# Patient Record
Sex: MALE | Race: WHITE | NOT HISPANIC OR LATINO | Employment: UNEMPLOYED | URBAN - METROPOLITAN AREA
[De-identification: names, ages, dates, MRNs, and addresses within clinical notes are randomized per-mention and may not be internally consistent; named-entity substitution may affect disease eponyms.]

---

## 2016-05-12 LAB
ALT SERPL-CCNC: 30 U/L (ref 9–46)
AST SERPL-CCNC: 36 U/L (ref 10–35)
CHOLEST SERPL-MCNC: 180 MG/DL (ref 125–200)
CREAT SERPL-MCNC: 1.03 MG/DL (ref 0.7–1.33)
GFR SERPL CREATININE-BSD FRML MDRD: 80 ML/MIN/1.73M2
GLUCOSE SERPL-MCNC: 90 MG/DL (ref 65–99)
HBA1C MFR BLD: 5.2 % (ref 0–5.7)
HDLC SERPL-MCNC: 67 MG/DL
LDLC SERPL CALC-MCNC: 95 MG/DL
NONHDLC SERPL-MCNC: 113 MG/DL
POTASSIUM SERPL-SCNC: 4.8 MMOL/L (ref 3.5–5.3)
TRIGL SERPL-MCNC: 89 MG/DL
TSH SERPL-ACNC: 9.22 MCU/ML (ref 0.4–4.5)

## 2017-07-18 LAB
ALT SERPL-CCNC: 20 U/L (ref 9–46)
AST SERPL-CCNC: 27 U/L (ref 10–35)
CREAT SERPL-MCNC: 1.1 MG/DL (ref 0.7–1.33)
GFR SERPL CREATININE-BSD FRML MDRD: 74 ML/MIN/1.73M2
GLUCOSE SERPL-MCNC: 68 MG/DL (ref 65–99)
POTASSIUM SERPL-SCNC: 4.7 MMOL/L (ref 3.5–5.3)
TSH SERPL-ACNC: 11.36 MIU/L (ref 0.4–4.5)

## 2017-08-16 ENCOUNTER — TRANSFERRED RECORDS (OUTPATIENT)
Dept: HEALTH INFORMATION MANAGEMENT | Facility: CLINIC | Age: 57
End: 2017-08-16

## 2017-08-16 LAB — TSH SERPL-ACNC: 12.34 UIU/ML (ref 0.3–5)

## 2017-10-25 LAB — TSH SERPL-ACNC: 3.81 UIU/ML (ref 0.3–5)

## 2018-03-14 ENCOUNTER — TRANSFERRED RECORDS (OUTPATIENT)
Dept: HEALTH INFORMATION MANAGEMENT | Facility: CLINIC | Age: 58
End: 2018-03-14

## 2018-03-14 LAB — TSH SERPL-ACNC: 11.08 UIU/ML (ref 0.3–5)

## 2018-03-27 ENCOUNTER — TRANSFERRED RECORDS (OUTPATIENT)
Dept: HEALTH INFORMATION MANAGEMENT | Facility: CLINIC | Age: 58
End: 2018-03-27

## 2018-03-27 LAB
ALT SERPL-CCNC: 19 U/L (ref 9–46)
AST SERPL-CCNC: 22 U/L (ref 10–35)
CHOLEST SERPL-MCNC: 232 MG/DL
CREAT SERPL-MCNC: 1.05 MG/DL (ref 0.7–1.33)
GFR SERPL CREATININE-BSD FRML MDRD: 78 ML/MIN/1.73M2
GLUCOSE SERPL-MCNC: 73 MG/DL (ref 65–99)
HDLC SERPL-MCNC: 62 MG/DL
LDLC SERPL CALC-MCNC: 126 MG/DL
NONHDLC SERPL-MCNC: 170 MG/DL
POTASSIUM SERPL-SCNC: 4.3 MMOL/L (ref 3.5–5.3)
TRIGL SERPL-MCNC: 284 MG/DL

## 2018-04-03 ENCOUNTER — TRANSFERRED RECORDS (OUTPATIENT)
Dept: HEALTH INFORMATION MANAGEMENT | Facility: CLINIC | Age: 58
End: 2018-04-03

## 2018-04-17 ENCOUNTER — OFFICE VISIT (OUTPATIENT)
Dept: URGENT CARE | Facility: URGENT CARE | Age: 58
End: 2018-04-17
Payer: COMMERCIAL

## 2018-04-17 ENCOUNTER — NURSE TRIAGE (OUTPATIENT)
Dept: NURSING | Facility: CLINIC | Age: 58
End: 2018-04-17

## 2018-04-17 VITALS
OXYGEN SATURATION: 90 % | BODY MASS INDEX: 32.08 KG/M2 | HEART RATE: 85 BPM | HEIGHT: 74 IN | TEMPERATURE: 101.3 F | RESPIRATION RATE: 18 BRPM | SYSTOLIC BLOOD PRESSURE: 161 MMHG | DIASTOLIC BLOOD PRESSURE: 93 MMHG | WEIGHT: 250 LBS

## 2018-04-17 DIAGNOSIS — R05.9 COUGH: ICD-10-CM

## 2018-04-17 DIAGNOSIS — R50.9 FEVER CHILLS: Primary | ICD-10-CM

## 2018-04-17 LAB
BASOPHILS # BLD AUTO: 0 10E9/L (ref 0–0.2)
BASOPHILS NFR BLD AUTO: 0.3 %
DIFFERENTIAL METHOD BLD: ABNORMAL
EOSINOPHIL # BLD AUTO: 0 10E9/L (ref 0–0.7)
EOSINOPHIL NFR BLD AUTO: 0.5 %
ERYTHROCYTE [DISTWIDTH] IN BLOOD BY AUTOMATED COUNT: 12.9 % (ref 10–15)
FLUAV+FLUBV AG SPEC QL: NEGATIVE
FLUAV+FLUBV AG SPEC QL: NEGATIVE
HCT VFR BLD AUTO: 41.1 % (ref 40–53)
HGB BLD-MCNC: 13.9 G/DL (ref 13.3–17.7)
LYMPHOCYTES # BLD AUTO: 0.9 10E9/L (ref 0.8–5.3)
LYMPHOCYTES NFR BLD AUTO: 14.3 %
MCH RBC QN AUTO: 32.1 PG (ref 26.5–33)
MCHC RBC AUTO-ENTMCNC: 33.8 G/DL (ref 31.5–36.5)
MCV RBC AUTO: 95 FL (ref 78–100)
MONOCYTES # BLD AUTO: 0.6 10E9/L (ref 0–1.3)
MONOCYTES NFR BLD AUTO: 9.5 %
NEUTROPHILS # BLD AUTO: 4.8 10E9/L (ref 1.6–8.3)
NEUTROPHILS NFR BLD AUTO: 75.4 %
PLATELET # BLD AUTO: 153 10E9/L (ref 150–450)
RBC # BLD AUTO: 4.33 10E12/L (ref 4.4–5.9)
SPECIMEN SOURCE: NORMAL
WBC # BLD AUTO: 6.3 10E9/L (ref 4–11)

## 2018-04-17 PROCEDURE — 99214 OFFICE O/P EST MOD 30 MIN: CPT | Performed by: PHYSICIAN ASSISTANT

## 2018-04-17 PROCEDURE — 36415 COLL VENOUS BLD VENIPUNCTURE: CPT | Performed by: PHYSICIAN ASSISTANT

## 2018-04-17 PROCEDURE — 85025 COMPLETE CBC W/AUTO DIFF WBC: CPT | Performed by: PHYSICIAN ASSISTANT

## 2018-04-17 PROCEDURE — 87804 INFLUENZA ASSAY W/OPTIC: CPT | Performed by: PHYSICIAN ASSISTANT

## 2018-04-17 RX ORDER — CODEINE PHOSPHATE AND GUAIFENESIN 10; 100 MG/5ML; MG/5ML
1-2 SOLUTION ORAL EVERY 6 HOURS PRN
Qty: 236 ML | Refills: 0 | Status: SHIPPED | OUTPATIENT
Start: 2018-04-17 | End: 2018-08-29

## 2018-04-17 RX ORDER — ALBUTEROL SULFATE 90 UG/1
2 AEROSOL, METERED RESPIRATORY (INHALATION) EVERY 6 HOURS
Qty: 1 INHALER | Refills: 0 | Status: SHIPPED | OUTPATIENT
Start: 2018-04-17 | End: 2018-08-29

## 2018-04-17 NOTE — LETTER
[facility]  [address] [address]  [phone]      April 17, 2018    RE:  Nicolás Fermin                                                                                                                                                       [address] [facility] [address]            To whom it may concern:    Nicolás Fermin was seen in the urgent care today for an influenza like illness. He will be able to return to work when his fever has resolved.         Sincerely,        Salvador Sanchez [facility]

## 2018-04-17 NOTE — PROGRESS NOTES
"SUBJECTIVE:   Nicolás Fermin is a 58 year old male presenting with a chief complaint of fever, body aches, runny nose and cough.  Onset of symptoms was 3-4 days.  Course of illness is same.    Severity moderate  Current and Associated symptoms: dry coughing  Treatment measures tried include OTC medications.  Predisposing factors include none.    Past Medical History:   Diagnosis Date     Asymptomatic human immunodeficiency virus (HIV) infection status (H)      ALLERGIES   No Known Allergies      Social History   Substance Use Topics     Smoking status: Current Every Day Smoker     Smokeless tobacco: Never Used      Comment: 15 cigarettes a day     Alcohol use Not on file       ROS:  CONSTITUTIONAL:POSITIVE  for fever  INTEGUMENTARY/SKIN: NEGATIVE for worrisome rashes, moles or lesions  ENT/MOUTH: POSITIVE for runny nose  RESP:POSITIVE for cough-non productive  CV: NEGATIVE for chest pain, palpitations or peripheral edema  GI: NEGATIVE for nausea, abdominal pain, heartburn, or change in bowel habits  MUSCULOSKELETAL: POSITIVE  for body aches  NEURO: NEGATIVE for weakness, dizziness or paresthesias    OBJECTIVE  :BP (!) 161/93  Pulse 85  Temp 101.3  F (38.5  C) (Oral)  Resp 18  Ht 6' 2\" (1.88 m)  Wt 250 lb (113.4 kg)  SpO2 90%  BMI 32.1 kg/m2  GENERAL APPEARANCE: healthy, alert and no distress  EYES: EOMI,  PERRL, conjunctiva clear  HENT: ear canals and TM's normal.  Nose and mouth without ulcers, erythema or lesions  NECK: supple, nontender, no lymphadenopathy  RESP: lungs clear to auscultation - no rales, rhonchi or wheezes  CV: regular rates and rhythm, normal S1 S2, no murmur noted  ABDOMEN:  soft, nontender, no HSM or masses and bowel sounds normal  MS: extremities normal- no gross deformities noted, no erythema, FROM noted in all extremities  NEURO: Normal strength and tone, sensory exam grossly normal,  normal speech and mentation  SKIN: no suspicious lesions or rashes    Results for orders placed or " performed in visit on 04/17/18   CBC with platelets differential   Result Value Ref Range    WBC 6.3 4.0 - 11.0 10e9/L    RBC Count 4.33 (L) 4.4 - 5.9 10e12/L    Hemoglobin 13.9 13.3 - 17.7 g/dL    Hematocrit 41.1 40.0 - 53.0 %    MCV 95 78 - 100 fl    MCH 32.1 26.5 - 33.0 pg    MCHC 33.8 31.5 - 36.5 g/dL    RDW 12.9 10.0 - 15.0 %    Platelet Count 153 150 - 450 10e9/L    Diff Method Automated Method     % Neutrophils 75.4 %    % Lymphocytes 14.3 %    % Monocytes 9.5 %    % Eosinophils 0.5 %    % Basophils 0.3 %    Absolute Neutrophil 4.8 1.6 - 8.3 10e9/L    Absolute Lymphocytes 0.9 0.8 - 5.3 10e9/L    Absolute Monocytes 0.6 0.0 - 1.3 10e9/L    Absolute Eosinophils 0.0 0.0 - 0.7 10e9/L    Absolute Basophils 0.0 0.0 - 0.2 10e9/L   Influenza A/B antigen   Result Value Ref Range    Influenza A/B Agn Specimen Nasal     Influenza A Negative NEG^Negative    Influenza B Negative NEG^Negative       ASSESSMENT/PLAN:      ICD-10-CM    1. Fever chills R50.9 Emtricitabine-Tenofovir AF (DESCOVY PO)     Dolutegravir Sodium (TIVICAY PO)     Influenza A/B antigen     CBC with platelets differential   2. Cough R05           Influenza A/B antigen     CBC with platelets differential     guaiFENesin-codeine (ROBITUSSIN AC) 100-10 MG/5ML SOLN solution     albuterol (PROVENTIL HFA) 108 (90 Base) MCG/ACT Inhaler     Robitussin ac for coughing  proventil for chest tightness  Fluids, rest  Follow up as needed  See orders in Epic

## 2018-04-17 NOTE — MR AVS SNAPSHOT
"              After Visit Summary   2018    Nicolás Fermin    MRN: [id number]           Patient Information     Date Of Birth          1960        Visit Information        Provider Department      2018 12:50 PM Salvador Sanchez PA-C [facility]        Today's Diagnoses     Fever chills    -  1    Cough           Follow-ups after your visit        Who to contact     If you have questions or need follow up information about today's clinic visit or your schedule please contact [facility] directly at [phone].  Normal or non-critical lab and imaging results will be communicated to you by Echovox[employer], letter or phone within 4 business days after the clinic has received the results. If you do not hear from us within 7 days, please contact the clinic through Echovoxhart or phone. If you have a critical or abnormal lab result, we will notify you by phone as soon as possible.  Submit refill requests through [employer] or call your pharmacy and they will forward the refill request to us. Please allow 3 business days for your refill to be completed.          Additional Information About Your Visit        MyChart Information     [employer] lets you send messages to your doctor, view your test results, renew your prescriptions, schedule appointments and more. To sign up, go to [email or web address] . Click on \"Log in\" on the left side of the screen, which will take you to the Welcome page. Then click on \"Sign up Now\" on the right side of the page.     You will be asked to enter the access code listed below, as well as some personal information. Please follow the directions to create your username and password.     Your access code is: [email or web address]  Expires: 2018  3:52 PM     Your access code will  in 90 days. If you need help or a new code, please call your [place] clinic or [phone].        Care EveryWhere ID     This is your Care EveryWhere ID. " "This could be used by other organizations to access your [place] medical records  [id number]        Your Vitals Were     Pulse Temperature Respirations Height Pulse Oximetry BMI (Body Mass Index)    85 101.3  F (38.5  C) (Oral) 18 6' 2\" (1.88 m) 90% 32.1 kg/m2       Blood Pressure from Last 3 Encounters:   04/17/18 (!) 161/93   11/24/15 150/80    Weight from Last 3 Encounters:   04/17/18 250 lb (113.4 kg)   11/24/15 207 lb (93.9 kg)              We Performed the Following     CBC with platelets differential     Influenza A/B antigen          Today's Medication Changes          These changes are accurate as of 4/17/18  3:52 PM.  If you have any questions, ask your nurse or doctor.               Start taking these medicines.        Dose/Directions    albuterol 108 (90 Base) MCG/ACT Inhaler   Commonly known as:  PROVENTIL HFA   Used for:  Cough   Started by:  Salvador Sanchez PA-C        Dose:  2 puff   Inhale 2 puffs into the lungs every 6 hours   Quantity:  1 Inhaler   Refills:  0       guaiFENesin-codeine 100-10 MG/5ML Soln solution   Commonly known as:  ROBITUSSIN AC   Used for:  Cough   Started by:  Salvador Sanchez PA-C        Dose:  1-2 tsp.   Take 5-10 mLs by mouth every 6 hours as needed for cough   Quantity:  236 mL   Refills:  0            Where to get your medicines      These medications were sent to [id number] [place], [place] - [address] [address]     Phone:  [phone]     albuterol 108 (90 Base) MCG/ACT Inhaler         Some of these will need a paper prescription and others can be bought over the counter.  Ask your nurse if you have questions.     Bring a paper prescription for each of these medications     guaiFENesin-codeine 100-10 MG/5ML Soln solution                Primary Care Provider Fax #    Physician No Ref-Primary [phone]       No address on file        Equal Access to Services     ROSSY MOSLEY AH: hayden [place] " tuckerjackie, yoselin kalouann hess, genie edouard. So [facility] [phone].    ATENCIÓN: Si erasmo jernigan, tiene a aceves disposición servicios gratuitos de asistencia lingüística. Cecilio al [phone].    We comply with applicable federal civil rights laws and [place] laws. We do not discriminate on the basis of race, color, national origin, age, disability, sex, sexual orientation, or gender identity.            Thank you!     Thank you for choosing [facility]  for your care. Our goal is always to provide you with excellent care. Hearing back from our patients is one way we can continue to improve our services. Please take a few minutes to complete the written survey that you may receive in the mail after your visit with us. Thank you!             Your Updated Medication List - Protect others around you: Learn how to safely use, store and throw away your medicines at www.disposemymeds.org.          This list is accurate as of 4/17/18  3:52 PM.  Always use your most recent med list.                   Brand Name Dispense Instructions for use Diagnosis    albuterol 108 (90 Base) MCG/ACT Inhaler    PROVENTIL HFA    1 Inhaler    Inhale 2 puffs into the lungs every 6 hours    Cough       amitriptyline 25 MG tablet    ELAVIL     Take 75 mg by mouth At Bedtime        atenolol 100 MG tablet    TENORMIN     Take 100 mg by mouth daily        ATRIPLA 600-200-300 MG per tablet   Generic drug:  efavirenz-emtrictabine-tenofovir      Take 1 tablet by mouth At Bedtime        DESCOVY PO      Take by mouth daily    Fever chills, Cough       gabapentin 300 MG capsule    NEURONTIN     Take 300 mg by mouth 2 times daily        guaiFENesin-codeine 100-10 MG/5ML Soln solution    ROBITUSSIN AC    236 mL    Take 5-10 mLs by mouth every 6 hours as needed for cough    Cough       TIVICAY PO       Fever chills, Cough       valACYclovir 1000 mg tablet    VALTREX    21 tablet    For  Shingles: Take 1 tablet by mouth three times daily for 7 days    Herpes zoster without complication       VIAGRA 50 MG tablet   Generic drug:  sildenafil      Take by mouth daily as needed for erectile dysfunction

## 2018-04-17 NOTE — TELEPHONE ENCOUNTER
Paulino, pharmacist at [facility] in [place] called asking to substitute Ventolin HFA for the ordered Proventil HFA. [facility] doesn't have the Proventil. Also, Ventolin is $30 cheaper for the patient per his insurance.  Paulino stated it is the same dosing only a different name.  I was able to okay this.  Karin JIMENEZ RN [facility]

## 2018-08-29 ENCOUNTER — OFFICE VISIT (OUTPATIENT)
Dept: INTERNAL MEDICINE | Facility: CLINIC | Age: 58
End: 2018-08-29
Payer: COMMERCIAL

## 2018-08-29 VITALS
HEART RATE: 77 BPM | HEIGHT: 74 IN | WEIGHT: 255.8 LBS | BODY MASS INDEX: 32.83 KG/M2 | OXYGEN SATURATION: 97 % | RESPIRATION RATE: 15 BRPM

## 2018-08-29 DIAGNOSIS — I10 ESSENTIAL HYPERTENSION, BENIGN: Primary | ICD-10-CM

## 2018-08-29 DIAGNOSIS — Z13.6 CARDIOVASCULAR SCREENING; LDL GOAL LESS THAN 130: ICD-10-CM

## 2018-08-29 DIAGNOSIS — E05.00 GRAVES DISEASE: ICD-10-CM

## 2018-08-29 DIAGNOSIS — B20 HUMAN IMMUNODEFICIENCY VIRUS (HIV) DISEASE (H): ICD-10-CM

## 2018-08-29 DIAGNOSIS — G62.9 NEUROPATHY: ICD-10-CM

## 2018-08-29 DIAGNOSIS — Z11.59 NEED FOR HEPATITIS C SCREENING TEST: ICD-10-CM

## 2018-08-29 DIAGNOSIS — Z12.11 SCREEN FOR COLON CANCER: ICD-10-CM

## 2018-08-29 DIAGNOSIS — Z23 NEED FOR PROPHYLACTIC VACCINATION WITH TETANUS-DIPHTHERIA (TD): ICD-10-CM

## 2018-08-29 LAB
ANION GAP SERPL CALCULATED.3IONS-SCNC: 8 MMOL/L (ref 3–14)
BUN SERPL-MCNC: 12 MG/DL (ref 7–30)
CALCIUM SERPL-MCNC: 8.7 MG/DL (ref 8.5–10.1)
CHLORIDE SERPL-SCNC: 101 MMOL/L (ref 94–109)
CO2 SERPL-SCNC: 28 MMOL/L (ref 20–32)
CREAT SERPL-MCNC: 1.1 MG/DL (ref 0.66–1.25)
GFR SERPL CREATININE-BSD FRML MDRD: 68 ML/MIN/1.7M2
GLUCOSE SERPL-MCNC: 99 MG/DL (ref 70–99)
POTASSIUM SERPL-SCNC: 4 MMOL/L (ref 3.4–5.3)
SODIUM SERPL-SCNC: 137 MMOL/L (ref 133–144)
T4 FREE SERPL-MCNC: 0.86 NG/DL (ref 0.76–1.46)
TSH SERPL DL<=0.005 MIU/L-ACNC: 8.1 MU/L (ref 0.4–4)

## 2018-08-29 PROCEDURE — 36415 COLL VENOUS BLD VENIPUNCTURE: CPT | Performed by: INTERNAL MEDICINE

## 2018-08-29 PROCEDURE — 84439 ASSAY OF FREE THYROXINE: CPT | Performed by: INTERNAL MEDICINE

## 2018-08-29 PROCEDURE — 84443 ASSAY THYROID STIM HORMONE: CPT | Performed by: INTERNAL MEDICINE

## 2018-08-29 PROCEDURE — 90715 TDAP VACCINE 7 YRS/> IM: CPT | Performed by: INTERNAL MEDICINE

## 2018-08-29 PROCEDURE — 99214 OFFICE O/P EST MOD 30 MIN: CPT | Mod: 25 | Performed by: INTERNAL MEDICINE

## 2018-08-29 PROCEDURE — 80048 BASIC METABOLIC PNL TOTAL CA: CPT | Performed by: INTERNAL MEDICINE

## 2018-08-29 PROCEDURE — 90471 IMMUNIZATION ADMIN: CPT | Performed by: INTERNAL MEDICINE

## 2018-08-29 RX ORDER — LISINOPRIL 20 MG/1
20 TABLET ORAL DAILY
Qty: 90 TABLET | Refills: 3 | Status: SHIPPED | OUTPATIENT
Start: 2018-08-29 | End: 2019-02-26

## 2018-08-29 RX ORDER — DULOXETIN HYDROCHLORIDE 60 MG/1
60 CAPSULE, DELAYED RELEASE ORAL DAILY
COMMUNITY
Start: 2018-04-05 | End: 2022-07-28

## 2018-08-29 RX ORDER — LEVOTHYROXINE SODIUM 88 UG/1
TABLET ORAL
COMMUNITY
Start: 2018-03-17 | End: 2022-06-06 | Stop reason: DRUGHIGH

## 2018-08-29 RX ORDER — AMITRIPTYLINE HYDROCHLORIDE 75 MG/1
TABLET ORAL
COMMUNITY
Start: 2018-06-26 | End: 2018-08-29

## 2018-08-29 NOTE — LETTER
[address] [address]  [phone]      8/29/2018       Nicolás Fermin  [address] [address] [facility] [address] [facility] [place] [address]        Dear Nicolás,    I am pleased to inform you that your routine blood work including your sodium, potassium, calcium and kidney function tests are all normal.    Your thyroid function tests may indicate you need some medication adjustment so I would call the clinic and make a follow-up appointment to discuss these changes.    Sincerely,      Brooks Walsh MD  Internal Medicine

## 2018-08-29 NOTE — MR AVS SNAPSHOT
After Visit Summary   8/29/2018    Nicolás Fermin    MRN: [id number]           Patient Information     Date Of Birth          1960        Visit Information        Provider Department      8/29/2018 2:20 PM Brooks Walsh MD [facility]        Today's Diagnoses     Essential hypertension, benign    -  1    Human immunodeficiency virus (HIV) disease (H)        CARDIOVASCULAR SCREENING; LDL GOAL LESS THAN 130        Neuropathy        Graves disease        Screen for colon cancer        Need for hepatitis C screening test        Need for prophylactic vaccination with tetanus-diphtheria (TD)           Follow-ups after your visit        Additional Services     GASTROENTEROLOGY ADULT REF PROCEDURE ONLY [facility] Black [phone]       Last Lab Result: Creatinine (mg/dL)       Date                     Value                 09/13/2002               0.8              ----------  There is no height or weight on file to calculate BMI.     Needed:  No  Language:  English    Patient will be contacted to schedule procedure.     Please be aware that coverage of these services is subject to the terms and limitations of your health insurance plan.  Call member services at your health plan with any benefit or coverage questions.  Any procedures must be performed at a [facility] facility OR coordinated by your clinic's referral office.    Please bring the following with you to your appointment:    (1) Any X-Rays, CTs or MRIs which have been performed.  Contact the facility where they were done to arrange for  prior to your scheduled appointment.    (2) List of current medications   (3) This referral request   (4) Any documents/labs given to you for this referral                  Future tests that were ordered for you today     Open Future Orders        Priority Expected Expires Ordered    Hepatitis C Screen Reflex to HCV RNA Quant and Genotype Routine 8/29/2018  "2018    Lipid panel reflex to direct LDL Fasting Routine 2018            Who to contact     If you have questions or need follow up information about today's clinic visit or your schedule please contact [facility] directly at [phone].  Normal or non-critical lab and imaging results will be communicated to you by MyChart, letter or phone within 4 business days after the clinic has received the results. If you do not hear from us within 7 days, please contact the clinic through [employer] or phone. If you have a critical or abnormal lab result, we will notify you by phone as soon as possible.  Submit refill requests through [employer] or call your pharmacy and they will forward the refill request to us. Please allow 3 business days for your refill to be completed.          Additional Information About Your Visit        [employer] Information     [employer] lets you send messages to your doctor, view your test results, renew your prescriptions, schedule appointments and more. To sign up, go to [email or web address] . Click on \"Log in\" on the left side of the screen, which will take you to the Welcome page. Then click on \"Sign up Now\" on the right side of the page.     You will be asked to enter the access code listed below, as well as some personal information. Please follow the directions to create your username and password.     Your access code is: [email or web address]  Expires: 2018  2:15 PM     Your access code will  in 90 days. If you need help or a new code, please call your [place] clinic or [phone].        Care EveryWhere ID     This is your Care EveryWhere ID. This could be used by other organizations to access your [place] medical records  [id number]        Your Vitals Were     Pulse Respirations Height Pulse Oximetry BMI (Body Mass Index)       77 15 6' 2\" (1.88 m) 97% 32.84 kg/m2        Blood Pressure from Last 3 Encounters: "   08/29/18 (!) (P) 160/100   04/17/18 (!) 161/93   11/24/15 150/80    Weight from Last 3 Encounters:   08/29/18 255 lb 12.8 oz (116 kg)   04/17/18 250 lb (113.4 kg)   11/24/15 207 lb (93.9 kg)              We Performed the Following     Basic metabolic panel     GASTROENTEROLOGY ADULT REF PROCEDURE ONLY [facility]at[facility] Black [phone]     TDAP VACCINE (ADACEL)     TSH with free T4 reflex          Today's Medication Changes          These changes are accurate as of 8/29/18  2:39 PM.  If you have any questions, ask your nurse or doctor.               Start taking these medicines.        Dose/Directions    lisinopril 20 MG tablet   Commonly known as:  PRINIVIL/ZESTRIL   Used for:  Essential hypertension, benign   Started by:  Brooks Walsh MD        Dose:  20 mg   Take 1 tablet (20 mg) by mouth daily   Quantity:  90 tablet   Refills:  3            Where to get your medicines      These medications were sent to [facility] - [place], [place] - [address] AT [facility] [place] [facility] [address]  [address], [place] [facility] [place] [address]    Hours:  24-hours Phone:  [phone]     lisinopril 20 MG tablet                Primary Care Provider Fax #    Physician No Ref-Primary [phone]       No address on file        Equal Access to Services     ROSSY MOSLEY AH: Hadii aad ku hadasho Soomaali, waaxda luqadaha, qaybta kaalmada adeegyada, genie quan haymichael goldberg . So [facility] [phone].    ATENCIÓN: Si habla español, tiene a aceves disposición servicios gratuitos de asistencia lingüística. Llame al [phone].    We comply with applicable federal civil rights laws and [place] laws. We do not discriminate on the basis of race, color, national origin, age, disability, sex, sexual orientation, or gender identity.            Thank you!     Thank you for choosing [facility]  for your care. Our goal is always to provide you with excellent care. Hearing back from our  patients is one way we can continue to improve our services. Please take a few minutes to complete the written survey that you may receive in the mail after your visit with us. Thank you!             Your Updated Medication List - Protect others around you: Learn how to safely use, store and throw away your medicines at www.disposemymeds.org.          This list is accurate as of 8/29/18  2:39 PM.  Always use your most recent med list.                   Brand Name Dispense Instructions for use Diagnosis    amitriptyline 25 MG tablet    ELAVIL     Take 75 mg by mouth At Bedtime        atenolol 100 MG tablet    TENORMIN     Take 100 mg by mouth daily        DESCOVY PO      Take by mouth daily    Fever chills, Cough       DULoxetine 60 MG EC capsule    CYMBALTA     Take 60 mg by mouth        gabapentin 300 MG capsule    NEURONTIN     Take 300 mg by mouth        levothyroxine 88 MCG tablet    SYNTHROID/LEVOTHROID          lisinopril 20 MG tablet    PRINIVIL/ZESTRIL    90 tablet    Take 1 tablet (20 mg) by mouth daily    Essential hypertension, benign       TIVICAY PO      Take 50 mg by mouth daily    Fever chills, Cough       VIAGRA 50 MG tablet   Generic drug:  sildenafil      Take by mouth daily as needed for erectile dysfunction

## 2018-08-29 NOTE — PROGRESS NOTES
SUBJECTIVE:   Nicolás Fermin is a 58 year old male who presents to clinic today for the following health issues:    New patient never seen in IM clinic. Patient states no previous routine provider. Seen by Dr. Wagner (HIV) and Neurology ( Dr. Hall- neuropathy), Dr. Freeman ([facility])     Hypertension Follow-up      Outpatient blood pressures are not being checked routinely     Low Salt Diet: low salt        Amount of exercise or physical activity: None    Problems taking medications regularly: No    Medication side effects: none    Diet: regular (no restrictions)    Other concerns:  1. Requesting TSH lab check     Problem list and histories reviewed & adjusted, as indicated.  Additional history: as documented    Patient Active Problem List   Diagnosis     Essential hypertension, benign     Human immunodeficiency virus (HIV) disease (H)     CARDIOVASCULAR SCREENING; LDL GOAL LESS THAN 130     Graves disease     Neuropathy     No past surgical history on file.    Social History   Substance Use Topics     Smoking status: Current Every Day Smoker     Smokeless tobacco: Never Used      Comment: 15 cigarettes a day     Alcohol use Not on file     No family history on file.      Current Outpatient Prescriptions   Medication Sig Dispense Refill     amitriptyline (ELAVIL) 25 MG tablet Take 75 mg by mouth At Bedtime       atenolol (TENORMIN) 100 MG tablet Take 100 mg by mouth daily       Dolutegravir Sodium (TIVICAY PO) Take 50 mg by mouth daily        DULoxetine (CYMBALTA) 60 MG EC capsule Take 60 mg by mouth       Emtricitabine-Tenofovir AF (DESCOVY PO) Take by mouth daily       gabapentin (NEURONTIN) 300 MG capsule Take 300 mg by mouth        levothyroxine (SYNTHROID/LEVOTHROID) 88 MCG tablet        lisinopril (PRINIVIL/ZESTRIL) 20 MG tablet Take 1 tablet (20 mg) by mouth daily 90 tablet 3     sildenafil (VIAGRA) 50 MG tablet Take by mouth daily as needed for erectile dysfunction        "[DISCONTINUED] amitriptyline (ELAVIL) 75 MG tablet TAKE ONE TABLET BY MOUTH NIGHTLY AT BEDTIME       No Known Allergies  BP Readings from Last 3 Encounters:   04/17/18 (!) 161/93   11/24/15 150/80    Wt Readings from Last 3 Encounters:   04/17/18 250 lb (113.4 kg)   11/24/15 207 lb (93.9 kg)            Reviewed and updated as needed this visit by clinical staff       Reviewed and updated as needed this visit by Provider       ROS:  CONSTITUTIONAL: NEGATIVE for fever, chills, change in weight  ENT/MOUTH: NEGATIVE for ear, mouth and throat problems  RESP: NEGATIVE for significant cough or SOB  CV: NEGATIVE for chest pain, palpitations or peripheral edema  GI: NEGATIVE for nausea, abdominal pain, heartburn, or change in bowel habits  : NEGATIVE for frequency, dysuria, or hematuria  MUSCULOSKELETAL: NEGATIVE for significant arthralgias or myalgia  HEME: NEGATIVE for bleeding problems  PSYCHIATRIC: NEGATIVE for changes in mood or affect    OBJECTIVE:                                                    BP (!) (P) 160/100  Pulse 77  Temp (P) 98  F (36.7  C) (Oral)  Resp 15  Ht 6' 2\" (1.88 m)  Wt 255 lb 12.8 oz (116 kg)  SpO2 97%  BMI 32.84 kg/m2  Body mass index is 32.84 kg/(m^2).  GENERAL: alert and no distress  RESP: lungs clear to auscultation - no rales, no rhonchi, no wheezes  CV: regular rates and rhythm, normal S1 S2, no S3 or S4 and no murmur, no click or rub -  MS: extremities- no gross deformities noted  NEURO:  No acute changes  PSYCH: Alert and oriented times 3; speech- coherent , normal rate and volume; able to articulate logical thoughts, able to abstract reason, no tangential thoughts, no hallucinations or delusions, affect- normal     ASSESSMENT/PLAN:                                                      (I10) Essential hypertension, benign  (primary encounter diagnosis)  Comment: Needs better blood pressure control thus will start lisinopril 20 mg daily pending basic metabolic  Plan: Basic metabolic " panel, lisinopril         (PRINIVIL/ZESTRIL) 20 MG tablet        Recheck blood pressure in clinic in 4    (B20) Human immunodeficiency virus (HIV) disease (H)  Comment: Stable and followed in the infectious disease clinic and continuing with antiviral third  Plan: Annual T4 and HIV viral RNA load recommend    (Z13.6) CARDIOVASCULAR SCREENING; LDL GOAL LESS THAN 130  Comment: Labs ordered as fasting per root  Plan: Lipid panel reflex to direct LDL Fasting            (G62.9) Neuropathy  Comment: Stable on gabapentin continue with low-dose as direct  Plan:     (E05.00) Graves disease  Comment: Stable on Synthroid recheck TSH follow-up endocrinology as direct  Plan: TSH with free T4 reflex            (Z12.11) Screen for colon cancer  Comment: ADVISED COLONOSCOPY FOR ROUTINE PREVENTATIVE CARE.    Plan: GASTROENTEROLOGY ADULT REF PROCEDURE ONLY         Aissatou Sofia [phone]            (Z11.59) Need for hepatitis C screening test  Comment: Routine screening done per  Plan: Hepatitis C Screen Reflex to HCV RNA Quant and         Genotype            (Z23) Need for prophylactic vaccination with tetanus-diphtheria (TD)  Comment: Recommended update as per routine tetanus booster recommended  Plan: TDAP VACCINE (ADACEL)          See Patient Instructions    Brooks Walsh MD  [facility]    THE MEDICATION LIST HAS BEEN FULLY RECONCILED BY THE M.D. AND THE NURSING STAFF.

## 2018-09-07 DIAGNOSIS — Z13.6 CARDIOVASCULAR SCREENING; LDL GOAL LESS THAN 130: ICD-10-CM

## 2018-09-07 DIAGNOSIS — Z11.59 NEED FOR HEPATITIS C SCREENING TEST: ICD-10-CM

## 2018-09-07 LAB
CHOLEST SERPL-MCNC: 188 MG/DL
HDLC SERPL-MCNC: 70 MG/DL
LDLC SERPL CALC-MCNC: 100 MG/DL
NONHDLC SERPL-MCNC: 118 MG/DL
TRIGL SERPL-MCNC: 91 MG/DL

## 2018-09-07 PROCEDURE — 36415 COLL VENOUS BLD VENIPUNCTURE: CPT | Performed by: INTERNAL MEDICINE

## 2018-09-07 PROCEDURE — 86803 HEPATITIS C AB TEST: CPT | Performed by: INTERNAL MEDICINE

## 2018-09-07 PROCEDURE — 80061 LIPID PANEL: CPT | Performed by: INTERNAL MEDICINE

## 2018-09-10 LAB — HCV AB SERPL QL IA: NONREACTIVE

## 2018-09-18 ENCOUNTER — TRANSFERRED RECORDS (OUTPATIENT)
Dept: INTERNAL MEDICINE | Facility: CLINIC | Age: 58
End: 2018-09-18

## 2018-09-18 NOTE — PROGRESS NOTES
Received transfer of records from [facility]. Sent to Dr Walsh.  Electronically filed by Kim Mcardle     9/18/2018  2:01 PM

## 2018-10-17 ENCOUNTER — HOSPITAL ENCOUNTER (OUTPATIENT)
Facility: CLINIC | Age: 58
Discharge: HOME | End: 2018-10-17
Attending: COLON & RECTAL SURGERY | Admitting: COLON & RECTAL SURGERY
Payer: COMMERCIAL

## 2018-10-17 ENCOUNTER — SURGERY (OUTPATIENT)
Age: 58
End: 2018-10-17

## 2018-10-17 VITALS
DIASTOLIC BLOOD PRESSURE: 103 MMHG | HEIGHT: 74 IN | RESPIRATION RATE: 11 BRPM | WEIGHT: 245 LBS | OXYGEN SATURATION: 93 % | BODY MASS INDEX: 31.44 KG/M2 | SYSTOLIC BLOOD PRESSURE: 170 MMHG

## 2018-10-17 LAB — COLONOSCOPY: NORMAL

## 2018-10-17 PROCEDURE — 88305 TISSUE EXAM BY PATHOLOGIST: CPT | Mod: 26 | Performed by: COLON & RECTAL SURGERY

## 2018-10-17 PROCEDURE — 25000128 H RX IP 250 OP 636: Performed by: COLON & RECTAL SURGERY

## 2018-10-17 PROCEDURE — G0500 MOD SEDAT ENDO SERVICE >5YRS: HCPCS | Performed by: COLON & RECTAL SURGERY

## 2018-10-17 PROCEDURE — 88305 TISSUE EXAM BY PATHOLOGIST: CPT | Performed by: COLON & RECTAL SURGERY

## 2018-10-17 PROCEDURE — 45385 COLONOSCOPY W/LESION REMOVAL: CPT | Performed by: COLON & RECTAL SURGERY

## 2018-10-17 RX ORDER — FENTANYL CITRATE 50 UG/ML
INJECTION, SOLUTION INTRAMUSCULAR; INTRAVENOUS PRN
Status: DISCONTINUED | OUTPATIENT
Start: 2018-10-17 | End: 2018-10-17 | Stop reason: HOSPADM

## 2018-10-17 RX ORDER — ONDANSETRON 2 MG/ML
4 INJECTION INTRAMUSCULAR; INTRAVENOUS
Status: DISCONTINUED | OUTPATIENT
Start: 2018-10-17 | End: 2018-10-17 | Stop reason: HOSPADM

## 2018-10-17 RX ORDER — ASPIRIN 325 MG/1
325 TABLET, FILM COATED ORAL DAILY
COMMUNITY

## 2018-10-17 RX ORDER — LIDOCAINE 40 MG/G
CREAM TOPICAL
Status: DISCONTINUED | OUTPATIENT
Start: 2018-10-17 | End: 2018-10-17 | Stop reason: HOSPADM

## 2018-10-17 RX ORDER — OMEGA-3/DHA/EPA/FISH OIL 60 MG-90MG
CAPSULE ORAL
COMMUNITY

## 2018-10-17 RX ADMIN — FENTANYL CITRATE 100 MCG: 50 INJECTION, SOLUTION INTRAMUSCULAR; INTRAVENOUS at 09:30

## 2018-10-17 RX ADMIN — MIDAZOLAM 2 MG: 1 INJECTION INTRAMUSCULAR; INTRAVENOUS at 09:29

## 2018-10-17 NOTE — H&P
Colon & Rectal Surgery History and Physical  Pre-Endoscopy Procedure Note    History of Present Illness   I have been asked by Dr. Walsh to evaluate this 58 year old male for colorectal cancer screening. He denies any abdominal pain, weight loss, bleeding per rectum, or recent change in bowel habits.    Past Medical History  Diagnosis Date     Asymptomatic human immunodeficiency virus (HIV) infection status (H)      Graves disease 2006     Hypertension      Peripheral neuropathy        Past Surgical History   No past surgical history.     Medications  Medication Sig     amitriptyline (ELAVIL) 25 MG tablet Take 75 mg by mouth At Bedtime     aspirin - buffered (ASCRIPTIN) 325 MG TABS tablet Take 325 mg by mouth daily     atenolol (TENORMIN) 100 MG tablet Take 100 mg by mouth daily     DiphenhydrAMINE HCl (BENADRYL PO)      Dolutegravir Sodium (TIVICAY PO) Take 50 mg by mouth daily      DULoxetine (CYMBALTA) 60 MG EC capsule Take 60 mg by mouth     Emtricitabine-Tenofovir AF (DESCOVY PO) Take by mouth daily     gabapentin (NEURONTIN) 300 MG capsule Take 300 mg by mouth      levothyroxine (SYNTHROID/LEVOTHROID) 88 MCG tablet      lisinopril (PRINIVIL/ZESTRIL) 20 MG tablet Take 1 tablet (20 mg) by mouth daily     NAPROXEN PO      Omega-3 Fatty Acids (FISH OIL) 500 MG CAPS      psyllium (METAMUCIL) 58.6 % POWD Take by mouth daily     sildenafil (VIAGRA) 50 MG tablet Take by mouth daily as needed for erectile dysfunction       Allergies   No Known Allergies     Family History   Family history includes Breast Cancer in his mother; Hypertension in his father; Thyroid Disease in his mother.     Social History   He reports that he has been smoking Cigarettes.  He has been smoking about 0.50 packs per day. He has never used smokeless tobacco. He reports that he drinks alcohol. He reports that he does not use illicit drugs.    Review of Systems   Constitutional:  No fever, weight change or fatigue.    Eyes:     No dry eyes or  "vision changes.   Ears/Nose/Throat/Neck:  No oral ulcers, sore throat or voice change.    Cardiovascular:   No palpitations, syncope, angina or edema.   Respiratory:    No chest pain, excessive sleepiness, shortness of breath or hemoptysis.    Gastrointestinal:   No abdominal pain, nausea, vomiting, diarrhea or heartburn.    Genitourinary:   No dysuria, hematuria, urinary retention or urinary frequency.   Musculoskeletal:  No joint swelling or arthralgias.    Dermatologic:  No skin rash or other skin changes.   Neurologic:    No focal weakness or numbness. No neuropathy.   Psychiatric:    No depression, anxiety, suicidal ideation, or paranoid ideation.   Endocrine:   No cold or heat intolerance, polydipsia, hirsutism, change in libido, or flushing.   Hematology/Lymphatic:  No bleeding or lymphadenopathy.    Allergy/Immunology:  No rhinitis or hives.     Physical Exam   Vitals:  BP (!) 195/112, RR 18, HR 64, height 1.88 m (6' 2\"), weight 111.1 kg (245 lb), SpO2 97 %.    General:  Alert and oriented to person, place and time   Airway: Normal oropharyngeal airway and neck mobility   Lungs:  Clear bilaterally   Heart:  Regular rate and rhythm   Abdomen: Soft, NT, ND, no masses   Rectal:  Perianal skin without excoriation, hemorrhoidal disease or anal fissure        Digital rectal examination reveals normal sphincter tone without masses    ASA Grade: II (mild systemic disease)    Impression: Cleared for use of conscious sedation for colorectal cancer screening    Plan: Proceed with colonoscopy     Sofia Penn MD  [facility]  [phone]  "

## 2018-10-19 LAB — COPATH REPORT: NORMAL

## 2019-02-11 ENCOUNTER — TELEPHONE (OUTPATIENT)
Dept: INTERNAL MEDICINE | Facility: CLINIC | Age: 59
End: 2019-02-11

## 2019-02-11 NOTE — TELEPHONE ENCOUNTER
Panel Management Review    Patient Active Problem List   Diagnosis     Essential hypertension, benign     Human immunodeficiency virus (HIV) disease (H)     CARDIOVASCULAR SCREENING; LDL GOAL LESS THAN 130     Graves disease     Neuropathy       Patient has the following on his problem list:     Hypertension   Last three blood pressure readings:  BP Readings from Last 3 Encounters:   10/17/18 (!) 170/103   08/29/18 (!) (P) 160/100   04/17/18 (!) 161/93     Blood pressure: FAILED    HTN Guidelines:  Age 18-59 BP range:  Less than 140/90  Age 60-85 with Diabetes:  Less than 140/90  Age 60-85 without Diabetes:  less than 150/90      Composite cancer screening  Chart review shows that this patient is due/due soon for the following None  Summary:    Patient is due/failing the following:   FOLLOW UP    Action needed:   Patient needs office visit for HTN follow up.    Type of outreach:    Sent letter.    Questions for provider review:    None                                                                                                                                    Annamaria Lazaro CMA       Chart routed to Care Team .

## 2019-02-11 NOTE — LETTER
[facility]  [address] [address]  [phone]  February 11, 2019    Nicolás Fermin  [address]  [place] [address]    Dear Nicolás,    We care about your health and based on a review of your medical records, recommend the the following, to better manage your health:      You are in particular need of attention regarding:  -High Blood Pressure    I am recommending that you:     -schedule a FOLLOWUP OFFICE APPOINTMENT with me.        Here is a list of Health Maintenance topics that are due now or due soon:  Health Maintenance Due   Topic Date Due     Meningitis A Vaccine (1 - Risk 2-dose series) 04/13/1961     HIV SCREEN (SYSTEM ASSIGNED)  04/13/1977     Zoster (Shingles) Vaccine (1 of 2) 04/13/2009     Flu Vaccine (1) 09/01/2018       Please call us at [phone] or [phone] (or use Verifico) to address the above recommendations.     Thank you for trusting [facility].  We appreciate the opportunity to serve you and look forward to supporting your healthcare needs in the future.    If you have (or plan to have) any of these tests done at a facility other than a [facility] or a [facility], please have the results from these tests sent to your primary physician at [facility].    Healthy Regards,    Brooks Walsh MD/Annamaria Lazaro CMA

## 2019-02-25 NOTE — PROGRESS NOTES
SUBJECTIVE:   CC: Nicolás Fermin is an 58 year old male who presents for preventive health visit.     Answers for HPI/ROS submitted by the patient on 2/26/2019   Annual Exam:  Frequency of exercise:: 2-3 days/week  Getting at least 3 servings of Calcium per day:: Yes  Diet:: Low salt  Medication side effects:: Not applicable  Duration of exercise:: N/A        Today's PHQ-2 Score:   PHQ-2 ( 1999 [employer]) 8/29/2018   Q1: Little interest or pleasure in doing things 0   Q2: Feeling down, depressed or hopeless 0   PHQ-2 Score 0       Abuse: Current or Past(Physical, Sexual or Emotional)- No  Do you feel safe in your environment? Yes    Social History     Tobacco Use     Smoking status: Current Every Day Smoker     Packs/day: 0.50     Types: Cigarettes     Smokeless tobacco: Never Used     Tobacco comment: 15 cigarettes a day   Substance Use Topics     Alcohol use: Yes     Alcohol/week: 0.0 oz     Comment: 2-3/day     If you drink alcohol do you typically have >3 drinks per day or >7 drinks per week? No                      Last PSA: No results found for: PSA    Reviewed orders with patient. Reviewed health maintenance and updated orders accordingly - Yes    Reviewed and updated as needed this visit by clinical staff       Reviewed and updated as needed this visit by Provider        ROS:  CONSTITUTIONAL: NEGATIVE for fever, chills, change in weight  INTEGUMENTARY/SKIN: NEGATIVE for worrisome rashes, moles or lesions  EYES: NEGATIVE for vision changes or irritation  ENT: NEGATIVE for ear, mouth and throat problems  RESP: NEGATIVE for significant cough or SOB  CV: NEGATIVE for chest pain, palpitations or peripheral edema  GI: NEGATIVE for nausea, abdominal pain, heartburn, or change in bowel habits   male: negative for dysuria, hematuria, decreased urinary stream, erectile dysfunction, urethral discharge  MUSCULOSKELETAL: NEGATIVE for significant arthralgias or myalgia  NEURO: NEGATIVE for weakness, dizziness with  "mild paresthesias to ffet.  PSYCHIATRIC: NEGATIVE for changes in mood or affect    OBJECTIVE:   BP (!) 160/98   Pulse 75   Temp 98.3  F (36.8  C) (Oral)   Resp 15   Ht 1.88 m (6' 2\")   Wt 114.3 kg (251 lb 14.4 oz)   SpO2 98%   BMI 32.34 kg/m    EXAM:  GENERAL: healthy, alert and no distress  EYES: Eyes grossly normal to inspection, PERRL and conjunctivae and sclerae normal  HENT: ear canals and TM's normal, nose and mouth without ulcers or lesions  NECK: no adenopathy, no asymmetry, masses, or scars and thyroid normal to palpation  RESP: lungs clear to auscultation - no rales, rhonchi or wheezes  CV: regular rate and rhythm, normal S1 S2, no S3 or S4, no murmur, click or rub, no peripheral edema and peripheral pulses strong  ABDOMEN: soft, nontender, no hepatosplenomegaly, no masses and bowel sounds normal  RECTAL: normal sphincter tone, no rectal masses, prostate normal size, smooth, nontender without nodules or masses  MS: no gross musculoskeletal defects noted, no edema  NEURO: Normal strength and tone, mentation intact and speech normal less subjective paresthesia to feet.  PSYCH: mentation appears normal, affect normal/bright    LDL Cholesterol Calculated   Date Value Ref Range Status   09/07/2018 100 (H) <100 mg/dL Final     Comment:     Desirable:       <100 mg/dl       ASSESSMENT/PLAN:   1. Encounter for routine adult health examination without abnormal findings  Advised Shingrix    2. Human immunodeficiency virus (HIV) disease (H)  Stable and followed through infectious disease as ordered    3. Essential hypertension, benign  Poorly controlled and will increase lisinopril to twice daily dosing with recheck blood pressure 6-8 weeks recommend  - lisinopril (PRINIVIL/ZESTRIL) 20 MG tablet; Take 1 tablet (20 mg) by mouth 2 times daily  Dispense: 180 tablet; Refill: 3  - OFFICE/OUTPT VISIT,PEGGY WANG III    4. Neuropathy  Stable and continuing with medical management and followed up per    Daniella Danielson " "disease  Patient scheduled to see endocrinology within the next couple months.  Will recheck TSH and free T4 today as labs are currently at subclinical level    6. CARDIOVASCULAR SCREENING; LDL GOAL LESS THAN 130  Current LDL at goal continuing with medical therapy    7. Screening PSA (prostate specific antigen)  Labs ordered as scheduled  - PSA, screen    8. Colon cancer screening  Recent colonoscopy updated      COUNSELING:  Reviewed preventive health counseling, as reflected in patient instructions       Regular exercise       Healthy diet/nutrition    BP Readings from Last 1 Encounters:   10/17/18 (!) 170/103     Estimated body mass index is 31.46 kg/m  as calculated from the following:    Height as of 10/17/18: 1.88 m (6' 2\").    Weight as of 10/17/18: 111.1 kg (245 lb).       reports that he has been smoking cigarettes.  He has been smoking about 0.50 packs per day. he has never used smokeless tobacco.      Counseling Resources:  ATP IV Guidelines  Pooled Cohorts Equation Calculator  FRAX Risk Assessment  ICSI Preventive Guidelines  Dietary Guidelines for Americans, 2010  USDA's MyPlate  ASA Prophylaxis  Lung CA Screening    Brooks Walsh MD  [facility]  "

## 2019-02-26 ENCOUNTER — OFFICE VISIT (OUTPATIENT)
Dept: INTERNAL MEDICINE | Facility: CLINIC | Age: 59
End: 2019-02-26
Payer: COMMERCIAL

## 2019-02-26 VITALS
BODY MASS INDEX: 32.33 KG/M2 | WEIGHT: 251.9 LBS | RESPIRATION RATE: 15 BRPM | OXYGEN SATURATION: 98 % | TEMPERATURE: 98.3 F | HEIGHT: 74 IN | DIASTOLIC BLOOD PRESSURE: 98 MMHG | SYSTOLIC BLOOD PRESSURE: 160 MMHG | HEART RATE: 75 BPM

## 2019-02-26 DIAGNOSIS — Z12.5 SCREENING PSA (PROSTATE SPECIFIC ANTIGEN): ICD-10-CM

## 2019-02-26 DIAGNOSIS — Z13.6 CARDIOVASCULAR SCREENING; LDL GOAL LESS THAN 130: ICD-10-CM

## 2019-02-26 DIAGNOSIS — B20 HUMAN IMMUNODEFICIENCY VIRUS (HIV) DISEASE (H): ICD-10-CM

## 2019-02-26 DIAGNOSIS — E05.00 GRAVES DISEASE: ICD-10-CM

## 2019-02-26 DIAGNOSIS — Z12.11 COLON CANCER SCREENING: ICD-10-CM

## 2019-02-26 DIAGNOSIS — G62.9 NEUROPATHY: ICD-10-CM

## 2019-02-26 DIAGNOSIS — I10 ESSENTIAL HYPERTENSION, BENIGN: ICD-10-CM

## 2019-02-26 DIAGNOSIS — Z00.00 ENCOUNTER FOR ROUTINE ADULT HEALTH EXAMINATION WITHOUT ABNORMAL FINDINGS: Primary | ICD-10-CM

## 2019-02-26 PROCEDURE — 36415 COLL VENOUS BLD VENIPUNCTURE: CPT | Performed by: INTERNAL MEDICINE

## 2019-02-26 PROCEDURE — G0103 PSA SCREENING: HCPCS | Performed by: INTERNAL MEDICINE

## 2019-02-26 PROCEDURE — 84439 ASSAY OF FREE THYROXINE: CPT | Performed by: INTERNAL MEDICINE

## 2019-02-26 PROCEDURE — 99396 PREV VISIT EST AGE 40-64: CPT | Performed by: INTERNAL MEDICINE

## 2019-02-26 PROCEDURE — 99214 OFFICE O/P EST MOD 30 MIN: CPT | Mod: 25 | Performed by: INTERNAL MEDICINE

## 2019-02-26 PROCEDURE — 84443 ASSAY THYROID STIM HORMONE: CPT | Performed by: INTERNAL MEDICINE

## 2019-02-26 RX ORDER — LISINOPRIL 20 MG/1
20 TABLET ORAL 2 TIMES DAILY
Qty: 180 TABLET | Refills: 3 | Status: SHIPPED | OUTPATIENT
Start: 2019-02-26 | End: 2019-05-28 | Stop reason: ALTCHOICE

## 2019-02-26 ASSESSMENT — MIFFLIN-ST. JEOR: SCORE: 2027.36

## 2019-02-26 NOTE — LETTER
[address] [address]  [phone]      2/27/2019       Nicolás Fermin  [address] [place] [address]        Dear Nicolás,    Your thyroid function tests are slightly abnormal but stable and should be rechecked here in the clinic in 3 months with a follow-up visit with me.  I will look forward to seeing you at that time and please call to make an appointment.    In addition, your PSA or prostate screening antigen is normal and should be repeated annually.    Sincerely,      Brooks Walsh MD  Internal Medicine

## 2019-02-27 LAB
PSA SERPL-ACNC: 0.45 UG/L (ref 0–4)
T4 FREE SERPL-MCNC: 0.99 NG/DL (ref 0.76–1.46)
TSH SERPL DL<=0.005 MIU/L-ACNC: 6.1 MU/L (ref 0.4–4)

## 2019-05-21 ENCOUNTER — TRANSFERRED RECORDS (OUTPATIENT)
Dept: HEALTH INFORMATION MANAGEMENT | Facility: CLINIC | Age: 59
End: 2019-05-21

## 2019-05-28 ENCOUNTER — OFFICE VISIT (OUTPATIENT)
Dept: INTERNAL MEDICINE | Facility: CLINIC | Age: 59
End: 2019-05-28
Payer: COMMERCIAL

## 2019-05-28 VITALS
HEART RATE: 87 BPM | DIASTOLIC BLOOD PRESSURE: 80 MMHG | RESPIRATION RATE: 16 BRPM | TEMPERATURE: 98.5 F | OXYGEN SATURATION: 96 % | SYSTOLIC BLOOD PRESSURE: 150 MMHG | WEIGHT: 252.8 LBS | BODY MASS INDEX: 32.46 KG/M2

## 2019-05-28 DIAGNOSIS — Z72.0 TOBACCO ABUSE DISORDER: ICD-10-CM

## 2019-05-28 DIAGNOSIS — Z13.6 CARDIOVASCULAR SCREENING; LDL GOAL LESS THAN 130: ICD-10-CM

## 2019-05-28 DIAGNOSIS — E05.00 GRAVES DISEASE: ICD-10-CM

## 2019-05-28 DIAGNOSIS — I10 ESSENTIAL HYPERTENSION, BENIGN: Primary | ICD-10-CM

## 2019-05-28 PROCEDURE — 99214 OFFICE O/P EST MOD 30 MIN: CPT | Performed by: INTERNAL MEDICINE

## 2019-05-28 RX ORDER — LISINOPRIL AND HYDROCHLOROTHIAZIDE 12.5; 2 MG/1; MG/1
1 TABLET ORAL
Qty: 180 TABLET | Refills: 1 | Status: SHIPPED | OUTPATIENT
Start: 2019-05-28 | End: 2019-11-06

## 2019-05-28 NOTE — PROGRESS NOTES
Subjective     Nicolás Fermin is a 59 year old male who presents to clinic today for the following health issues:    HPI   Hypertension Follow-up      Do you check your blood pressure regularly outside of the clinic? Yes     Are you following a low salt diet? Yes    Are your blood pressures ever more than 140 on the top number (systolic) OR more   than 90 on the bottom number (diastolic), for example 140/90? Yes    Amount of exercise or physical activity: None    Problems taking medications regularly: No    Medication side effects: none    Diet: regular (no restrictions)    Dr. Romero was concerned about his lipids talk of a statin    Patient Active Problem List   Diagnosis     Essential hypertension, benign     Human immunodeficiency virus (HIV) disease (H)     CARDIOVASCULAR SCREENING; LDL GOAL LESS THAN 130     Graves disease     Neuropathy     No past surgical history on file.    Social History     Tobacco Use     Smoking status: Current Every Day Smoker     Packs/day: 0.50     Types: Cigarettes     Smokeless tobacco: Never Used     Tobacco comment: 10 cigarettes a day   Substance Use Topics     Alcohol use: Yes     Alcohol/week: 0.0 oz     Comment: 2-3/day     Family History   Problem Relation Age of Onset     Thyroid Disease Mother      Breast Cancer Mother      Hypertension Father          Current Outpatient Medications   Medication Sig Dispense Refill     amitriptyline (ELAVIL) 25 MG tablet Take 75 mg by mouth At Bedtime       aspirin - buffered (ASCRIPTIN) 325 MG TABS tablet Take 325 mg by mouth daily       atenolol (TENORMIN) 100 MG tablet Take 100 mg by mouth daily       DiphenhydrAMINE HCl (BENADRYL PO)        Dolutegravir Sodium (TIVICAY PO) Take 50 mg by mouth daily        DULoxetine (CYMBALTA) 60 MG EC capsule Take 60 mg by mouth       Emtricitabine-Tenofovir AF (DESCOVY PO) Take by mouth daily       gabapentin (NEURONTIN) 300 MG capsule Take 300 mg by mouth        levothyroxine  (SYNTHROID/LEVOTHROID) 88 MCG tablet        lisinopril (PRINIVIL/ZESTRIL) 20 MG tablet Take 1 tablet (20 mg) by mouth 2 times daily 180 tablet 3     NAPROXEN PO        Omega-3 Fatty Acids (FISH OIL) 500 MG CAPS        psyllium (METAMUCIL) 58.6 % POWD Take by mouth daily       sildenafil (VIAGRA) 50 MG tablet Take by mouth daily as needed for erectile dysfunction       No Known Allergies  BP Readings from Last 3 Encounters:   02/26/19 (!) 160/98   10/17/18 (!) 170/103   08/29/18 (!) (P) 160/100    Wt Readings from Last 3 Encounters:   02/26/19 114.3 kg (251 lb 14.4 oz)   10/17/18 111.1 kg (245 lb)   08/29/18 116 kg (255 lb 12.8 oz)           Reviewed and updated as needed this visit by Provider         Review of Systems   ROS COMP: CONSTITUTIONAL: NEGATIVE for fever, chills, change in weight  ENT/MOUTH: NEGATIVE for ear, mouth and throat problems  RESP: NEGATIVE for significant cough or SOB  CV: NEGATIVE for chest pain, palpitations or peripheral edema  GI: NEGATIVE for nausea, abdominal pain, heartburn, or change in bowel habits  : NEGATIVE for frequency, dysuria, or hematuria  MUSCULOSKELETAL: NEGATIVE for significant arthralgias or myalgia  NEURO: NEGATIVE for weakness, dizziness or paresthesias  ENDOCRINE: NEGATIVE for temperature intolerance, skin/hair changes  HEME: NEGATIVE for bleeding problems  PSYCHIATRIC: NEGATIVE for changes in mood or affect      Objective    /80   Pulse 87   Temp 98.5  F (36.9  C) (Oral)   Resp 16   Wt 114.7 kg (252 lb 12.8 oz)   SpO2 96%   BMI 32.46 kg/m    Body mass index is 32.46 kg/m .  Physical Exam   GENERAL: healthy, alert and no distress  EYES: Eyes grossly normal to inspection, PERRL and conjunctivae and sclerae normal  HENT: ear canals and TM's normal, nose and mouth without ulcers or lesions  NECK: no adenopathy, no asymmetry, masses, or scars and thyroid normal to palpation  RESP: lungs clear to auscultation - no rales, rhonchi or wheezes  CV: regular rate and  "rhythm, normal S1 S2, no S3 or S4, no murmur, click or rub, no peripheral edema and peripheral pulses strong  ABDOMEN: soft, nontender, no hepatosplenomegaly, no masses and bowel sounds normal  MS: no gross musculoskeletal defects noted, no edema  PSYCH: mentation appears normal, affect normal/bright    TSH   Date Value Ref Range Status   02/26/2019 6.10 (H) 0.40 - 4.00 mU/L Final        Assessment & Plan     1. Essential hypertension, benign  Suggest increasing blood pressure control and adding low-dose hydrochlorothiazide 12.5 mg twice daily with recheck of blood pressure in 4 to 6 weeks and a basic metabolic panel in 2 weeks  - lisinopril-hydrochlorothiazide (PRINZIDE/ZESTORETIC) 20-12.5 MG tablet; Take 1 tablet by mouth 2 times daily  Dispense: 180 tablet; Refill: 1  - Basic metabolic panel; Future    2. Tobacco abuse disorder  Smoking cessation was advised and the risks of continued smoking in regards to this patients health history was reiterated. Options of smoking cessation were also discussed. This time extended beyond the routine exam.    3. CARDIOVASCULAR SCREENING; LDL GOAL LESS THAN 130  Labs ordered as fasting per routine due to current medication therapy  - Lipid Profile; Future    4. Graves disease  Recent dose adjustment per ID for thyroid function tests for which she will be following through that clinic.       Tobacco Cessation:   reports that he has been smoking cigarettes.  He has been smoking about 0.50 packs per day. He has never used smokeless tobacco.  Tobacco Cessation Action Plan: Information offered: Patient not interested at this time      BMI:   Estimated body mass index is 32.34 kg/m  as calculated from the following:    Height as of 2/26/19: 1.88 m (6' 2\").    Weight as of 2/26/19: 114.3 kg (251 lb 14.4 oz).           Work on weight loss  Regular exercise    Return in about 2 weeks (around 6/11/2019) for Lab Work appointment.    Brooks Walsh MD  [facility] " [facility]

## 2019-06-10 DIAGNOSIS — I10 ESSENTIAL HYPERTENSION, BENIGN: ICD-10-CM

## 2019-06-10 DIAGNOSIS — Z13.6 CARDIOVASCULAR SCREENING; LDL GOAL LESS THAN 130: ICD-10-CM

## 2019-06-10 LAB
ANION GAP SERPL CALCULATED.3IONS-SCNC: 8 MMOL/L (ref 3–14)
BUN SERPL-MCNC: 15 MG/DL (ref 7–30)
CALCIUM SERPL-MCNC: 9 MG/DL (ref 8.5–10.1)
CHLORIDE SERPL-SCNC: 91 MMOL/L (ref 94–109)
CHOLEST SERPL-MCNC: 176 MG/DL
CO2 SERPL-SCNC: 28 MMOL/L (ref 20–32)
CREAT SERPL-MCNC: 1.23 MG/DL (ref 0.66–1.25)
GFR SERPL CREATININE-BSD FRML MDRD: 63 ML/MIN/{1.73_M2}
GLUCOSE SERPL-MCNC: 99 MG/DL (ref 70–99)
HDLC SERPL-MCNC: 44 MG/DL
LDLC SERPL CALC-MCNC: 112 MG/DL
NONHDLC SERPL-MCNC: 132 MG/DL
POTASSIUM SERPL-SCNC: 4.5 MMOL/L (ref 3.4–5.3)
SODIUM SERPL-SCNC: 127 MMOL/L (ref 133–144)
TRIGL SERPL-MCNC: 100 MG/DL

## 2019-06-10 PROCEDURE — 36415 COLL VENOUS BLD VENIPUNCTURE: CPT | Performed by: INTERNAL MEDICINE

## 2019-06-10 PROCEDURE — 80061 LIPID PANEL: CPT | Performed by: INTERNAL MEDICINE

## 2019-06-10 PROCEDURE — 80048 BASIC METABOLIC PNL TOTAL CA: CPT | Performed by: INTERNAL MEDICINE

## 2019-06-25 ENCOUNTER — MYC MEDICAL ADVICE (OUTPATIENT)
Dept: INTERNAL MEDICINE | Facility: CLINIC | Age: 59
End: 2019-06-25

## 2019-06-25 DIAGNOSIS — I10 ESSENTIAL HYPERTENSION, BENIGN: Primary | ICD-10-CM

## 2019-06-28 DIAGNOSIS — I10 ESSENTIAL HYPERTENSION, BENIGN: ICD-10-CM

## 2019-06-28 LAB
ANION GAP SERPL CALCULATED.3IONS-SCNC: 8 MMOL/L (ref 3–14)
BUN SERPL-MCNC: 8 MG/DL (ref 7–30)
CALCIUM SERPL-MCNC: 9.5 MG/DL (ref 8.5–10.1)
CHLORIDE SERPL-SCNC: 95 MMOL/L (ref 94–109)
CO2 SERPL-SCNC: 26 MMOL/L (ref 20–32)
CREAT SERPL-MCNC: 1.01 MG/DL (ref 0.66–1.25)
GFR SERPL CREATININE-BSD FRML MDRD: 80 ML/MIN/{1.73_M2}
GLUCOSE SERPL-MCNC: 85 MG/DL (ref 70–99)
POTASSIUM SERPL-SCNC: 4 MMOL/L (ref 3.4–5.3)
SODIUM SERPL-SCNC: 129 MMOL/L (ref 133–144)

## 2019-06-28 PROCEDURE — 36415 COLL VENOUS BLD VENIPUNCTURE: CPT | Performed by: INTERNAL MEDICINE

## 2019-06-28 PROCEDURE — 80048 BASIC METABOLIC PNL TOTAL CA: CPT | Performed by: INTERNAL MEDICINE

## 2019-09-26 ENCOUNTER — MYC MEDICAL ADVICE (OUTPATIENT)
Dept: INTERNAL MEDICINE | Facility: CLINIC | Age: 59
End: 2019-09-26

## 2019-10-17 ENCOUNTER — OFFICE VISIT (OUTPATIENT)
Dept: FAMILY MEDICINE | Facility: CLINIC | Age: 59
End: 2019-10-17
Payer: COMMERCIAL

## 2019-10-17 VITALS — DIASTOLIC BLOOD PRESSURE: 82 MMHG | SYSTOLIC BLOOD PRESSURE: 136 MMHG

## 2019-10-17 DIAGNOSIS — D22.9 MULTIPLE NEVI: ICD-10-CM

## 2019-10-17 DIAGNOSIS — D18.01 CHERRY ANGIOMA: ICD-10-CM

## 2019-10-17 DIAGNOSIS — Z80.8 FAMILY HISTORY OF BASAL CELL CARCINOMA (BCC): ICD-10-CM

## 2019-10-17 DIAGNOSIS — B35.1 ONYCHOMYCOSIS: ICD-10-CM

## 2019-10-17 DIAGNOSIS — L40.9 PSORIASIS: ICD-10-CM

## 2019-10-17 DIAGNOSIS — L82.1 SEBORRHEIC KERATOSES: ICD-10-CM

## 2019-10-17 DIAGNOSIS — L81.4 LENTIGINES: ICD-10-CM

## 2019-10-17 DIAGNOSIS — B35.3 TINEA PEDIS OF BOTH FEET: ICD-10-CM

## 2019-10-17 DIAGNOSIS — D48.5 NEOPLASM OF UNCERTAIN BEHAVIOR OF SKIN: ICD-10-CM

## 2019-10-17 DIAGNOSIS — L57.8 SOLAR ELASTOSIS: ICD-10-CM

## 2019-10-17 DIAGNOSIS — Z51.81 MEDICATION MONITORING ENCOUNTER: Primary | ICD-10-CM

## 2019-10-17 DIAGNOSIS — L57.0 ACTINIC KERATOSIS: ICD-10-CM

## 2019-10-17 PROCEDURE — 11102 TANGNTL BX SKIN SINGLE LES: CPT | Performed by: PHYSICIAN ASSISTANT

## 2019-10-17 PROCEDURE — 11103 TANGNTL BX SKIN EA SEP/ADDL: CPT | Performed by: PHYSICIAN ASSISTANT

## 2019-10-17 PROCEDURE — 17003 DESTRUCT PREMALG LES 2-14: CPT | Performed by: PHYSICIAN ASSISTANT

## 2019-10-17 PROCEDURE — 88305 TISSUE EXAM BY PATHOLOGIST: CPT | Mod: TC | Performed by: PHYSICIAN ASSISTANT

## 2019-10-17 PROCEDURE — 17000 DESTRUCT PREMALG LESION: CPT | Mod: 59 | Performed by: PHYSICIAN ASSISTANT

## 2019-10-17 PROCEDURE — 99243 OFF/OP CNSLTJ NEW/EST LOW 30: CPT | Mod: 25 | Performed by: PHYSICIAN ASSISTANT

## 2019-10-17 RX ORDER — FLUOCINONIDE 0.5 MG/G
CREAM TOPICAL
Qty: 15 G | Refills: 0 | Status: SHIPPED | OUTPATIENT
Start: 2019-10-17 | End: 2022-06-13

## 2019-10-17 NOTE — LETTER
10/17/2019         RE: Nicolás Fermin  [address]  [place] [address]        Dear Colleague,    Thank you for referring your patient, Nicolás Fermin, to the [facility]. Please see a copy of my visit note below.    HPI:  I was asked to see pt by Dr. Walsh. Nicolás Fermin is a 59 year old male patient here today for growth on right leg .  Patient states this has been present for a while.  Patient reports the following symptoms: painful .  Patient reports the following previous treatments: none.  Patient reports the following modifying factors: none.  Associated symptoms: none.  Patient has no other skin complaints today.  Remainder of the HPI, Meds, PMH, Allergies, FH, and SH was reviewed in chart.    Pertinent Hx:   Father and mother had BCC. No personal history of skin cancer.   Past Medical History:   Diagnosis Date     Asymptomatic human immunodeficiency virus (HIV) infection status (H)      Graves disease 2006     Hypertension      Peripheral neuropathy        No past surgical history on file.     Family History   Problem Relation Age of Onset     Thyroid Disease Mother      Breast Cancer Mother      Skin Cancer Mother      Hypertension Father      Skin Cancer Father        Social History     Socioeconomic History     Marital status: Single     Spouse name: Not on file     Number of children: Not on file     Years of education: Not on file     Highest education level: Not on file   Occupational History     Not on file   Social Needs     Financial resource strain: Not on file     Food insecurity:     Worry: Not on file     Inability: Not on file     Transportation needs:     Medical: Not on file     Non-medical: Not on file   Tobacco Use     Smoking status: Current Every Day Smoker     Packs/day: 0.50     Types: Cigarettes     Smokeless tobacco: Never Used     Tobacco comment: 10 cigarettes a day   Substance and Sexual Activity     Alcohol use: Yes     Alcohol/week:  0.0 standard drinks     Comment: 2-3/day     Drug use: No     Sexual activity: Not Currently   Lifestyle     Physical activity:     Days per week: Not on file     Minutes per session: Not on file     Stress: Not on file   Relationships     Social connections:     Talks on phone: Not on file     Gets together: Not on file     Attends Bahai service: Not on file     Active member of club or organization: Not on file     Attends meetings of clubs or organizations: Not on file     Relationship status: Not on file     Intimate partner violence:     Fear of current or ex partner: Not on file     Emotionally abused: Not on file     Physically abused: Not on file     Forced sexual activity: Not on file   Other Topics Concern     Parent/sibling w/ CABG, MI or angioplasty before 65F 55M? No   Social History Narrative     Not on file       Outpatient Encounter Medications as of 10/17/2019   Medication Sig Dispense Refill     amitriptyline (ELAVIL) 25 MG tablet Take 75 mg by mouth At Bedtime       aspirin - buffered (ASCRIPTIN) 325 MG TABS tablet Take 325 mg by mouth daily       atenolol (TENORMIN) 100 MG tablet Take 100 mg by mouth daily       DiphenhydrAMINE HCl (BENADRYL PO)        Dolutegravir Sodium (TIVICAY PO) Take 50 mg by mouth daily        Emtricitabine-Tenofovir AF (DESCOVY PO) Take by mouth daily       gabapentin (NEURONTIN) 300 MG capsule Take 300 mg by mouth        levothyroxine (SYNTHROID/LEVOTHROID) 88 MCG tablet        lisinopril-hydrochlorothiazide (PRINZIDE/ZESTORETIC) 20-12.5 MG tablet Take 1 tablet by mouth 2 times daily 180 tablet 1     NAPROXEN PO        Omega-3 Fatty Acids (FISH OIL) 500 MG CAPS        psyllium (METAMUCIL) 58.6 % POWD Take by mouth daily       sildenafil (VIAGRA) 50 MG tablet Take by mouth daily as needed for erectile dysfunction       DULoxetine (CYMBALTA) 60 MG EC capsule Take 60 mg by mouth       No facility-administered encounter medications on file as of 10/17/2019.        Review  Of Systems:  Skin: As above  Eyes: negative  Ears/Nose/Throat: negative  Respiratory: No shortness of breath, dyspnea on exertion, cough, or hemoptysis  Cardiovascular: negative  Gastrointestinal: negative  Genitourinary: negative  Musculoskeletal: negative  Neurologic: negative  Psychiatric: negative  Hematologic/Lymphatic/Immunologic: negative  Endocrine: negative      Objective:     /82   Eyes: Conjunctivae/lids: Normal   ENT: Lips:  Normal  MSK: Normal  Cardiovascular: Peripheral edema none  Pulm: Breathing Normal  Neuro/Psych: Orientation: Normal; Mood/Affect: Normal, NAD, WDWN  Pt accompanied by: self  Following areas examined: Scalp, face, eyelids, lips, neck, chest, abdomen, back, buttocks, and R&L upper and lower extremities. Pt defers exam of groin and genitals.   [place] skin type:i   Findings:  Red smooth well-defined macules on trunk and extremities.  Brown, stuck-on scaly appearing papules on trunk and extremities.  Well circumscribed macules with symmetric color distribution on trunk and extremities.  Tan WD smooth macules on face, neck, trunk, and extremities.  Pink hemecrusted plaque on right posterior thigh 1.3cm  Pink smooth pearly papule with atrophic center and raised borders on left nasal side wall 0.5cm  Brown irregular pigmented macule on left nasal bridge 0.3cm  Pink gritty macules on left temple, right ear and left cheek  Pink plaque with silvery scale x 2 plaque on medial aspects of thighs  Scaling of feet  Yellow thickened nails of great toes and 2-4th right toenails  Rhytides, hypo/hyperpigmentation, and atrophy of face, neck, trunk and UE    Assessment and Plan:     1) Cherry angiomas, Seborrheic keratoses, Benign nevi, Lentigines     I discussed the specifics of tumor, prognosis, and genetics of benign lesions.  I explained that treatment of these lesions would be purely cosmetic and not medically neccessary.  I discussed with patient different removal options including  excision, cryotherapy, cautery and /or laser.  Lesion may recur and/or may not completely resolve. May need additional treatment.     2) Neoplasm of uncertain behavior right posterior thigh 1.3cm  BCC vs SCC  TANGENTIAL BIOPSY:  After consent, anesthesia with LEC and prep, tangential biopsy performed.  No complications and routine wound care.  May grow back and will get a scar. Based on lesion type may need to completely remove lesion. Patient will be notified in 7-10 days of results. Wound care directions given.    3) Neoplasm of uncertain behavior left nasal bridge 0.3cm  Lentigo vs lentigo maligna  TANGENTIAL BIOPSY:  After consent, anesthesia with LEC and prep, tangential biopsy performed.  No complications and routine wound care.  May grow back and will get a scar. Based on lesion type may need to completely remove lesion. Patient will be notified in 7-10 days of results. Wound care directions given.    4) Neoplasm of uncertain behavior left nasal side wall 0.5cm  Rule out BCC  TANGENTIAL BIOPSY:  After consent, anesthesia with LEC and prep, tangential biopsy performed.  No complications and routine wound care.  May grow back and will get a scar. Based on lesion type may need to completely remove lesion. Patient will be notified in 7-10 days of results. Wound care directions given.    5) onychomycosis,  tinea pedis and medication monitoring  Begin terbinafine 250mg tablet by mouth once a day for 3 months. Follow up for labs in 6 weeks.   Discussed treatment options with OTC products including daily application of tea tree oil, Vicks vapor rub, and/or apple cider vinegar soaks. Disc oral agents and liver function tests required at baseline, 6 weeks after treatment begins, and then once treatment ends. Prescription strength topicals are available, can use up to 48 weeks. Fingernails take 3-6 months to regrow completely, and toenails up to 12-18 months.   6) psoriasis vs dermatitis doubt superficial BCC   Apply a  thin layer of lidex to affected area 2x a day for 2-3 weeks. Tapering with improvement. Do not use on face or body folds.  Discussed side effects of topical steroids including but not limited to atrophy (skin thinning), striae (stretch marks) telangiectasias, steroid acne, and others. Do not apply to normal skin. Do not apply to discolored skin that does not have rash present. Educated patient on post inflammatory hyperpigmentation.   7) Actinic keratoses x 3 and solar elastosis    LN2 for 5 seconds x 2. Discussed AE include hypopigmentation (white spot) and recurrence. Follow up in 2-3 months to recheck lesions. There is a risk of AKs developing into a SCC.   Treatment options include LN2 vs PDT vs Efudex. Pt elected LN2    8) family history of BCC  Signs and Symptoms of non-melanoma skin cancer and ABCDEs of melanoma reviewed with patient. Patient encouraged to perform monthly self skin exams and educated on how to perform them. UV precautions reviewed with patient. Patient was asked about new or changing moles/lesions on body.   Wear a sunscreen with at least SPF 30 on your face, ears, neck and V of the chest daily. Wear sunscreen on other areas of the body if those areas are exposed to the sun throughout the day. Sunscreens can contain physical and/or chemical blockers. Physical blockers are less likely to clog pores, these include zinc oxide and titanium dioxide. Reapply every two hour and after swimming. Sunscreen examples include Neutrogena, CeraVe, Blue Lizard, Elta MD and many others.    Proper skin care from [facility]:    -Eliminate harsh soaps as they strip the natural oils from the skin, often resulting in dry itchy skin ( i.e. Dial, Zest, [place])  -Use mild soaps such as Cetaphil or Dove Sensitive Skin in the shower. You do not need to use soap on arms, legs, and trunk every time you shower unless visibly soiled.   -Avoid hot or cold showers.  -After showering, lightly dry off and apply  moisturizing within 2-3 minutes. This will help trap moisture in the skin.   -Aggressive use of a moisturizer at least 1-2 times a day to the entire body (including -Vanicream, Cetaphil, Aquaphor or Cerave) and moisturize hands after every washing.  -We recommend using moisturizers that come in a tub that needs to be scooped out, not a pump. This has more of an oil base. It will hold moisture in your skin much better than a water base moisturizer. The above recommended are non-pore clogging.               Follow up in 3 weeks to recheck rash. 2-3 months to check aks. yearly FBE.       Again, thank you for allowing me to participate in the care of your patient.        Sincerely,        Aiyana Sanabria PA-C

## 2019-10-17 NOTE — PROGRESS NOTES
HPI:  I was asked to see pt by Dr. Walsh. Nicolás Fermin is a 59 year old male patient here today for growth on right leg .  Patient states this has been present for a while.  Patient reports the following symptoms: painful .  Patient reports the following previous treatments: none.  Patient reports the following modifying factors: none.  Associated symptoms: none.  Patient has no other skin complaints today.  Remainder of the HPI, Meds, PMH, Allergies, FH, and SH was reviewed in chart.    Pertinent Hx:   Father and mother had BCC. No personal history of skin cancer.   Past Medical History:   Diagnosis Date     Asymptomatic human immunodeficiency virus (HIV) infection status (H)      Graves disease 2006     Hypertension      Peripheral neuropathy        No past surgical history on file.     Family History   Problem Relation Age of Onset     Thyroid Disease Mother      Breast Cancer Mother      Skin Cancer Mother      Hypertension Father      Skin Cancer Father        Social History     Socioeconomic History     Marital status: Single     Spouse name: Not on file     Number of children: Not on file     Years of education: Not on file     Highest education level: Not on file   Occupational History     Not on file   Social Needs     Financial resource strain: Not on file     Food insecurity:     Worry: Not on file     Inability: Not on file     Transportation needs:     Medical: Not on file     Non-medical: Not on file   Tobacco Use     Smoking status: Current Every Day Smoker     Packs/day: 0.50     Types: Cigarettes     Smokeless tobacco: Never Used     Tobacco comment: 10 cigarettes a day   Substance and Sexual Activity     Alcohol use: Yes     Alcohol/week: 0.0 standard drinks     Comment: 2-3/day     Drug use: No     Sexual activity: Not Currently   Lifestyle     Physical activity:     Days per week: Not on file     Minutes per session: Not on file     Stress: Not on file   Relationships     Social  connections:     Talks on phone: Not on file     Gets together: Not on file     Attends Restorationism service: Not on file     Active member of club or organization: Not on file     Attends meetings of clubs or organizations: Not on file     Relationship status: Not on file     Intimate partner violence:     Fear of current or ex partner: Not on file     Emotionally abused: Not on file     Physically abused: Not on file     Forced sexual activity: Not on file   Other Topics Concern     Parent/sibling w/ CABG, MI or angioplasty before 65F 55M? No   Social History Narrative     Not on file       Outpatient Encounter Medications as of 10/17/2019   Medication Sig Dispense Refill     amitriptyline (ELAVIL) 25 MG tablet Take 75 mg by mouth At Bedtime       aspirin - buffered (ASCRIPTIN) 325 MG TABS tablet Take 325 mg by mouth daily       atenolol (TENORMIN) 100 MG tablet Take 100 mg by mouth daily       DiphenhydrAMINE HCl (BENADRYL PO)        Dolutegravir Sodium (TIVICAY PO) Take 50 mg by mouth daily        Emtricitabine-Tenofovir AF (DESCOVY PO) Take by mouth daily       gabapentin (NEURONTIN) 300 MG capsule Take 300 mg by mouth        levothyroxine (SYNTHROID/LEVOTHROID) 88 MCG tablet        lisinopril-hydrochlorothiazide (PRINZIDE/ZESTORETIC) 20-12.5 MG tablet Take 1 tablet by mouth 2 times daily 180 tablet 1     NAPROXEN PO        Omega-3 Fatty Acids (FISH OIL) 500 MG CAPS        psyllium (METAMUCIL) 58.6 % POWD Take by mouth daily       sildenafil (VIAGRA) 50 MG tablet Take by mouth daily as needed for erectile dysfunction       DULoxetine (CYMBALTA) 60 MG EC capsule Take 60 mg by mouth       No facility-administered encounter medications on file as of 10/17/2019.        Review Of Systems:  Skin: As above  Eyes: negative  Ears/Nose/Throat: negative  Respiratory: No shortness of breath, dyspnea on exertion, cough, or hemoptysis  Cardiovascular: negative  Gastrointestinal: negative  Genitourinary:  negative  Musculoskeletal: negative  Neurologic: negative  Psychiatric: negative  Hematologic/Lymphatic/Immunologic: negative  Endocrine: negative      Objective:     /82   Eyes: Conjunctivae/lids: Normal   ENT: Lips:  Normal  MSK: Normal  Cardiovascular: Peripheral edema none  Pulm: Breathing Normal  Neuro/Psych: Orientation: Normal; Mood/Affect: Normal, NAD, WDWN  Pt accompanied by: self  Following areas examined: Scalp, face, eyelids, lips, neck, chest, abdomen, back, buttocks, and R&L upper and lower extremities. Pt defers exam of groin and genitals.   [place] skin type:i   Findings:  Red smooth well-defined macules on trunk and extremities.  Brown, stuck-on scaly appearing papules on trunk and extremities.  Well circumscribed macules with symmetric color distribution on trunk and extremities.  Tan WD smooth macules on face, neck, trunk, and extremities.  Pink hemecrusted plaque on right posterior thigh 1.3cm  Pink smooth pearly papule with atrophic center and raised borders on left nasal side wall 0.5cm  Brown irregular pigmented macule on left nasal bridge 0.3cm  Pink gritty macules on left temple, right ear and left cheek  Pink plaque with silvery scale x 2 plaque on medial aspects of thighs  Scaling of feet  Yellow thickened nails of great toes and 2-4th right toenails  Rhytides, hypo/hyperpigmentation, and atrophy of face, neck, trunk and UE    Assessment and Plan:     1) Cherry angiomas, Seborrheic keratoses, Benign nevi, Lentigines     I discussed the specifics of tumor, prognosis, and genetics of benign lesions.  I explained that treatment of these lesions would be purely cosmetic and not medically neccessary.  I discussed with patient different removal options including excision, cryotherapy, cautery and /or laser.  Lesion may recur and/or may not completely resolve. May need additional treatment.     2) Neoplasm of uncertain behavior right posterior thigh 1.3cm  BCC vs SCC  TANGENTIAL BIOPSY:   After consent, anesthesia with LEC and prep, tangential biopsy performed.  No complications and routine wound care.  May grow back and will get a scar. Based on lesion type may need to completely remove lesion. Patient will be notified in 7-10 days of results. Wound care directions given.    3) Neoplasm of uncertain behavior left nasal bridge 0.3cm  Lentigo vs lentigo maligna  TANGENTIAL BIOPSY:  After consent, anesthesia with LEC and prep, tangential biopsy performed.  No complications and routine wound care.  May grow back and will get a scar. Based on lesion type may need to completely remove lesion. Patient will be notified in 7-10 days of results. Wound care directions given.    4) Neoplasm of uncertain behavior left nasal side wall 0.5cm  Rule out BCC  TANGENTIAL BIOPSY:  After consent, anesthesia with LEC and prep, tangential biopsy performed.  No complications and routine wound care.  May grow back and will get a scar. Based on lesion type may need to completely remove lesion. Patient will be notified in 7-10 days of results. Wound care directions given.    5) onychomycosis,  tinea pedis and medication monitoring  Begin terbinafine 250mg tablet by mouth once a day for 3 months. Follow up for labs in 6 weeks.   Discussed treatment options with OTC products including daily application of tea tree oil, Vicks vapor rub, and/or apple cider vinegar soaks. Disc oral agents and liver function tests required at baseline, 6 weeks after treatment begins, and then once treatment ends. Prescription strength topicals are available, can use up to 48 weeks. Fingernails take 3-6 months to regrow completely, and toenails up to 12-18 months.   6) psoriasis vs dermatitis doubt superficial BCC   Apply a thin layer of lidex to affected area 2x a day for 2-3 weeks. Tapering with improvement. Do not use on face or body folds.  Discussed side effects of topical steroids including but not limited to atrophy (skin thinning), striae  (stretch marks) telangiectasias, steroid acne, and others. Do not apply to normal skin. Do not apply to discolored skin that does not have rash present. Educated patient on post inflammatory hyperpigmentation.   7) Actinic keratoses x 3 and solar elastosis    LN2 for 5 seconds x 2. Discussed AE include hypopigmentation (white spot) and recurrence. Follow up in 2-3 months to recheck lesions. There is a risk of AKs developing into a SCC.   Treatment options include LN2 vs PDT vs Efudex. Pt elected LN2    8) family history of BCC  Signs and Symptoms of non-melanoma skin cancer and ABCDEs of melanoma reviewed with patient. Patient encouraged to perform monthly self skin exams and educated on how to perform them. UV precautions reviewed with patient. Patient was asked about new or changing moles/lesions on body.   Wear a sunscreen with at least SPF 30 on your face, ears, neck and V of the chest daily. Wear sunscreen on other areas of the body if those areas are exposed to the sun throughout the day. Sunscreens can contain physical and/or chemical blockers. Physical blockers are less likely to clog pores, these include zinc oxide and titanium dioxide. Reapply every two hour and after swimming. Sunscreen examples include Neutrogena, CeraVe, Blue Lizard, Elta MD and many others.    Proper skin care from [facility]:    -Eliminate harsh soaps as they strip the natural oils from the skin, often resulting in dry itchy skin ( i.e. Dial, Zest, [place])  -Use mild soaps such as Cetaphil or Dove Sensitive Skin in the shower. You do not need to use soap on arms, legs, and trunk every time you shower unless visibly soiled.   -Avoid hot or cold showers.  -After showering, lightly dry off and apply moisturizing within 2-3 minutes. This will help trap moisture in the skin.   -Aggressive use of a moisturizer at least 1-2 times a day to the entire body (including -Vanicream, Cetaphil, Aquaphor or Cerave) and moisturize  hands after every washing.  -We recommend using moisturizers that come in a tub that needs to be scooped out, not a pump. This has more of an oil base. It will hold moisture in your skin much better than a water base moisturizer. The above recommended are non-pore clogging.               Follow up in 3 weeks to recheck rash. 2-3 months to check aks. yearly FBE.

## 2019-10-17 NOTE — PATIENT INSTRUCTIONS
Wound Care Instructions     FOR SUPERFICIAL WOUNDS     [facility] [phone]    [facility] [phone]          AFTER 24 HOURS YOU SHOULD REMOVE THE BANDAGE AND BEGIN DAILY DRESSING CHANGES AS FOLLOWS:     1) Remove Dressing.     2) Clean and dry the area with tap water using a Q-tip or sterile gauze pad.     3) Apply Vaseline, Aquaphor, Polysporin ointment or Bacitracin ointment over entire wound.  Do NOT use Neosporin ointment.     4) Cover the wound with a band-aid, or a sterile non-stick gauze pad and micropore paper tape      REPEAT THESE INSTRUCTIONS AT LEAST ONCE A DAY UNTIL THE WOUND HAS COMPLETELY HEALED.    It is an old wives tale that a wound heals better when it is exposed to air and allowed to dry out. The wound will heal faster with a better cosmetic result if it is kept moist with ointment and covered with a bandage.    **Do not let the wound dry out.**      Supplies Needed:      *Cotton tipped applicators (Q-tips)    *Polysporin Ointment or Bacitracin Ointment (NOT NEOSPORIN)    *Band-aids or non-stick gauze pads and micropore paper tape.      PATIENT INFORMATION:    During the healing process you will notice a number of changes. All wounds develop a small halo of redness surrounding the wound.  This means healing is occurring. Severe itching with extensive redness usually indicates sensitivity to the ointment or bandage tape used to dress the wound.  You should call our office if this develops.      Swelling  and/or discoloration around your surgical site is common, particularly when performed around the eye.    All wounds normally drain.  The larger the wound the more drainage there will be.  After 7-10 days, you will notice the wound beginning to shrink and new skin will begin to grow.  The wound is healed when you can see skin has formed over the entire area.  A healed wound has a healthy, shiny look to the surface and is red to dark pink in color to  normalize.  Wounds may take approximately 4-6 weeks to heal.  Larger wounds may take 6-8 weeks.  After the wound is healed you may discontinue dressing changes.    You may experience a sensation of tightness as your wound heals. This is normal and will gradually subside.    Your healed wound may be sensitive to temperature changes. This sensitivity improves with time, but if you re having a lot of discomfort, try to avoid temperature extremes.    Patients frequently experience itching after their wound appears to have healed because of the continue healing under the skin.  Plain Vaseline will help relieve the itching.        POSSIBLE COMPLICATIONS    BLEEDIN. Leave the bandage in place.  2. Use tightly rolled up gauze or a cloth to apply direct pressure over the bandage for 30  minutes.  3. Reapply pressure for an additional 30 minutes if necessary  4. Use additional gauze and tape to maintain pressure once the bleeding has stopped.        WOUND CARE INSTRUCTIONS  FOR CRYOSURGERY        This area treated with liquid nitrogen will form a blister. You do not need to bandage the area until after the blister forms and breaks (which may be a few days).  When the blister breaks, begin daily dressing changes as follows:    1) Clean and dry the area with tap water using clean Q-tip or sterile gauze pad.    2) Apply Polysporin ointment or Bacitracin ointment over entire wound.  Do NOT use Neosporin ointment.    3) Cover the wound with a band-aid or sterile non-stick gauze pad and micropore paper tape.      REPEAT THESE INSTRUCTIONS AT LEAST ONCE A DAY UNTIL THE WOUND HAS COMPLETELY HEALED.        It is an old wives tale that a wound heals better when it is exposed to air and allowed to dry out. The wound will heal faster with a better cosmetic result if it is kept moist with ointment and covered with a bandage.  Do not let the wound dry out.      Supplies Needed:     *Cotton tipped applicators (Q-tips)   *Polysporin  ointment or Bacitracin ointment (NOT NEOSPORIN)   *Band-aids, or non stick gauze pads and micropore paper tape    PATIENT INFORMATION    During the healing process you will notice a number of changes. All wounds develop a small halo of redness surrounding the wound.  This means healing is occurring. Severe itching with extensive redness usually indicates sensitivity to the ointment or bandage tape used to dress the wound.  You should call our office if this develops.      Swelling and/or discoloration around your surgical site is common, particularly when performed around the eye.    All wounds normally drain.  The larger the wound the more drainage there will be.  After 7-10 days, you will notice the wound beginning to shrink and new skin will begin to grow.  The wound is healed when you can see skin has formed over the entire area.  A healed wound has a healthy, shiny look to the surface and is red to dark pink in color to normalize.  Wounds may take approximately 4-6 weeks to heal.  Larger wounds may take 6-8 weeks.  After the wound is healed you may discontinue dressing changes.    You may experience a sensation of tightness as your wound heals. This is normal and will gradually subside.    Your healed wound may be sensitive to temperature changes. This sensitivity improves with time, but if you re having a lot of discomfort, try to avoid temperature extremes.    Patients frequently experience itching after their wound appears to have healed because of the continue healing under the skin.  Plain Vaseline will help relieve the itching.             Proper skin care from [facility]:    -Eliminate harsh soaps as they strip the natural oils from the skin, often resulting in dry itchy skin ( i.e. Dial, Zest, Una Spring)  -Use mild soaps such as Cetaphil or Dove Sensitive Skin in the shower. You do not need to use soap on arms, legs, and trunk every time you shower unless visibly soiled.   -Avoid hot or  cold showers.  -After showering, lightly dry off and apply moisturizing within 2-3 minutes. This will help trap moisture in the skin.   -Aggressive use of a moisturizer at least 1-2 times a day to the entire body (including -Vanicream, Cetaphil, Aquaphor or Cerave) and moisturize hands after every washing.  -We recommend using moisturizers that come in a tub that needs to be scooped out, not a pump. This has more of an oil base. It will hold moisture in your skin much better than a water base moisturizer. The above recommended are non-pore clogging.      Wear a sunscreen with at least SPF 30 on your face, ears, neck and V of the chest daily. Wear sunscreen on other areas of the body if those areas are exposed to the sun throughout the day. Sunscreens can contain physical and/or chemical blockers. Physical blockers are less likely to clog pores, these include zinc oxide and titanium dioxide. Reapply every two hour and after swimming. Sunscreen examples include Neutrogena, CeraVe, Blue Lizard, Elta MD and many others.    UV radiation  UVA radiation remains constant throughout the day and throughout the year. It is a longer wavelength than UVB and therefore penetrates deeper into the skin leading to immediate and delayed tanning, photoaging, and skin cancer. 70-80% of UVA and UVB radiation occurs between the hours of 10am-2pm.  UVB radiation  UVB radiation causes the most harmful effects and is more significant during the summer months. However, snow and ice can reflect UVB radiation leading to skin damage during the winter months as well. UVB radiation is responsible for tanning, burning, inflammation, delayed erythema (pinkness), pigmentation (brown spots), and skin cancer.       onychomycosis  Begin terbinafine 250mg tablet by mouth once a day for 3 months. Follow up for labs in 6 weeks.   Discussed treatment options with OTC products including daily application of tea tree oil, Vicks vapor rub, and/or apple cider  vinegar soaks. Disc oral agents and liver function tests required at baseline, 6 weeks after treatment begins, and then once treatment ends. Prescription strength topicals are available, can use up to 48 weeks. Fingernails take 3-6 months to regrow completely, and toenails up to 12-18 months.     Apply a thin layer of lidex to affected area 2x a day for 2-3 weeks. Tapering with improvement. Do not use on face or body folds.  Discussed side effects of topical steroids including but not limited to atrophy (skin thinning), striae (stretch marks) telangiectasias, steroid acne, and others. Do not apply to normal skin. Do not apply to discolored skin that does not have rash present. Educated patient on post inflammatory hyperpigmentation.       Follow up 3 weeks to recheck rash on thighs

## 2019-10-22 DIAGNOSIS — Z51.81 MEDICATION MONITORING ENCOUNTER: ICD-10-CM

## 2019-10-22 LAB
ALBUMIN SERPL-MCNC: 4.1 G/DL (ref 3.4–5)
ALP SERPL-CCNC: 53 U/L (ref 40–150)
ALT SERPL W P-5'-P-CCNC: 48 U/L (ref 0–70)
AST SERPL W P-5'-P-CCNC: 36 U/L (ref 0–45)
BILIRUB DIRECT SERPL-MCNC: 0.2 MG/DL (ref 0–0.2)
BILIRUB SERPL-MCNC: 0.7 MG/DL (ref 0.2–1.3)
PROT SERPL-MCNC: 7.4 G/DL (ref 6.8–8.8)

## 2019-10-22 PROCEDURE — 36415 COLL VENOUS BLD VENIPUNCTURE: CPT | Performed by: PHYSICIAN ASSISTANT

## 2019-10-22 PROCEDURE — 80076 HEPATIC FUNCTION PANEL: CPT | Performed by: PHYSICIAN ASSISTANT

## 2019-10-23 ENCOUNTER — TELEPHONE (OUTPATIENT)
Dept: FAMILY MEDICINE | Facility: CLINIC | Age: 59
End: 2019-10-23

## 2019-10-23 ENCOUNTER — TELEPHONE (OUTPATIENT)
Dept: DERMATOLOGY | Facility: CLINIC | Age: 59
End: 2019-10-23

## 2019-10-23 DIAGNOSIS — B35.1 ONYCHOMYCOSIS: Primary | ICD-10-CM

## 2019-10-23 LAB — COPATH REPORT: NORMAL

## 2019-10-23 RX ORDER — TERBINAFINE HYDROCHLORIDE 250 MG/1
250 TABLET ORAL DAILY
Qty: 45 TABLET | Refills: 0 | Status: SHIPPED | OUTPATIENT
Start: 2019-10-23 | End: 2019-11-25

## 2019-10-23 NOTE — TELEPHONE ENCOUNTER
----- Message from Abundio Matt MD sent at 10/23/2019  7:30 AM CDT -----  ;lfts normal    No further treatment

## 2019-10-23 NOTE — TELEPHONE ENCOUNTER
----- Message from Aiyana Sanabria PA-C sent at 10/23/2019 11:19 AM CDT -----  A. Shave, left nasal side wall:   - Basal cell carcinoma, infiltrating type, extending to the lateral and   deep margins - (see description)   --mohs  B. Shave, left nasal bridge:   - Actinic keratosis, pigmented - (see description)     -cryo    C. Shave, right posterior thigh:   - Squamous cell carcinoma, at least in situ - (see comment)     --mohs

## 2019-10-23 NOTE — TELEPHONE ENCOUNTER
Called and spoke to patient. Educated patient on lab results- labs normal, terbinafine was sent by provider.     Reminded patient to f/u in 6 weeks for labs ONLY (can go to any [facility] lab for this)- after lab results are are back provider will send the remainder of Rx.     Patient voiced understanding.    NYA Anderson-BSN-PHN  [facility]  [phone]

## 2019-10-23 NOTE — TELEPHONE ENCOUNTER
Patient called back. Informed of lab results and refill that was sent for Lamisil. Advised bx results are still pending.     Pepper ANAYA RN, BSN, PHN

## 2019-10-23 NOTE — LETTER
[facility]  [address]  [address]  [phone]    10/23/2019       Nicolás Fermin  [address]  [place] [address]      Dear Nicolás:    You are scheduled for Mohs Surgery on: 12/5/19 @7:45am.    Please check in at [facility], [address].     You don't need to arrive more than 5-10 minutes prior to your appointment time.     Be sure to eat a good breakfast and bathe and wash your hair prior to surgery.     If you are taking any anti-coagulants that are prescribed by your Doctor (such as Coumadin/Warfarin, Plavix, Aspirin, Ibuprofen), please continue taking them.     However, if you are taking anti-coagulants over the counter without a Doctor's order for a medical condition, please discontinue them 10 days prior to surgery.     Please wear loose comfortable clothing as it could possibly be 4-6 hours until your surgery is completed depending upon how many layers of tissue need to be removed.      Thank you,    GORGE Matt MD

## 2019-10-23 NOTE — TELEPHONE ENCOUNTER
Called and spoke to patient. Educated patient on biopsy results- SCIS + BCC + AK. Educated patient on SCIS, BCC, AK, mohs, cryo, scheduled mohs/cryo appointment, and letter/packet sent. Patient voiced understanding.    **Patient is aware Dr. Matt may NOT be able to do both spots at scheduled appointment.    Monica RN-BSN-N  [facility]  [phone]

## 2019-10-23 NOTE — TELEPHONE ENCOUNTER
Called and LM for patient to call back in regards to lab results and providers notes.    Monica RN-BSN-PHN  [facility]  [phone]

## 2019-10-23 NOTE — TELEPHONE ENCOUNTER
Called and LM for patient to call back in regards to lab results and providers notes.    Monica RN-BSN-PHN  [facility]  [phone]

## 2019-11-04 ENCOUNTER — HEALTH MAINTENANCE LETTER (OUTPATIENT)
Age: 59
End: 2019-11-04

## 2019-11-06 DIAGNOSIS — I10 ESSENTIAL HYPERTENSION, BENIGN: ICD-10-CM

## 2019-11-06 RX ORDER — LISINOPRIL AND HYDROCHLOROTHIAZIDE 12.5; 2 MG/1; MG/1
TABLET ORAL
Qty: 180 TABLET | Refills: 0 | Status: SHIPPED | OUTPATIENT
Start: 2019-11-06 | End: 2020-02-05

## 2019-11-06 NOTE — TELEPHONE ENCOUNTER
"Requested Prescriptions   Pending Prescriptions Disp Refills     lisinopril-hydrochlorothiazide (PRINZIDE/ZESTORETIC) 20-12.5 MG tablet [Pharmacy Med Name: LISINOPRIL-HCTZ 20/12.5MG TABLETS] 180 tablet 0     Sig: TAKE 1 TABLET BY MOUTH TWICE DAILY       Diuretics (Including Combos) Protocol Failed - 11/6/2019  5:09 AM        Failed - Normal serum sodium on file in past 12 months     Recent Labs   Lab Test 06/28/19  1031   *              Passed - Blood pressure under 140/90 in past 12 months     BP Readings from Last 3 Encounters:   10/17/19 136/82   05/28/19 150/80   02/26/19 (!) 160/98                 Passed - Recent (12 mo) or future (30 days) visit within the authorizing provider's specialty     Patient has had an office visit with the authorizing provider or a provider within the authorizing providers department within the previous 12 mos or has a future within next 30 days. See \"Patient Info\" tab in inbasket, or \"Choose Columns\" in Meds & Orders section of the refill encounter.              Passed - Medication is active on med list        Passed - Patient is age 18 or older        Passed - Normal serum creatinine on file in past 12 months     Recent Labs   Lab Test 06/28/19  1031   CR 1.01              Passed - Normal serum potassium on file in past 12 months     Recent Labs   Lab Test 06/28/19  1031   POTASSIUM 4.0                    Routing refill request to provider for review/approval because:  Labs out of range:  Na        "

## 2019-11-14 ENCOUNTER — OFFICE VISIT (OUTPATIENT)
Dept: FAMILY MEDICINE | Facility: CLINIC | Age: 59
End: 2019-11-14
Payer: COMMERCIAL

## 2019-11-14 VITALS — DIASTOLIC BLOOD PRESSURE: 82 MMHG | SYSTOLIC BLOOD PRESSURE: 140 MMHG

## 2019-11-14 DIAGNOSIS — C44.311 BASAL CELL CARCINOMA (BCC) OF SKIN OF NOSE: ICD-10-CM

## 2019-11-14 DIAGNOSIS — B35.3 TINEA PEDIS, UNSPECIFIED LATERALITY: ICD-10-CM

## 2019-11-14 DIAGNOSIS — B35.1 ONYCHOMYCOSIS: ICD-10-CM

## 2019-11-14 DIAGNOSIS — L40.9 PSORIASIS: ICD-10-CM

## 2019-11-14 DIAGNOSIS — D09.9 SQUAMOUS CELL CARCINOMA IN SITU (SCCIS): ICD-10-CM

## 2019-11-14 DIAGNOSIS — L57.0 ACTINIC KERATOSES: Primary | ICD-10-CM

## 2019-11-14 PROCEDURE — 17000 DESTRUCT PREMALG LESION: CPT | Performed by: PHYSICIAN ASSISTANT

## 2019-11-14 PROCEDURE — 99214 OFFICE O/P EST MOD 30 MIN: CPT | Mod: 25 | Performed by: PHYSICIAN ASSISTANT

## 2019-11-14 NOTE — PROGRESS NOTES
HPI:  Nicolás Fermin is a 59 year old male patient here today for recheck onychomycosis .  Patient states this has been present for a while.  Patient reports the following symptoms: thickened nails and bothersome .  Patient reports the following previous treatments: has completed one month of daily terbinafine. Tolerating medication well. Hepatic panel nml. LOV biopsy proven SCC, BCC, and AK. Pt has mohs appt for BCC and SCC. Has been treating dermatitis on right leg with lidex bid with improvement. .  Patient reports the following modifying factors: none.  Associated symptoms: none.  Patient has no other skin complaints today.  Remainder of the HPI, Meds, PMH, Allergies, FH, and SH was reviewed in chart.    Pertinent Hx:   BCC, SCC, AK. Mother and father had skin cancer.   Past Medical History:   Diagnosis Date     Asymptomatic human immunodeficiency virus (HIV) infection status (H)      Graves disease 2006     Hypertension      Peripheral neuropathy        History reviewed. No pertinent surgical history.     Family History   Problem Relation Age of Onset     Thyroid Disease Mother      Breast Cancer Mother      Skin Cancer Mother      Hypertension Father      Skin Cancer Father        Social History     Socioeconomic History     Marital status: Single     Spouse name: Not on file     Number of children: Not on file     Years of education: Not on file     Highest education level: Not on file   Occupational History     Not on file   Social Needs     Financial resource strain: Not on file     Food insecurity:     Worry: Not on file     Inability: Not on file     Transportation needs:     Medical: Not on file     Non-medical: Not on file   Tobacco Use     Smoking status: Current Every Day Smoker     Packs/day: 0.50     Types: Cigarettes     Smokeless tobacco: Never Used     Tobacco comment: 10 cigarettes a day   Substance and Sexual Activity     Alcohol use: Yes     Alcohol/week: 0.0 standard drinks     Comment:  2-3/day     Drug use: No     Sexual activity: Not Currently   Lifestyle     Physical activity:     Days per week: Not on file     Minutes per session: Not on file     Stress: Not on file   Relationships     Social connections:     Talks on phone: Not on file     Gets together: Not on file     Attends Congregation service: Not on file     Active member of club or organization: Not on file     Attends meetings of clubs or organizations: Not on file     Relationship status: Not on file     Intimate partner violence:     Fear of current or ex partner: Not on file     Emotionally abused: Not on file     Physically abused: Not on file     Forced sexual activity: Not on file   Other Topics Concern     Parent/sibling w/ CABG, MI or angioplasty before 65F 55M? No   Social History Narrative     Not on file       Outpatient Encounter Medications as of 11/14/2019   Medication Sig Dispense Refill     amitriptyline (ELAVIL) 25 MG tablet Take 75 mg by mouth At Bedtime       aspirin - buffered (ASCRIPTIN) 325 MG TABS tablet Take 325 mg by mouth daily       atenolol (TENORMIN) 100 MG tablet Take 100 mg by mouth daily       DiphenhydrAMINE HCl (BENADRYL PO)        Dolutegravir Sodium (TIVICAY PO) Take 50 mg by mouth daily        Emtricitabine-Tenofovir AF (DESCOVY PO) Take by mouth daily       fluocinonide (LIDEX) 0.05 % external cream Apply 1/4g to aa on thighs bid x 3 weeks. Do not use on face or body folds. Should last 30 days. 15 g 0     gabapentin (NEURONTIN) 300 MG capsule Take 300 mg by mouth        levothyroxine (SYNTHROID/LEVOTHROID) 88 MCG tablet        lisinopril-hydrochlorothiazide (PRINZIDE/ZESTORETIC) 20-12.5 MG tablet TAKE 1 TABLET BY MOUTH TWICE DAILY 180 tablet 0     NAPROXEN PO        Omega-3 Fatty Acids (FISH OIL) 500 MG CAPS        psyllium (METAMUCIL) 58.6 % POWD Take by mouth daily       sildenafil (VIAGRA) 50 MG tablet Take by mouth daily as needed for erectile dysfunction       terbinafine (LAMISIL) 250 MG tablet  Take 1 tablet (250 mg) by mouth daily 45 tablet 0     DULoxetine (CYMBALTA) 60 MG EC capsule Take 60 mg by mouth       No facility-administered encounter medications on file as of 11/14/2019.        Review Of Systems:  Skin: As above  Eyes: negative  Ears/Nose/Throat: negative  Respiratory: No shortness of breath, dyspnea on exertion, cough, or hemoptysis  Cardiovascular: negative  Gastrointestinal: negative  Genitourinary: negative  Musculoskeletal: negative  Neurologic: negative  Psychiatric: negative  Hematologic/Lymphatic/Immunologic: negative  Endocrine: negative      Objective:     BP (!) 140/82   Eyes: Conjunctivae/lids: Normal   ENT: Lips:  Normal  MSK: Normal  Cardiovascular: Peripheral edema none  Pulm: Breathing Normal  Neuro/Psych: Orientation: Normal; Mood/Affect: Normal, NAD, WDWN  Pt accompanied by: self  Following areas examined: face, right foot, neck, ventral and medial LE,   [place] skin type:i   Findings:  Pink scaly macule on left nasal bridge  Well granulating biopsy site on right posterior thigh  Atrophic macule on left nasal side wall  Pink scaly patches on right and left medial thighs, pink scaly macule on right and left medial leg  Yellow thickened nails on right foot   Minimal scaling of skin of right foot  Assessment and Plan:  1)  Bx proven Basal cell carcinoma, infiltrating type, extending to the lateral and   deep margins - left nasal side wall 0.5cm  -mohs scheduled 12/5/19 with Dr. Matt    2) Bx proven Actinic keratosis, pigmented left nasal bridge 0.3cm  Disc etiology  LN2: Treated with LN2 for 5s for 1-2 cycles. Warned risks of blistering, pain, pigment change, scarring, and incomplete resolution.  Advised patient to return if lesions do not completely resolve within 2-3 months.  Wound care sheet given.      3)  Bx proven Squamous cell carcinoma, at least in situ- right posterior thigh 1.3cm    -mohs scheduled 12/5/19 with Dr. Matt    4) Onychomycosis,  tinea pedis    Begin terbinafine 250mg tablet by mouth once a day for 3 months. Follow up for labs in 2 weeks.   Discussed treatment options with OTC products including daily application of tea tree oil, Vicks vapor rub, and/or apple cider vinegar soaks. Disc oral agents and liver function tests required at baseline, 6 weeks after treatment begins, and then once treatment ends. Prescription strength topicals are available, can use up to 48 weeks. Fingernails take 3-6 months to regrow completely, and toenails up to 12-18 months    4) SCCIS vs Esthela vs psoriasis  Right and left medial leg and thighs. Total of 4 lesions.   Disc with pt recommend biopsy of lesions today. Pt has been treating lesion on right thigh with lidex bid x a few weeks with improvement. Pt would like to try treating a little longer.   Follow up for biopsy in 4 weeks.     Rainer to follow up with Primary Care provider regarding elevated blood pressure.

## 2019-11-14 NOTE — LETTER
11/14/2019         RE: Nicolás Fermin  [address]  [place] [address]        Dear Colleague,    Thank you for referring your patient, Nicolás Fermin, to the [facility]. Please see a copy of my visit note below.    HPI:  Nicolás Fermin is a 59 year old male patient here today for recheck onychomycosis .  Patient states this has been present for a while.  Patient reports the following symptoms: thickened nails and bothersome .  Patient reports the following previous treatments: has completed one month of daily terbinafine. Tolerating medication well. Hepatic panel nml. LOV biopsy proven SCC, BCC, and AK. Pt has mohs appt for BCC and SCC. Has been treating dermatitis on right leg with lidex bid with improvement. .  Patient reports the following modifying factors: none.  Associated symptoms: none.  Patient has no other skin complaints today.  Remainder of the HPI, Meds, PMH, Allergies, FH, and SH was reviewed in chart.    Pertinent Hx:   BCC, SCC, AK. Mother and father had skin cancer.   Past Medical History:   Diagnosis Date     Asymptomatic human immunodeficiency virus (HIV) infection status (H)      Graves disease 2006     Hypertension      Peripheral neuropathy        History reviewed. No pertinent surgical history.     Family History   Problem Relation Age of Onset     Thyroid Disease Mother      Breast Cancer Mother      Skin Cancer Mother      Hypertension Father      Skin Cancer Father        Social History     Socioeconomic History     Marital status: Single     Spouse name: Not on file     Number of children: Not on file     Years of education: Not on file     Highest education level: Not on file   Occupational History     Not on file   Social Needs     Financial resource strain: Not on file     Food insecurity:     Worry: Not on file     Inability: Not on file     Transportation needs:     Medical: Not on file     Non-medical: Not on file   Tobacco Use     Smoking  status: Current Every Day Smoker     Packs/day: 0.50     Types: Cigarettes     Smokeless tobacco: Never Used     Tobacco comment: 10 cigarettes a day   Substance and Sexual Activity     Alcohol use: Yes     Alcohol/week: 0.0 standard drinks     Comment: 2-3/day     Drug use: No     Sexual activity: Not Currently   Lifestyle     Physical activity:     Days per week: Not on file     Minutes per session: Not on file     Stress: Not on file   Relationships     Social connections:     Talks on phone: Not on file     Gets together: Not on file     Attends Evangelical service: Not on file     Active member of club or organization: Not on file     Attends meetings of clubs or organizations: Not on file     Relationship status: Not on file     Intimate partner violence:     Fear of current or ex partner: Not on file     Emotionally abused: Not on file     Physically abused: Not on file     Forced sexual activity: Not on file   Other Topics Concern     Parent/sibling w/ CABG, MI or angioplasty before 65F 55M? No   Social History Narrative     Not on file       Outpatient Encounter Medications as of 11/14/2019   Medication Sig Dispense Refill     amitriptyline (ELAVIL) 25 MG tablet Take 75 mg by mouth At Bedtime       aspirin - buffered (ASCRIPTIN) 325 MG TABS tablet Take 325 mg by mouth daily       atenolol (TENORMIN) 100 MG tablet Take 100 mg by mouth daily       DiphenhydrAMINE HCl (BENADRYL PO)        Dolutegravir Sodium (TIVICAY PO) Take 50 mg by mouth daily        Emtricitabine-Tenofovir AF (DESCOVY PO) Take by mouth daily       fluocinonide (LIDEX) 0.05 % external cream Apply 1/4g to aa on thighs bid x 3 weeks. Do not use on face or body folds. Should last 30 days. 15 g 0     gabapentin (NEURONTIN) 300 MG capsule Take 300 mg by mouth        levothyroxine (SYNTHROID/LEVOTHROID) 88 MCG tablet        lisinopril-hydrochlorothiazide (PRINZIDE/ZESTORETIC) 20-12.5 MG tablet TAKE 1 TABLET BY MOUTH TWICE DAILY 180 tablet 0      NAPROXEN PO        Omega-3 Fatty Acids (FISH OIL) 500 MG CAPS        psyllium (METAMUCIL) 58.6 % POWD Take by mouth daily       sildenafil (VIAGRA) 50 MG tablet Take by mouth daily as needed for erectile dysfunction       terbinafine (LAMISIL) 250 MG tablet Take 1 tablet (250 mg) by mouth daily 45 tablet 0     DULoxetine (CYMBALTA) 60 MG EC capsule Take 60 mg by mouth       No facility-administered encounter medications on file as of 11/14/2019.        Review Of Systems:  Skin: As above  Eyes: negative  Ears/Nose/Throat: negative  Respiratory: No shortness of breath, dyspnea on exertion, cough, or hemoptysis  Cardiovascular: negative  Gastrointestinal: negative  Genitourinary: negative  Musculoskeletal: negative  Neurologic: negative  Psychiatric: negative  Hematologic/Lymphatic/Immunologic: negative  Endocrine: negative      Objective:     BP (!) 140/82   Eyes: Conjunctivae/lids: Normal   ENT: Lips:  Normal  MSK: Normal  Cardiovascular: Peripheral edema none  Pulm: Breathing Normal  Neuro/Psych: Orientation: Normal; Mood/Affect: Normal, NAD, WDWN  Pt accompanied by: self  Following areas examined: face, right foot, neck, ventral and medial LE,   [place] skin type:i   Findings:  Pink scaly macule on left nasal bridge  Well granulating biopsy site on right posterior thigh  Atrophic macule on left nasal side wall  Pink scaly patches on right and left medial thighs, pink scaly macule on right and left medial leg  Yellow thickened nails on right foot   Minimal scaling of skin of right foot  Assessment and Plan:  1)  Bx proven Basal cell carcinoma, infiltrating type, extending to the lateral and   deep margins - left nasal side wall 0.5cm  -mohs scheduled 12/5/19 with Dr. Matt    2) Bx proven Actinic keratosis, pigmented left nasal bridge 0.3cm  Disc etiology  LN2: Treated with LN2 for 5s for 1-2 cycles. Warned risks of blistering, pain, pigment change, scarring, and incomplete resolution.  Advised patient to  return if lesions do not completely resolve within 2-3 months.  Wound care sheet given.      3)  Bx proven Squamous cell carcinoma, at least in situ- right posterior thigh 1.3cm    -mohs scheduled 12/5/19 with Dr. Matt    4) Onychomycosis,  tinea pedis   Begin terbinafine 250mg tablet by mouth once a day for 3 months. Follow up for labs in 2 weeks.   Discussed treatment options with OTC products including daily application of tea tree oil, Vicks vapor rub, and/or apple cider vinegar soaks. Disc oral agents and liver function tests required at baseline, 6 weeks after treatment begins, and then once treatment ends. Prescription strength topicals are available, can use up to 48 weeks. Fingernails take 3-6 months to regrow completely, and toenails up to 12-18 months    4) SCCIS vs Esthela vs psoriasis  Right and left medial leg and thighs. Total of 4 lesions.   Disc with pt recommend biopsy of lesions today. Pt has been treating lesion on right thigh with lidex bid x a few weeks with improvement. Pt would like to try treating a little longer.   Follow up for biopsy in 4 weeks.         Again, thank you for allowing me to participate in the care of your patient.        Sincerely,        Aiyana Sanabria PA-C

## 2019-11-14 NOTE — PATIENT INSTRUCTIONS
Proper skin care from [facility]:    -Eliminate harsh soaps as they strip the natural oils from the skin, often resulting in dry itchy skin ( i.e. Dial, Zest, Una Spring)  -Use mild soaps such as Cetaphil or Dove Sensitive Skin in the shower. You do not need to use soap on arms, legs, and trunk every time you shower unless visibly soiled.   -Avoid hot or cold showers.  -After showering, lightly dry off and apply moisturizing within 2-3 minutes. This will help trap moisture in the skin.   -Aggressive use of a moisturizer at least 1-2 times a day to the entire body (including -Vanicream, Cetaphil, Aquaphor or Cerave) and moisturize hands after every washing.  -We recommend using moisturizers that come in a tub that needs to be scooped out, not a pump. This has more of an oil base. It will hold moisture in your skin much better than a water base moisturizer. The above recommended are non-pore clogging.      Wear a sunscreen with at least SPF 30 on your face, ears, neck and V of the chest daily. Wear sunscreen on other areas of the body if those areas are exposed to the sun throughout the day. Sunscreens can contain physical and/or chemical blockers. Physical blockers are less likely to clog pores, these include zinc oxide and titanium dioxide. Reapply every two hour and after swimming. Sunscreen examples include Neutrogena, CeraVe, Blue Lizard, Elta MD and many others.    UV radiation  UVA radiation remains constant throughout the day and throughout the year. It is a longer wavelength than UVB and therefore penetrates deeper into the skin leading to immediate and delayed tanning, photoaging, and skin cancer. 70-80% of UVA and UVB radiation occurs between the hours of 10am-2pm.  UVB radiation  UVB radiation causes the most harmful effects and is more significant during the summer months. However, snow and ice can reflect UVB radiation leading to skin damage during the winter months as well. UVB radiation is  responsible for tanning, burning, inflammation, delayed erythema (pinkness), pigmentation (brown spots), and skin cancer.

## 2019-11-25 DIAGNOSIS — Z51.81 MEDICATION MONITORING ENCOUNTER: ICD-10-CM

## 2019-11-25 DIAGNOSIS — B35.1 ONYCHOMYCOSIS: ICD-10-CM

## 2019-11-25 LAB
ALBUMIN SERPL-MCNC: 4.1 G/DL (ref 3.4–5)
ALP SERPL-CCNC: 60 U/L (ref 40–150)
ALT SERPL W P-5'-P-CCNC: 35 U/L (ref 0–70)
AST SERPL W P-5'-P-CCNC: 29 U/L (ref 0–45)
BILIRUB DIRECT SERPL-MCNC: 0.2 MG/DL (ref 0–0.2)
BILIRUB SERPL-MCNC: 0.9 MG/DL (ref 0.2–1.3)
PROT SERPL-MCNC: 7.2 G/DL (ref 6.8–8.8)

## 2019-11-25 PROCEDURE — 36415 COLL VENOUS BLD VENIPUNCTURE: CPT | Performed by: PHYSICIAN ASSISTANT

## 2019-11-25 PROCEDURE — 80076 HEPATIC FUNCTION PANEL: CPT | Performed by: PHYSICIAN ASSISTANT

## 2019-11-25 RX ORDER — TERBINAFINE HYDROCHLORIDE 250 MG/1
250 TABLET ORAL DAILY
Qty: 45 TABLET | Refills: 0 | Status: SHIPPED | OUTPATIENT
Start: 2019-11-25 | End: 2020-07-16

## 2019-12-04 NOTE — PROGRESS NOTES
Surgical Office Location:  [facility]  [address] [place] [id number]

## 2019-12-05 ENCOUNTER — OFFICE VISIT (OUTPATIENT)
Dept: DERMATOLOGY | Facility: CLINIC | Age: 59
End: 2019-12-05
Payer: COMMERCIAL

## 2019-12-05 VITALS — DIASTOLIC BLOOD PRESSURE: 87 MMHG | HEART RATE: 66 BPM | SYSTOLIC BLOOD PRESSURE: 135 MMHG | OXYGEN SATURATION: 94 %

## 2019-12-05 DIAGNOSIS — D04.71 SQUAMOUS CELL CARCINOMA IN SITU OF SKIN OF RIGHT LOWER LEG: Primary | ICD-10-CM

## 2019-12-05 DIAGNOSIS — C44.311 BASAL CELL CARCINOMA OF NOSE: ICD-10-CM

## 2019-12-05 PROCEDURE — 17311 MOHS 1 STAGE H/N/HF/G: CPT | Mod: 51 | Performed by: DERMATOLOGY

## 2019-12-05 PROCEDURE — 17313 MOHS 1 STAGE T/A/L: CPT | Mod: 51 | Performed by: DERMATOLOGY

## 2019-12-05 PROCEDURE — 14061 TIS TRNFR E/N/E/L10.1-30SQCM: CPT | Performed by: DERMATOLOGY

## 2019-12-05 NOTE — PATIENT INSTRUCTIONS
Sutured Wound Care     [facility]: [phone]    [facility]: [phone]          ? No strenuous activity for 48 hours. Resume moderate activity in 48 hours. No heavy exercising until you are seen for follow up in one week.     ? Take Tylenol as needed for discomfort.                         ? Do not drink alcoholic beverages for 48 hours.     ? Keep the pressure bandage in place for 24 hours. If the bandage becomes blood tinged or loose, reinforce it with gauze and tape.        (Refer to the reverse side of this page for management of bleeding).    ? Remove pressure bandage in 24 hours     ? Leave the flat bandage in place until your follow up appointment.    ? Keep the bandage dry. Wash around it carefully.    ? If the tape becomes soiled or starts to come off, reinforce it with additional paper tape.    ? Do not smoke for 3 weeks; smoking is detrimental to wound healing.    ? It is normal to have swelling and bruising around the surgical site. The bruising will fade in approximately 10-14 days. Elevate the area to reduce swelling.    ? Numbness, itchiness and sensitivity to temperature changes can occur after surgery and may take up to 18 months to normalize.      POSSIBLE COMPLICATIONS    BLEEDIN. Leave the bandage in place.  2. Use tightly rolled up gauze or a cloth to apply direct pressure over the bandage for 20   minutes.  3. Reapply pressure for an additional 20 minutes if necessary  4. Call the office or go to the nearest emergency room if pressure fails to stop the bleeding.  5. Use additional gauze and tape to maintain pressure once the bleeding has stopped.        PAIN:    1. Post operative pain should slowly get better, never worse.  2. A severe increase in pain may indicate a problem. Call the office if this occurs.    In case of emergency phone:Dr Matt [phone]

## 2019-12-05 NOTE — PROGRESS NOTES
Nicolás Fermin is a 59 year old year old male patient here today for evaluation and managment of squamous cell carcinoma in situ and basal cell carcinoma. Patient has no other skin complaints today.  Remainder of the HPI, Meds, PMH, Allergies, FH, and SH was reviewed in chart.      Past Medical History:   Diagnosis Date     Asymptomatic human immunodeficiency virus (HIV) infection status (H)      Graves disease 2006     Hypertension      Peripheral neuropathy        History reviewed. No pertinent surgical history.     Family History   Problem Relation Age of Onset     Thyroid Disease Mother      Breast Cancer Mother      Skin Cancer Mother         face     Hypertension Father      Skin Cancer Father         head       Social History     Socioeconomic History     Marital status: Single     Spouse name: Not on file     Number of children: Not on file     Years of education: Not on file     Highest education level: Not on file   Occupational History     Not on file   Social Needs     Financial resource strain: Not on file     Food insecurity:     Worry: Not on file     Inability: Not on file     Transportation needs:     Medical: Not on file     Non-medical: Not on file   Tobacco Use     Smoking status: Current Every Day Smoker     Packs/day: 0.50     Types: Cigarettes     Smokeless tobacco: Never Used     Tobacco comment: 10 cigarettes a day   Substance and Sexual Activity     Alcohol use: Yes     Alcohol/week: 0.0 standard drinks     Comment: 2-3/day     Drug use: No     Sexual activity: Not Currently   Lifestyle     Physical activity:     Days per week: Not on file     Minutes per session: Not on file     Stress: Not on file   Relationships     Social connections:     Talks on phone: Not on file     Gets together: Not on file     Attends Christianity service: Not on file     Active member of club or organization: Not on file     Attends meetings of clubs or organizations: Not on file     Relationship status: Not on  file     Intimate partner violence:     Fear of current or ex partner: Not on file     Emotionally abused: Not on file     Physically abused: Not on file     Forced sexual activity: Not on file   Other Topics Concern     Parent/sibling w/ CABG, MI or angioplasty before 65F 55M? No   Social History Narrative     Not on file       Outpatient Encounter Medications as of 12/5/2019   Medication Sig Dispense Refill     amitriptyline (ELAVIL) 25 MG tablet Take 75 mg by mouth At Bedtime       aspirin - buffered (ASCRIPTIN) 325 MG TABS tablet Take 325 mg by mouth daily       atenolol (TENORMIN) 100 MG tablet Take 100 mg by mouth daily       DiphenhydrAMINE HCl (BENADRYL PO)        Dolutegravir Sodium (TIVICAY PO) Take 50 mg by mouth daily        Emtricitabine-Tenofovir AF (DESCOVY PO) Take by mouth daily       fluocinonide (LIDEX) 0.05 % external cream Apply 1/4g to aa on thighs bid x 3 weeks. Do not use on face or body folds. Should last 30 days. 15 g 0     gabapentin (NEURONTIN) 300 MG capsule Take 300 mg by mouth        levothyroxine (SYNTHROID/LEVOTHROID) 88 MCG tablet        lisinopril-hydrochlorothiazide (PRINZIDE/ZESTORETIC) 20-12.5 MG tablet TAKE 1 TABLET BY MOUTH TWICE DAILY 180 tablet 0     NAPROXEN PO        Omega-3 Fatty Acids (FISH OIL) 500 MG CAPS        psyllium (METAMUCIL) 58.6 % POWD Take by mouth daily       sildenafil (VIAGRA) 50 MG tablet Take by mouth daily as needed for erectile dysfunction       terbinafine (LAMISIL) 250 MG tablet Take 1 tablet (250 mg) by mouth daily 45 tablet 0     DULoxetine (CYMBALTA) 60 MG EC capsule Take 60 mg by mouth       No facility-administered encounter medications on file as of 12/5/2019.              Review Of Systems  Skin: As above  Eyes: negative  Ears/Nose/Throat: negative  Respiratory: No shortness of breath, dyspnea on exertion, cough, or hemoptysis  Cardiovascular: negative  Gastrointestinal: negative  Genitourinary: negative  Musculoskeletal: negative  Neurologic:  negative  Psychiatric: negative  Hematologic/Lymphatic/Immunologic: negative  Endocrine: negative      O:   NAD, WDWN, Alert & Oriented, Mood & Affect wnl, Vitals stable   Here today alone   /87   Pulse 66   SpO2 94%    General appearance normal   Vitals stable   Alert, oriented and in no acute distress      Following lymph nodes palpated: Occipital, Cervical, Supraclavicular , popliteal no lad   R post thigh 1.6cm red ill-defined scaly papule    L NSW 5mm scaly papule       Eyes: Conjunctivae/lids:Normal     ENT: Lips, buccal mucosa, tongue: normal    MSK:Normal    Cardiovascular: peripheral edema none    Pulm: Breathing Normal    Lymph Nodes: No Head and Neck Lymphadenopathy     Neuro/Psych: Orientation:Alert and Orientedx3 ; Mood/Affect:normal       A/P:  1. L NSW basal cell carcinoma   MOHS:   Location    After PGACAC discussed with patient, decision for Mohs surgery was made. Indication for Mohs was Location. Patient confirmed the site with Dr. Matt.  After anesthesia with LEC, the tumor was excised using standard Mohs technique in 1 stages(s).  CLEAR MARGINS OBTAINED and Final defect size was 0.8 x 1 cm.       REPAIR RHOMBIC: Because of the size and full-thickness nature of the defect, and in order to maintain form and function while avoiding distortion of the nearby nasal tip and ala, a rhombic transposition flap was planned. After anesthesia and prep, the rhombic flap was carefully incised superior to the defect; Burow's triangle was excised laterally, just superior to the alar groove. The flap was raised and the wound edges were all undermined in the subcutaneous plane (above the nasalis muscle). After hemostasis, the flap was transposed into the defect and sutured in a layered fashion using Vicryl and Fast Absorbing Plain Gut sutures. Postoperative size was 4.1 x 2.9 cm.  EBL minimal; complications none; wound care routine.  The patient was discharged in good condition and will return in one  week for wound evaluation.  2. R post thigh squamous cell carcinoma in situ  MOHS:   Size and Ill-defined margins    After PGACAC discussed with patient, decision for Mohs surgery was made. Indication for Mohs was Size and Ill-defined margins. Patient confirmed the site with Dr. Matt.  After anesthesia with LEC, the tumor was excised using standard Mohs technique in 1 stages(s).  CLEAR MARGINS OBTAINED and Final defect size was 2.2 x 2.2 cm.       REPAIR SECOND INTENT: We discussed the options for wound management in full with the patient including risks/benefits/possible outcomes. Decision made to allow the wound to heal by second intention. EBL minimal; complications none; wound care routine.  The patient was discharged in good condition and will return in one month or prn for wound evaluation.  BENIGN LESIONS DISCUSSED WITH PATIENT:  I discussed the specifics of tumor, prognosis, and genetics of benign lesions.  I explained that treatment of these lesions would be purely cosmetic and not medically neccessary.  I discussed with patient different removal options including excision, cautery and /or laser.      Nature and genetics of benign skin lesions dicussed with patient.  Signs and Symptoms of skin cancer discussed with patient.  ABCDEs of melanoma reviewed with patient.  Patient encouraged to perform monthly skin exams.  UV precautions reviewed with patient.  Patient to follow up with Primary Care provider regarding elevated blood pressure.  Skin care regimen reviewed with patient: Eliminate harsh soaps, i.e. Dial, zest, irsih spring; Mild soaps such as Cetaphil or Dove sensitive skin, avoid hot or cold showers, aggressive use of emollients including vanicream, cetaphil or cerave discussed with patient.    Risks of non-melanoma skin cancer discussed with patient   Return to clinic 6 months

## 2019-12-05 NOTE — LETTER
12/5/2019         RE: Nicol[facility] Fermin  [address] [facility] [address]        Dear Colleague,    Thank you for referring your patient, Nicolás Fermin, to the [facility]. Please see a copy of my visit note below.    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 Patient has no other skin complaints today.  Remainder of the HPI, Meds, PMH, Allergies, FH, and SH was reviewed in chart.      Past Medical History:   Diagnosis Date     Asymptomatic human immunodeficiency virus (HIV) infection status (H)      Graves disease 2006     Hypertension      Peripheral neuropathy        History reviewed. No pertinent surgical history.     Family History   Problem Relation Age of Onset     Thyroid Disease Mother      Breast Cancer Mother      Skin Cancer Mother         face     Hypertension Father      Skin Cancer Father         head       Social History     Socioeconomic History     Marital status: Single     Spouse name: Not on file     Number of children: Not on file     Years of education: Not on file     Highest education level: Not on file   Occupational History     Not on file   Social Needs     Financial resource strain: Not on file     Food insecurity:     Worry: Not on file     Inability: Not on file     Transportation needs:     Medical: Not on file     Non-medical: Not on file   Tobacco Use     Smoking status: Current Every Day Smoker     Packs/day: 0.50     Types: Cigarettes     Smokeless tobacco: Never Used     Tobacco comment: 10 cigarettes a day   Substance and Sexual Activity     Alcohol use: Yes     Alcohol/week: 0.0 standard drinks     Comment: 2-3/day     Drug use: No     Sexual activity: Not Currently   Lifestyle     Physical activity:     Days per week: Not  on file     Minutes per session: Not on file     Stress: Not on file   Relationships     Social connections:     Talks on phone: Not on file     Gets together: Not on file     Attends Alevism service: Not on file     Active member of club or organization: Not on file     Attends meetings of clubs or organizations: Not on file     Relationship status: Not on file     Intimate partner violence:     Fear of current or ex partner: Not on file     Emotionally abused: Not on file     Physically abused: Not on file     Forced sexual activity: Not on file   Other Topics Concern     Parent/sibling w/ CABG, MI or angioplasty before 65F 55M? No   Social History Narrative     Not on file       Outpatient Encounter Medications as of 12/5/2019   Medication Sig Dispense Refill     amitriptyline (ELAVIL) 25 MG tablet Take 75 mg by mouth At Bedtime       aspirin - buffered (ASCRIPTIN) 325 MG TABS tablet Take 325 mg by mouth daily       atenolol (TENORMIN) 100 MG tablet Take 100 mg by mouth daily       DiphenhydrAMINE HCl (BENADRYL PO)        Dolutegravir Sodium (TIVICAY PO) Take 50 mg by mouth daily        Emtricitabine-Tenofovir AF (DESCOVY PO) Take by mouth daily       fluocinonide (LIDEX) 0.05 % external cream Apply 1/4g to aa on thighs bid x 3 weeks. Do not use on face or body folds. Should last 30 days. 15 g 0     gabapentin (NEURONTIN) 300 MG capsule Take 300 mg by mouth        levothyroxine (SYNTHROID/LEVOTHROID) 88 MCG tablet        lisinopril-hydrochlorothiazide (PRINZIDE/ZESTORETIC) 20-12.5 MG tablet TAKE 1 TABLET BY MOUTH TWICE DAILY 180 tablet 0     NAPROXEN PO        Omega-3 Fatty Acids (FISH OIL) 500 MG CAPS        psyllium (METAMUCIL) 58.6 % POWD Take by mouth daily       sildenafil (VIAGRA) 50 MG tablet Take by mouth daily as needed for erectile dysfunction       terbinafine (LAMISIL) 250 MG tablet Take 1 tablet (250 mg) by mouth daily 45 tablet 0     DULoxetine (CYMBALTA) 60 MG EC capsule Take 60 mg by mouth        No facility-administered encounter medications on file as of 12/5/2019.              Review Of Systems  Skin: As above  Eyes: negative  Ears/Nose/Throat: negative  Respiratory: No shortness of breath, dyspnea on exertion, cough, or hemoptysis  Cardiovascular: negative  Gastrointestinal: negative  Genitourinary: negative  Musculoskeletal: negative  Neurologic: negative  Psychiatric: negative  Hematologic/Lymphatic/Immunologic: negative  Endocrine: negative      O:   NAD, WDWN, Alert & Oriented, Mood & Affect wnl, Vitals stable   Here today alone   /87   Pulse 66   SpO2 94%    General appearance normal   Vitals stable   Alert, oriented and in no acute distress      Following lymph nodes palpated: Occipital, Cervical, Supraclavicular , popliteal no lad   R post thigh 1.6cm red ill-defined scaly papule    L NSW 5mm scaly papule       Eyes: Conjunctivae/lids:Normal     ENT: Lips, buccal mucosa, tongue: normal    MSK:Normal    Cardiovascular: peripheral edema none    Pulm: Breathing Normal    Lymph Nodes: No Head and Neck Lymphadenopathy     Neuro/Psych: Orientation:Alert and Orientedx3 ; Mood/Affect:normal       A/P:  1. L NSW basal cell carcinoma   MOHS:   Location    After PGACAC discussed with patient, decision for Mohs surgery was made. Indication for Mohs was Location. Patient confirmed the site with Dr. Matt.  After anesthesia with LEC, the tumor was excised using standard Mohs technique in 1 stages(s).  CLEAR MARGINS OBTAINED and Final defect size was 0.8 x 1 cm.       REPAIR RHOMBIC: Because of the size and full-thickness nature of the defect, and in order to maintain form and function while avoiding distortion of the nearby nasal tip and ala, a rhombic transposition flap was planned. After anesthesia and prep, the rhombic flap was carefully incised superior to the defect; Burow's triangle was excised laterally, just superior to the alar groove. The flap was raised and the wound edges were all  undermined in the subcutaneous plane (above the nasalis muscle). After hemostasis, the flap was transposed into the defect and sutured in a layered fashion using Vicryl and Fast Absorbing Plain Gut sutures. Postoperative size was 4.1 x 2.9 cm.  EBL minimal; complications none; wound care routine.  The patient was discharged in good condition and will return in one week for wound evaluation.  2. R post thigh squamous cell carcinoma in situ  MOHS:   Size and Ill-defined margins    After PGACAC discussed with patient, decision for Mohs surgery was made. Indication for Mohs was Size and Ill-defined margins. Patient confirmed the site with Dr. Matt.  After anesthesia with LEC, the tumor was excised using standard Mohs technique in 1 stages(s).  CLEAR MARGINS OBTAINED and Final defect size was 2.2 x 2.2 cm.       REPAIR SECOND INTENT: We discussed the options for wound management in full with the patient including risks/benefits/possible outcomes. Decision made to allow the wound to heal by second intention. EBL minimal; complications none; wound care routine.  The patient was discharged in good condition and will return in one month or prn for wound evaluation.  BENIGN LESIONS DISCUSSED WITH PATIENT:  I discussed the specifics of tumor, prognosis, and genetics of benign lesions.  I explained that treatment of these lesions would be purely cosmetic and not medically neccessary.  I discussed with patient different removal options including excision, cautery and /or laser.      Nature and genetics of benign skin lesions dicussed with patient.  Signs and Symptoms of skin cancer discussed with patient.  ABCDEs of melanoma reviewed with patient.  Patient encouraged to perform monthly skin exams.  UV precautions reviewed with patient.  Patient to follow up with Primary Care provider regarding elevated blood pressure.  Skin care regimen reviewed with patient: Eliminate harsh soaps, i.e. Dial, zest, irsih spring; Mild soaps  such as Cetaphil or Dove sensitive skin, avoid hot or cold showers, aggressive use of emollients including vanicream, cetaphil or cerave discussed with patient.    Risks of non-melanoma skin cancer discussed with patient   Return to clinic 6 months      Again, thank you for allowing me to participate in the care of your patient.        Sincerely,        Abundio Matt MD

## 2019-12-06 ENCOUNTER — MYC MEDICAL ADVICE (OUTPATIENT)
Dept: DERMATOLOGY | Facility: CLINIC | Age: 59
End: 2019-12-06

## 2019-12-13 ENCOUNTER — ALLIED HEALTH/NURSE VISIT (OUTPATIENT)
Dept: DERMATOLOGY | Facility: CLINIC | Age: 59
End: 2019-12-13
Payer: COMMERCIAL

## 2019-12-13 DIAGNOSIS — Z48.01 ENCOUNTER FOR CHANGE OR REMOVAL OF SURGICAL WOUND DRESSING: Primary | ICD-10-CM

## 2019-12-13 PROCEDURE — 99207 ZZC NO CHARGE NURSE ONLY: CPT

## 2019-12-13 NOTE — PATIENT INSTRUCTIONS

## 2019-12-13 NOTE — NURSING NOTE
Pt returned to clinic for post surgery 1 week follow up bandage change. Pt has no complaints, denies pain. Bandage removed from left NSW, area cleansed with normal saline. Site is healing and wound edges approximating well. Reapplied new steri strips and paper tape.    Advised to watch for signs/sx of infection; spreading redness, drainage, odor, fever. Call or report promptly to clinic. Pt given written instructions and informed to rtc as needed. Patient verbalized understanding.     NYA Anderson-BSN-PHN  [facility]  [phone]

## 2020-02-04 DIAGNOSIS — I10 ESSENTIAL HYPERTENSION, BENIGN: ICD-10-CM

## 2020-02-05 RX ORDER — LISINOPRIL AND HYDROCHLOROTHIAZIDE 12.5; 2 MG/1; MG/1
TABLET ORAL
Qty: 180 TABLET | Refills: 0 | Status: SHIPPED | OUTPATIENT
Start: 2020-02-05 | End: 2020-05-04

## 2020-02-05 NOTE — TELEPHONE ENCOUNTER
"    Requested Prescriptions   Pending Prescriptions Disp Refills     lisinopril-hydrochlorothiazide (PRINZIDE/ZESTORETIC) 20-12.5 MG tablet [Pharmacy Med Name: LISINOPRIL-HCTZ 20/12.5MG TABLETS] 180 tablet 0     Sig: TAKE 1 TABLET BY MOUTH TWICE DAILY       Diuretics (Including Combos) Protocol Failed - 2/4/2020  5:40 AM        Failed - Normal serum sodium on file in past 12 months     Recent Labs   Lab Test 06/28/19  1031   *              Passed - Blood pressure under 140/90 in past 12 months     BP Readings from Last 3 Encounters:   12/05/19 135/87   11/14/19 (!) 140/82   10/17/19 136/82                 Passed - Recent (12 mo) or future (30 days) visit within the authorizing provider's specialty     Patient has had an office visit with the authorizing provider or a provider within the authorizing providers department within the previous 12 mos or has a future within next 30 days. See \"Patient Info\" tab in inbasket, or \"Choose Columns\" in Meds & Orders section of the refill encounter.              Passed - Medication is active on med list        Passed - Patient is age 18 or older        Passed - Normal serum creatinine on file in past 12 months     Recent Labs   Lab Test 06/28/19  1031   CR 1.01              Passed - Normal serum potassium on file in past 12 months     Recent Labs   Lab Test 06/28/19  1031   POTASSIUM 4.0                      "

## 2020-05-04 DIAGNOSIS — I10 ESSENTIAL HYPERTENSION, BENIGN: ICD-10-CM

## 2020-05-04 RX ORDER — LISINOPRIL AND HYDROCHLOROTHIAZIDE 12.5; 2 MG/1; MG/1
TABLET ORAL
Qty: 180 TABLET | Refills: 0 | Status: SHIPPED | OUTPATIENT
Start: 2020-05-04 | End: 2020-07-16

## 2020-05-04 NOTE — TELEPHONE ENCOUNTER
Prescription refilled but patient needs to be reminded that will need a virtual phone visit with me prior to next refill

## 2020-06-11 ENCOUNTER — OFFICE VISIT (OUTPATIENT)
Dept: FAMILY MEDICINE | Facility: CLINIC | Age: 60
End: 2020-06-11
Payer: COMMERCIAL

## 2020-06-11 VITALS — DIASTOLIC BLOOD PRESSURE: 80 MMHG | SYSTOLIC BLOOD PRESSURE: 122 MMHG

## 2020-06-11 DIAGNOSIS — L57.0 ACTINIC KERATOSES: ICD-10-CM

## 2020-06-11 DIAGNOSIS — L91.8 INFLAMED SKIN TAG: ICD-10-CM

## 2020-06-11 DIAGNOSIS — Z85.828 HISTORY OF NONMELANOMA SKIN CANCER: ICD-10-CM

## 2020-06-11 DIAGNOSIS — D18.01 CHERRY ANGIOMA: ICD-10-CM

## 2020-06-11 DIAGNOSIS — L82.1 SEBORRHEIC KERATOSES: ICD-10-CM

## 2020-06-11 DIAGNOSIS — D48.5 NEOPLASM OF UNCERTAIN BEHAVIOR OF SKIN: Primary | ICD-10-CM

## 2020-06-11 DIAGNOSIS — L57.8 SOLAR ELASTOSIS: ICD-10-CM

## 2020-06-11 DIAGNOSIS — L81.4 LENTIGINES: ICD-10-CM

## 2020-06-11 DIAGNOSIS — L82.0 INFLAMED SEBORRHEIC KERATOSIS: ICD-10-CM

## 2020-06-11 DIAGNOSIS — D22.9 MULTIPLE BENIGN NEVI: ICD-10-CM

## 2020-06-11 PROCEDURE — 88341 IMHCHEM/IMCYTCHM EA ADD ANTB: CPT | Mod: TC | Performed by: PHYSICIAN ASSISTANT

## 2020-06-11 PROCEDURE — 17110 DESTRUCTION B9 LES UP TO 14: CPT | Performed by: PHYSICIAN ASSISTANT

## 2020-06-11 PROCEDURE — 99214 OFFICE O/P EST MOD 30 MIN: CPT | Mod: 25 | Performed by: PHYSICIAN ASSISTANT

## 2020-06-11 PROCEDURE — 17000 DESTRUCT PREMALG LESION: CPT | Mod: 59 | Performed by: PHYSICIAN ASSISTANT

## 2020-06-11 PROCEDURE — 88342 IMHCHEM/IMCYTCHM 1ST ANTB: CPT | Mod: TC | Performed by: PHYSICIAN ASSISTANT

## 2020-06-11 PROCEDURE — 17003 DESTRUCT PREMALG LES 2-14: CPT | Mod: 59 | Performed by: PHYSICIAN ASSISTANT

## 2020-06-11 PROCEDURE — 11200 RMVL SKIN TAGS UP TO&INC 15: CPT | Mod: 59 | Performed by: PHYSICIAN ASSISTANT

## 2020-06-11 PROCEDURE — 11102 TANGNTL BX SKIN SINGLE LES: CPT | Mod: 59 | Performed by: PHYSICIAN ASSISTANT

## 2020-06-11 PROCEDURE — 88305 TISSUE EXAM BY PATHOLOGIST: CPT | Mod: TC | Performed by: PHYSICIAN ASSISTANT

## 2020-06-11 NOTE — PROGRESS NOTES
HPI:  Nicolás Fermin is a 60 year old male patient here today for FBE . Has some new very bothersome growths near anus Patient states this has been present for months.  Patient reports the following symptoms: irritated, interferes with cleaning .  Patient reports the following previous treatments: none.  Patient reports the following modifying factors: none.  Associated symptoms: none.  Patient has no other skin complaints today.  Remainder of the HPI, Meds, PMH, Allergies, FH, and SH was reviewed in chart.    Pertinent Hx:   NMSC. fhx of NMSC.  Past Medical History:   Diagnosis Date     Asymptomatic human immunodeficiency virus (HIV) infection status (H)      Graves disease 2006     Hypertension      Peripheral neuropathy        No past surgical history on file.     Family History   Problem Relation Age of Onset     Thyroid Disease Mother      Breast Cancer Mother      Skin Cancer Mother         face     Hypertension Father      Skin Cancer Father         head       Social History     Socioeconomic History     Marital status: Single     Spouse name: Not on file     Number of children: Not on file     Years of education: Not on file     Highest education level: Not on file   Occupational History     Not on file   Social Needs     Financial resource strain: Not on file     Food insecurity     Worry: Not on file     Inability: Not on file     Transportation needs     Medical: Not on file     Non-medical: Not on file   Tobacco Use     Smoking status: Current Every Day Smoker     Packs/day: 0.50     Types: Cigarettes     Smokeless tobacco: Never Used     Tobacco comment: 10 cigarettes a day   Substance and Sexual Activity     Alcohol use: Yes     Alcohol/week: 0.0 standard drinks     Comment: 2-3/day     Drug use: No     Sexual activity: Not Currently   Lifestyle     Physical activity     Days per week: Not on file     Minutes per session: Not on file     Stress: Not on file   Relationships     Social connections      Talks on phone: Not on file     Gets together: Not on file     Attends Buddhist service: Not on file     Active member of club or organization: Not on file     Attends meetings of clubs or organizations: Not on file     Relationship status: Not on file     Intimate partner violence     Fear of current or ex partner: Not on file     Emotionally abused: Not on file     Physically abused: Not on file     Forced sexual activity: Not on file   Other Topics Concern     Parent/sibling w/ CABG, MI or angioplasty before 65F 55M? No   Social History Narrative     Not on file       Outpatient Encounter Medications as of 6/11/2020   Medication Sig Dispense Refill     amitriptyline (ELAVIL) 25 MG tablet Take 75 mg by mouth At Bedtime       aspirin - buffered (ASCRIPTIN) 325 MG TABS tablet Take 325 mg by mouth daily       atenolol (TENORMIN) 100 MG tablet Take 100 mg by mouth daily       DiphenhydrAMINE HCl (BENADRYL PO)        Dolutegravir Sodium (TIVICAY PO) Take 50 mg by mouth daily        Emtricitabine-Tenofovir AF (DESCOVY PO) Take by mouth daily       fluocinonide (LIDEX) 0.05 % external cream Apply 1/4g to aa on thighs bid x 3 weeks. Do not use on face or body folds. Should last 30 days. 15 g 0     gabapentin (NEURONTIN) 300 MG capsule Take 300 mg by mouth        levothyroxine (SYNTHROID/LEVOTHROID) 88 MCG tablet        lisinopril-hydrochlorothiazide (ZESTORETIC) 20-12.5 MG tablet TAKE 1 TABLET BY MOUTH TWICE DAILY 180 tablet 0     NAPROXEN PO        Omega-3 Fatty Acids (FISH OIL) 500 MG CAPS        psyllium (METAMUCIL) 58.6 % POWD Take by mouth daily       sildenafil (VIAGRA) 50 MG tablet Take by mouth daily as needed for erectile dysfunction       terbinafine (LAMISIL) 250 MG tablet Take 1 tablet (250 mg) by mouth daily 45 tablet 0     DULoxetine (CYMBALTA) 60 MG EC capsule Take 60 mg by mouth       No facility-administered encounter medications on file as of 6/11/2020.        Review Of Systems:  Skin: growths near  anus  Eyes: negative  Ears/Nose/Throat: negative  Respiratory: No shortness of breath, dyspnea on exertion, cough, or hemoptysis  Cardiovascular: negative  Gastrointestinal: negative  Genitourinary: negative  Musculoskeletal: negative  Neurologic: negative  Psychiatric: negative  Hematologic/Lymphatic/Immunologic: negative  Endocrine: negative      Objective:     /80   Eyes: Conjunctivae/lids: Normal   ENT: Lips:  Normal  MSK: Normal  Cardiovascular: Peripheral edema none  Pulm: Breathing Normal  Neuro/Psych: Orientation: A/O x 3. Normal; Mood/Affect: Normal, NAD, WDWN  Pt accompanied by: self  Following areas examined: Scalp, face, eyelids, lips, neck, chest, abdomen, back, buttocks, anus and R&L upper and lower extremities. Pt defers exam of groin and genitals.   [place] skin type:i   Findings:  Red smooth well-defined macules on trunk and extremities.  Brown, stuck-on scaly appearing papules on trunk and extremities.  Well circumscribed macules with symmetric color distribution on trunk and extremities.  Tan WD smooth macules on face, neck, trunk, and extremities.  Inflamed flesh-colored smooth pedunculated papules on left medial gluteal cleft x 2  Light brown and dark brown irregular macule on right lateral arm 0.6cm  Inflamed brown, stuck-on scaly appearing papules on forearms, ventral legs   Pink gritty macule/s x 4 on right dorsal hands  Linear flesh colored plaque and patch on left NLF  Pink annular scaly patch on post right thigh  Rhytides, hypo/hyperpigmentation, and atrophy        Assessment and Plan:     1) Cherry angiomas, Seborrheic keratoses, Benign nevi, Lentigines     I discussed the specifics of tumor, prognosis, and genetics of benign lesions.  I explained that treatment of these lesions would be purely cosmetic and not medically neccessary.  I discussed with patient different removal options including excision, cryotherapy, cautery and /or laser.  Lesion may recur and/or may not  completely resolve. May need additional treatment.     2) Neoplasm of uncertain behavior right lateral arm 0.6cm  ISK vs atypical nevus vs MIS  TANGENTIAL BIOPSY:  After consent, anesthesia with LEC and prep, tangential biopsy performed.  No complications and routine wound care.  May grow back and will get a scar. Based on lesion type may need to completely remove lesion. Patient will be notified in 7-10 days of results. Wound care directions given.    3) irritated skin tags x 2  SNIP REMOVAL: After consent, anesthesia with LEC and prep, snip excision performed.  No complications and routine wound care.  May grow back and will get a scar. Wound care directions given.  4) ISK x 6  Benign etiology and course of lesion.  LN2: Treated with LN2 for 5s for 1-2 cycles. Warned risks of blistering, pain, pigment change, scarring, and incomplete resolution.  Advised patient to return if lesions do not completely resolve within 2-3 months.  Wound care sheet given.  5) Actinic keratoses x 4 and solar elastosis    LN2 for 5 seconds x 2. Discussed AE include hypopigmentation (white spot) and recurrence. Follow up in 2-3 months to recheck lesions. There is a risk of AKs developing into a SCC.   Treatment options include LN2 vs PDT vs Efudex. 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 Patient encouraged to perform monthly self skin exams and educated on how to perform them. UV precautions reviewed with patient. Patient was asked about new or changing moles/lesions on body.   Wear a sunscreen with at least SPF 30 on your face, ears, neck and V of the chest daily. Wear sunscreen on other areas of the body if those areas are exposed to the sun throughout the day. Sunscreens can contain physical and/or chemical blockers. Physical blockers are less likely to clog pores, these include  zinc oxide and titanium dioxide. Reapply every two hour and after swimming. Sunscreen examples include Neutrogena, CeraVe, Blue Lizard, Elta MD and many others.    Proper skin care from [facility]:    -Eliminate harsh soaps as they strip the natural oils from the skin, often resulting in dry itchy skin ( i.e. Dial, Zest, Tamazight Spring)  -Use mild soaps such as Cetaphil or Dove Sensitive Skin in the shower. You do not need to use soap on arms, legs, and trunk every time you shower unless visibly soiled.   -Avoid hot or cold showers.  -After showering, lightly dry off and apply moisturizing within 2-3 minutes. This will help trap moisture in the skin.   -Aggressive use of a moisturizer at least 1-2 times a day to the entire body (including -Vanicream, Cetaphil, Aquaphor or Cerave) and moisturize hands after every washing.  -We recommend using moisturizers that come in a tub that needs to be scooped out, not a pump. This has more of an oil base. It will hold moisture in your skin much better than a water base moisturizer. The above recommended are non-pore clogging.               Follow up in 6 months

## 2020-06-11 NOTE — LETTER
6/11/2020         RE: Nicolás Fermin  [address]  [place] [address]        Dear Colleague,    Thank you for referring your patient, Nicolás Fermin, to the [facility] [place]. Please see a copy of my visit note below.    HPI:  Nicolás Fermin is a 60 year old male patient here today for FBE . Has some new very bothersome growths near anus Patient states this has been present for months.  Patient reports the following symptoms: irritated, interferes with cleaning .  Patient reports the following previous treatments: none.  Patient reports the following modifying factors: none.  Associated symptoms: none.  Patient has no other skin complaints today.  Remainder of the HPI, Meds, PMH, Allergies, FH, and SH was reviewed in chart.    Pertinent Hx:   NMSC. fhx of NMSC.  Past Medical History:   Diagnosis Date     Asymptomatic human immunodeficiency virus (HIV) infection status (H)      Graves disease 2006     Hypertension      Peripheral neuropathy        No past surgical history on file.     Family History   Problem Relation Age of Onset     Thyroid Disease Mother      Breast Cancer Mother      Skin Cancer Mother         face     Hypertension Father      Skin Cancer Father         head       Social History     Socioeconomic History     Marital status: Single     Spouse name: Not on file     Number of children: Not on file     Years of education: Not on file     Highest education level: Not on file   Occupational History     Not on file   Social Needs     Financial resource strain: Not on file     Food insecurity     Worry: Not on file     Inability: Not on file     Transportation needs     Medical: Not on file     Non-medical: Not on file   Tobacco Use     Smoking status: Current Every Day Smoker     Packs/day: 0.50     Types: Cigarettes     Smokeless tobacco: Never Used     Tobacco comment: 10 cigarettes a day   Substance and Sexual Activity     Alcohol use: Yes     Alcohol/week: 0.0  standard drinks     Comment: 2-3/day     Drug use: No     Sexual activity: Not Currently   Lifestyle     Physical activity     Days per week: Not on file     Minutes per session: Not on file     Stress: Not on file   Relationships     Social connections     Talks on phone: Not on file     Gets together: Not on file     Attends Synagogue service: Not on file     Active member of club or organization: Not on file     Attends meetings of clubs or organizations: Not on file     Relationship status: Not on file     Intimate partner violence     Fear of current or ex partner: Not on file     Emotionally abused: Not on file     Physically abused: Not on file     Forced sexual activity: Not on file   Other Topics Concern     Parent/sibling w/ CABG, MI or angioplasty before 65F 55M? No   Social History Narrative     Not on file       Outpatient Encounter Medications as of 6/11/2020   Medication Sig Dispense Refill     amitriptyline (ELAVIL) 25 MG tablet Take 75 mg by mouth At Bedtime       aspirin - buffered (ASCRIPTIN) 325 MG TABS tablet Take 325 mg by mouth daily       atenolol (TENORMIN) 100 MG tablet Take 100 mg by mouth daily       DiphenhydrAMINE HCl (BENADRYL PO)        Dolutegravir Sodium (TIVICAY PO) Take 50 mg by mouth daily        Emtricitabine-Tenofovir AF (DESCOVY PO) Take by mouth daily       fluocinonide (LIDEX) 0.05 % external cream Apply 1/4g to aa on thighs bid x 3 weeks. Do not use on face or body folds. Should last 30 days. 15 g 0     gabapentin (NEURONTIN) 300 MG capsule Take 300 mg by mouth        levothyroxine (SYNTHROID/LEVOTHROID) 88 MCG tablet        lisinopril-hydrochlorothiazide (ZESTORETIC) 20-12.5 MG tablet TAKE 1 TABLET BY MOUTH TWICE DAILY 180 tablet 0     NAPROXEN PO        Omega-3 Fatty Acids (FISH OIL) 500 MG CAPS        psyllium (METAMUCIL) 58.6 % POWD Take by mouth daily       sildenafil (VIAGRA) 50 MG tablet Take by mouth daily as needed for erectile dysfunction       terbinafine  (LAMISIL) 250 MG tablet Take 1 tablet (250 mg) by mouth daily 45 tablet 0     DULoxetine (CYMBALTA) 60 MG EC capsule Take 60 mg by mouth       No facility-administered encounter medications on file as of 6/11/2020.        Review Of Systems:  Skin: growths near anus  Eyes: negative  Ears/Nose/Throat: negative  Respiratory: No shortness of breath, dyspnea on exertion, cough, or hemoptysis  Cardiovascular: negative  Gastrointestinal: negative  Genitourinary: negative  Musculoskeletal: negative  Neurologic: negative  Psychiatric: negative  Hematologic/Lymphatic/Immunologic: negative  Endocrine: negative      Objective:     /80   Eyes: Conjunctivae/lids: Normal   ENT: Lips:  Normal  MSK: Normal  Cardiovascular: Peripheral edema none  Pulm: Breathing Normal  Neuro/Psych: Orientation: A/O x 3. Normal; Mood/Affect: Normal, NAD, WDWN  Pt accompanied by: self  Following areas examined: Scalp, face, eyelids, lips, neck, chest, abdomen, back, buttocks, anus and R&L upper and lower extremities. Pt defers exam of groin and genitals.   [place] skin type:i   Findings:  Red smooth well-defined macules on trunk and extremities.  Brown, stuck-on scaly appearing papules on trunk and extremities.  Well circumscribed macules with symmetric color distribution on trunk and extremities.  Tan WD smooth macules on face, neck, trunk, and extremities.  Inflamed flesh-colored smooth pedunculated papules on left medial gluteal cleft x 2  Light brown and dark brown irregular macule on right lateral arm 0.6cm  Inflamed brown, stuck-on scaly appearing papules on forearms, ventral legs   Pink gritty macule/s x 4 on right dorsal hands  Linear flesh colored plaque and patch on left NLF  Pink annular scaly patch on post right thigh  Rhytides, hypo/hyperpigmentation, and atrophy        Assessment and Plan:     1) Cherry angiomas, Seborrheic keratoses, Benign nevi, Lentigines     I discussed the specifics of tumor, prognosis, and genetics of  benign lesions.  I explained that treatment of these lesions would be purely cosmetic and not medically neccessary.  I discussed with patient different removal options including excision, cryotherapy, cautery and /or laser.  Lesion may recur and/or may not completely resolve. May need additional treatment.     2) Neoplasm of uncertain behavior right lateral arm 0.6cm  ISK vs atypical nevus vs MIS  TANGENTIAL BIOPSY:  After consent, anesthesia with LEC and prep, tangential biopsy performed.  No complications and routine wound care.  May grow back and will get a scar. Based on lesion type may need to completely remove lesion. Patient will be notified in 7-10 days of results. Wound care directions given.    3) irritated skin tags x 2  SNIP REMOVAL: After consent, anesthesia with LEC and prep, snip excision performed.  No complications and routine wound care.  May grow back and will get a scar. Wound care directions given.  4) ISK x 6  Benign etiology and course of lesion.  LN2: Treated with LN2 for 5s for 1-2 cycles. Warned risks of blistering, pain, pigment change, scarring, and incomplete resolution.  Advised patient to return if lesions do not completely resolve within 2-3 months.  Wound care sheet given.  5) Actinic keratoses x 4 and solar elastosis    LN2 for 5 seconds x 2. Discussed AE include hypopigmentation (white spot) and recurrence. Follow up in 2-3 months to recheck lesions. There is a risk of AKs developing into a SCC.   Treatment options include LN2 vs PDT vs Efudex. 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 Patient encouraged to perform monthly self skin exams and educated on how to perform them. UV precautions reviewed with patient. Patient was asked about new or changing moles/lesions on body.   Wear a sunscreen with at least SPF 30 on your face,  ears, neck and V of the chest daily. Wear sunscreen on other areas of the body if those areas are exposed to the sun throughout the day. Sunscreens can contain physical and/or chemical blockers. Physical blockers are less likely to clog pores, these include zinc oxide and titanium dioxide. Reapply every two hour and after swimming. Sunscreen examples include Neutrogena, CeraVe, Blue Lizard, Elta MD and many others.    Proper skin care from [facility]:    -Eliminate harsh soaps as they strip the natural oils from the skin, often resulting in dry itchy skin ( i.e. Dial, Zest, [place] Spring)  -Use mild soaps such as Cetaphil or Dove Sensitive Skin in the shower. You do not need to use soap on arms, legs, and trunk every time you shower unless visibly soiled.   -Avoid hot or cold showers.  -After showering, lightly dry off and apply moisturizing within 2-3 minutes. This will help trap moisture in the skin.   -Aggressive use of a moisturizer at least 1-2 times a day to the entire body (including -Vanicream, Cetaphil, Aquaphor or Cerave) and moisturize hands after every washing.  -We recommend using moisturizers that come in a tub that needs to be scooped out, not a pump. This has more of an oil base. It will hold moisture in your skin much better than a water base moisturizer. The above recommended are non-pore clogging.               Follow up in 6 months      Again, thank you for allowing me to participate in the care of your patient.        Sincerely,        Aiyana Sanabria PA-C

## 2020-06-11 NOTE — PATIENT INSTRUCTIONS
Wound Care Instructions     FOR SUPERFICIAL WOUNDS     [facility] [phone]    [facility] [phone]          AFTER 24 HOURS YOU SHOULD REMOVE THE BANDAGE AND BEGIN DAILY DRESSING CHANGES AS FOLLOWS:     1) Remove Dressing.     2) Clean and dry the area with tap water using a Q-tip or sterile gauze pad.     3) Apply Vaseline, Aquaphor, Polysporin ointment or Bacitracin ointment over entire wound.  Do NOT use Neosporin ointment.     4) Cover the wound with a band-aid, or a sterile non-stick gauze pad and micropore paper tape      REPEAT THESE INSTRUCTIONS AT LEAST ONCE A DAY UNTIL THE WOUND HAS COMPLETELY HEALED.    It is an old wives tale that a wound heals better when it is exposed to air and allowed to dry out. The wound will heal faster with a better cosmetic result if it is kept moist with ointment and covered with a bandage.    **Do not let the wound dry out.**      Supplies Needed:      *Cotton tipped applicators (Q-tips)    *Polysporin Ointment or Bacitracin Ointment (NOT NEOSPORIN)    *Band-aids or non-stick gauze pads and micropore paper tape.      PATIENT INFORMATION:    During the healing process you will notice a number of changes. All wounds develop a small halo of redness surrounding the wound.  This means healing is occurring. Severe itching with extensive redness usually indicates sensitivity to the ointment or bandage tape used to dress the wound.  You should call our office if this develops.      Swelling  and/or discoloration around your surgical site is common, particularly when performed around the eye.    All wounds normally drain.  The larger the wound the more drainage there will be.  After 7-10 days, you will notice the wound beginning to shrink and new skin will begin to grow.  The wound is healed when you can see skin has formed over the entire area.  A healed wound has a healthy, shiny look to the surface and is red to dark pink in color to  normalize.  Wounds may take approximately 4-6 weeks to heal.  Larger wounds may take 6-8 weeks.  After the wound is healed you may discontinue dressing changes.    You may experience a sensation of tightness as your wound heals. This is normal and will gradually subside.    Your healed wound may be sensitive to temperature changes. This sensitivity improves with time, but if you re having a lot of discomfort, try to avoid temperature extremes.    Patients frequently experience itching after their wound appears to have healed because of the continue healing under the skin.  Plain Vaseline will help relieve the itching.        POSSIBLE COMPLICATIONS    BLEEDIN. Leave the bandage in place.  2. Use tightly rolled up gauze or a cloth to apply direct pressure over the bandage for 30  minutes.  3. Reapply pressure for an additional 30 minutes if necessary  4. Use additional gauze and tape to maintain pressure once the bleeding has stopped.    WOUND CARE INSTRUCTIONS  FOR CRYOSURGERY        This area treated with liquid nitrogen will form a blister. You do not need to bandage the area until after the blister forms and breaks (which may be a few days).  When the blister breaks, begin daily dressing changes as follows:    1) Clean and dry the area with tap water using clean Q-tip or sterile gauze pad.    2) Apply Polysporin ointment or Bacitracin ointment over entire wound.  Do NOT use Neosporin ointment.    3) Cover the wound with a band-aid or sterile non-stick gauze pad and micropore paper tape.      REPEAT THESE INSTRUCTIONS AT LEAST ONCE A DAY UNTIL THE WOUND HAS COMPLETELY HEALED.        It is an old wives tale that a wound heals better when it is exposed to air and allowed to dry out. The wound will heal faster with a better cosmetic result if it is kept moist with ointment and covered with a bandage.  Do not let the wound dry out.      Supplies Needed:     *Cotton tipped applicators (Q-tips)   *Polysporin  ointment or Bacitracin ointment (NOT NEOSPORIN)   *Band-aids, or non stick gauze pads and micropore paper tape    PATIENT INFORMATION    During the healing process you will notice a number of changes. All wounds develop a small halo of redness surrounding the wound.  This means healing is occurring. Severe itching with extensive redness usually indicates sensitivity to the ointment or bandage tape used to dress the wound.  You should call our office if this develops.      Swelling and/or discoloration around your surgical site is common, particularly when performed around the eye.    All wounds normally drain.  The larger the wound the more drainage there will be.  After 7-10 days, you will notice the wound beginning to shrink and new skin will begin to grow.  The wound is healed when you can see skin has formed over the entire area.  A healed wound has a healthy, shiny look to the surface and is red to dark pink in color to normalize.  Wounds may take approximately 4-6 weeks to heal.  Larger wounds may take 6-8 weeks.  After the wound is healed you may discontinue dressing changes.    You may experience a sensation of tightness as your wound heals. This is normal and will gradually subside.    Your healed wound may be sensitive to temperature changes. This sensitivity improves with time, but if you re having a lot of discomfort, try to avoid temperature extremes.    Patients frequently experience itching after their wound appears to have healed because of the continue healing under the skin.  Plain Vaseline will help relieve the itching.         Proper skin care from [facility]:    -Eliminate harsh soaps as they strip the natural oils from the skin, often resulting in dry itchy skin ( i.e. Dial, Zest, Citizen of the [place] Spring)  -Use mild soaps such as Cetaphil or Dove Sensitive Skin in the shower. You do not need to use soap on arms, legs, and trunk every time you shower unless visibly soiled.   -Avoid hot or cold  showers.  -After showering, lightly dry off and apply moisturizing within 2-3 minutes. This will help trap moisture in the skin.   -Aggressive use of a moisturizer at least 1-2 times a day to the entire body (including -Vanicream, Cetaphil, Aquaphor or Cerave) and moisturize hands after every washing.  -We recommend using moisturizers that come in a tub that needs to be scooped out, not a pump. This has more of an oil base. It will hold moisture in your skin much better than a water base moisturizer. The above recommended are non-pore clogging.      Wear a sunscreen with at least SPF 30 on your face, ears, neck and V of the chest daily. Wear sunscreen on other areas of the body if those areas are exposed to the sun throughout the day. Sunscreens can contain physical and/or chemical blockers. Physical blockers are less likely to clog pores, these include zinc oxide and titanium dioxide. Reapply every two hour and after swimming. Sunscreen examples include Neutrogena, CeraVe, Blue Lizard, Elta MD and many others.    UV radiation  UVA radiation remains constant throughout the day and throughout the year. It is a longer wavelength than UVB and therefore penetrates deeper into the skin leading to immediate and delayed tanning, photoaging, and skin cancer. 70-80% of UVA and UVB radiation occurs between the hours of 10am-2pm.  UVB radiation  UVB radiation causes the most harmful effects and is more significant during the summer months. However, snow and ice can reflect UVB radiation leading to skin damage during the winter months as well. UVB radiation is responsible for tanning, burning, inflammation, delayed erythema (pinkness), pigmentation (brown spots), and skin cancer.

## 2020-06-18 ENCOUNTER — MYC MEDICAL ADVICE (OUTPATIENT)
Dept: FAMILY MEDICINE | Facility: CLINIC | Age: 60
End: 2020-06-18

## 2020-06-18 ENCOUNTER — MYC MEDICAL ADVICE (OUTPATIENT)
Dept: DERMATOLOGY | Facility: CLINIC | Age: 60
End: 2020-06-18

## 2020-06-18 NOTE — TELEPHONE ENCOUNTER
[email or web address] message sent:    Hi Rainer,    It was good to see you too!    I am not understanding your questions. Can you rephrase for me?    Thank you,    Azucena

## 2020-06-18 NOTE — TELEPHONE ENCOUNTER
[employer] message sent:    Hi Rainer-    Your biopsy results are not back yet.    We will give you a call as soon as they are back.    Let me know if you have any other questions,    NYA Anderson-BSN-N  [facility]  [phone]

## 2020-06-18 NOTE — TELEPHONE ENCOUNTER
Hi Rainer,    It was good to see you too!    I am not understanding your questions. Can you rephrase for me?    Thank you,    Azucena

## 2020-06-22 LAB — COPATH REPORT: NORMAL

## 2020-07-15 NOTE — PROGRESS NOTES
3  SUBJECTIVE:   CC: Nicolás Fermin is an 60 year old male who presents for preventive health visit.     Healthy Habits:    Do you get at least three servings of calcium containing foods daily (dairy, green leafy vegetables, etc.)? yes    Amount of exercise or daily activities, outside of work: 2-3 day(s) per week    Problems taking medications regularly No    Medication side effects: No    Have you had an eye exam in the past two years? yes    Do you see a dentist twice per year? no    Do you have sleep apnea, excessive snoring or daytime drowsiness?no      PROBLEMS TO ADD ON...  1. Discuss sildenafil use with  Lisinopril - discussed  2. Chiropractic suggestions     Today's PHQ-2 Score:   PHQ-2 ( 1999 Pfizer) 2/26/2019 8/29/2018   Q1: Little interest or pleasure in doing things 1 0   Q2: Feeling down, depressed or hopeless 1 0   PHQ-2 Score 2 0   Q1: Little interest or pleasure in doing things Not at all -   Q2: Feeling down, depressed or hopeless Not at all -   PHQ-2 Score 0 -       Abuse: Current or Past(Physical, Sexual or Emotional)- No  Do you feel safe in your environment? Yes    Social History     Tobacco Use     Smoking status: Current Every Day Smoker     Packs/day: 0.50     Types: Cigarettes     Smokeless tobacco: Never Used     Tobacco comment: 10 cigarettes a day   Substance Use Topics     Alcohol use: Yes     Alcohol/week: 0.0 standard drinks     Comment: 2-3/day     If you drink alcohol do you typically have >3 drinks per day or >7 drinks per week? No                      Last PSA:   PSA   Date Value Ref Range Status   02/26/2019 0.45 0 - 4 ug/L Final     Comment:     Assay Method:  Chemiluminescence using Siemens Vista analyzer       Reviewed orders with patient. Reviewed health maintenance and updated orders accordingly - Yes    Reviewed and updated as needed this visit by clinical staff       Reviewed and updated as needed this visit by Provider         ROS:  CONSTITUTIONAL: NEGATIVE for  "fever, chills, change in weight  INTEGUMENTARY/SKIN: NEGATIVE for worrisome rashes, moles or lesions  EYES: NEGATIVE for vision changes or irritation  ENT: NEGATIVE for ear, mouth and throat problems  RESP: NEGATIVE for significant cough or SOB  CV: NEGATIVE for chest pain, palpitations or peripheral edema  GI: NEGATIVE for nausea, abdominal pain, heartburn, or change in bowel habits   male: negative for dysuria, hematuria, decreased urinary stream, erectile dysfunction, urethral discharge  MUSCULOSKELETAL: NEGATIVE for significant arthralgias or myalgia  NEURO: NEGATIVE for weakness, dizziness or paresthesias  PSYCHIATRIC: NEGATIVE for changes in mood or affect    OBJECTIVE:   /80   Pulse 65   Temp 97.6  F (36.4  C) (Temporal)   Resp 15   Ht 1.861 m (6' 1.25\")   Wt 105.7 kg (233 lb 1.6 oz)   SpO2 99%   BMI 30.54 kg/m    EXAM:  GENERAL: healthy, alert and no distress  EYES: Eyes grossly normal to inspection, PERRL and conjunctivae and sclerae normal  HENT: ear canals and TM's normal, nose and mouth without ulcers or lesions  NECK: no adenopathy, no asymmetry, masses, or scars and thyroid normal to palpation  RESP: lungs clear to auscultation - no rales, rhonchi or wheezes  CV: regular rate and rhythm, normal S1 S2, no S3 or S4, no murmur, click or rub, no peripheral edema and peripheral pulses strong  ABDOMEN: soft, nontender, no hepatosplenomegaly, no masses and bowel sounds normal  RECTAL: normal sphincter tone, no rectal masses, prostate normal size, smooth, nontender without nodules or masses  MS: no gross musculoskeletal defects noted, no edema  NEURO: Normal strength and tone, mentation intact and speech normal  PSYCH: mentation appears normal, affect normal/bright    ASSESSMENT/PLAN:   1. Routine general medical examination at a health care facility  Advised updated vaccinations in setting of HIV status  - Hemoglobin  - Comprehensive metabolic panel  - Lipid Profile    2. Human immunodeficiency " "virus (HIV) disease (H)  Following with ID and Dr. Romero    3. Essential hypertension, benign  Stable on therapy  - lisinopril-hydrochlorothiazide (ZESTORETIC) 20-12.5 MG tablet; Take 1 tablet by mouth 2 times daily  Dispense: 60 tablet; Refill: 11    4. CARDIOVASCULAR SCREENING; LDL GOAL LESS THAN 130  Labs as fasting   - Lipid Profile    5. Screening PSA (prostate specific antigen)  As per screening  - PSA, screen    6. Colon cancer screening  Prior Colonoscopy reviewed  - Fecal colorectal cancer screen (FIT); Future    COUNSELING:  Reviewed preventive health counseling, as reflected in patient instructions       Regular exercise       Healthy diet/nutrition    Estimated body mass index is 32.46 kg/m  as calculated from the following:    Height as of 2/26/19: 1.88 m (6' 2\").    Weight as of 5/28/19: 114.7 kg (252 lb 12.8 oz).     reports that he has been smoking cigarettes. He has been smoking about 0.50 packs per day. He has never used smokeless tobacco.  Tobacco Cessation Action Plan: Information offered: Patient not interested at this time    Counseling Resources:  ATP IV Guidelines  Pooled Cohorts Equation Calculator  FRAX Risk Assessment  ICSI Preventive Guidelines  Dietary Guidelines for Americans, 2010  [employer]'s MyPlate  ASA Prophylaxis  Lung CA Screening    Brooks Walsh MD  [facility]  "

## 2020-07-16 ENCOUNTER — OFFICE VISIT (OUTPATIENT)
Dept: INTERNAL MEDICINE | Facility: CLINIC | Age: 60
End: 2020-07-16
Payer: COMMERCIAL

## 2020-07-16 VITALS
BODY MASS INDEX: 30.89 KG/M2 | WEIGHT: 233.1 LBS | TEMPERATURE: 97.6 F | SYSTOLIC BLOOD PRESSURE: 130 MMHG | DIASTOLIC BLOOD PRESSURE: 80 MMHG | HEIGHT: 73 IN | RESPIRATION RATE: 15 BRPM | OXYGEN SATURATION: 99 % | HEART RATE: 65 BPM

## 2020-07-16 DIAGNOSIS — Z12.5 SCREENING PSA (PROSTATE SPECIFIC ANTIGEN): ICD-10-CM

## 2020-07-16 DIAGNOSIS — I10 ESSENTIAL HYPERTENSION, BENIGN: ICD-10-CM

## 2020-07-16 DIAGNOSIS — Z12.11 COLON CANCER SCREENING: ICD-10-CM

## 2020-07-16 DIAGNOSIS — Z00.00 ROUTINE GENERAL MEDICAL EXAMINATION AT A HEALTH CARE FACILITY: Primary | ICD-10-CM

## 2020-07-16 DIAGNOSIS — Z13.6 CARDIOVASCULAR SCREENING; LDL GOAL LESS THAN 130: ICD-10-CM

## 2020-07-16 DIAGNOSIS — B20 HUMAN IMMUNODEFICIENCY VIRUS (HIV) DISEASE (H): ICD-10-CM

## 2020-07-16 LAB
HGB BLD-MCNC: 14.1 G/DL (ref 13.3–17.7)
PSA SERPL-ACNC: 0.39 UG/L (ref 0–4)

## 2020-07-16 PROCEDURE — 99396 PREV VISIT EST AGE 40-64: CPT | Performed by: INTERNAL MEDICINE

## 2020-07-16 PROCEDURE — 80053 COMPREHEN METABOLIC PANEL: CPT | Performed by: INTERNAL MEDICINE

## 2020-07-16 PROCEDURE — 80061 LIPID PANEL: CPT | Performed by: INTERNAL MEDICINE

## 2020-07-16 PROCEDURE — 85018 HEMOGLOBIN: CPT | Performed by: INTERNAL MEDICINE

## 2020-07-16 PROCEDURE — 36415 COLL VENOUS BLD VENIPUNCTURE: CPT | Performed by: INTERNAL MEDICINE

## 2020-07-16 PROCEDURE — G0103 PSA SCREENING: HCPCS | Performed by: INTERNAL MEDICINE

## 2020-07-16 RX ORDER — LISINOPRIL AND HYDROCHLOROTHIAZIDE 12.5; 2 MG/1; MG/1
1 TABLET ORAL 2 TIMES DAILY
Qty: 60 TABLET | Refills: 11 | Status: SHIPPED | OUTPATIENT
Start: 2020-07-16 | End: 2020-08-19 | Stop reason: ALTCHOICE

## 2020-07-16 ASSESSMENT — MIFFLIN-ST. JEOR: SCORE: 1920.17

## 2020-07-17 LAB
ALBUMIN SERPL-MCNC: 3.9 G/DL (ref 3.4–5)
ALP SERPL-CCNC: 55 U/L (ref 40–150)
ALT SERPL W P-5'-P-CCNC: 30 U/L (ref 0–70)
ANION GAP SERPL CALCULATED.3IONS-SCNC: 7 MMOL/L (ref 3–14)
AST SERPL W P-5'-P-CCNC: 30 U/L (ref 0–45)
BILIRUB SERPL-MCNC: 0.7 MG/DL (ref 0.2–1.3)
BUN SERPL-MCNC: 12 MG/DL (ref 7–30)
CALCIUM SERPL-MCNC: 8.7 MG/DL (ref 8.5–10.1)
CHLORIDE SERPL-SCNC: 95 MMOL/L (ref 94–109)
CHOLEST SERPL-MCNC: 182 MG/DL
CO2 SERPL-SCNC: 26 MMOL/L (ref 20–32)
CREAT SERPL-MCNC: 0.92 MG/DL (ref 0.66–1.25)
GFR SERPL CREATININE-BSD FRML MDRD: 89 ML/MIN/{1.73_M2}
GLUCOSE SERPL-MCNC: 88 MG/DL (ref 70–99)
HDLC SERPL-MCNC: 67 MG/DL
LDLC SERPL CALC-MCNC: 100 MG/DL
NONHDLC SERPL-MCNC: 115 MG/DL
POTASSIUM SERPL-SCNC: 4.3 MMOL/L (ref 3.4–5.3)
PROT SERPL-MCNC: 7.4 G/DL (ref 6.8–8.8)
SODIUM SERPL-SCNC: 128 MMOL/L (ref 133–144)
TRIGL SERPL-MCNC: 76 MG/DL

## 2020-07-20 DIAGNOSIS — Z12.11 COLON CANCER SCREENING: ICD-10-CM

## 2020-07-20 PROCEDURE — 82274 ASSAY TEST FOR BLOOD FECAL: CPT | Performed by: INTERNAL MEDICINE

## 2020-07-24 LAB — HEMOCCULT STL QL IA: NEGATIVE

## 2020-08-13 ENCOUNTER — TELEPHONE (OUTPATIENT)
Dept: INTERNAL MEDICINE | Facility: CLINIC | Age: 60
End: 2020-08-13

## 2020-08-13 DIAGNOSIS — I10 ESSENTIAL HYPERTENSION, BENIGN: Primary | ICD-10-CM

## 2020-08-13 NOTE — TELEPHONE ENCOUNTER
Patient informed. Has appointment for lab already made.  Aiyana Garrett CMA on 8/13/2020 at 4:28 PM

## 2020-08-13 NOTE — TELEPHONE ENCOUNTER
Reason for Call: Request for an order or referral:    Order or referral being requested: sodium lab    Date needed: as soon as possible    Has the patient been seen by the PCP for this problem? YES    Additional comments: Pt saw Dr Walsh 7/16/20.  Pt was told to check his sodium in 1 month, but there isn't an order.  Pt has a follow up appt 8/19/20 scheduled with Dr Walsh.    Phone number Patient can be reached at:  Home number on file [phone] (home)    Best Time:  anytime    Can we leave a detailed message on this number?  YES    Call taken on 8/13/2020 at 9:12 AM by FOREIGN VASQUEZ

## 2020-08-14 DIAGNOSIS — I10 ESSENTIAL HYPERTENSION, BENIGN: ICD-10-CM

## 2020-08-14 LAB
ANION GAP SERPL CALCULATED.3IONS-SCNC: 6 MMOL/L (ref 3–14)
BUN SERPL-MCNC: 10 MG/DL (ref 7–30)
CALCIUM SERPL-MCNC: 9.2 MG/DL (ref 8.5–10.1)
CHLORIDE SERPL-SCNC: 93 MMOL/L (ref 94–109)
CO2 SERPL-SCNC: 27 MMOL/L (ref 20–32)
CREAT SERPL-MCNC: 1 MG/DL (ref 0.66–1.25)
GFR SERPL CREATININE-BSD FRML MDRD: 81 ML/MIN/{1.73_M2}
GLUCOSE SERPL-MCNC: 88 MG/DL (ref 70–99)
POTASSIUM SERPL-SCNC: 4.1 MMOL/L (ref 3.4–5.3)
SODIUM SERPL-SCNC: 126 MMOL/L (ref 133–144)

## 2020-08-14 PROCEDURE — 36415 COLL VENOUS BLD VENIPUNCTURE: CPT | Performed by: INTERNAL MEDICINE

## 2020-08-14 PROCEDURE — 80048 BASIC METABOLIC PNL TOTAL CA: CPT | Performed by: INTERNAL MEDICINE

## 2020-08-17 ENCOUNTER — MYC MEDICAL ADVICE (OUTPATIENT)
Dept: INTERNAL MEDICINE | Facility: CLINIC | Age: 60
End: 2020-08-17

## 2020-08-19 ENCOUNTER — VIRTUAL VISIT (OUTPATIENT)
Dept: INTERNAL MEDICINE | Facility: CLINIC | Age: 60
End: 2020-08-19
Payer: COMMERCIAL

## 2020-08-19 DIAGNOSIS — E05.00 GRAVES DISEASE: ICD-10-CM

## 2020-08-19 DIAGNOSIS — I10 ESSENTIAL HYPERTENSION, BENIGN: Primary | ICD-10-CM

## 2020-08-19 DIAGNOSIS — E87.1 HYPONATREMIA: ICD-10-CM

## 2020-08-19 PROCEDURE — 99214 OFFICE O/P EST MOD 30 MIN: CPT | Mod: 95 | Performed by: INTERNAL MEDICINE

## 2020-08-19 RX ORDER — LISINOPRIL 20 MG/1
20 TABLET ORAL 2 TIMES DAILY
Qty: 180 TABLET | Refills: 1 | Status: SHIPPED | OUTPATIENT
Start: 2020-08-19 | End: 2021-02-15

## 2020-08-19 NOTE — PROGRESS NOTES
"Nicolás Fermin is a 60 year old male who is being evaluated via a billable telephone visit.      The patient has been notified of following:     \"This telephone visit will be conducted via a call between you and your physician/provider. We have found that certain health care needs can be provided without the need for a physical exam.  This service lets us provide the care you need with a short phone conversation.  If a prescription is necessary we can send it directly to your pharmacy.  If lab work is needed we can place an order for that and you can then stop by our lab to have the test done at a later time.    Telephone visits are billed at different rates depending on your insurance coverage. During this emergency period, for some insurers they may be billed the same as an in-person visit.  Please reach out to your insurance provider with any questions.    If during the course of the call the physician/provider feels a telephone visit is not appropriate, you will not be charged for this service.\"    Patient has given verbal consent for Telephone visit?  Yes    What phone number would you like to be contacted at? [phone]    How would you like to obtain your AVS? [place]    Subjective     Nicolás Fermin is a 60 year old male who presents via phone visit today for the following health issues:    HPI    Follow up on sodium from 8/14/20    Review of Systems   CONSTITUTIONAL: NEGATIVE for fever, chills, change in weight  EYES: NEGATIVE for vision changes or irritation  ENT/MOUTH: NEGATIVE for ear, mouth and throat problems  RESP: NEGATIVE for significant cough or SOB  CV: NEGATIVE for chest pain, palpitations or peripheral edema  GI: NEGATIVE for nausea, abdominal pain, heartburn, or change in bowel habits  : NEGATIVE for frequency, dysuria, or hematuria  MUSCULOSKELETAL: NEGATIVE for significant arthralgias or myalgia  NEURO: NEGATIVE for weakness, dizziness or paresthesias  HEME: NEGATIVE for bleeding " problems  PSYCHIATRIC: NEGATIVE for changes in mood or affect       Objective      Vitals:  No vitals were obtained today due to virtual visit.  BP at home 130/80, pulse 62  healthy, alert and no distress  PSYCH: Alert and oriented times 3; coherent speech, normal   rate and volume, able to articulate logical thoughts, able   to abstract reason, no tangential thoughts, no hallucinations   or delusions  His affect is normal  RESP: No cough, no audible wheezing, able to talk in full sentences  Remainder of exam unable to be completed due to telephone visits      Component      Latest Ref Rng & Units 8/29/2018 6/10/2019 6/28/2019 7/16/2020   Sodium      133 - 144 mmol/L 137 127 (L) 129 (L) 128 (L)   Potassium      3.4 - 5.3 mmol/L 4.0 4.5 4.0 4.3   Chloride      94 - 109 mmol/L 101 91 (L) 95 95   Carbon Dioxide      20 - 32 mmol/L 28 28 26 26   Anion Gap      3 - 14 mmol/L 8 8 8 7   Glucose      70 - 99 mg/dL 99 99 85 88   Urea Nitrogen      7 - 30 mg/dL 12 15 8 12   Creatinine      0.66 - 1.25 mg/dL 1.10 1.23 1.01 0.92   GFR Estimate      >60 mL/min/1.73:m2 68 63 80 89   GFR Estimate If Black      >60 mL/min/1.73:m2 83 73 >90 >90   Calcium      8.5 - 10.1 mg/dL 8.7 9.0 9.5 8.7     Component      Latest Ref Rng & Units 8/14/2020   Sodium      133 - 144 mmol/L 126 (L)   Potassium      3.4 - 5.3 mmol/L 4.1   Chloride      94 - 109 mmol/L 93 (L)   Carbon Dioxide      20 - 32 mmol/L 27   Anion Gap      3 - 14 mmol/L 6   Glucose      70 - 99 mg/dL 88   Urea Nitrogen      7 - 30 mg/dL 10   Creatinine      0.66 - 1.25 mg/dL 1.00   GFR Estimate      >60 mL/min/1.73:m2 81   GFR Estimate If Black      >60 mL/min/1.73:m2 >90   Calcium      8.5 - 10.1 mg/dL 9.2       Assessment & Plan     Essential hypertension, benign  We will discontinue hydrochlorothiazide component in hopes of improving sodium level and recommend recheck blood pressure in 1 month with recheck basic metabolic panel.  - lisinopril (ZESTRIL) 20 MG tablet; Take 1  tablet (20 mg) by mouth 2 times daily  - Basic metabolic panel  (Ca, Cl, CO2, Creat, Gluc, K, Na, BUN); Future    Hyponatremia  Advised limiting excess fluid intake and discontinuing hydrochlorothiazide.  - Basic metabolic panel  (Ca, Cl, CO2, Creat, Gluc, K, Na, BUN); Future    Graves disease  Recheck thyroid function panel as ordered  - TSH with free T4 reflex; Future       See Patient Instructions    Return in about 1 month (around 9/19/2020) for Lab Work appointment, BP Recheck.    Brooks Walsh MD  [facility]    Phone call duration:  14 minutes

## 2020-09-18 DIAGNOSIS — I10 ESSENTIAL HYPERTENSION, BENIGN: ICD-10-CM

## 2020-09-18 DIAGNOSIS — E05.00 GRAVES DISEASE: ICD-10-CM

## 2020-09-18 DIAGNOSIS — E87.1 HYPONATREMIA: ICD-10-CM

## 2020-09-18 PROCEDURE — 80048 BASIC METABOLIC PNL TOTAL CA: CPT | Performed by: INTERNAL MEDICINE

## 2020-09-18 PROCEDURE — 36415 COLL VENOUS BLD VENIPUNCTURE: CPT | Performed by: INTERNAL MEDICINE

## 2020-09-18 PROCEDURE — 84443 ASSAY THYROID STIM HORMONE: CPT | Performed by: INTERNAL MEDICINE

## 2020-09-18 PROCEDURE — 84439 ASSAY OF FREE THYROXINE: CPT | Performed by: INTERNAL MEDICINE

## 2020-09-19 LAB
ANION GAP SERPL CALCULATED.3IONS-SCNC: 5 MMOL/L (ref 3–14)
BUN SERPL-MCNC: 13 MG/DL (ref 7–30)
CALCIUM SERPL-MCNC: 9.1 MG/DL (ref 8.5–10.1)
CHLORIDE SERPL-SCNC: 103 MMOL/L (ref 94–109)
CO2 SERPL-SCNC: 24 MMOL/L (ref 20–32)
CREAT SERPL-MCNC: 1.04 MG/DL (ref 0.66–1.25)
GFR SERPL CREATININE-BSD FRML MDRD: 77 ML/MIN/{1.73_M2}
GLUCOSE SERPL-MCNC: 87 MG/DL (ref 70–99)
POTASSIUM SERPL-SCNC: 4.5 MMOL/L (ref 3.4–5.3)
SODIUM SERPL-SCNC: 132 MMOL/L (ref 133–144)
T4 FREE SERPL-MCNC: 1.29 NG/DL (ref 0.76–1.46)
TSH SERPL DL<=0.005 MIU/L-ACNC: 0.33 MU/L (ref 0.4–4)

## 2020-11-13 ENCOUNTER — APPOINTMENT (OUTPATIENT)
Dept: LAB | Facility: CLINIC | Age: 60
End: 2020-11-13
Payer: COMMERCIAL

## 2020-11-13 ENCOUNTER — TELEPHONE (OUTPATIENT)
Dept: LAB | Facility: CLINIC | Age: 60
End: 2020-11-13

## 2020-11-13 ENCOUNTER — MYC MEDICAL ADVICE (OUTPATIENT)
Dept: INTERNAL MEDICINE | Facility: CLINIC | Age: 60
End: 2020-11-13

## 2020-11-13 DIAGNOSIS — I10 ESSENTIAL HYPERTENSION, BENIGN: Primary | ICD-10-CM

## 2020-11-13 DIAGNOSIS — E05.00 GRAVES DISEASE: ICD-10-CM

## 2020-11-13 NOTE — TELEPHONE ENCOUNTER
Awaiting pt response. No future lab appts or appt with Dr. Walsh seen. Will need to help him reschedule.

## 2020-11-16 DIAGNOSIS — I10 ESSENTIAL HYPERTENSION, BENIGN: ICD-10-CM

## 2020-11-16 DIAGNOSIS — E05.00 GRAVES DISEASE: ICD-10-CM

## 2020-11-16 PROCEDURE — 84443 ASSAY THYROID STIM HORMONE: CPT | Performed by: INTERNAL MEDICINE

## 2020-11-16 PROCEDURE — 80048 BASIC METABOLIC PNL TOTAL CA: CPT | Performed by: INTERNAL MEDICINE

## 2020-11-17 LAB
ANION GAP SERPL CALCULATED.3IONS-SCNC: 5 MMOL/L (ref 3–14)
BUN SERPL-MCNC: 12 MG/DL (ref 7–30)
CALCIUM SERPL-MCNC: 9.2 MG/DL (ref 8.5–10.1)
CHLORIDE SERPL-SCNC: 100 MMOL/L (ref 94–109)
CO2 SERPL-SCNC: 27 MMOL/L (ref 20–32)
CREAT SERPL-MCNC: 1.01 MG/DL (ref 0.66–1.25)
GFR SERPL CREATININE-BSD FRML MDRD: 80 ML/MIN/{1.73_M2}
GLUCOSE SERPL-MCNC: 94 MG/DL (ref 70–99)
POTASSIUM SERPL-SCNC: 4.2 MMOL/L (ref 3.4–5.3)
SODIUM SERPL-SCNC: 132 MMOL/L (ref 133–144)
TSH SERPL DL<=0.005 MIU/L-ACNC: 0.56 MU/L (ref 0.4–4)

## 2020-11-19 ENCOUNTER — VIRTUAL VISIT (OUTPATIENT)
Dept: INTERNAL MEDICINE | Facility: CLINIC | Age: 60
End: 2020-11-19
Payer: COMMERCIAL

## 2020-11-19 DIAGNOSIS — E87.1 HYPONATREMIA: ICD-10-CM

## 2020-11-19 DIAGNOSIS — M25.552 HIP PAIN, LEFT: Primary | ICD-10-CM

## 2020-11-19 PROCEDURE — 99213 OFFICE O/P EST LOW 20 MIN: CPT | Mod: 95 | Performed by: INTERNAL MEDICINE

## 2020-11-19 NOTE — PROGRESS NOTES
"Nicolás Fermin is a 60 year old male who is being evaluated via a billable telephone visit.      The patient has been notified of following:     \"This telephone visit will be conducted via a call between you and your physician/provider. We have found that certain health care needs can be provided without the need for a physical exam.  This service lets us provide the care you need with a short phone conversation.  If a prescription is necessary we can send it directly to your pharmacy.  If lab work is needed we can place an order for that and you can then stop by our lab to have the test done at a later time.    Telephone visits are billed at different rates depending on your insurance coverage. During this emergency period, for some insurers they may be billed the same as an in-person visit.  Please reach out to your insurance provider with any questions.    If during the course of the call the physician/provider feels a telephone visit is not appropriate, you will not be charged for this service.\"    Patient has given verbal consent for Telephone visit?  Yes    What phone number would you like to be contacted at? [phone]    How would you like to obtain your AVS? MyChart    Subjective     Nicolás Fermin is a 60 year old male who presents via phone visit today for the following health issues:    HPI        Follow up BMP & TSH from 11/16/20.  Would like to just review.    Musculoskeletal problem/pain  States [place] the airport [place] and walked about a mile and at the end of his walk noted increasing pain or discomfort left hip area.    Duration: 2-3 weeks symptom duration    Description  Location: left hip     Intensity:  moderate    Accompanying signs and symptoms: radiation of pain to knee     History  Previous similar problem: no   Previous evaluation:  none    Precipitating or alleviating factors:  Trauma or overuse: no   Aggravating factors include: prolonged standing     Therapies " tried and outcome: Ibuprofen- no improvement        Review of Systems   CONSTITUTIONAL: NEGATIVE for fever, chills, change in weight  EYES: NEGATIVE for vision changes or irritation  ENT/MOUTH: NEGATIVE for ear, mouth and throat problems  RESP: NEGATIVE for significant cough or SOB  CV: NEGATIVE for chest pain, palpitations or peripheral edema  GI: NEGATIVE for nausea, abdominal pain, heartburn, or change in bowel habits  : NEGATIVE for frequency, dysuria, or hematuria  NEURO: NEGATIVE for weakness, dizziness or paresthesias  HEME: NEGATIVE for bleeding problems  PSYCHIATRIC: NEGATIVE for changes in mood or affect       Objective      Vitals:  No vitals were obtained today due to virtual visit.    Healthy, alert and no distress  PSYCH: Alert and oriented times 3; coherent speech, normal   rate and volume, able to articulate logical thoughts, able   to abstract reason, no tangential thoughts, no hallucinations   or delusions  His affect is normal  RESP: No cough, no audible wheezing, able to talk in full sentences  Remainder of exam unable to be completed due to telephone visits      Component      Latest Ref Rng & Units 9/18/2020 11/16/2020   Sodium      133 - 144 mmol/L 132 (L) 132 (L)   Potassium      3.4 - 5.3 mmol/L 4.5 4.2   Chloride      94 - 109 mmol/L 103 100   Carbon Dioxide      20 - 32 mmol/L 24 27   Anion Gap      3 - 14 mmol/L 5 5   Glucose      70 - 99 mg/dL 87 94   Urea Nitrogen      7 - 30 mg/dL 13 12   Creatinine      0.66 - 1.25 mg/dL 1.04 1.01   GFR Estimate      >60 mL/min/1.73:m2 77 80   GFR Estimate If Black      >60 mL/min/1.73:m2 89 >90   Calcium      8.5 - 10.1 mg/dL 9.1 9.2   T4 Free      0.76 - 1.46 ng/dL 1.29    TSH      0.40 - 4.00 mU/L  0.56     Assessment/Plan:    Hip pain, left  Discussed with patient options at this point which include observation with as needed anti-inflammatories versus a trial of physical therapy versus a referral to see one of our orthopedic   to determine need for further assessment.  Patient is opted to trial anti-inflammatory over the next couple weeks and will follow up with me pending his response.    Hyponatremia  Improving now almost back to baseline.  Suggest continuing as ordered with a recheck of basic metabolic panel in 3 months.  Orders placed  - Basic metabolic panel  (Ca, Cl, CO2, Creat, Gluc, K, Na, BUN); Future      See Patient Instructions    Return in about 2 weeks (around 12/3/2020) for if symptoms recur or worsen.    Brooks Walsh MD  [facility]    Phone call duration:  12 minutes

## 2021-02-13 DIAGNOSIS — I10 ESSENTIAL HYPERTENSION, BENIGN: ICD-10-CM

## 2021-02-15 RX ORDER — LISINOPRIL 20 MG/1
20 TABLET ORAL 2 TIMES DAILY
Qty: 180 TABLET | Refills: 2 | Status: SHIPPED | OUTPATIENT
Start: 2021-02-15 | End: 2021-11-10

## 2021-03-01 ENCOUNTER — MYC MEDICAL ADVICE (OUTPATIENT)
Dept: INTERNAL MEDICINE | Facility: CLINIC | Age: 61
End: 2021-03-01

## 2021-03-04 DIAGNOSIS — E87.1 HYPONATREMIA: ICD-10-CM

## 2021-03-04 PROCEDURE — 80048 BASIC METABOLIC PNL TOTAL CA: CPT | Performed by: INTERNAL MEDICINE

## 2021-03-04 PROCEDURE — 36415 COLL VENOUS BLD VENIPUNCTURE: CPT | Performed by: INTERNAL MEDICINE

## 2021-03-05 LAB
ANION GAP SERPL CALCULATED.3IONS-SCNC: 7 MMOL/L (ref 3–14)
BUN SERPL-MCNC: 12 MG/DL (ref 7–30)
CALCIUM SERPL-MCNC: 9.4 MG/DL (ref 8.5–10.1)
CHLORIDE SERPL-SCNC: 91 MMOL/L (ref 94–109)
CO2 SERPL-SCNC: 27 MMOL/L (ref 20–32)
CREAT SERPL-MCNC: 1.04 MG/DL (ref 0.66–1.25)
GFR SERPL CREATININE-BSD FRML MDRD: 77 ML/MIN/{1.73_M2}
GLUCOSE SERPL-MCNC: 83 MG/DL (ref 70–99)
POTASSIUM SERPL-SCNC: 3.8 MMOL/L (ref 3.4–5.3)
SODIUM SERPL-SCNC: 125 MMOL/L (ref 133–144)

## 2021-03-11 ENCOUNTER — VIRTUAL VISIT (OUTPATIENT)
Dept: INTERNAL MEDICINE | Facility: CLINIC | Age: 61
End: 2021-03-11
Payer: COMMERCIAL

## 2021-03-11 DIAGNOSIS — B20 HUMAN IMMUNODEFICIENCY VIRUS (HIV) DISEASE (H): ICD-10-CM

## 2021-03-11 DIAGNOSIS — M25.552 HIP PAIN, LEFT: ICD-10-CM

## 2021-03-11 DIAGNOSIS — E87.1 HYPONATREMIA: Primary | ICD-10-CM

## 2021-03-11 DIAGNOSIS — I10 ESSENTIAL HYPERTENSION, BENIGN: ICD-10-CM

## 2021-03-11 PROCEDURE — 99214 OFFICE O/P EST MOD 30 MIN: CPT | Mod: 95 | Performed by: INTERNAL MEDICINE

## 2021-03-11 NOTE — PROGRESS NOTES
"Rainer is a 60 year old who is being evaluated via a billable telephone visit.      What phone number would you like to be contacted at? [phone]  How would you like to obtain your AVS? MyChart    Assessment & Plan     Hyponatremia  Presumed secondary to hydrochlorothiazide.  We will stop as such and recheck basic metabolic panel in 2 weeks.  - Basic metabolic panel  (Ca, Cl, CO2, Creat, Gluc, K, Na, BUN); Future    Human immunodeficiency virus (HIV) disease (H)  Stable and continue with current oral therapy as maintenance.    Essential hypertension, benign  Change to lisinopril 20 mg twice daily, recheck blood pressure.    Hip pain, left  Start physical therapy, image left hip, pending response consider orthopedic assessment  - [facility]; Future  - XR Pelvis and Hip Left 1 View; Future       BMI:   Estimated body mass index is 30.54 kg/m  as calculated from the following:    Height as of 7/16/20: 1.861 m (6' 1.25\").    Weight as of 7/16/20: 105.7 kg (233 lb 1.6 oz).     See Patient Instructions    Return in about 2 weeks (around 3/25/2021) for BP Recheck, Lab Work appointment, follow-up physical therapy.    Brooks Walsh MD  [facility]    Subjective   Rainer is a 60 year old who presents for the following health issues     HPI     Patient states his prescription for lisinopril contains hydrochlorothiazide (12.5 mg), not currently listed on his chart.     Follow up 3/4/21 sodium lab results. Review of chart demonstrates that his sodium level started to drift downward in 6/2019 without any obvious precipitant cause. He does not have hyperglycemia. He did not have any obvious postoperative procedure or he did not receive any form of IVIG. His serum is not obviously lipemic nor is he jaundice and he has no notable history of a plasma cell dyscrasia. In addition his GFR and creatinine are stable. He also has no signs of hypovolemia or postural hypotension. Review of " his HIV medicine this does not demonstrate any obvious source for persistent hyponatremia.    I reviewed things in depth and it appears that from what I can gather the patient has been getting hydrochlorothiazide with his lisinopril dose although the chart did not distinctly reflect that as I reviewed it.  Nonetheless it appears that this was added in a twice daily dosing regimen as of May 2019 with his first sodium level in June 2019 being low.    Hypertension Follow-up      Do you check your blood pressure regularly outside of the clinic? Yes     Are you following a low salt diet? Yes    Are your blood pressures ever more than 140 on the top number (systolic) OR more   than 90 on the bottom number (diastolic), for example 140/90? No     Other concerns:  1. Follow up on ongoing left hip/ groin pain. Patient had virtual visit 11/19/21. Using Ibuprofen with minimal relief, would like to discuss starting PT     Review of Systems   CONSTITUTIONAL: NEGATIVE for fever, chills, change in weight  EYES: NEGATIVE for vision changes or irritation  ENT/MOUTH: NEGATIVE for ear, mouth and throat problems  RESP: NEGATIVE for significant cough or SOB  CV: NEGATIVE for chest pain, palpitations or peripheral edema  GI: NEGATIVE for nausea, abdominal pain, heartburn, or change in bowel habits  : NEGATIVE for frequency, dysuria, or hematuria  MUSCULOSKELETAL: NEGATIVE for significant arthralgias or myalgia  NEURO: NEGATIVE for weakness, dizziness or paresthesias  HEME: NEGATIVE for bleeding problems  PSYCHIATRIC: NEGATIVE for changes in mood or affect      Objective       Vitals:  No vitals were obtained today due to virtual visit.    Physical Exam   healthy, alert and no distress  PSYCH: Alert and oriented times 3; coherent speech, normal   rate and volume, able to articulate logical thoughts, able   to abstract reason, no tangential thoughts, no hallucinations   or delusions  His affect is normal  RESP: No cough, no audible  wheezing, able to talk in full sentences  Remainder of exam unable to be completed due to telephone visits    Creatinine   Date Value Ref Range Status   03/04/2021 1.04 0.66 - 1.25 mg/dL Final             Phone call duration: 14 minutes

## 2021-03-16 ENCOUNTER — THERAPY VISIT (OUTPATIENT)
Dept: PHYSICAL THERAPY | Facility: CLINIC | Age: 61
End: 2021-03-16
Attending: INTERNAL MEDICINE
Payer: COMMERCIAL

## 2021-03-16 DIAGNOSIS — M25.552 HIP PAIN, LEFT: ICD-10-CM

## 2021-03-16 PROCEDURE — 97161 PT EVAL LOW COMPLEX 20 MIN: CPT | Mod: GP | Performed by: PHYSICAL THERAPIST

## 2021-03-16 PROCEDURE — 97110 THERAPEUTIC EXERCISES: CPT | Mod: GP | Performed by: PHYSICAL THERAPIST

## 2021-03-16 ASSESSMENT — ACTIVITIES OF DAILY LIVING (ADL)
HOW_WOULD_YOU_RATE_YOUR_CURRENT_LEVEL_OF_FUNCTION_DURING_YOUR_USUAL_ACTIVITIES_OF_DAILY_LIVING_FROM_0_TO_100_WITH_100_BEING_YOUR_LEVEL_OF_FUNCTION_PRIOR_TO_YOUR_HIP_PROBLEM_AND_0_BEING_THE_INABILITY_TO_PERFORM_ANY_OF_YOUR_USUAL_DAILY_ACTIVITIES?: 70
WALKING_APPROXIMATELY_10_MINUTES: MODERATE DIFFICULTY
HOS_ADL_SCORE(%): 54.41
WALKING_15_MINUTES_OR_GREATER: EXTREME DIFFICULTY
WALKING_DOWN_STEEP_HILLS: MODERATE DIFFICULTY
ROLLING_OVER_IN_BED: SLIGHT DIFFICULTY
TWISTING/PIVOTING_ON_INVOLVED_LEG: MODERATE DIFFICULTY
SITTING_FOR_15_MINUTES: NO DIFFICULTY AT ALL
STEPPING_UP_AND_DOWN_CURBS: SLIGHT DIFFICULTY
STANDING_FOR_15_MINUTES: MODERATE DIFFICULTY
RECREATIONAL_ACTIVITIES: MODERATE DIFFICULTY
GETTING_INTO_AND_OUT_OF_A_BATHTUB: SLIGHT DIFFICULTY
HOS_ADL_ITEM_SCORE_TOTAL: 37
DEEP_SQUATTING: EXTREME DIFFICULTY
LIGHT_TO_MODERATE_WORK: SLIGHT DIFFICULTY
WALKING_INITIALLY: SLIGHT DIFFICULTY
GOING_DOWN_1_FLIGHT_OF_STAIRS: MODERATE DIFFICULTY
HOS_ADL_COUNT: 17
GETTING_INTO_AND_OUT_OF_AN_AVERAGE_CAR: SLIGHT DIFFICULTY
HEAVY_WORK: MODERATE DIFFICULTY
WALKING_UP_STEEP_HILLS: EXTREME DIFFICULTY
PUTTING_ON_SOCKS_AND_SHOES: SLIGHT DIFFICULTY
GOING_UP_1_FLIGHT_OF_STAIRS: MODERATE DIFFICULTY
HOS_ADL_HIGHEST_POTENTIAL_SCORE: 68

## 2021-03-16 NOTE — PROGRESS NOTES
Physical Therapy Initial Evaluation  Subjective:  The history is provided by the patient. No  was used.   Patient Health History  Nicolás Fermin being seen for hip pain, sciatica(?).     Problem began: 10/16/2020.   Problem occurred: later 2020, progressively getting more painful   Pain is reported as 8/10 on pain scale.  General health as reported by patient is fair.  Pertinent medical history includes: hepatitis, high blood pressure, numbness/tingling, overweight, smoking and thyroid problems (B distal Neuropathy, B distal edema w compression socks).   Red flags:  None as reported by patient.  Medical allergies: none.   Surgeries include:  None.    Current medications:  Anti-depressants, anti-inflammatory, high blood pressure medication, pain medication and thyroid medication.       Primary job tasks include:  Computer work, driving, prolonged sitting and repetitive tasks.                  Therapist Generated HPI Evaluation         Type of problem:  Left hip.    This is a new condition.  Condition occurred with:  Insidious onset.  Where condition occurred: for unknown reasons.  Patient reports pain:  Joint and posterior.  Pain is described as aching and is intermittent.  Pain radiates to:  Hip, thigh and knee. Pain is worse in the P.M. and worse during the night.  Since onset symptoms are unchanged.  Symptoms are exacerbated by standing, certain positions, activity, bending/squatting, ascending stairs, descending stairs and walking  and relieved by rest.      Work activity restrictions: currently not working.  Barriers include:  None as reported by patient.                        Objective:    Gait:    Gait Type:  Antalgic   Weight Bearing Status:  WBAT   Assistive Devices:  None  Deviations:  Lumbar:  Trunk lean LGeneral Deviations:  Charo decr               Lumbar/SI Evaluation  ROM:  Arom wnl lumbar: prone lying good tolerance to position and ALFRED x 2 sets feels back stretch, no LE pain.   Repeated L SG ok during, after worse.  AROM Lumbar:   Flexion:          Min loss, feels good  Ext:                    Min-mod loss no changes   Side Bend:        Left:  Min loss w L leg sx's    Right:  WNL  Rotation:           Left:     Right:   Side Glide:        Left:     Right:                                                               Hip Evaluation  Hip PROM:  Hip PROM:  Left Hip:    Normal  Right Hip:  Normal                          Hip Strength:        Abduction:  Left: 3+/5     Pain:Right: 4-/5    Pain:                  Hip Special Testing:   Special tests hip not assessed: B nerve tension with SLR around 50 deg.  Left hip positive for the following special tests:  SLR  Left hip negative for the following special tests:  Piriformis or Fadir/Labrum   Right hip positive for the following special tests:  SLRRight hip negative for the following special tests:  Piriformis or Fadir/Labrum                 General     ROS    Assessment/Plan:    Patient is a 60 year old male with lumbar and left side hip complaints.    Patient has the following significant findings with corresponding treatment plan.                Diagnosis 1:  L hip pain  Pain -  hot/cold therapy, self management and education  Decreased ROM/flexibility - manual therapy and therapeutic exercise  Decreased strength - therapeutic exercise and therapeutic activities  Impaired balance - neuro re-education and therapeutic activities  Decreased proprioception - neuro re-education and therapeutic activities  Impaired gait - gait training  Decreased function - therapeutic activities    Previous and current functional limitations:  (See Goal Flow Sheet for this information)    Short term and Long term goals: (See Goal Flow Sheet for this information)     Communication ability:  Patient appears to be able to clearly communicate and understand verbal and written communication and follow directions correctly.  Treatment Explanation - The following has been  discussed with the patient:   RX ordered/plan of care  Anticipated outcomes  Possible risks and side effects  This patient would benefit from PT intervention to resume normal activities.   Rehab potential is good.    Frequency:  1 X week, once daily  Duration:  for 6 weeks  Discharge Plan:  Achieve all LTG.  Independent in home treatment program.  Reach maximal therapeutic benefit.    Please refer to the daily flowsheet for treatment today, total treatment time and time spent performing 1:1 timed codes.

## 2021-03-23 ENCOUNTER — THERAPY VISIT (OUTPATIENT)
Dept: PHYSICAL THERAPY | Facility: CLINIC | Age: 61
End: 2021-03-23
Payer: COMMERCIAL

## 2021-03-23 DIAGNOSIS — M25.552 HIP PAIN, LEFT: ICD-10-CM

## 2021-03-23 PROCEDURE — 97112 NEUROMUSCULAR REEDUCATION: CPT | Mod: GP | Performed by: PHYSICAL THERAPIST

## 2021-03-23 PROCEDURE — 97110 THERAPEUTIC EXERCISES: CPT | Mod: GP | Performed by: PHYSICAL THERAPIST

## 2021-03-30 ENCOUNTER — THERAPY VISIT (OUTPATIENT)
Dept: PHYSICAL THERAPY | Facility: CLINIC | Age: 61
End: 2021-03-30
Payer: COMMERCIAL

## 2021-03-30 DIAGNOSIS — M25.552 HIP PAIN, LEFT: ICD-10-CM

## 2021-03-30 PROCEDURE — 97112 NEUROMUSCULAR REEDUCATION: CPT | Mod: GP | Performed by: PHYSICAL THERAPIST

## 2021-03-30 PROCEDURE — 97110 THERAPEUTIC EXERCISES: CPT | Mod: GP | Performed by: PHYSICAL THERAPIST

## 2021-04-09 ENCOUNTER — ANCILLARY PROCEDURE (OUTPATIENT)
Dept: GENERAL RADIOLOGY | Facility: CLINIC | Age: 61
End: 2021-04-09
Attending: INTERNAL MEDICINE
Payer: COMMERCIAL

## 2021-04-09 DIAGNOSIS — E87.1 HYPONATREMIA: ICD-10-CM

## 2021-04-09 DIAGNOSIS — M25.552 HIP PAIN, LEFT: ICD-10-CM

## 2021-04-09 LAB
ANION GAP SERPL CALCULATED.3IONS-SCNC: 2 MMOL/L (ref 3–14)
BUN SERPL-MCNC: 10 MG/DL (ref 7–30)
CALCIUM SERPL-MCNC: 8.8 MG/DL (ref 8.5–10.1)
CHLORIDE SERPL-SCNC: 102 MMOL/L (ref 94–109)
CO2 SERPL-SCNC: 29 MMOL/L (ref 20–32)
CREAT SERPL-MCNC: 1.05 MG/DL (ref 0.66–1.25)
GFR SERPL CREATININE-BSD FRML MDRD: 76 ML/MIN/{1.73_M2}
GLUCOSE SERPL-MCNC: 78 MG/DL (ref 70–99)
POTASSIUM SERPL-SCNC: 4.6 MMOL/L (ref 3.4–5.3)
SODIUM SERPL-SCNC: 133 MMOL/L (ref 133–144)

## 2021-04-09 PROCEDURE — 73502 X-RAY EXAM HIP UNI 2-3 VIEWS: CPT | Mod: LT | Performed by: RADIOLOGY

## 2021-04-09 PROCEDURE — 80048 BASIC METABOLIC PNL TOTAL CA: CPT | Performed by: INTERNAL MEDICINE

## 2021-04-09 PROCEDURE — 36415 COLL VENOUS BLD VENIPUNCTURE: CPT | Performed by: INTERNAL MEDICINE

## 2021-05-17 ENCOUNTER — MYC MEDICAL ADVICE (OUTPATIENT)
Dept: INTERNAL MEDICINE | Facility: CLINIC | Age: 61
End: 2021-05-17

## 2021-05-17 DIAGNOSIS — I10 ESSENTIAL HYPERTENSION, BENIGN: Primary | ICD-10-CM

## 2021-05-17 DIAGNOSIS — E05.00 GRAVES DISEASE: ICD-10-CM

## 2021-05-18 RX ORDER — LEVOTHYROXINE SODIUM 88 UG/1
88 TABLET ORAL DAILY
Qty: 90 TABLET | Refills: 3 | Status: CANCELLED | OUTPATIENT
Start: 2021-05-18

## 2021-05-18 RX ORDER — ATENOLOL 100 MG/1
100 TABLET ORAL DAILY
Qty: 90 TABLET | Refills: 3 | Status: SHIPPED | OUTPATIENT
Start: 2021-05-18 | End: 2022-05-17

## 2021-05-18 RX ORDER — LEVOTHYROXINE SODIUM 112 UG/1
112 TABLET ORAL DAILY
Qty: 90 TABLET | Refills: 0 | Status: SHIPPED | OUTPATIENT
Start: 2021-05-18 | End: 2021-08-26

## 2021-05-18 NOTE — TELEPHONE ENCOUNTER
As you may recall, I mentioned that Dr. Garcia at [facility] has retired and this is an opportunity to do some more consolidation to [place].  (112mcg daily).Yisel Wei RN

## 2021-08-03 ENCOUNTER — MYC MEDICAL ADVICE (OUTPATIENT)
Dept: FAMILY MEDICINE | Facility: CLINIC | Age: 61
End: 2021-08-03

## 2021-08-24 ASSESSMENT — ENCOUNTER SYMPTOMS
PALPITATIONS: 0
WEAKNESS: 0
FREQUENCY: 0
NERVOUS/ANXIOUS: 0
SORE THROAT: 0
SHORTNESS OF BREATH: 0
DIARRHEA: 0
HEADACHES: 0
HEMATOCHEZIA: 0
DYSURIA: 0
HEARTBURN: 0
MYALGIAS: 0
ARTHRALGIAS: 1
FEVER: 0
ABDOMINAL PAIN: 0
NAUSEA: 0
PARESTHESIAS: 0
HEMATURIA: 0
JOINT SWELLING: 1
COUGH: 1
CHILLS: 0
CONSTIPATION: 0
DIZZINESS: 1
EYE PAIN: 0

## 2021-08-25 DIAGNOSIS — E05.00 GRAVES DISEASE: ICD-10-CM

## 2021-08-26 RX ORDER — LEVOTHYROXINE SODIUM 112 UG/1
TABLET ORAL
Qty: 90 TABLET | Refills: 0 | Status: SHIPPED | OUTPATIENT
Start: 2021-08-26 | End: 2021-11-26

## 2021-08-27 ENCOUNTER — OFFICE VISIT (OUTPATIENT)
Dept: FAMILY MEDICINE | Facility: CLINIC | Age: 61
End: 2021-08-27
Payer: COMMERCIAL

## 2021-08-27 VITALS — DIASTOLIC BLOOD PRESSURE: 88 MMHG | SYSTOLIC BLOOD PRESSURE: 138 MMHG

## 2021-08-27 DIAGNOSIS — L57.8 SOLAR ELASTOSIS: ICD-10-CM

## 2021-08-27 DIAGNOSIS — L82.1 SEBORRHEIC KERATOSES: ICD-10-CM

## 2021-08-27 DIAGNOSIS — D48.5 NEOPLASM OF UNCERTAIN BEHAVIOR OF SKIN: Primary | ICD-10-CM

## 2021-08-27 DIAGNOSIS — D22.9 MULTIPLE BENIGN NEVI: ICD-10-CM

## 2021-08-27 DIAGNOSIS — Z85.828 HISTORY OF NONMELANOMA SKIN CANCER: ICD-10-CM

## 2021-08-27 DIAGNOSIS — L81.4 LENTIGINES: ICD-10-CM

## 2021-08-27 DIAGNOSIS — L57.0 ACTINIC KERATOSES: ICD-10-CM

## 2021-08-27 DIAGNOSIS — Z87.898 HISTORY OF ATYPICAL NEVUS: ICD-10-CM

## 2021-08-27 DIAGNOSIS — D18.01 CHERRY ANGIOMA: ICD-10-CM

## 2021-08-27 DIAGNOSIS — L82.0 INFLAMED SEBORRHEIC KERATOSIS: ICD-10-CM

## 2021-08-27 PROCEDURE — 99214 OFFICE O/P EST MOD 30 MIN: CPT | Mod: 25 | Performed by: PHYSICIAN ASSISTANT

## 2021-08-27 PROCEDURE — 11102 TANGNTL BX SKIN SINGLE LES: CPT | Mod: 59 | Performed by: PHYSICIAN ASSISTANT

## 2021-08-27 PROCEDURE — 17000 DESTRUCT PREMALG LESION: CPT | Mod: 59 | Performed by: PHYSICIAN ASSISTANT

## 2021-08-27 PROCEDURE — 17003 DESTRUCT PREMALG LES 2-14: CPT | Mod: 59 | Performed by: PHYSICIAN ASSISTANT

## 2021-08-27 PROCEDURE — 17110 DESTRUCTION B9 LES UP TO 14: CPT | Performed by: PHYSICIAN ASSISTANT

## 2021-08-27 PROCEDURE — 88305 TISSUE EXAM BY PATHOLOGIST: CPT | Performed by: PATHOLOGY

## 2021-08-27 PROCEDURE — 11103 TANGNTL BX SKIN EA SEP/ADDL: CPT | Mod: 59 | Performed by: PHYSICIAN ASSISTANT

## 2021-08-27 NOTE — PATIENT INSTRUCTIONS
Proper skin care from [facility]:    -Eliminate harsh soaps as they strip the natural oils from the skin, often resulting in dry itchy skin ( i.e. Dial, Zest, [place] Spring)  -Use mild soaps such as Cetaphil or Dove Sensitive Skin in the shower. You do not need to use soap on arms, legs, and trunk every time you shower unless visibly soiled.   -Avoid hot or cold showers.  -After showering, lightly dry off and apply moisturizing within 2-3 minutes. This will help trap moisture in the skin.   -Aggressive use of a moisturizer at least 1-2 times a day to the entire body (including -Vanicream, Cetaphil, Aquaphor or Cerave) and moisturize hands after every washing.  -We recommend using moisturizers that come in a tub that needs to be scooped out, not a pump. This has more of an oil base. It will hold moisture in your skin much better than a water base moisturizer. The above recommended are non-pore clogging.      Wear a sunscreen with at least SPF 30 on your face, ears, neck and V of the chest daily. Wear sunscreen on other areas of the body if those areas are exposed to the sun throughout the day. Sunscreens can contain physical and/or chemical blockers. Physical blockers are less likely to clog pores, these include zinc oxide and titanium dioxide. Reapply every two hour and after swimming.     Sunscreen examples: https://www.ewg.org/sunscreen/    UV radiation  UVA radiation remains constant throughout the day and throughout the year. It is a longer wavelength than UVB and therefore penetrates deeper into the skin leading to immediate and delayed tanning, photoaging, and skin cancer. 70-80% of UVA and UVB radiation occurs between the hours of 10am-2pm.  UVB radiation  UVB radiation causes the most harmful effects and is more significant during the summer months. However, snow and ice can reflect UVB radiation leading to skin damage during the winter months as well. UVB radiation is responsible for tanning,  burning, inflammation, delayed erythema (pinkness), pigmentation (brown spots), and skin cancer.     I recommend self monthly full body exams and yearly full body exams with a dermatology provider. If you develop a new or changing lesion please follow up for examination. Most skin cancers are pink and scaly or pink and pearly. However, we do see blue/brown/black skin cancers.  Consider the ABCDEs of melanoma when giving yourself your monthly full body exam ( don't forget the groin, buttocks, feet, toes, etc). A-asymmetry, B-borders, C-color, D-diameter, E-elevation or evolving. If you see any of these changes please follow up in clinic. If you cannot see your back I recommend purchasing a hand held mirror to use with a larger wall mirror.                       Wound Care Instructions     FOR SUPERFICIAL WOUNDS     [facility] or     [facility] [phone]          AFTER 24 HOURS YOU SHOULD REMOVE THE BANDAGE AND BEGIN DAILY DRESSING CHANGES AS FOLLOWS:     1) Remove Dressing.     2) Clean and dry the area with tap water using a Q-tip or sterile gauze pad.     3) Apply Vaseline, Aquaphor, Polysporin ointment or Bacitracin ointment over entire wound.  Do NOT use Neosporin ointment.     4) Cover the wound with a band-aid, or a sterile non-stick gauze pad and micropore paper tape      REPEAT THESE INSTRUCTIONS AT LEAST ONCE A DAY UNTIL THE WOUND HAS COMPLETELY HEALED.    It is an old wives tale that a wound heals better when it is exposed to air and allowed to dry out. The wound will heal faster with a better cosmetic result if it is kept moist with ointment and covered with a bandage.    **Do not let the wound dry out.**      Supplies Needed:      *Cotton tipped applicators (Q-tips)    *Polysporin Ointment or Bacitracin Ointment (NOT NEOSPORIN)    *Band-aids or non-stick gauze pads and micropore paper tape.      PATIENT INFORMATION:    During the healing process you will notice a number of  changes. All wounds develop a small halo of redness surrounding the wound.  This means healing is occurring. Severe itching with extensive redness usually indicates sensitivity to the ointment or bandage tape used to dress the wound.  You should call our office if this develops.      Swelling  and/or discoloration around your surgical site is common, particularly when performed around the eye.    All wounds normally drain.  The larger the wound the more drainage there will be.  After 7-10 days, you will notice the wound beginning to shrink and new skin will begin to grow.  The wound is healed when you can see skin has formed over the entire area.  A healed wound has a healthy, shiny look to the surface and is red to dark pink in color to normalize.  Wounds may take approximately 4-6 weeks to heal.  Larger wounds may take 6-8 weeks.  After the wound is healed you may discontinue dressing changes.    You may experience a sensation of tightness as your wound heals. This is normal and will gradually subside.    Your healed wound may be sensitive to temperature changes. This sensitivity improves with time, but if you re having a lot of discomfort, try to avoid temperature extremes.    Patients frequently experience itching after their wound appears to have healed because of the continue healing under the skin.  Plain Vaseline will help relieve the itching.        POSSIBLE COMPLICATIONS    BLEEDIN. Leave the bandage in place.  2. Use tightly rolled up gauze or a cloth to apply direct pressure over the bandage for 30  minutes.  3. Reapply pressure for an additional 30 minutes if necessary  4. Use additional gauze and tape to maintain pressure once the bleeding has stopped.    WOUND CARE INSTRUCTIONS  FOR CRYOSURGERY  For questions please call [phone]        This area treated with liquid nitrogen will form a blister. You do not need to bandage the area until after the blister forms and breaks (which may be a few  days).  When the blister breaks, begin daily dressing changes as follows:    1) Clean and dry the area with tap water using clean Q-tip or sterile gauze pad.    2) Apply Vaseline or Aquaphor over entire wound. Other options include Polysporin ointment or Bacitracin ointment over entire wound.  Do NOT use Neosporin ointment.    3) Cover the wound with a band-aid or sterile non-stick gauze pad and micropore paper tape.      REPEAT THESE INSTRUCTIONS AT LEAST ONCE A DAY UNTIL THE WOUND HAS COMPLETELY HEALED.        It is an old wives tale that a wound heals better when it is exposed to air and allowed to dry out. The wound will heal faster with a better cosmetic result if it is kept moist with ointment and covered with a bandage.  Do not let the wound dry out.      Supplies Needed:     *Cotton tipped applicators (Q-tips)   *Polysporin ointment or Bacitracin ointment (NOT NEOSPORIN)   *Band-aids, or non stick gauze pads and micropore paper tape    PATIENT INFORMATION    During the healing process you will notice a number of changes. All wounds develop a small halo of redness surrounding the wound.  This means healing is occurring. Severe itching with extensive redness usually indicates sensitivity to the ointment or bandage tape used to dress the wound.  You should call our office if this develops.      Swelling and/or discoloration around your surgical site is common, particularly when performed around the eye.    All wounds normally drain.  The larger the wound the more drainage there will be.  After 7-10 days, you will notice the wound beginning to shrink and new skin will begin to grow.  The wound is healed when you can see skin has formed over the entire area.  A healed wound has a healthy, shiny look to the surface and is red to dark pink in color to normalize.  Wounds may take approximately 4-6 weeks to heal.  Larger wounds may take 6-8 weeks.  After the wound is healed you may discontinue dressing  changes.    You may experience a sensation of tightness as your wound heals. This is normal and will gradually subside.    Your healed wound may be sensitive to temperature changes. This sensitivity improves with time, but if you re having a lot of discomfort, try to avoid temperature extremes.    Patients frequently experience itching after their wound appears to have healed because of the continue healing under the skin.  Plain Vaseline will help relieve the itching.

## 2021-08-27 NOTE — LETTER
8/27/2021         RE: Nicolás Fermin  [address]  [place] [address]        Dear Colleague,    Thank you for referring your patient, Nicolás Fermin, to the [facility]. Please see a copy of my visit note below.    HPI:  Nicolás Fermin is a 61 year old male patient here today for FBE. Has some scaly spots on nose .  Patient states this has been present for a while.  Patient reports the following symptoms: scaly .  Patient reports the following previous treatments: none.  Patient reports the following modifying factors: none.  Associated symptoms: none.  Patient has no other skin complaints today.  Remainder of the HPI, Meds, PMH, Allergies, FH, and SH was reviewed in chart.    Pertinent Hx:   NMSC. fhx of NMSC.  Past Medical History:   Diagnosis Date     Asymptomatic human immunodeficiency virus (HIV) infection status (H)      Graves disease 2006     Hypertension      Peripheral neuropathy        History reviewed. No pertinent surgical history.     Family History   Problem Relation Age of Onset     Thyroid Disease Mother      Breast Cancer Mother      Skin Cancer Mother         face     Hypertension Father      Skin Cancer Father         head       Social History     Socioeconomic History     Marital status: Single     Spouse name: Not on file     Number of children: Not on file     Years of education: Not on file     Highest education level: Not on file   Occupational History     Not on file   Tobacco Use     Smoking status: Current Every Day Smoker     Packs/day: 0.50     Types: Cigarettes     Smokeless tobacco: Never Used     Tobacco comment: 10 cigarettes a day   Substance and Sexual Activity     Alcohol use: Yes     Alcohol/week: 0.0 standard drinks     Comment: 2-3/day     Drug use: No     Sexual activity: Not Currently   Other Topics Concern     Parent/sibling w/ CABG, MI or angioplasty before 65F 55M? No   Social History Narrative     Not on file     Social  Determinants of Health     Financial Resource Strain:      Difficulty of Paying Living Expenses:    Food Insecurity:      Worried About Running Out of Food in the Last Year:      Ran Out of Food in the Last Year:    Transportation Needs:      Lack of Transportation (Medical):      Lack of Transportation (Non-Medical):    Physical Activity:      Days of Exercise per Week:      Minutes of Exercise per Session:    Stress:      Feeling of Stress :    Social Connections:      Frequency of Communication with Friends and Family:      Frequency of Social Gatherings with Friends and Family:      Attends Anabaptist Services:      Active Member of Clubs or Organizations:      Attends Club or Organization Meetings:      Marital Status:    Intimate Partner Violence:      Fear of Current or Ex-Partner:      Emotionally Abused:      Physically Abused:      Sexually Abused:        Outpatient Encounter Medications as of 8/27/2021   Medication Sig Dispense Refill     amitriptyline (ELAVIL) 25 MG tablet Take 75 mg by mouth At Bedtime       aspirin - buffered (ASCRIPTIN) 325 MG TABS tablet Take 325 mg by mouth daily       atenolol (TENORMIN) 100 MG tablet Take 1 tablet (100 mg) by mouth daily 90 tablet 3     DiphenhydrAMINE HCl (BENADRYL PO)        Dolutegravir Sodium (TIVICAY PO) Take 50 mg by mouth daily        Emtricitabine-Tenofovir AF (DESCOVY PO) Take by mouth daily       fluocinonide (LIDEX) 0.05 % external cream Apply 1/4g to aa on thighs bid x 3 weeks. Do not use on face or body folds. Should last 30 days. 15 g 0     gabapentin (NEURONTIN) 300 MG capsule Take 300 mg by mouth        levothyroxine (SYNTHROID/LEVOTHROID) 112 MCG tablet TAKE 1 TABLET(112 MCG) BY MOUTH DAILY 90 tablet 0     levothyroxine (SYNTHROID/LEVOTHROID) 88 MCG tablet        lisinopril (ZESTRIL) 20 MG tablet Take 1 tablet (20 mg) by mouth 2 times daily 180 tablet 2     NAPROXEN PO        Omega-3 Fatty Acids (FISH OIL) 500 MG CAPS        psyllium (METAMUCIL)  58.6 % POWD Take by mouth daily       sildenafil (VIAGRA) 50 MG tablet Take by mouth daily as needed for erectile dysfunction       DULoxetine (CYMBALTA) 60 MG EC capsule Take 60 mg by mouth daily        No facility-administered encounter medications on file as of 8/27/2021.       Review Of Systems:  Skin: scaly spots  Eyes: negative  Ears/Nose/Throat: negative  Respiratory: No shortness of breath, dyspnea on exertion, cough, or hemoptysis  Cardiovascular: negative  Gastrointestinal: negative  Genitourinary: negative  Musculoskeletal: negative  Neurologic: negative  Psychiatric: negative  Hematologic/Lymphatic/Immunologic: negative  Endocrine: negative      Objective:     /88   Eyes: Conjunctivae/lids: Normal   ENT: Lips:  Normal  MSK: Normal  Cardiovascular: Peripheral edema none  Pulm: Breathing Normal  Neuro/Psych: Orientation: A/O x 3. Normal; Mood/Affect: Normal, NAD, WDWN  Pt accompanied by: self  Following areas examined: Scalp, face, eyelids, lips, neck, chest, abdomen, back, R&L upper and lower extremities. Pt defers exam of buttocks, hips, proximal thighs, groin and genitals.   [place] skin type:i   Findings:  Red smooth well-defined macules on trunk and extremities.  Brown, stuck-on scaly appearing papules on trunk and extremities.  Well circumscribed macules with symmetric color distribution on trunk and extremities.  Tan WD smooth macules on face, neck, trunk, and extremities.  Pink gritty macule/s on nose x 3, left dorsal hand/wrist x 1  Rhytides, hypo/hyperpigmentation, and atrophy  Brown and dark brown macule with irregular pigment network on let lateral mid back 0.4cm  Pink smooth slightly raised plaque on left calf 0.6cm  Wd red/pink patch on left ventral proximal leg 1.2cm  Pink scaly patch on mid upper abdomen 1.2cm  Inflamed brown, stuck-on scaly appearing papules on right temporal scalp      Assessment and Plan:     1) Cherry angiomas, Seborrheic keratoses, Benign nevi, Lentigines      I discussed the specifics of tumor, prognosis, and genetics of benign lesions.  I explained that treatment of these lesions would be purely cosmetic and not medically neccessary.  I discussed with patient different removal options including excision, cryotherapy, cautery and /or laser.  Lesion may recur and/or may not completely resolve. May need additional treatment.     2) hx of atypical nevus  right lateral arm, shave:   - Lentiginous junctional melanocytic nevus with mild atypia   reveiwed pathology  This is a benign lesion that does not need any additional treatment.   Watch and monitor  3) Neoplasm of uncertain behavior left lateral mid back 0.4cm  Rule out atypical nevus vs ISK  TANGENTIAL BIOPSY:  After consent, anesthesia with LEC and prep, tangential biopsy performed.  No complications and routine wound care.  May grow back and will get a scar. Based on lesion type may need to completely remove lesion. Patient will be notified in 7-10 days of results. Wound care directions given.    4) Neoplasm of uncertain behavior left calf 0.6cm  Rule out BCC  TANGENTIAL BIOPSY:  After consent, anesthesia with LEC and prep, tangential biopsy performed.  No complications and routine wound care.  May grow back and will get a scar. Based on lesion type may need to completely remove lesion. Patient will be notified in 7-10 days of results. Wound care directions given.    5) Neoplasm of uncertain behavior left ventral proximal leg 1.2cm  SCC vs SK  TANGENTIAL BIOPSY:  After consent, anesthesia with LEC and prep, tangential biopsy performed.  No complications and routine wound care.  May grow back and will get a scar. Based on lesion type may need to completely remove lesion. Patient will be notified in 7-10 days of results. Wound care directions given.    6) Neoplasm of uncertain behavior mid upper abdomen 1.2cm  BCC vs SCC  TANGENTIAL BIOPSY:  After consent, anesthesia with LEC and prep, tangential biopsy performed.  No  complications and routine wound care.  May grow back and will get a scar. Based on lesion type may need to completely remove lesion. Patient will be notified in 7-10 days of results. Wound care directions given.    7) ISK x 1  Benign etiology and course of lesion.  LN2: Treated with LN2 for 5s for 1-2 cycles. Warned risks of blistering, pain, pigment change, scarring, and incomplete resolution.  Advised patient to return if lesions do not completely resolve within 2-3 months.  Wound care sheet given.  8) Actinic keratoses x 4 and solar elastosis    LN2 for 5 seconds x 2. Discussed AE include hypopigmentation (white spot) and recurrence. Follow up in 2-3 months to recheck lesions. There is a risk of AKs developing into a SCC.   Treatment options include LN2 vs PDT vs Efudex. 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 Patient encouraged to perform monthly self skin exams and educated on how to perform them. UV precautions reviewed with patient. Patient was asked about new or changing moles/lesions on body.   Wear a sunscreen with at least SPF 30 on your face, ears, neck and V of the chest daily. Wear sunscreen on other areas of the body if those areas are exposed to the sun throughout the day. Sunscreens can contain physical and/or chemical blockers. Physical blockers are less likely to clog pores, these include zinc oxide and titanium dioxide. Reapply every two hour and after swimming.Sunscreen examples: https://www.ewg.org/sunscreen/    Proper skin care from [facility]:    -Eliminate harsh soaps as they strip the natural oils from the skin, often resulting in dry itchy skin ( i.e. Dial, Zest, [place] Spring)  -Use mild soaps such as Cetaphil or Dove Sensitive Skin in the shower. You do not need to use soap on arms, legs, and trunk every time you shower unless  visibly soiled.   -Avoid hot or cold showers.  -After showering, lightly dry off and apply moisturizing within 2-3 minutes. This will help trap moisture in the skin.   -Aggressive use of a moisturizer at least 1-2 times a day to the entire body (including -Vanicream, Cetaphil, Aquaphor or Cerave) and moisturize hands after every washing.  -We recommend using moisturizers that come in a tub that needs to be scooped out, not a pump. This has more of an oil base. It will hold moisture in your skin much better than a water base moisturizer. The above recommended are non-pore clogging.         It was a pleasure speaking with Nicolás Fermin today.       Follow up in 6 month FBE.         Again, thank you for allowing me to participate in the care of your patient.        Sincerely,        Aiyana Sanabria PA-C

## 2021-08-27 NOTE — PROGRESS NOTES
HPI:  Nicolás Fermin is a 61 year old male patient here today for FBE. Has some scaly spots on nose .  Patient states this has been present for a while.  Patient reports the following symptoms: scaly .  Patient reports the following previous treatments: none.  Patient reports the following modifying factors: none.  Associated symptoms: none.  Patient has no other skin complaints today.  Remainder of the HPI, Meds, PMH, Allergies, FH, and SH was reviewed in chart.    Pertinent Hx:   NMSC. fhx of NMSC.  Past Medical History:   Diagnosis Date     Asymptomatic human immunodeficiency virus (HIV) infection status (H)      Graves disease 2006     Hypertension      Peripheral neuropathy        History reviewed. No pertinent surgical history.     Family History   Problem Relation Age of Onset     Thyroid Disease Mother      Breast Cancer Mother      Skin Cancer Mother         face     Hypertension Father      Skin Cancer Father         head       Social History     Socioeconomic History     Marital status: Single     Spouse name: Not on file     Number of children: Not on file     Years of education: Not on file     Highest education level: Not on file   Occupational History     Not on file   Tobacco Use     Smoking status: Current Every Day Smoker     Packs/day: 0.50     Types: Cigarettes     Smokeless tobacco: Never Used     Tobacco comment: 10 cigarettes a day   Substance and Sexual Activity     Alcohol use: Yes     Alcohol/week: 0.0 standard drinks     Comment: 2-3/day     Drug use: No     Sexual activity: Not Currently   Other Topics Concern     Parent/sibling w/ CABG, MI or angioplasty before 65F 55M? No   Social History Narrative     Not on file     Social Determinants of Health     Financial Resource Strain:      Difficulty of Paying Living Expenses:    Food Insecurity:      Worried About Running Out of Food in the Last Year:      Ran Out of Food in the Last Year:    Transportation Needs:      Lack of  Transportation (Medical):      Lack of Transportation (Non-Medical):    Physical Activity:      Days of Exercise per Week:      Minutes of Exercise per Session:    Stress:      Feeling of Stress :    Social Connections:      Frequency of Communication with Friends and Family:      Frequency of Social Gatherings with Friends and Family:      Attends [employer] Services:      Active Member of Clubs or Organizations:      Attends Club or Organization Meetings:      Marital Status:    Intimate Partner Violence:      Fear of Current or Ex-Partner:      Emotionally Abused:      Physically Abused:      Sexually Abused:        Outpatient Encounter Medications as of 8/27/2021   Medication Sig Dispense Refill     amitriptyline (ELAVIL) 25 MG tablet Take 75 mg by mouth At Bedtime       aspirin - buffered (ASCRIPTIN) 325 MG TABS tablet Take 325 mg by mouth daily       atenolol (TENORMIN) 100 MG tablet Take 1 tablet (100 mg) by mouth daily 90 tablet 3     DiphenhydrAMINE HCl (BENADRYL PO)        Dolutegravir Sodium (TIVICAY PO) Take 50 mg by mouth daily        Emtricitabine-Tenofovir AF (DESCOVY PO) Take by mouth daily       fluocinonide (LIDEX) 0.05 % external cream Apply 1/4g to aa on thighs bid x 3 weeks. Do not use on face or body folds. Should last 30 days. 15 g 0     gabapentin (NEURONTIN) 300 MG capsule Take 300 mg by mouth        levothyroxine (SYNTHROID/LEVOTHROID) 112 MCG tablet TAKE 1 TABLET(112 MCG) BY MOUTH DAILY 90 tablet 0     levothyroxine (SYNTHROID/LEVOTHROID) 88 MCG tablet        lisinopril (ZESTRIL) 20 MG tablet Take 1 tablet (20 mg) by mouth 2 times daily 180 tablet 2     NAPROXEN PO        Omega-3 Fatty Acids (FISH OIL) 500 MG CAPS        psyllium (METAMUCIL) 58.6 % POWD Take by mouth daily       sildenafil (VIAGRA) 50 MG tablet Take by mouth daily as needed for erectile dysfunction       DULoxetine (CYMBALTA) 60 MG EC capsule Take 60 mg by mouth daily        No facility-administered encounter medications on  file as of 8/27/2021.       Review Of Systems:  Skin: scaly spots  Eyes: negative  Ears/Nose/Throat: negative  Respiratory: No shortness of breath, dyspnea on exertion, cough, or hemoptysis  Cardiovascular: negative  Gastrointestinal: negative  Genitourinary: negative  Musculoskeletal: negative  Neurologic: negative  Psychiatric: negative  Hematologic/Lymphatic/Immunologic: negative  Endocrine: negative      Objective:     /88   Eyes: Conjunctivae/lids: Normal   ENT: Lips:  Normal  MSK: Normal  Cardiovascular: Peripheral edema none  Pulm: Breathing Normal  Neuro/Psych: Orientation: A/O x 3. Normal; Mood/Affect: Normal, NAD, WDWN  Pt accompanied by: self  Following areas examined: Scalp, face, eyelids, lips, neck, chest, abdomen, back, R&L upper and lower extremities. Pt defers exam of buttocks, hips, proximal thighs, groin and genitals.   [place] skin type:i   Findings:  Red smooth well-defined macules on trunk and extremities.  Brown, stuck-on scaly appearing papules on trunk and extremities.  Well circumscribed macules with symmetric color distribution on trunk and extremities.  Tan WD smooth macules on face, neck, trunk, and extremities.  Pink gritty macule/s on nose x 3, left dorsal hand/wrist x 1  Rhytides, hypo/hyperpigmentation, and atrophy  Brown and dark brown macule with irregular pigment network on let lateral mid back 0.4cm  Pink smooth slightly raised plaque on left calf 0.6cm  Wd red/pink patch on left ventral proximal leg 1.2cm  Pink scaly patch on mid upper abdomen 1.2cm  Inflamed brown, stuck-on scaly appearing papules on right temporal scalp      Assessment and Plan:     1) Cherry angiomas, Seborrheic keratoses, Benign nevi, Lentigines     I discussed the specifics of tumor, prognosis, and genetics of benign lesions.  I explained that treatment of these lesions would be purely cosmetic and not medically neccessary.  I discussed with patient different removal options including excision,  cryotherapy, cautery and /or laser.  Lesion may recur and/or may not completely resolve. May need additional treatment.     2) hx of atypical nevus  right lateral arm, shave:   - Lentiginous junctional melanocytic nevus with mild atypia   reveiwed pathology  This is a benign lesion that does not need any additional treatment.   Watch and monitor  3) Neoplasm of uncertain behavior left lateral mid back 0.4cm  Rule out atypical nevus vs ISK  TANGENTIAL BIOPSY:  After consent, anesthesia with LEC and prep, tangential biopsy performed.  No complications and routine wound care.  May grow back and will get a scar. Based on lesion type may need to completely remove lesion. Patient will be notified in 7-10 days of results. Wound care directions given.    4) Neoplasm of uncertain behavior left calf 0.6cm  Rule out BCC  TANGENTIAL BIOPSY:  After consent, anesthesia with LEC and prep, tangential biopsy performed.  No complications and routine wound care.  May grow back and will get a scar. Based on lesion type may need to completely remove lesion. Patient will be notified in 7-10 days of results. Wound care directions given.    5) Neoplasm of uncertain behavior left ventral proximal leg 1.2cm  SCC vs SK  TANGENTIAL BIOPSY:  After consent, anesthesia with LEC and prep, tangential biopsy performed.  No complications and routine wound care.  May grow back and will get a scar. Based on lesion type may need to completely remove lesion. Patient will be notified in 7-10 days of results. Wound care directions given.    6) Neoplasm of uncertain behavior mid upper abdomen 1.2cm  BCC vs SCC  TANGENTIAL BIOPSY:  After consent, anesthesia with LEC and prep, tangential biopsy performed.  No complications and routine wound care.  May grow back and will get a scar. Based on lesion type may need to completely remove lesion. Patient will be notified in 7-10 days of results. Wound care directions given.    7) ISK x 1  Benign etiology and course  of lesion.  LN2: Treated with LN2 for 5s for 1-2 cycles. Warned risks of blistering, pain, pigment change, scarring, and incomplete resolution.  Advised patient to return if lesions do not completely resolve within 2-3 months.  Wound care sheet given.  8) Actinic keratoses x 4 and solar elastosis    LN2 for 5 seconds x 2. Discussed AE include hypopigmentation (white spot) and recurrence. Follow up in 2-3 months to recheck lesions. There is a risk of AKs developing into a SCC.   Treatment options include LN2 vs PDT vs Efudex. 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 Patient encouraged to perform monthly self skin exams and educated on how to perform them. UV precautions reviewed with patient. Patient was asked about new or changing moles/lesions on body.   Wear a sunscreen with at least SPF 30 on your face, ears, neck and V of the chest daily. Wear sunscreen on other areas of the body if those areas are exposed to the sun throughout the day. Sunscreens can contain physical and/or chemical blockers. Physical blockers are less likely to clog pores, these include zinc oxide and titanium dioxide. Reapply every two hour and after swimming.Sunscreen examples: https://www.ewg.org/sunscreen/    Proper skin care from [facility]:    -Eliminate harsh soaps as they strip the natural oils from the skin, often resulting in dry itchy skin ( i.e. Dial, Zest, [place] Spring)  -Use mild soaps such as Cetaphil or Dove Sensitive Skin in the shower. You do not need to use soap on arms, legs, and trunk every time you shower unless visibly soiled.   -Avoid hot or cold showers.  -After showering, lightly dry off and apply moisturizing within 2-3 minutes. This will help trap moisture in the skin.   -Aggressive use of a moisturizer at least 1-2 times a day to the entire body (including  -Vanicream, Cetaphil, Aquaphor or Cerave) and moisturize hands after every washing.  -We recommend using moisturizers that come in a tub that needs to be scooped out, not a pump. This has more of an oil base. It will hold moisture in your skin much better than a water base moisturizer. The above recommended are non-pore clogging.         It was a pleasure speaking with Nicolás Fermin today.       Follow up in 6 month FBE.

## 2021-08-31 ENCOUNTER — OFFICE VISIT (OUTPATIENT)
Dept: INTERNAL MEDICINE | Facility: CLINIC | Age: 61
End: 2021-08-31
Payer: COMMERCIAL

## 2021-08-31 VITALS
WEIGHT: 250.8 LBS | HEIGHT: 74 IN | HEART RATE: 69 BPM | OXYGEN SATURATION: 98 % | TEMPERATURE: 95.1 F | RESPIRATION RATE: 16 BRPM | SYSTOLIC BLOOD PRESSURE: 125 MMHG | BODY MASS INDEX: 32.19 KG/M2 | DIASTOLIC BLOOD PRESSURE: 76 MMHG

## 2021-08-31 DIAGNOSIS — I10 ESSENTIAL HYPERTENSION, BENIGN: ICD-10-CM

## 2021-08-31 DIAGNOSIS — Z13.6 CARDIOVASCULAR SCREENING; LDL GOAL LESS THAN 130: ICD-10-CM

## 2021-08-31 DIAGNOSIS — B20 HUMAN IMMUNODEFICIENCY VIRUS (HIV) DISEASE (H): ICD-10-CM

## 2021-08-31 DIAGNOSIS — Z00.00 ROUTINE GENERAL MEDICAL EXAMINATION AT A HEALTH CARE FACILITY: Primary | ICD-10-CM

## 2021-08-31 DIAGNOSIS — Z12.11 COLON CANCER SCREENING: ICD-10-CM

## 2021-08-31 DIAGNOSIS — Z72.0 TOBACCO ABUSE DISORDER: ICD-10-CM

## 2021-08-31 DIAGNOSIS — Z12.5 SCREENING PSA (PROSTATE SPECIFIC ANTIGEN): ICD-10-CM

## 2021-08-31 DIAGNOSIS — E05.00 GRAVES DISEASE: ICD-10-CM

## 2021-08-31 DIAGNOSIS — M25.552 HIP PAIN, LEFT: ICD-10-CM

## 2021-08-31 LAB
ALBUMIN SERPL-MCNC: 3.7 G/DL (ref 3.4–5)
ALP SERPL-CCNC: 63 U/L (ref 40–150)
ALT SERPL W P-5'-P-CCNC: 26 U/L (ref 0–70)
ANION GAP SERPL CALCULATED.3IONS-SCNC: 5 MMOL/L (ref 3–14)
AST SERPL W P-5'-P-CCNC: 19 U/L (ref 0–45)
BILIRUB SERPL-MCNC: 0.5 MG/DL (ref 0.2–1.3)
BUN SERPL-MCNC: 12 MG/DL (ref 7–30)
CALCIUM SERPL-MCNC: 8.4 MG/DL (ref 8.5–10.1)
CHLORIDE BLD-SCNC: 103 MMOL/L (ref 94–109)
CHOLEST SERPL-MCNC: 202 MG/DL
CO2 SERPL-SCNC: 26 MMOL/L (ref 20–32)
CREAT SERPL-MCNC: 0.97 MG/DL (ref 0.66–1.25)
ERYTHROCYTE [DISTWIDTH] IN BLOOD BY AUTOMATED COUNT: 12.3 % (ref 10–15)
FASTING STATUS PATIENT QL REPORTED: YES
GFR SERPL CREATININE-BSD FRML MDRD: 83 ML/MIN/1.73M2
GLUCOSE BLD-MCNC: 91 MG/DL (ref 70–99)
HCT VFR BLD AUTO: 43.6 % (ref 40–53)
HDLC SERPL-MCNC: 61 MG/DL
HGB BLD-MCNC: 15 G/DL (ref 13.3–17.7)
LDLC SERPL CALC-MCNC: 123 MG/DL
MCH RBC QN AUTO: 34.3 PG (ref 26.5–33)
MCHC RBC AUTO-ENTMCNC: 34.4 G/DL (ref 31.5–36.5)
MCV RBC AUTO: 100 FL (ref 78–100)
NONHDLC SERPL-MCNC: 141 MG/DL
PLATELET # BLD AUTO: 181 10E3/UL (ref 150–450)
POTASSIUM BLD-SCNC: 3.9 MMOL/L (ref 3.4–5.3)
PROT SERPL-MCNC: 7.3 G/DL (ref 6.8–8.8)
PSA SERPL-MCNC: 0.28 UG/L (ref 0–4)
RBC # BLD AUTO: 4.37 10E6/UL (ref 4.4–5.9)
SODIUM SERPL-SCNC: 134 MMOL/L (ref 133–144)
TRIGL SERPL-MCNC: 88 MG/DL
TSH SERPL DL<=0.005 MIU/L-ACNC: 1.08 MU/L (ref 0.4–4)
WBC # BLD AUTO: 7.3 10E3/UL (ref 4–11)

## 2021-08-31 PROCEDURE — 80053 COMPREHEN METABOLIC PANEL: CPT | Performed by: INTERNAL MEDICINE

## 2021-08-31 PROCEDURE — G0103 PSA SCREENING: HCPCS | Performed by: INTERNAL MEDICINE

## 2021-08-31 PROCEDURE — 80061 LIPID PANEL: CPT | Performed by: INTERNAL MEDICINE

## 2021-08-31 PROCEDURE — 82274 ASSAY TEST FOR BLOOD FECAL: CPT | Performed by: INTERNAL MEDICINE

## 2021-08-31 PROCEDURE — 85027 COMPLETE CBC AUTOMATED: CPT | Performed by: INTERNAL MEDICINE

## 2021-08-31 PROCEDURE — 99396 PREV VISIT EST AGE 40-64: CPT | Performed by: INTERNAL MEDICINE

## 2021-08-31 PROCEDURE — 36415 COLL VENOUS BLD VENIPUNCTURE: CPT | Performed by: INTERNAL MEDICINE

## 2021-08-31 PROCEDURE — 84443 ASSAY THYROID STIM HORMONE: CPT | Performed by: INTERNAL MEDICINE

## 2021-08-31 ASSESSMENT — MIFFLIN-ST. JEOR: SCORE: 2007.37

## 2021-08-31 NOTE — PROGRESS NOTES
SUBJECTIVE:   CC: Nicolás Fermin is an 61 year old male who presents for preventative health visit.       Patient has been advised of split billing requirements and indicates understanding: Yes  Healthy Habits:     Getting at least 3 servings of Calcium per day:  Yes    Bi-annual eye exam:  Yes    Dental care twice a year:  Yes    Sleep apnea or symptoms of sleep apnea:  Daytime drowsiness    Diet:  Regular (no restrictions)    Frequency of exercise:  2-3 days/week    Duration of exercise:  30-45 minutes    Taking medications regularly:  Yes    Medication side effects:  None    PHQ-2 Total Score: 0    Additional concerns today:  No        Today's PHQ-2 Score:   PHQ-2 ( 1999 [employer]) 8/24/2021   Q1: Little interest or pleasure in doing things 0   Q2: Feeling down, depressed or hopeless 0   PHQ-2 Score 0   Q1: Little interest or pleasure in doing things Not at all   Q2: Feeling down, depressed or hopeless Not at all   PHQ-2 Score 0       Abuse: Current or Past(Physical, Sexual or Emotional)- No  Do you feel safe in your environment? Yes      Social History     Tobacco Use     Smoking status: Current Every Day Smoker     Packs/day: 0.50     Types: Cigarettes     Smokeless tobacco: Never Used     Tobacco comment: 10 cigarettes a day   Substance Use Topics     Alcohol use: Yes     Alcohol/week: 0.0 standard drinks     Comment: 2-3/day         Alcohol Use 8/24/2021   Prescreen: >3 drinks/day or >7 drinks/week? Yes   AUDIT SCORE  6       Last PSA:   PSA   Date Value Ref Range Status   07/16/2020 0.39 0 - 4 ug/L Final     Comment:     Assay Method:  Chemiluminescence using Siemens Vista analyzer       Reviewed orders with patient. Reviewed health maintenance and updated orders accordingly - Yes      Reviewed and updated as needed this visit by clinical staff                 Reviewed and updated as needed this visit by Provider                  Review of Systems  CONSTITUTIONAL: NEGATIVE for fever, chills, change in  "weight  EYES: NEGATIVE for vision changes or irritation  ENT: NEGATIVE for ear, mouth and throat problems  RESP: NEGATIVE for significant cough or SOB  CV: NEGATIVE for chest pain, palpitations or peripheral edema  GI: NEGATIVE for nausea, abdominal pain, heartburn, or change in bowel habits   male: negative for dysuria, hematuria, decreased urinary stream, erectile dysfunction, urethral discharge  MUSCULOSKELETAL: NEGATIVE for significant arthralgias or myalgia less occ. Hip pain s/p PT trial.  NEURO: NEGATIVE for weakness, dizziness or paresthesias  PSYCHIATRIC: NEGATIVE for changes in mood or affect    OBJECTIVE:   /76 (BP Location: Left arm, Patient Position: Chair, Cuff Size: Adult Large)   Pulse 69   Temp (!) 95.1  F (35.1  C) (Temporal)   Resp 16   Ht 1.88 m (6' 2\")   Wt 113.8 kg (250 lb 12.8 oz)   SpO2 98%   BMI 32.20 kg/m      Physical Exam  GENERAL: alert and no distress  EYES: Eyes grossly normal to inspection, PERRL and conjunctivae and sclerae normal  HENT: ear canals and TM's normal, nose and mouth without ulcers or lesions  NECK: no adenopathy, no asymmetry, masses, or scars and thyroid normal to palpation  RESP: lungs clear to auscultation - no rales, rhonchi or wheezes  CV: regular rate and rhythm, normal S1 S2, no S3 or S4, no click or rub  ABDOMEN: soft, nontender, no hepatosplenomegaly, no masses and bowel sounds normal  RECTAL: normal sphincter tone, no rectal masses, prostate normal size, smooth, nontender without nodules or masses  MS: no gross musculoskeletal defects noted, mild antalgic gait  NEURO: No focal changes  PSYCH: mentation appears normal, affect normal/bright    ASSESSMENT/PLAN:   (Z00.00) Routine general medical examination at a health care facility  (primary encounter diagnosis)  Comment: Advised patient touch base with his infectious disease doctor to identify whether or not he has had updated recommended vaccinations.  Plan: CBC with platelets, Comprehensive " "metabolic         panel            (I10) Essential hypertension, benign  Comment: Stable on therapy continue with current medical management  Plan:     (B20) Human immunodeficiency virus (HIV) disease (H)  Comment: Following up with Dr. Romero in the infectious disease clinic for routine cell counts  Plan:     (Z13.6) CARDIOVASCULAR SCREENING; LDL GOAL LESS THAN 130  Comment: Labs ordered as fasting per routine  Plan: Lipid Profile            (Z12.5) Screening PSA (prostate specific antigen)  Comment: Ordered as routine screening based on age  Plan: Prostate Specific Antigen Screen            (Z12.11) Colon cancer screening  Comment: Prior colonoscopy reviewed, recommended updated fit test  Plan: Fecal colorectal cancer screen FIT            (E05.00) Graves disease  Comment: Recheck thyroid function tests for replacement therapy dosing  Plan: TSH with free T4 reflex            (Z72.0) Tobacco abuse disorder  Comment:   Plan: Smoking cessation was advised and the risks of continued smoking in regards to this patients health history was reiterated. Options of smoking cessation were also discussed. This time extended beyond the routine exam.    (M25.552) Hip pain, left  Comment: Bethanie with patient.  Suggest orthopedic assessment for potential injection therapy  Plan: Orthopedic  Referral            Patient has been advised of split billing requirements and indicates understanding: Yes  COUNSELING:   Reviewed preventive health counseling, as reflected in patient instructions       Regular exercise       Healthy diet/nutrition    Estimated body mass index is 30.54 kg/m  as calculated from the following:    Height as of 7/16/20: 1.861 m (6' 1.25\").    Weight as of 7/16/20: 105.7 kg (233 lb 1.6 oz).         He reports that he has been smoking cigarettes. He has been smoking about 0.50 packs per day. He has never used smokeless tobacco.  Tobacco Cessation Action Plan:   Information offered: Patient not interested at " this time      Counseling Resources:  ATP IV Guidelines  Pooled Cohorts Equation Calculator  FRAX Risk Assessment  ICSI Preventive Guidelines  Dietary Guidelines for Americans, 2010  [employer]'s MyPlate  ASA Prophylaxis  Lung CA Screening    Brooks Walsh MD  [facility]

## 2021-09-02 LAB
PATH REPORT.COMMENTS IMP SPEC: NORMAL
PATH REPORT.FINAL DX SPEC: NORMAL
PATH REPORT.GROSS SPEC: NORMAL
PATH REPORT.MICROSCOPIC SPEC OTHER STN: NORMAL
PATH REPORT.RELEVANT HX SPEC: NORMAL

## 2021-09-03 ENCOUNTER — TELEPHONE (OUTPATIENT)
Dept: DERMATOLOGY | Facility: CLINIC | Age: 61
End: 2021-09-03

## 2021-09-03 NOTE — TELEPHONE ENCOUNTER
patient notified of test results and mohs procedure explained- appointment scheduled- letter mailed.    Thank you,    Karin LALRN BSN  [facility]  [phone]

## 2021-09-03 NOTE — LETTER
[address]  [address]  [phone]        9/3/2021       Nicolás Fermin  [address]  [place] [address]      Dear Nicolás:    You are scheduled for Mohs Surgery on: Thursday, November 11, 2021 at 8:30 am.    Please check in at [facility], [address].     You don't need to arrive more than 5-10 minutes prior to your appointment time.     Be sure to eat a good breakfast and bathe and wash your hair prior to surgery.     If you are taking any anti-coagulants that are prescribed by your Doctor (such as Coumadin/Warfarin, Plavix, Aspirin, Ibuprofen), please continue taking them.     However, if you are taking anti-coagulants over the counter without a Doctor's order for a medical condition, please discontinue them 10 days prior to surgery.     Please wear loose comfortable clothing as it could possibly be 4-6 hours until your surgery is completed depending upon how many layers of tissue need to be removed.      Thank you,    GORGE Matt MD

## 2021-09-03 NOTE — TELEPHONE ENCOUNTER
----- Message from Aiyana Sanabria PA-C sent at 9/2/2021 11:19 AM CDT -----  A(1). Skin, left lateral mid back , shave:  -  Compound dysplastic nevus with moderate atypia - This is a benign lesion that does not need any additional treatment. Please continue wound care until area is healed.        B(2). Skin, left calf, shave:  - Perivascular and interstitial inflammation with eosinophils - suspicious for arthropod bite. No evidence of malignancy. Continue wound care.      C(3). Skin, left ventral proximal leg, shave:  - Squamous cell carcinoma in situ, extending to the lateral margin - (see description)      --mohs     D(4). Skin, mid upper abdomen, shave:  - Hypertrophic actinic keratosis     -cryo at mohs    Atypical moles are unusual benign moles that may resemble melanoma. People who have them are at increased risk of developing single or multiple melanomas. The higher the number of these moles someone has, the higher the risk; those who have 10 or more have 12 times the risk of developing melanoma compared to the general population. Atypical nevi are found significantly more often in melanoma patients than in the general population.    While atypical moles are considered to be pre-cancerous (more likely to turn into melanoma than regular moles), not everyone who has atypical moles gets melanoma. In fact, most moles -- both ordinary and atypical ones -- never become cancerous. Thus the removal of all atypical nevi is unnecessary. In fact, most of the melanomas found on people with atypical moles arise from normal skin and not an atypical mole.    Although a physician bases the initial diagnosis of atypical moles on a physical examination, removing several moles and examining them under a microscope must confirm the diagnosis. This procedure, called a biopsy.    A pathologist will examine the tissue under a microscope and make the precise diagnosis. Diagnosis by biopsy is not exact, and in difficult cases  "doctors may split 50/50 down the middle as to whether a mole is melanoma or benign. If the pathologist uses the term \"severely dysplastic\" or \"atypical melanocytic hyperplasia\" or offers a long descriptive narrative it means he really is concerned about melanoma, but does not want to call it that.    Most dermatologists usually recommend that all patients with these severely dysplastic moles have them removed. Also many dermatologists recommend removing \"moderate dysplasia\" moles, if the biopsy didn't get all of it. Those with \"mild dysplasia\" can usually be left alone or watched.      "

## 2021-09-10 LAB — HEMOCCULT STL QL IA: NEGATIVE

## 2021-09-10 NOTE — PROGRESS NOTES
"CHIEF COMPLAINT:  No chief complaint on file.       HISTORY OF PRESENT ILLNESS  Mr. Fermin is a pleasant 61 year old year old male who presents to clinic today with left hip pain.  Nicolás explains that he is having a lot of pain in his left hip radiating down his entire leg. Notes knee swelling and has neuropathy in his feet.     Onset: rapid, worsening  Location: right hip, posterior hip along iliac crest radiating down posterior leg into knee medial knee. Notes lower back pain as well.   Quality:  Burning pain in hip down into knee  Duration: 3 months   Severity: 9/10 at worst  Timing:constant  Modifying factors:  resting and non-use makes it better, movement and use makes it worse  Associated signs & symptoms: weakness, burning, feels like he needs to \"shake his leg\" out, burning, denies numbness or tingling   Previous similar pain: No  Treatments to date: 3 sessions of physical therapy in march with no relief.     Additional history: as documented    Review of Systems:    Have you recently had a a fever, chills, weight loss? No    Do you have any vision problems? No    Do you have any chest pain or edema? No    Do you have any shortness of breath or wheezing?  Occasionally     Do you have stomach problems? No    Do you have any numbness or focal weakness? Yes, in legs    Do you have diabetes? No    Do you have problems with bleeding or clotting? No    Do you have an rashes or other skin lesions? Lower legs, wears compression stockings    MEDICAL HISTORY  Patient Active Problem List   Diagnosis     Essential hypertension, benign     Human immunodeficiency virus (HIV) disease (H)     CARDIOVASCULAR SCREENING; LDL GOAL LESS THAN 130     Graves disease     Neuropathy     Hip pain, left     Tobacco abuse disorder       Current Outpatient Medications   Medication Sig Dispense Refill     amitriptyline (ELAVIL) 25 MG tablet Take 75 mg by mouth At Bedtime       aspirin - buffered (ASCRIPTIN) 325 MG TABS tablet Take 325 " mg by mouth daily       atenolol (TENORMIN) 100 MG tablet Take 1 tablet (100 mg) by mouth daily 90 tablet 3     DiphenhydrAMINE HCl (BENADRYL PO)        Dolutegravir Sodium (TIVICAY PO) Take 50 mg by mouth daily        DULoxetine (CYMBALTA) 60 MG EC capsule Take 60 mg by mouth daily        Emtricitabine-Tenofovir AF (DESCOVY PO) Take by mouth daily       fluocinonide (LIDEX) 0.05 % external cream Apply 1/4g to aa on thighs bid x 3 weeks. Do not use on face or body folds. Should last 30 days. 15 g 0     gabapentin (NEURONTIN) 300 MG capsule Take 300 mg by mouth        levothyroxine (SYNTHROID/LEVOTHROID) 112 MCG tablet TAKE 1 TABLET(112 MCG) BY MOUTH DAILY 90 tablet 0     levothyroxine (SYNTHROID/LEVOTHROID) 88 MCG tablet        lisinopril (ZESTRIL) 20 MG tablet Take 1 tablet (20 mg) by mouth 2 times daily 180 tablet 2     NAPROXEN PO        Omega-3 Fatty Acids (FISH OIL) 500 MG CAPS        psyllium (METAMUCIL) 58.6 % POWD Take by mouth daily       sildenafil (VIAGRA) 50 MG tablet Take by mouth daily as needed for erectile dysfunction         No Known Allergies    Family History   Problem Relation Age of Onset     Thyroid Disease Mother      Breast Cancer Mother      Skin Cancer Mother         face     Hypertension Father      Skin Cancer Father         head       Additional medical/Social/Surgical histories reviewed in [facility] and updated as appropriate.       PHYSICAL EXAM  There were no vitals taken for this visit.    General  - normal appearance, in no obvious distress  CV  - normal peripheral perfusion  Pulm  - normal respiratory pattern, non-labored  Musculoskeletal - lumbar spine  - stance: normal gait without limp, no obvious leg length discrepancy, normal heel and toe walk  - inspection: normal bone and joint alignment, no obvious scoliosis  - palpation: no paravertebral or bony tenderness  - ROM: flexion exacerbates pain, normal extension, sidebending, rotation  - strength: lower extremities 5/5 in all  planes  - special tests:  (+) straight leg raise  Musculoskeletal - Left hip  - inspection: no swelling of anterolateral hip,  normal bone and joint alignment, no obvious deformity  - palpation: no lateral or anterior hip tenderness  - ROM: normal flexion, extension, IR, ER, abduction, adduction, painful ER at lateral hip only, no crepitus  - strength: 5/5 in all planes  - special tests:  (-) DALTON  (-) FADIR  no pain with axial femoral load  Neuro  - No lower extremity sensory deficits, grossly normal coordination, normal muscle tone  Skin  - no ecchymosis, erythema, warmth, or induration, no obvious rash  Psych  - interactive, appropriate, normal mood and affect    IMAGING : XR LUMBAR 2V. Final results and radiologist's interpretation, available in the [facility] health record. Images were reviewed with the patient/family members in the office today. My personal interpretation of the performed imaging is multilevel lumbar DDD severe at L2-L3 and L3-L4.      PELVIS AND HIP LEFT ONE VIEW April 9, 2021 12:07 PM      HISTORY: Hip pain, left.     COMPARISON: None.                                                                      IMPRESSION: Mild to moderate left hip degenerative changes. Mild right  hip degenerative changes. No acute fracture.     RAMONE MADRID MD      ASSESSMENT & PLAN  Mr. Fermin is a 61 year old year old male who presents to clinic today with chronic left sided hip pain radiating to left lower extremity. Underlying hip DJD may be playing a role but I do believe radicular symptoms are primary  Pain generator.      Diagnosis:   1. Lumbar radiculitis affecting left lower extremity  2. Primary osteoarthritis of left hip  3. Lumbar DDD    -Lumbar MRI warranted given persistent nature  Of symptoms  -prednisone 40mg x 5 days  -Continue HEP  -Follow up after MRI, discuss whether SHARAD is an option vs. Other referral spine.  -May  Consider diagnostic hip injection at some point as a part of workup  -Follow up  after lumbar MRI in person visit requested    It was a pleasure seeing Nicolás today.    Emmanuel Oro DO, [facility]  Primary Care Sports Medicine

## 2021-09-16 ENCOUNTER — ANCILLARY PROCEDURE (OUTPATIENT)
Dept: GENERAL RADIOLOGY | Facility: CLINIC | Age: 61
End: 2021-09-16
Attending: FAMILY MEDICINE
Payer: COMMERCIAL

## 2021-09-16 ENCOUNTER — OFFICE VISIT (OUTPATIENT)
Dept: ORTHOPEDICS | Facility: CLINIC | Age: 61
End: 2021-09-16
Attending: INTERNAL MEDICINE
Payer: COMMERCIAL

## 2021-09-16 VITALS
DIASTOLIC BLOOD PRESSURE: 80 MMHG | BODY MASS INDEX: 33.12 KG/M2 | SYSTOLIC BLOOD PRESSURE: 152 MMHG | HEIGHT: 74 IN | WEIGHT: 258.1 LBS

## 2021-09-16 DIAGNOSIS — M25.552 HIP PAIN, LEFT: ICD-10-CM

## 2021-09-16 DIAGNOSIS — M54.16 LUMBAR BACK PAIN WITH RADICULOPATHY AFFECTING LEFT LOWER EXTREMITY: ICD-10-CM

## 2021-09-16 DIAGNOSIS — M16.12 PRIMARY OSTEOARTHRITIS OF LEFT HIP: Primary | ICD-10-CM

## 2021-09-16 PROCEDURE — 72100 X-RAY EXAM L-S SPINE 2/3 VWS: CPT | Performed by: RADIOLOGY

## 2021-09-16 PROCEDURE — 99204 OFFICE O/P NEW MOD 45 MIN: CPT | Performed by: FAMILY MEDICINE

## 2021-09-16 RX ORDER — PREDNISONE 20 MG/1
40 TABLET ORAL DAILY
Qty: 10 TABLET | Refills: 0 | Status: SHIPPED | OUTPATIENT
Start: 2021-09-16 | End: 2022-06-06

## 2021-09-16 ASSESSMENT — MIFFLIN-ST. JEOR: SCORE: 2040.48

## 2021-09-16 NOTE — LETTER
"    9/16/2021         RE: Nicolás Fermin  [address] [facility] [address] [address]        Dear Colleague,    Thank you for referring your patient, Nicolás Fermin, to the [facility] [place]. Please see a copy of my visit note below.    CHIEF COMPLAINT:  No chief complaint on file.       HISTORY OF PRESENT ILLNESS  Mr. Fermin is a pleasant 61 year old year old male who presents to clinic today with left hip pain.  Nicolás explains that he is having a lot of pain in his left hip radiating down his entire leg. Notes knee swelling and has neuropathy in his feet.     Onset: rapid, worsening  Location: right hip, posterior hip along iliac crest radiating down posterior leg into knee medial knee. Notes lower back pain as well.   Quality:  Burning pain in hip down into knee  Duration: 3 months   Severity: 9/10 at worst  Timing:constant  Modifying factors:  resting and non-use makes it better, movement and use makes it worse  Associated signs & symptoms: weakness, burning, feels like he needs to \"shake his leg\" out, burning, denies numbness or tingling   Previous similar pain: No  Treatments to date: 3 sessions of physical therapy in march with no relief.     Additional history: as documented    Review of Systems:    Have you recently had a a fever, chills, weight loss? No    Do you have any vision problems? No    Do you have any chest pain or edema? No    Do you have any shortness of breath or wheezing?  Occasionally     Do you have stomach problems? No    Do you have any numbness or focal weakness? Yes, in legs    Do you have diabetes? No    Do you have problems with bleeding or clotting? No    Do you have an rashes or other skin lesions? Lower legs, wears compression stockings    MEDICAL HISTORY  Patient Active Problem List   Diagnosis     Essential hypertension, benign     Human immunodeficiency virus (HIV) disease (H)     CARDIOVASCULAR SCREENING; LDL GOAL LESS THAN 130     Graves " disease     Neuropathy     Hip pain, left     Tobacco abuse disorder       Current Outpatient Medications   Medication Sig Dispense Refill     amitriptyline (ELAVIL) 25 MG tablet Take 75 mg by mouth At Bedtime       aspirin - buffered (ASCRIPTIN) 325 MG TABS tablet Take 325 mg by mouth daily       atenolol (TENORMIN) 100 MG tablet Take 1 tablet (100 mg) by mouth daily 90 tablet 3     DiphenhydrAMINE HCl (BENADRYL PO)        Dolutegravir Sodium (TIVICAY PO) Take 50 mg by mouth daily        DULoxetine (CYMBALTA) 60 MG EC capsule Take 60 mg by mouth daily        Emtricitabine-Tenofovir AF (DESCOVY PO) Take by mouth daily       fluocinonide (LIDEX) 0.05 % external cream Apply 1/4g to aa on thighs bid x 3 weeks. Do not use on face or body folds. Should last 30 days. 15 g 0     gabapentin (NEURONTIN) 300 MG capsule Take 300 mg by mouth        levothyroxine (SYNTHROID/LEVOTHROID) 112 MCG tablet TAKE 1 TABLET(112 MCG) BY MOUTH DAILY 90 tablet 0     levothyroxine (SYNTHROID/LEVOTHROID) 88 MCG tablet        lisinopril (ZESTRIL) 20 MG tablet Take 1 tablet (20 mg) by mouth 2 times daily 180 tablet 2     NAPROXEN PO        Omega-3 Fatty Acids (FISH OIL) 500 MG CAPS        psyllium (METAMUCIL) 58.6 % POWD Take by mouth daily       sildenafil (VIAGRA) 50 MG tablet Take by mouth daily as needed for erectile dysfunction         No Known Allergies    Family History   Problem Relation Age of Onset     Thyroid Disease Mother      Breast Cancer Mother      Skin Cancer Mother         face     Hypertension Father      Skin Cancer Father         head       Additional medical/Social/Surgical histories reviewed in [facility] and updated as appropriate.       PHYSICAL EXAM  There were no vitals taken for this visit.    General  - normal appearance, in no obvious distress  CV  - normal peripheral perfusion  Pulm  - normal respiratory pattern, non-labored  Musculoskeletal - lumbar spine  - stance: normal gait without limp, no obvious leg length  discrepancy, normal heel and toe walk  - inspection: normal bone and joint alignment, no obvious scoliosis  - palpation: no paravertebral or bony tenderness  - ROM: flexion exacerbates pain, normal extension, sidebending, rotation  - strength: lower extremities 5/5 in all planes  - special tests:  (+) straight leg raise  Musculoskeletal - Left hip  - inspection: no swelling of anterolateral hip,  normal bone and joint alignment, no obvious deformity  - palpation: no lateral or anterior hip tenderness  - ROM: normal flexion, extension, IR, ER, abduction, adduction, painful ER at lateral hip only, no crepitus  - strength: 5/5 in all planes  - special tests:  (-) DALTON  (-) FADIR  no pain with axial femoral load  Neuro  - No lower extremity sensory deficits, grossly normal coordination, normal muscle tone  Skin  - no ecchymosis, erythema, warmth, or induration, no obvious rash  Psych  - interactive, appropriate, normal mood and affect    IMAGING : XR LUMBAR 2V. Final results and radiologist's interpretation, available in the [facility] health record. Images were reviewed with the patient/family members in the office today. My personal interpretation of the performed imaging is multilevel lumbar DDD severe at L2-L3 and L3-L4.      PELVIS AND HIP LEFT ONE VIEW April 9, 2021 12:07 PM      HISTORY: Hip pain, left.     COMPARISON: None.                                                                      IMPRESSION: Mild to moderate left hip degenerative changes. Mild right  hip degenerative changes. No acute fracture.     RAMONE MADRID MD      ASSESSMENT & PLAN  Mr. Fermin is a 61 year old year old male who presents to clinic today with chronic left sided hip pain radiating to left lower extremity. Underlying hip DJD may be playing a role but I do believe radicular symptoms are primary  Pain generator.      Diagnosis:   1. Lumbar radiculitis affecting left lower extremity  2. Primary osteoarthritis of left hip  3. Lumbar  DDD    -Lumbar MRI warranted given persistent nature  Of symptoms  -prednisone 40mg x 5 days  -Continue HEP  -Follow up after MRI, discuss whether SHARAD is an option vs. Other referral spine.  -May  Consider diagnostic hip injection at some point as a part of workup  -Follow up after lumbar MRI in person visit requested    It was a pleasure seeing Nicolás today.    Emmanuel Oro DO, [facility]  Primary Care Sports Medicine        Again, thank you for allowing me to participate in the care of your patient.        Sincerely,        Emmanuel Oro DO

## 2021-09-18 ENCOUNTER — HEALTH MAINTENANCE LETTER (OUTPATIENT)
Age: 61
End: 2021-09-18

## 2021-09-30 ENCOUNTER — HOSPITAL ENCOUNTER (OUTPATIENT)
Dept: MRI IMAGING | Facility: CLINIC | Age: 61
Discharge: HOME | End: 2021-09-30
Attending: FAMILY MEDICINE | Admitting: FAMILY MEDICINE
Payer: COMMERCIAL

## 2021-09-30 DIAGNOSIS — M54.16 LUMBAR BACK PAIN WITH RADICULOPATHY AFFECTING LEFT LOWER EXTREMITY: ICD-10-CM

## 2021-09-30 PROCEDURE — 72148 MRI LUMBAR SPINE W/O DYE: CPT

## 2021-10-19 ENCOUNTER — VIRTUAL VISIT (OUTPATIENT)
Dept: ORTHOPEDICS | Facility: CLINIC | Age: 61
End: 2021-10-19
Payer: COMMERCIAL

## 2021-10-19 DIAGNOSIS — M54.16 LUMBAR BACK PAIN WITH RADICULOPATHY AFFECTING LEFT LOWER EXTREMITY: Primary | ICD-10-CM

## 2021-10-19 PROCEDURE — 99442 PR PHYSICIAN TELEPHONE EVALUATION 11-20 MIN: CPT | Mod: 95 | Performed by: FAMILY MEDICINE

## 2021-10-19 NOTE — LETTER
10/19/2021       RE: Nicolás Fermin  [address] [facility] [address]     Dear Colleague,    Thank you for referring your patient, Nicolás Fermin, to the [facility] [place] [facility]. Please see a copy of my visit note below.      VIRTUAL TELEPHONE VISIT:  RECHECK (Back and hip)       How would you like to obtain your AVS? MyChart  If the video visit is dropped, the invitation should be resent by: Send to e-mail at: [email or web address]  Will anyone else be joining your video visit? No      HISTORY OF PRESENT ILLNESS  Mr. Fermin is a pleasant 61 year old year old male who presents virtually today for back and left hip pain.    Date of injury: About 4 months ago  Date last seen: 9/16/21  Following Therapeutic Plan: Yes  Pain: Worsening  Function: Worsening  Interval History: Symptoms are similar just worse than it was before.  He continues to endorse symptoms strictly down the left leg.  He notes the medial aspect of his thigh is painful.  Additionally he notes pain of his left buttock.    Additional medical/Social/Surgical histories reviewed in [facility] and updated as appropriate.    REVIEW OF SYSTEMS (10/19/2021)  CONSTITUTIONAL: Denies fever and weight loss  GASTROINTESTINAL: Denies abdominal pain, nausea, vomiting  MUSCULOSKELETAL: See HPI  SKIN: Denies any recent rash or lesion  NEUROLOGICAL: Denies numbness or focal weakness    PHYSICAL EXAMINATION  Deferred (Telephone)    IMAGING :  IMPRESSION:  1. Diffuse degenerative change and prominent epidural fat of the  lumbar spine as detailed above.  2. No significant degenerative spinal canal narrowing at any level of  the lumbar spine; however, there is moderate narrowing of the thecal  sac due to epidural lipomatosis from the L2-L3 level down to the L5-S1  level.  3. Moderate degenerative neural foraminal stenosis bilaterally at  L3-L4, bilaterally L4-L5 and on the left at  L5-S1.     SLICK MCNEIL MD     ASSESSMENT & PLAN  Mr. Fermin is a 61 year old year old male who presents virtually today with RECHECK (Back and hip)    Diagnosis:   1.  Lumbar degenerative disc disease  2.  Lumbar radiculitis affecting left lower extremity    Treatment options discussed at this time.  Continues to have pain despite previous physical therapy, home exercise program and he is already taking gabapentin.  No significant improvement with prednisone course.    At this time we agreed to proceed with lumbar epidural steroid injection.  We discussed that this may provide impractical duration of relief, possibly no relief to more long-term greater than 1 year.  Discussed that the majority of patients receive benefit for at least 3 months.  We discussed that after injection he will return to his home exercise program.    I will see him back in 1 month for in-person visit.    It was a pleasure seeing Nicolás.    Emmanuel Oro DO, CAM  Primary Care Sports Medicine  [facility]    Total telephone time: 17 min      Again, thank you for allowing me to participate in the care of your patient.      Sincerely,    Emmanuel Oro DO

## 2021-10-19 NOTE — PROGRESS NOTES
VIRTUAL TELEPHONE VISIT:  RECHECK (Back and hip)       How would you like to obtain your AVS? MyChart  If the video visit is dropped, the invitation should be resent by: Send to e-mail at: [email or web address]  Will anyone else be joining your video visit? No      HISTORY OF PRESENT ILLNESS  Mr. Fermin is a pleasant 61 year old year old male who presents virtually today for back and left hip pain.    Date of injury: About 4 months ago  Date last seen: 9/16/21  Following Therapeutic Plan: Yes  Pain: Worsening  Function: Worsening  Interval History: Symptoms are similar just worse than it was before.  He continues to endorse symptoms strictly down the left leg.  He notes the medial aspect of his thigh is painful.  Additionally he notes pain of his left buttock.    Additional medical/Social/Surgical histories reviewed in [facility] and updated as appropriate.    REVIEW OF SYSTEMS (10/19/2021)  CONSTITUTIONAL: Denies fever and weight loss  GASTROINTESTINAL: Denies abdominal pain, nausea, vomiting  MUSCULOSKELETAL: See HPI  SKIN: Denies any recent rash or lesion  NEUROLOGICAL: Denies numbness or focal weakness    PHYSICAL EXAMINATION  Deferred (Telephone)    IMAGING :  IMPRESSION:  1. Diffuse degenerative change and prominent epidural fat of the  lumbar spine as detailed above.  2. No significant degenerative spinal canal narrowing at any level of  the lumbar spine; however, there is moderate narrowing of the thecal  sac due to epidural lipomatosis from the L2-L3 level down to the L5-S1  level.  3. Moderate degenerative neural foraminal stenosis bilaterally at  L3-L4, bilaterally L4-L5 and on the left at L5-S1.     SLICK MCNEIL MD     ASSESSMENT & PLAN  Mr. Fermin is a 61 year old year old male who presents virtually today with RECHECK (Back and hip)    Diagnosis:   1.  Lumbar degenerative disc disease  2.  Lumbar radiculitis affecting left lower extremity    Treatment options discussed at this time.  Continues to have pain  despite previous physical therapy, home exercise program and he is already taking gabapentin.  No significant improvement with prednisone course.    At this time we agreed to proceed with lumbar epidural steroid injection.  We discussed that this may provide impractical duration of relief, possibly no relief to more long-term greater than 1 year.  Discussed that the majority of patients receive benefit for at least 3 months.  We discussed that after injection he will return to his home exercise program.    I will see him back in 1 month for in-person visit.    It was a pleasure seeing Nicolás.    Emmanuel Oro DO, CAM  Primary Care Sports Medicine  [facility]    Total telephone time: 17 min

## 2021-10-19 NOTE — LETTER
10/19/2021      RE: Nicolás Fermin  [address] [place] [address]         VIRTUAL TELEPHONE VISIT:  RECHECK (Back and hip)       How would you like to obtain your AVS? MyChart  If the video visit is dropped, the invitation should be resent by: Send to e-mail at: [email or web address]  Will anyone else be joining your video visit? No      HISTORY OF PRESENT ILLNESS  Mr. Fermin is a pleasant 61 year old year old male who presents virtually today for back and left hip pain.    Date of injury: About 4 months ago  Date last seen: 9/16/21  Following Therapeutic Plan: Yes  Pain: Worsening  Function: Worsening  Interval History: Symptoms are similar just worse than it was before.  He continues to endorse symptoms strictly down the left leg.  He notes the medial aspect of his thigh is painful.  Additionally he notes pain of his left buttock.    Additional medical/Social/Surgical histories reviewed in [facility] and updated as appropriate.    REVIEW OF SYSTEMS (10/19/2021)  CONSTITUTIONAL: Denies fever and weight loss  GASTROINTESTINAL: Denies abdominal pain, nausea, vomiting  MUSCULOSKELETAL: See HPI  SKIN: Denies any recent rash or lesion  NEUROLOGICAL: Denies numbness or focal weakness    PHYSICAL EXAMINATION  Deferred (Telephone)    IMAGING :  IMPRESSION:  1. Diffuse degenerative change and prominent epidural fat of the  lumbar spine as detailed above.  2. No significant degenerative spinal canal narrowing at any level of  the lumbar spine; however, there is moderate narrowing of the thecal  sac due to epidural lipomatosis from the L2-L3 level down to the L5-S1  level.  3. Moderate degenerative neural foraminal stenosis bilaterally at  L3-L4, bilaterally L4-L5 and on the left at L5-S1.     SLICK MCNEIL MD     ASSESSMENT & PLAN  Mr. Fermin is a 61 year old year old male who presents virtually today with RECHECK (Back and hip)    Diagnosis:   1.  Lumbar degenerative disc disease  2.  Lumbar radiculitis affecting  left lower extremity    Treatment options discussed at this time.  Continues to have pain despite previous physical therapy, home exercise program and he is already taking gabapentin.  No significant improvement with prednisone course.    At this time we agreed to proceed with lumbar epidural steroid injection.  We discussed that this may provide impractical duration of relief, possibly no relief to more long-term greater than 1 year.  Discussed that the majority of patients receive benefit for at least 3 months.  We discussed that after injection he will return to his home exercise program.    I will see him back in 1 month for in-person visit.    It was a pleasure seeing Nicolás.    Emmanuel Oro DO, [facility]  Primary Care Sports Medicine  [facility]    Total telephone time: 17 min      Emmanuel Oro DO

## 2021-10-25 DIAGNOSIS — Z11.59 ENCOUNTER FOR SCREENING FOR OTHER VIRAL DISEASES: ICD-10-CM

## 2021-11-09 DIAGNOSIS — I10 ESSENTIAL HYPERTENSION, BENIGN: ICD-10-CM

## 2021-11-10 RX ORDER — LISINOPRIL 20 MG/1
TABLET ORAL
Qty: 180 TABLET | Refills: 2 | Status: SHIPPED | OUTPATIENT
Start: 2021-11-10 | End: 2022-12-06

## 2021-11-10 NOTE — TELEPHONE ENCOUNTER
Routing refill request to provider for review/approval because:  Labs out of range:    BP Readings from Last 3 Encounters:   09/16/21 (!) 152/80   08/31/21 125/76   08/27/21 138/88       Alma Hu RN

## 2021-11-11 ENCOUNTER — OFFICE VISIT (OUTPATIENT)
Dept: DERMATOLOGY | Facility: CLINIC | Age: 61
End: 2021-11-11
Payer: COMMERCIAL

## 2021-11-11 VITALS — SYSTOLIC BLOOD PRESSURE: 156 MMHG | OXYGEN SATURATION: 99 % | HEART RATE: 61 BPM | DIASTOLIC BLOOD PRESSURE: 88 MMHG

## 2021-11-11 DIAGNOSIS — D04.72 SQUAMOUS CELL CARCINOMA IN SITU (SCCIS) OF SKIN OF LEFT LOWER LEG: ICD-10-CM

## 2021-11-11 DIAGNOSIS — L57.0 AK (ACTINIC KERATOSIS): Primary | ICD-10-CM

## 2021-11-11 PROCEDURE — 17000 DESTRUCT PREMALG LESION: CPT | Performed by: DERMATOLOGY

## 2021-11-11 PROCEDURE — 17313 MOHS 1 STAGE T/A/L: CPT | Performed by: DERMATOLOGY

## 2021-11-11 PROCEDURE — 12001 RPR S/N/AX/GEN/TRNK 2.5CM/<: CPT | Mod: 59 | Performed by: DERMATOLOGY

## 2021-11-11 PROCEDURE — 99207 PR NO CHARGE LOS: CPT | Performed by: DERMATOLOGY

## 2021-11-11 NOTE — LETTER
11/11/2021         RE: Nicolás Fermin  [address] [place] [address]        Dear Colleague,    Thank you for referring your patient, Nicolás Fermin, to the [facility]. Please see a copy of my visit note below.    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  Patient has no other skin complaints today.  Remainder of the HPI, Meds, PMH, Allergies, FH, and SH was reviewed in chart.      Past Medical History:   Diagnosis Date     Asymptomatic human immunodeficiency virus (HIV) infection status (H)      Graves disease 2006     Hypertension      Peripheral neuropathy      Squamous cell carcinoma of skin, unspecified        No past surgical history on file.     Family History   Problem Relation Age of Onset     Thyroid Disease Mother      Breast Cancer Mother      Skin Cancer Mother         face     Hypertension Father      Skin Cancer Father         head       Social History     Socioeconomic History     Marital status: Single     Spouse name: Not on file     Number of children: Not on file     Years of education: Not on file     Highest education level: Not on file   Occupational History     Not on file   Tobacco Use     Smoking status: Current Every Day Smoker     Packs/day: 0.50     Types: Cigarettes     Smokeless tobacco: Never Used     Tobacco comment: 10 cigarettes a day   Substance and Sexual Activity     Alcohol use: Yes     Alcohol/week: 0.0 standard drinks     Comment: 2-3/day     Drug use: No     Sexual activity: Not Currently   Other Topics Concern     Parent/sibling w/ CABG, MI or angioplasty before 65F 55M? No   Social History Narrative     Not on file     Social Determinants of Health     Financial Resource  Strain: Not on file   Food Insecurity: Not on file   Transportation Needs: Not on file   Physical Activity: Not on file   Stress: Not on file   Social Connections: Not on file   Intimate Partner Violence: Not on file   Housing Stability: Not on file       Outpatient Encounter Medications as of 11/11/2021   Medication Sig Dispense Refill     amitriptyline (ELAVIL) 25 MG tablet Take 75 mg by mouth At Bedtime       aspirin - buffered (ASCRIPTIN) 325 MG TABS tablet Take 325 mg by mouth daily       atenolol (TENORMIN) 100 MG tablet Take 1 tablet (100 mg) by mouth daily 90 tablet 3     DiphenhydrAMINE HCl (BENADRYL PO)        Dolutegravir Sodium (TIVICAY PO) Take 50 mg by mouth daily        DULoxetine (CYMBALTA) 60 MG EC capsule Take 60 mg by mouth daily        Emtricitabine-Tenofovir AF (DESCOVY PO) Take by mouth daily       fluocinonide (LIDEX) 0.05 % external cream Apply 1/4g to aa on thighs bid x 3 weeks. Do not use on face or body folds. Should last 30 days. 15 g 0     gabapentin (NEURONTIN) 300 MG capsule Take 300 mg by mouth        levothyroxine (SYNTHROID/LEVOTHROID) 112 MCG tablet TAKE 1 TABLET(112 MCG) BY MOUTH DAILY 90 tablet 0     levothyroxine (SYNTHROID/LEVOTHROID) 88 MCG tablet        lisinopril (ZESTRIL) 20 MG tablet TAKE 1 TABLET(20 MG) BY MOUTH TWICE DAILY 180 tablet 2     NAPROXEN PO        Omega-3 Fatty Acids (FISH OIL) 500 MG CAPS        predniSONE (DELTASONE) 20 MG tablet Take 2 tablets (40 mg) by mouth daily 10 tablet 0     psyllium (METAMUCIL) 58.6 % POWD Take by mouth daily       sildenafil (VIAGRA) 50 MG tablet Take by mouth daily as needed for erectile dysfunction        [DISCONTINUED] lisinopril (ZESTRIL) 20 MG tablet Take 1 tablet (20 mg) by mouth 2 times daily 180 tablet 2     No facility-administered encounter medications on file as of 11/11/2021.             O:   NAD, WDWN, Alert & Oriented, Mood & Affect wnl, Vitals stable   Here today alone   BP (!) 156/88   Pulse 61   SpO2 99%     General appearance normal   Vitals stable   Alert, oriented and in no acute distress      Following lymph nodes palpated: popliteal no lad   L shin ill-defined 1.2cm red plaque  Ab red scaly plaque      Eyes: Conjunctivae/lids:Normal     ENT: Lips, buccal mucosa, tongue: normal    MSK:Normal    Cardiovascular: peripheral edema none    Pulm: Breathing Normal    Neuro/Psych: Orientation:Alert and Orientedx3 ; Mood/Affect:normal       A/P:  1. Abdomen actinic keratosis   LN2:  Treated with LN2 for 5s for 1-2 cycles. Warned risks of blistering, pain, pigment change, scarring, and incomplete resolution.  Advised patient to return if lesions do not completely resolve.  Wound care sheet given.  2. L shin squamous cell carcinoma in situ   MOHS:   Location and Ill-defined margins    The rationale for Mohs surgery was discussed with the patient and consent was obtained.  The risks and benefits as well as alternatives to therapy were discussed, in detail.  Specifically, the risks of infection, scarring, bleeding, prolonged wound healing, incomplete removal, allergy to anesthesia, nerve injury and recurrence were addressed.  Indication for Mohs was Location and Ill-defined margins. Prior to the procedure, the treatment site was clearly identified and, if available, confirmed with previous photos and confirmed by the patient   All components of the Universal Protocol/PAUSE rule were completed.  The Mohs surgeon operated in two distinct and integrated capacities as the surgeon and pathologist.      The area was prepped with Betasept.  A rim of normal appearing skin was marked circumferentially around the lesion.  The area was infiltrated with local anesthesia.  The tumor was first debulked to remove all clinically apparent tumor.  An incision following the standard Mohs approach was done and the specimen was oriented,mapped and placed in 1 block(s).  Each specimen was then chromacoded and processed in the Mohs laboratory using  standard Mohs technique and submitted for frozen section histology.  Frozen section analysis showed no residual tumor but CLEAR MARGINS.      The tumor was excised using standard Mohs technique in 1 stages(s).  CLEAR MARGINS OBTAINED and Final defect size was 1.8x1.9 cm.     We discussed the options for wound management in full with the patient including risks/benefits/ possible outcomes.        REPAIR SECOND INTENT: We discussed the options for wound management in full with the patient including risks/benefits/possible outcomes. Decision made to allow the wound to heal by second intention. Cautery was used for for hemostasis. EBL minimal; complications none; wound care routine.  The patient was discharged in good condition and will return in one month or prn for wound evaluation.  It was a pleasure speaking to Nicolás Fermin today.  Previous clinic notes and pertinent laboratory tests were reviewed prior to Nicolás Fermin's visit.  Nature and genetics of benign skin lesions dicussed with patient.  Signs and Symptoms of skin cancer discussed with patient.  Patient encouraged to perform monthly skin exams.  UV precautions reviewed with patient.  Risks of non-melanoma skin cancer discussed with patient   Return to clinic 6 months        Again, thank you for allowing me to participate in the care of your patient.        Sincerely,        Abundio Matt MD

## 2021-11-11 NOTE — NURSING NOTE
"Initial BP (!) 156/88   Pulse 61   SpO2 99%  Estimated body mass index is 33.14 kg/m  as calculated from the following:    Height as of 9/16/21: 1.88 m (6' 2\").    Weight as of 9/16/21: 117.1 kg (258 lb 1.6 oz).     NYA Anderson-BSN-N  [facility]  [phone]  "

## 2021-11-11 NOTE — PROGRESS NOTES
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  Patient has no other skin complaints today.  Remainder of the HPI, Meds, PMH, Allergies, FH, and SH was reviewed in chart.      Past Medical History:   Diagnosis Date     Asymptomatic human immunodeficiency virus (HIV) infection status (H)      Graves disease 2006     Hypertension      Peripheral neuropathy      Squamous cell carcinoma of skin, unspecified        No past surgical history on file.     Family History   Problem Relation Age of Onset     Thyroid Disease Mother      Breast Cancer Mother      Skin Cancer Mother         face     Hypertension Father      Skin Cancer Father         head       Social History     Socioeconomic History     Marital status: Single     Spouse name: Not on file     Number of children: Not on file     Years of education: Not on file     Highest education level: Not on file   Occupational History     Not on file   Tobacco Use     Smoking status: Current Every Day Smoker     Packs/day: 0.50     Types: Cigarettes     Smokeless tobacco: Never Used     Tobacco comment: 10 cigarettes a day   Substance and Sexual Activity     Alcohol use: Yes     Alcohol/week: 0.0 standard drinks     Comment: 2-3/day     Drug use: No     Sexual activity: Not Currently   Other Topics Concern     Parent/sibling w/ CABG, MI or angioplasty before 65F 55M? No   Social History Narrative     Not on file     Social Determinants of Health     Financial Resource Strain: Not on file   Food Insecurity: Not on file   Transportation Needs: Not on file   Physical Activity: Not on file   Stress: Not on file   Social Connections: Not on file   Intimate Partner Violence: Not on file   Housing Stability: Not on file       Outpatient Encounter Medications as of 11/11/2021   Medication Sig Dispense Refill     amitriptyline (ELAVIL) 25 MG  tablet Take 75 mg by mouth At Bedtime       aspirin - buffered (ASCRIPTIN) 325 MG TABS tablet Take 325 mg by mouth daily       atenolol (TENORMIN) 100 MG tablet Take 1 tablet (100 mg) by mouth daily 90 tablet 3     DiphenhydrAMINE HCl (BENADRYL PO)        Dolutegravir Sodium (TIVICAY PO) Take 50 mg by mouth daily        DULoxetine (CYMBALTA) 60 MG EC capsule Take 60 mg by mouth daily        Emtricitabine-Tenofovir AF (DESCOVY PO) Take by mouth daily       fluocinonide (LIDEX) 0.05 % external cream Apply 1/4g to aa on thighs bid x 3 weeks. Do not use on face or body folds. Should last 30 days. 15 g 0     gabapentin (NEURONTIN) 300 MG capsule Take 300 mg by mouth        levothyroxine (SYNTHROID/LEVOTHROID) 112 MCG tablet TAKE 1 TABLET(112 MCG) BY MOUTH DAILY 90 tablet 0     levothyroxine (SYNTHROID/LEVOTHROID) 88 MCG tablet        lisinopril (ZESTRIL) 20 MG tablet TAKE 1 TABLET(20 MG) BY MOUTH TWICE DAILY 180 tablet 2     NAPROXEN PO        Omega-3 Fatty Acids (FISH OIL) 500 MG CAPS        predniSONE (DELTASONE) 20 MG tablet Take 2 tablets (40 mg) by mouth daily 10 tablet 0     psyllium (METAMUCIL) 58.6 % POWD Take by mouth daily       sildenafil (VIAGRA) 50 MG tablet Take by mouth daily as needed for erectile dysfunction        [DISCONTINUED] lisinopril (ZESTRIL) 20 MG tablet Take 1 tablet (20 mg) by mouth 2 times daily 180 tablet 2     No facility-administered encounter medications on file as of 11/11/2021.             O:   NAD, WDWN, Alert & Oriented, Mood & Affect wnl, Vitals stable   Here today alone   BP (!) 156/88   Pulse 61   SpO2 99%    General appearance normal   Vitals stable   Alert, oriented and in no acute distress      Following lymph nodes palpated: popliteal no lad   L shin ill-defined 1.2cm red plaque  Ab red scaly plaque      Eyes: Conjunctivae/lids:Normal     ENT: Lips, buccal mucosa, tongue: normal    MSK:Normal    Cardiovascular: peripheral edema none    Pulm: Breathing Normal    Neuro/Psych:  Orientation:Alert and Orientedx3 ; Mood/Affect:normal       A/P:  1. Abdomen actinic keratosis   LN2:  Treated with LN2 for 5s for 1-2 cycles. Warned risks of blistering, pain, pigment change, scarring, and incomplete resolution.  Advised patient to return if lesions do not completely resolve.  Wound care sheet given.  2. L shin squamous cell carcinoma in situ   MOHS:   Location and Ill-defined margins    The rationale for Mohs surgery was discussed with the patient and consent was obtained.  The risks and benefits as well as alternatives to therapy were discussed, in detail.  Specifically, the risks of infection, scarring, bleeding, prolonged wound healing, incomplete removal, allergy to anesthesia, nerve injury and recurrence were addressed.  Indication for Mohs was Location and Ill-defined margins. Prior to the procedure, the treatment site was clearly identified and, if available, confirmed with previous photos and confirmed by the patient   All components of the Universal Protocol/PAUSE rule were completed.  The Mohs surgeon operated in two distinct and integrated capacities as the surgeon and pathologist.      The area was prepped with Betasept.  A rim of normal appearing skin was marked circumferentially around the lesion.  The area was infiltrated with local anesthesia.  The tumor was first debulked to remove all clinically apparent tumor.  An incision following the standard Mohs approach was done and the specimen was oriented,mapped and placed in 1 block(s).  Each specimen was then chromacoded and processed in the Mohs laboratory using standard Mohs technique and submitted for frozen section histology.  Frozen section analysis showed no residual tumor but CLEAR MARGINS.      The tumor was excised using standard Mohs technique in 1 stages(s).  CLEAR MARGINS OBTAINED and Final defect size was 1.8x1.9 cm.     We discussed the options for wound management in full with the patient including risks/benefits/  possible outcomes.        REPAIR SECOND INTENT: We discussed the options for wound management in full with the patient including risks/benefits/possible outcomes. Decision made to allow the wound to heal by second intention. Cautery was used for for hemostasis. EBL minimal; complications none; wound care routine.  The patient was discharged in good condition and will return in one month or prn for wound evaluation.  It was a pleasure speaking to Nicolás Fermin today.  Previous clinic notes and pertinent laboratory tests were reviewed prior to Nicolás Fermin's visit.  Nature and genetics of benign skin lesions dicussed with patient.  Signs and Symptoms of skin cancer discussed with patient.  Patient encouraged to perform monthly skin exams.  UV precautions reviewed with patient.  Risks of non-melanoma skin cancer discussed with patient   Return to clinic 6 months

## 2021-11-11 NOTE — PATIENT INSTRUCTIONS
Open Wound Care     for _left leg_____________        ? No strenuous activity for 48 hours    ? Take Tylenol as needed for discomfort.                                                .         ? Do not drink alcoholic beverages for 48 hours.    ? Keep the pressure bandage in place for 24 hours. If the bandage becomes blood tinged or loose, reinforce it with gauze and tape.        (Refer to the reverse side of this page for management of bleeding).    ? Remove bandage in 24 hours and begin wound care as follows:     1. Clean area with tap water using a Q tip or gauze pad, (shower / bathe normally)  2. Dry wound with Q tip or gauze pad  3. Apply Aquaphor, Vaseline, Polysporin or Bacitracin Ointment with a Q tip    Do NOT use Neosporin Ointment *  4. Cover the wound with a band-aid or nonstick gauze pad and paper tape.  5. Repeat wound care once a day until wound is completely healed.    It is an old wives tale that a wound heals better when it is exposed to air and allowed to dry out. The wound will heal faster with a better cosmetic result if it is kept moist with ointment and covered with a bandage.  Do not let the wound dry out.      Supplies Needed:                Qtips or gauze pads                Polysporin or Bacitracin Ointment                Bandaids or nonstick gauze pads and paper tape    Wound care kits and brown paper tape are available for purchase at   the pharmacy.    BLEEDIN. Use tightly rolled up gauze or cloth to apply direct pressure over the bandage for 20   minutes.  2. Reapply pressure for an additional 20 minutes if necessary  3. Call the office or go to the nearest emergency room if pressure fails to stop the bleeding.  4. Use additional gauze and tape to maintain pressure once the bleeding has stopped.  5. Begin wound care 24 hours after surgery as directed.                  WOUND HEALING    1. One week after surgery a pink / red halo will form around the outside of the wound.   This is  new skin.  2. The center of the wound will appear yellowish white and produce some drainage.  3. The pink halo will slowly migrate in toward the center of the wound until the wound is covered with new shiny pink skin.  4. There will be no more drainage when the wound is completely healed.  5. It will take six months to one year for the redness to fade.  6. The scar may be itchy, tight and sensitive to extreme temperatures for a year after the surgery.  7. Massaging the area several times a day for several minutes after the wound is completely healed will help the scar soften and normalize faster. Begin massage only after healing is complete.      In case of emergency call: Dr Matt: [phone]    [facility]:[phone]

## 2021-11-26 ENCOUNTER — LAB (OUTPATIENT)
Dept: URGENT CARE | Facility: URGENT CARE | Age: 61
End: 2021-11-26
Attending: FAMILY MEDICINE
Payer: COMMERCIAL

## 2021-11-26 DIAGNOSIS — Z11.59 ENCOUNTER FOR SCREENING FOR OTHER VIRAL DISEASES: ICD-10-CM

## 2021-11-26 PROCEDURE — U0003 INFECTIOUS AGENT DETECTION BY NUCLEIC ACID (DNA OR RNA); SEVERE ACUTE RESPIRATORY SYNDROME CORONAVIRUS 2 (SARS-COV-2) (CORONAVIRUS DISEASE [COVID-19]), AMPLIFIED PROBE TECHNIQUE, MAKING USE OF HIGH THROUGHPUT TECHNOLOGIES AS DESCRIBED BY CMS-2020-01-R: HCPCS

## 2021-11-26 PROCEDURE — U0005 INFEC AGEN DETEC AMPLI PROBE: HCPCS

## 2021-11-27 LAB — SARS-COV-2 RNA RESP QL NAA+PROBE: NEGATIVE

## 2021-11-29 ENCOUNTER — ANCILLARY PROCEDURE (OUTPATIENT)
Dept: GENERAL RADIOLOGY | Facility: CLINIC | Age: 61
End: 2021-11-29
Attending: FAMILY MEDICINE
Payer: COMMERCIAL

## 2021-11-29 VITALS
SYSTOLIC BLOOD PRESSURE: 169 MMHG | HEART RATE: 68 BPM | DIASTOLIC BLOOD PRESSURE: 105 MMHG | OXYGEN SATURATION: 98 % | TEMPERATURE: 98.3 F

## 2021-11-29 DIAGNOSIS — M54.16 LUMBAR BACK PAIN WITH RADICULOPATHY AFFECTING LEFT LOWER EXTREMITY: ICD-10-CM

## 2021-11-29 PROCEDURE — 62323 NJX INTERLAMINAR LMBR/SAC: CPT | Performed by: RADIOLOGY

## 2021-11-29 RX ORDER — METHYLPREDNISOLONE ACETATE 80 MG/ML
80 INJECTION, SUSPENSION INTRA-ARTICULAR; INTRALESIONAL; INTRAMUSCULAR; SOFT TISSUE ONCE
Status: COMPLETED | OUTPATIENT
Start: 2021-11-29 | End: 2021-11-29

## 2021-11-29 RX ORDER — LIDOCAINE HYDROCHLORIDE 10 MG/ML
30 INJECTION, SOLUTION EPIDURAL; INFILTRATION; INTRACAUDAL; PERINEURAL ONCE
Status: COMPLETED | OUTPATIENT
Start: 2021-11-29 | End: 2021-11-29

## 2021-11-29 RX ORDER — BUPIVACAINE HYDROCHLORIDE 5 MG/ML
10 INJECTION, SOLUTION EPIDURAL; INTRACAUDAL ONCE
Status: COMPLETED | OUTPATIENT
Start: 2021-11-29 | End: 2021-11-29

## 2021-11-29 RX ORDER — IOPAMIDOL 408 MG/ML
10 INJECTION, SOLUTION INTRATHECAL ONCE
Status: COMPLETED | OUTPATIENT
Start: 2021-11-29 | End: 2021-11-29

## 2021-11-29 RX ADMIN — BUPIVACAINE HYDROCHLORIDE 10 MG: 5 INJECTION, SOLUTION EPIDURAL; INTRACAUDAL at 11:16

## 2021-11-29 RX ADMIN — LIDOCAINE HYDROCHLORIDE 5 ML: 10 INJECTION, SOLUTION EPIDURAL; INFILTRATION; INTRACAUDAL; PERINEURAL at 11:15

## 2021-11-29 RX ADMIN — METHYLPREDNISOLONE ACETATE 80 MG: 80 INJECTION, SUSPENSION INTRA-ARTICULAR; INTRALESIONAL; INTRAMUSCULAR; SOFT TISSUE at 11:15

## 2021-11-29 RX ADMIN — IOPAMIDOL 2 ML: 408 INJECTION, SOLUTION INTRATHECAL at 11:15

## 2021-11-29 NOTE — DISCHARGE INSTRUCTIONS
Discharge Instructions for Epidural Steroid Injection  Care of injection site:    If you have new numbness down your leg after the injection, this may last up to 4-6 hours, but should go away. You may need help with walking until your leg feels normal.    Over the next 24 to 48 hours, pain at the injection site may increase before it gets better.    For the next 48 hours, use ice packs for 15 minutes, 3-4 times a day for pain relief.    If you have a bandage, you may remove it the next morning     Do not submerge injection site in water for 24 hours, (no baths. pools, hot tubs). Showers are OK.            Activity:    You should relax today and then you may return to your regular activity tomorrow.    You may eat a normal diet.    Medicines:    Restart all your blood thinner medicines tomorrow at your regular dose, including Coumadin (Warfarin).    If you are restarting Coumadin, talk to your doctor about having your INR checked.    If you received steroids: The steroid should help reduce swelling and pain. This may take from 3-14 days. Pain from the shot will be mild and go away in 2-3 days.     Common side effects may include:    Insomnia    Heartburn    Flushed face    Water retention    Increased appetite    Increased blood sugar    If you have diabetes, watch your blood sugar closely. If needed, call your doctor to help control your blood sugar.    Call your doctor or go to the Emergency Room if you have new severe pain or fever.

## 2021-11-29 NOTE — PROGRESS NOTES
Pt arrived for lumbar injection. Tolerated procedure without issue. Verbally understood discharge instruction. Escorted to lobby, brother to take home.    Unique Amaro, RN

## 2022-01-24 NOTE — PROGRESS NOTES
ESTABLISHED PATIENT FOLLOW-UP:  Follow Up of the Left Hip       HISTORY OF PRESENT ILLNESS  Mr. Fermin is a pleasant 61 year old year old male who presents to clinic today for follow-up of left hip and low back pain.     Date of injury: 7 months ago  Date last seen: 10/19/2021  Following Therapeutic Plan: lumbar epidural CSI - did not help and he is not doing his HEP  Pain: he states his pain is different from the previous time he was seen. He states he still has pain in his hip that radiates down his leg into his knee and has some swelling. Has tried CBD oils and seems to help.  Function: worse, no significant improvement with epidural injection provided on 11/29/2021 at L4-L5 interlaminar space.  He states that there was no temporary initial relief either.    Per interventional radiology report, he did have improvement of his pain from a 5/10 to a 0/10 reported after leaving however.    Interval History: worse     Additional medical/Social/Surgical histories reviewed in [facility] and updated as appropriate.    REVIEW OF SYSTEMS (1/24/2022)  CONSTITUTIONAL: Denies fever and weight loss  GASTROINTESTINAL: Denies abdominal pain, nausea, vomiting  MUSCULOSKELETAL: See HPI  SKIN: Denies any recent rash or lesion  NEUROLOGICAL: Denies numbness or focal weakness     PHYSICAL EXAM  BP (!) 158/90   Wt 117 kg (258 lb)   BMI 33.13 kg/m      General  - normal appearance, in no obvious distress  Musculoskeletal -left hip  - stance: normal gait without limp  - inspection: no swelling or effusion, normal bone and joint alignment, no obvious deformity  - palpation: tender over the greater trochanter  - ROM: pain with active abduction, normal and painless flexion, extension, IR, ER  - strength: 5/5 in all planes  - special tests:  (-) DALTON  (-) FADIR  Neuro  - no sensory or motor deficit, grossly normal coordination, normal muscle tone    IMAGING :   PELVIS AND HIP LEFT ONE VIEW April 9, 2021 12:07 PM      HISTORY: Hip pain,  left.     COMPARISON: None.                                                                      IMPRESSION: Mild to moderate left hip degenerative changes. Mild right  hip degenerative changes. No acute fracture.     RAMONE MADRID MD    MRI LUMBAR SPINE    IMPRESSION:  1. Diffuse degenerative change and prominent epidural fat of the  lumbar spine as detailed above.  2. No significant degenerative spinal canal narrowing at any level of  the lumbar spine; however, there is moderate narrowing of the thecal  sac due to epidural lipomatosis from the L2-L3 level down to the L5-S1  level.  3. Moderate degenerative neural foraminal stenosis bilaterally at  L3-L4, bilaterally L4-L5 and on the left at L5-S1.     SLICK MCNEIL MD      ASSESSMENT & PLAN  Mr. Fermin is a 61 year old year old male who presents to clinic today with Follow Up of the Left Hip    No substantial improvement from L4-L5 interlaminar epidural steroid injection.  We reviewed his pain pattern he does have trochanteric tenderness on exam today.  Discussed diagnostic/therapeutic trochanteric bursa injection to determine whether this assists in pain relief.    Diagnosis: Chronic pain of left hip, trochanteric bursitis of left hip, lumbar degenerative disc disease with neuroforaminal stenosis    Shared decision making used to proceed with diagnostic/therapeutic injection of left trochanteric bursa.  Continue with home exercise program as discussed previously with Pavel and PT.  Follow up 4 to 6 weeks.    It was a pleasure seeing Nicolás.    Emmanuel Oro DO, [facility]  Primary Care Sports Medicine    Trochanteric Hip Injection  The patient was informed of the risks and the benefits of the procedure and a written consent was signed.  The patient s lateral hip was prepped with chlorhexidine in sterile fashion.   40 mg of triamcinolone suspension was drawn up into a 5 mL syringe with 4 mL of 1% lidocaine.  Injection was performed using sterile technique. Using  landmark guidance as well as correlation with patient's region of greatest tenderness on palpation at trochanter, a 3.5-inch 22-gauge needle was used to enter the larry-trochanteric tissue.    There were no complications. The patient tolerated the procedure well. There was negligible bleeding.   The patient was instructed to ice the hip upon leaving clinic and refrain from overuse over the next 3 days.   The patient was instructed to call or go to the emergency room with any unusual pain, swelling, redness, or if otherwise concerned.    Large Joint Injection/Arthocentesis: L greater trochanteric bursa    Date/Time: 1/26/2022 11:00 AM  Performed by: Emmanuel Oro DO  Authorized by: Emmanuel Oro DO     Indications:  Pain and osteoarthritis  Needle Size:  22 G  Guidance: landmark guided    Approach:  Anterolateral  Location:  Hip      Site:  L greater trochanteric bursa  Medications:  40 mg methylPREDNISolone 40 MG/ML  Outcome:  Tolerated well, no immediate complications  Procedure discussed: discussed risks, benefits, and alternatives    Consent Given by:  Patient  Prep: patient was prepped and draped in usual sterile fashion

## 2022-01-26 ENCOUNTER — OFFICE VISIT (OUTPATIENT)
Dept: ORTHOPEDICS | Facility: CLINIC | Age: 62
End: 2022-01-26
Payer: COMMERCIAL

## 2022-01-26 VITALS — DIASTOLIC BLOOD PRESSURE: 90 MMHG | SYSTOLIC BLOOD PRESSURE: 158 MMHG | WEIGHT: 258 LBS | BODY MASS INDEX: 33.13 KG/M2

## 2022-01-26 DIAGNOSIS — M70.62 TROCHANTERIC BURSITIS OF LEFT HIP: ICD-10-CM

## 2022-01-26 DIAGNOSIS — M54.16 LUMBAR BACK PAIN WITH RADICULOPATHY AFFECTING LEFT LOWER EXTREMITY: Primary | ICD-10-CM

## 2022-01-26 PROCEDURE — 99213 OFFICE O/P EST LOW 20 MIN: CPT | Mod: 25 | Performed by: FAMILY MEDICINE

## 2022-01-26 PROCEDURE — 20610 DRAIN/INJ JOINT/BURSA W/O US: CPT | Mod: LT | Performed by: FAMILY MEDICINE

## 2022-01-26 RX ADMIN — METHYLPREDNISOLONE ACETATE 40 MG: 40 INJECTION, SUSPENSION INTRA-ARTICULAR; INTRALESIONAL; INTRAMUSCULAR; SOFT TISSUE at 11:00

## 2022-01-26 NOTE — LETTER
1/26/2022         RE: Nicolás Fermin  [address] [facility] [address]        Dear Colleague,    Thank you for referring your patient, Nicolás Fermin, to the [facility] [place]. Please see a copy of my visit note below.    ESTABLISHED PATIENT FOLLOW-UP:  Follow Up of the Left Hip       HISTORY OF PRESENT ILLNESS  Mr. Fermin is a pleasant 61 year old year old male who presents to clinic today for follow-up of left hip and low back pain.     Date of injury: 7 months ago  Date last seen: 10/19/2021  Following Therapeutic Plan: lumbar epidural CSI - did not help and he is not doing his HEP  Pain: he states his pain is different from the previous time he was seen. He states he still has pain in his hip that radiates down his leg into his knee and has some swelling. Has tried CBD oils and seems to help.  Function: worse, no significant improvement with epidural injection provided on 11/29/2021 at L4-L5 interlaminar space.  He states that there was no temporary initial relief either.    Per interventional radiology report, he did have improvement of his pain from a 5/10 to a 0/10 reported after leaving however.    Interval History: worse     Additional medical/Social/Surgical histories reviewed in [facility] and updated as appropriate.    REVIEW OF SYSTEMS (1/24/2022)  CONSTITUTIONAL: Denies fever and weight loss  GASTROINTESTINAL: Denies abdominal pain, nausea, vomiting  MUSCULOSKELETAL: See HPI  SKIN: Denies any recent rash or lesion  NEUROLOGICAL: Denies numbness or focal weakness     PHYSICAL EXAM  BP (!) 158/90   Wt 117 kg (258 lb)   BMI 33.13 kg/m      General  - normal appearance, in no obvious distress  Musculoskeletal -left hip  - stance: normal gait without limp  - inspection: no swelling or effusion, normal bone and joint alignment, no obvious deformity  - palpation: tender over the greater trochanter  - ROM: pain with active abduction, normal and painless flexion,  extension, IR, ER  - strength: 5/5 in all planes  - special tests:  (-) DALTON  (-) FADIR  Neuro  - no sensory or motor deficit, grossly normal coordination, normal muscle tone    IMAGING :   PELVIS AND HIP LEFT ONE VIEW April 9, 2021 12:07 PM      HISTORY: Hip pain, left.     COMPARISON: None.                                                                      IMPRESSION: Mild to moderate left hip degenerative changes. Mild right  hip degenerative changes. No acute fracture.     RAMONE MADRID MD    MRI LUMBAR SPINE    IMPRESSION:  1. Diffuse degenerative change and prominent epidural fat of the  lumbar spine as detailed above.  2. No significant degenerative spinal canal narrowing at any level of  the lumbar spine; however, there is moderate narrowing of the thecal  sac due to epidural lipomatosis from the L2-L3 level down to the L5-S1  level.  3. Moderate degenerative neural foraminal stenosis bilaterally at  L3-L4, bilaterally L4-L5 and on the left at L5-S1.     SLICK MCNEIL MD      ASSESSMENT & PLAN  Mr. Fermin is a 61 year old year old male who presents to clinic today with Follow Up of the Left Hip    No substantial improvement from L4-L5 interlaminar epidural steroid injection.  We reviewed his pain pattern he does have trochanteric tenderness on exam today.  Discussed diagnostic/therapeutic trochanteric bursa injection to determine whether this assists in pain relief.    Diagnosis: Chronic pain of left hip, trochanteric bursitis of left hip, lumbar degenerative disc disease with neuroforaminal stenosis    Shared decision making used to proceed with diagnostic/therapeutic injection of left trochanteric bursa.  Continue with home exercise program as discussed previously with Pavel and PT.  Follow up 4 to 6 weeks.    It was a pleasure seeing Nicolás.    Emmanuel Oro DO, CAM  Primary Care Sports Medicine    Trochanteric Hip Injection  The patient was informed of the risks and the benefits of the  procedure and a written consent was signed.  The patient s lateral hip was prepped with chlorhexidine in sterile fashion.   40 mg of triamcinolone suspension was drawn up into a 5 mL syringe with 4 mL of 1% lidocaine.  Injection was performed using sterile technique. Using landmark guidance as well as correlation with patient's region of greatest tenderness on palpation at trochanter, a 3.5-inch 22-gauge needle was used to enter the larry-trochanteric tissue.    There were no complications. The patient tolerated the procedure well. There was negligible bleeding.   The patient was instructed to ice the hip upon leaving clinic and refrain from overuse over the next 3 days.   The patient was instructed to call or go to the emergency room with any unusual pain, swelling, redness, or if otherwise concerned.    Large Joint Injection/Arthocentesis: L greater trochanteric bursa    Date/Time: 1/26/2022 11:00 AM  Performed by: Emmanuel Oro DO  Authorized by: Emmanuel Oro DO     Indications:  Pain and osteoarthritis  Needle Size:  22 G  Guidance: landmark guided    Approach:  Anterolateral  Location:  Hip      Site:  L greater trochanteric bursa  Medications:  40 mg methylPREDNISolone 40 MG/ML  Outcome:  Tolerated well, no immediate complications  Procedure discussed: discussed risks, benefits, and alternatives    Consent Given by:  Patient  Prep: patient was prepped and draped in usual sterile fashion                    Again, thank you for allowing me to participate in the care of your patient.        Sincerely,        Emmanuel Oro DO

## 2022-01-26 NOTE — PATIENT INSTRUCTIONS
Thank you for choosing [facility]    DR CAMPOS'S CLINIC LOCATIONS  [place] [address] [address] [facility], [address]th [place]   [place], [place], [address] [place] [address]   [phone]       APPOINTMENTS: [phone]    CARE QUESTIONS: [phone], #3    BILLING QUESTIONS: [phone]    FAX NUMBER: [phone]        Follow up: 1 month.      Steroid Injection Information:    A corticosteroid injection was administered at your visit today.  The area of injection may be sore, slightly swollen for 1-2 days afterward.  Immediately after injection, you may have an area of numbness, which is caused by the local anesthetic, lidocaine (similar to novacaine) in the shot.  This medicine will wear off in about 4 hours.  In addition to the lidocaine, a steroid medication was injected, called triamcinolone acetate.  This medication is intended to provide long-acting antiinflammatory / pain relief.  It may take 2-5 days for this medication to provide noticeable relief.      After an injection, Dr. Campos recommends:      Protecting the area wearing a bandage or gauze pad for at least 24 hours.      Using ice; ice bag or frozen vegetables over the injection site every one to two hours when able (for 15 minutes at a time).        Avoid any strenuous activity even if the knee is already feeling better immediately afterward. Light stretching is encouraged but refrain from home exercise program and increasing activity level for about 48 hours.      Avoid soaking in a hot tub, bath, or pool for 48 hours after injection. Showering is fine!      Watch for signs of infection, including increasing pain, redness and swelling that last more than 48 hours after injection.

## 2022-01-28 RX ORDER — METHYLPREDNISOLONE ACETATE 40 MG/ML
40 INJECTION, SUSPENSION INTRA-ARTICULAR; INTRALESIONAL; INTRAMUSCULAR; SOFT TISSUE
Status: SHIPPED | OUTPATIENT
Start: 2022-01-26

## 2022-03-31 ENCOUNTER — ANCILLARY PROCEDURE (OUTPATIENT)
Dept: GENERAL RADIOLOGY | Facility: CLINIC | Age: 62
End: 2022-03-31
Attending: FAMILY MEDICINE
Payer: COMMERCIAL

## 2022-03-31 ENCOUNTER — OFFICE VISIT (OUTPATIENT)
Dept: ORTHOPEDICS | Facility: CLINIC | Age: 62
End: 2022-03-31
Payer: COMMERCIAL

## 2022-03-31 VITALS — SYSTOLIC BLOOD PRESSURE: 134 MMHG | DIASTOLIC BLOOD PRESSURE: 82 MMHG

## 2022-03-31 DIAGNOSIS — M25.562 CHRONIC PAIN OF LEFT KNEE: ICD-10-CM

## 2022-03-31 DIAGNOSIS — M25.562 ACUTE PAIN OF LEFT KNEE: ICD-10-CM

## 2022-03-31 DIAGNOSIS — G89.29 CHRONIC LEFT HIP PAIN: Primary | ICD-10-CM

## 2022-03-31 DIAGNOSIS — M25.552 CHRONIC LEFT HIP PAIN: Primary | ICD-10-CM

## 2022-03-31 DIAGNOSIS — M51.369 LUMBAR DEGENERATIVE DISC DISEASE: ICD-10-CM

## 2022-03-31 DIAGNOSIS — G89.29 CHRONIC PAIN OF LEFT KNEE: ICD-10-CM

## 2022-03-31 PROCEDURE — 99214 OFFICE O/P EST MOD 30 MIN: CPT | Performed by: FAMILY MEDICINE

## 2022-03-31 PROCEDURE — 73562 X-RAY EXAM OF KNEE 3: CPT | Mod: LT | Performed by: RADIOLOGY

## 2022-03-31 NOTE — PATIENT INSTRUCTIONS
Knee Exercises    You may do the first 7 exercises right away. You may do the rest of the exercises when the pain in your knee has decreased.    Passive knee extension: Do this exercise if you are unable to extend your knee fully. While lying on your back, place a rolled-up towel under the heel of your injured leg so the heel is about 6 inches off the ground. Relax your leg muscles and let gravity slowly straighten your knee. Try to hold this position for 2 minutes. Repeat 3 times. You may feel some discomfort while doing this exercise. Do the exercise several times a day.  This exercise can also be done while sitting in a chair with your heel on another chair or stool.    Heel slide: Sit on a firm surface with your legs straight in front of you. Slowly slide the heel of the foot on your injured side toward your buttock by pulling your knee toward your chest as you slide the heel. Return to the starting position. Do 2 sets of 15.  Standing calf stretch: Stand facing a wall with your hands on the wall at about eye level. Keep your injured leg back with your heel on the floor. Keep the other leg forward with the knee bent. Turn your back foot slightly inward (as if you were pigeon-toed). Slowly lean into the wall until you feel a stretch in the back of your calf. Hold the stretch for 15 to 30 seconds. Return to the starting position. Repeat 3 times. Do this exercise several times each day.    Hamstring stretch on wall: Lie on your back with your buttocks close to a doorway. Stretch your uninjured leg straight out in front of you on the floor through the doorway. Raise your injured leg and rest it against the wall next to the door frame. Keep your leg as straight as possible. You should feel a stretch in the back of your thigh. Hold this position for 15 to 30 seconds. Repeat 3 times.  Straight leg raise: Lie on your back with your legs straight out in front of you. Bend the knee on your uninjured side and place the  foot flat on the floor. Tighten the thigh muscle on your injured side and lift your leg about 8 inches off the floor. Keep your leg straight and your thigh muscle tight. Slowly lower your leg back down to the floor. Do 2 sets of 15.    Prone hip extension: Lie on your stomach with your legs straight out behind you. Fold your arms under your head and rest your head on your arms. Draw your belly button in towards your spine and tighten your abdominal muscles. Tighten the buttocks and thigh muscles of the leg on your injured side and lift the leg off the floor about 8 inches. Keep your leg straight. Hold for 5 seconds. Then lower your leg and relax. Do 2 sets of 15.  Clam exercise: Lie on your uninjured side with your hips and knees bent and feet together. Slowly raise your top leg toward the ceiling while keeping your heels touching each other. Hold for 2 seconds and lower slowly. Do 2 sets of 15 repetitions.    Wall squat with a ball: Stand with your back, shoulders, and head against a wall. Look straight ahead. Keep your shoulders relaxed and your feet 3 feet (90 centimeters) from the wall and shoulder's width apart. Place a soccer or basketball-sized ball behind your back. Keeping your back against the wall, slowly squat down to a 45-degree angle. Your thighs will not yet be parallel to the floor. Hold this position for 10 seconds and then slowly slide back up the wall. Repeat 10 times. Build up to 2 sets of 15.  Step-up: Stand with the foot of your injured leg on a support 3 to 5 inches (8 to 13 centimeters) high --like a small step or block of wood. Keep your other foot flat on the floor. Shift your weight onto the injured leg on the support. Straighten your injured leg as the other leg comes off the floor. Return to the starting position by bending your injured leg and slowly lowering your uninjured leg back to the floor. Do 2 sets of 15.    Knee stabilization: Wrap a piece of elastic tubing around the ankle of  your uninjured leg. Tie a knot in the other end of the tubing and close it in a door at about ankle height.  Stand facing the door on the leg without tubing (your injured leg) and bend your knee slightly, keeping your thigh muscles tight. Stay in this position while you move the leg with the tubing (the uninjured leg) straight back behind you. Do 2 sets of 15.  Turn 90 degrees so the leg without tubing is closest to the door. Move the leg with tubing away from your body. Do 2 sets of 15.  Turn 90 degrees again so your back is to the door. Move the leg with tubing straight out in front of you. Do 2 sets of 15.  Turn your body 90 degrees again so the leg with tubing is closest to the door. Move the leg with tubing across your body. Do 2 sets of 15.  Hold onto a chair if you need help balancing. This exercise can be made more challenging by standing on a firm pillow or foam mat while you move the leg with tubing.    Resisted terminal knee extension: Make a loop with a piece of elastic tubing by tying a knot in both ends. Close the knot in a door at knee height. Step into the loop with your injured leg so the tubing is around the back of your knee. Lift the other foot off the ground and hold onto a chair for balance, if needed. Bend the knee with tubing about 45 degrees. Slowly straighten your leg, keeping your thigh muscle tight as you do this. Repeat 15 times. Do 2 sets of 15. If you need an easier way to do this, stand on both legs for better support while you do the exercise.  If you have access to a wobble board, do the following exercises:            Developed by [employer].  Published by [employer].  Copyright  2014 [employer] and/or one of its subsidiaries. All rights reserved.

## 2022-03-31 NOTE — PROGRESS NOTES
ESTABLISHED PATIENT FOLLOW-UP:  Pain of the Left Hip       HISTORY OF PRESENT ILLNESS  Mr. Fermin is a pleasant 61 year old year old male who presents to clinic today for follow-up of left hip/ back issues    Date of injury/onset:   Date last seen: 1/26/2022  Following Therapeutic Plan: trial of copper leg stockings vs prescription compression stocking as recommended by Dr. Romero.    Pain: Persisting at left lateral and anterior thigh  Function: not able to walk as far, less active at this time since last visit  Interval History: No pain relief with the CSI to troch at last visit. Also notes very mild temporary relief from previous lumbar SHARAD.     Treatment to date has included:  Physical therapy with Pavel Meade in [place]  Lumbar epidural steroid injection  Trochanteric bursa injection  Currently on gabapentin  Naproxen      Rainer also wants to discuss his left knee pain.  He states his knee has been swollen for about 4 months.  He notes that 3 weeks ago he had a fall directly on his left knee.  He now has pain in the left knee with ambulation, in addition to the swelling which has been longstanding.    Review of Systems:    Do you have fever, chills, weight loss? No    Do you have any vision problems? No    Do you have any chest pain or edema? No    Do you have any shortness of breath or wheezing?  No    Do you have stomach problems? No    Do you have any numbness or focal weakness? No    Do you have diabetes? No    Do you have problems with bleeding or clotting? No    Do you have an rashes or other skin lesions? No    MEDICAL HISTORY  Patient Active Problem List   Diagnosis     Essential hypertension, benign     Human immunodeficiency virus (HIV) disease (H)     CARDIOVASCULAR SCREENING; LDL GOAL LESS THAN 130     Graves disease     Neuropathy     Hip pain, left     Tobacco abuse disorder       Current Outpatient Medications   Medication Sig Dispense Refill     amitriptyline (ELAVIL) 25 MG tablet Take 75 mg  by mouth At Bedtime       aspirin - buffered (ASCRIPTIN) 325 MG TABS tablet Take 325 mg by mouth daily       atenolol (TENORMIN) 100 MG tablet Take 1 tablet (100 mg) by mouth daily 90 tablet 3     DiphenhydrAMINE HCl (BENADRYL PO)        Dolutegravir Sodium (TIVICAY PO) Take 50 mg by mouth daily        DULoxetine (CYMBALTA) 60 MG EC capsule Take 60 mg by mouth daily        Emtricitabine-Tenofovir AF (DESCOVY PO) Take by mouth daily       fluocinonide (LIDEX) 0.05 % external cream Apply 1/4g to aa on thighs bid x 3 weeks. Do not use on face or body folds. Should last 30 days. 15 g 0     gabapentin (NEURONTIN) 300 MG capsule Take 300 mg by mouth        levothyroxine (SYNTHROID/LEVOTHROID) 112 MCG tablet TAKE 1 TABLET(112 MCG) BY MOUTH DAILY 90 tablet 2     levothyroxine (SYNTHROID/LEVOTHROID) 88 MCG tablet        lisinopril (ZESTRIL) 20 MG tablet TAKE 1 TABLET(20 MG) BY MOUTH TWICE DAILY 180 tablet 2     NAPROXEN PO        Omega-3 Fatty Acids (FISH OIL) 500 MG CAPS        predniSONE (DELTASONE) 20 MG tablet Take 2 tablets (40 mg) by mouth daily 10 tablet 0     psyllium (METAMUCIL) 58.6 % POWD Take by mouth daily       sildenafil (VIAGRA) 50 MG tablet Take by mouth daily as needed for erectile dysfunction          No Known Allergies    Family History   Problem Relation Age of Onset     Thyroid Disease Mother      Breast Cancer Mother      Skin Cancer Mother         face     Hypertension Father      Skin Cancer Father         head       Additional medical/Social/Surgical histories reviewed in [facility] and updated as appropriate.       PHYSICAL EXAM  There were no vitals taken for this visit.    General  - normal appearance, in no obvious distress  Musculoskeletal - left knee  - stance: mildly antalgic gait  - inspection: generalized swelling, moderate effusion  - palpation: lateral joint line tenderness, patella and patellar tendon non-tender, normal popliteal pulse  - ROM: 110 degrees flexion, 0 degrees extension, painful  motion terminally in flexion  - strength: 5/5 in flexion, 5/5 in extension  - special tests:  (-) Lachman  (-) anterior drawer  (-) Ganesh  (-) varus at 0 and 30 degrees flexion  (-) valgus at 0 and 30 degrees flexion  Neuro  - no sensory or motor deficit, grossly normal coordination, normal muscle tone  Skin  - no ecchymosis, erythema, warmth, or induration, no obvious rash  Psych  - interactive, appropriate, normal mood and affect    IMAGING : XR left knee 3V. Final results and radiologist's interpretation, available in the [facility] health record. Images were reviewed with the patient/family members in the office today. My personal interpretation of the performed imaging is no acute osseous abnormality. Mild lateral joint space narrowing early osteoarthritis.    PELVIS AND HIP LEFT ONE VIEW April 9, 2021 12:07 PM      HISTORY: Hip pain, left.     COMPARISON: None.                                                                      IMPRESSION: Mild to moderate left hip degenerative changes. Mild right  hip degenerative changes. No acute fracture.     RAMONE MARDID MD     MRI LUMBAR SPINE     IMPRESSION:  1. Diffuse degenerative change and prominent epidural fat of the  lumbar spine as detailed above.  2. No significant degenerative spinal canal narrowing at any level of  the lumbar spine; however, there is moderate narrowing of the thecal  sac due to epidural lipomatosis from the L2-L3 level down to the L5-S1  level.  3. Moderate degenerative neural foraminal stenosis bilaterally at  L3-L4, bilaterally L4-L5 and on the left at L5-S1.     SLICK MCNEIL MD     ASSESSMENT & PLAN  Mr. Fermin is a 61 year old year old male who presents to clinic today to discuss chronic left hip pain with radiculitis at lateral and anterior thigh, as well as new evaluation for left knee effusion.    Diagnosis:  Chronic pain of left hip  Lumbar degenerative disc disease  Mild osteoarthritis of left hip  Acute pain of left knee    Knee  pain and effusion secondary to early osteoarthritis versus possible meniscal tear. Ligamentously intact. His left leg pain pain persists even with lack of movement.  Distribution and lack of improvement from trochanteric injection points more toward lumbar radiculitis.  Previous SHARAD mildly  Helpful.    I would like him to see consultation with neurosurgery for further discussion of possible nerve root impingement, additional interventions available.  I would appreciate the recommendations.  In addition of this, his left knee does have swelling as well as persisting pain since a fall 3 weeks ago.  I recommended using a knee sleeve.  You continue to use ice and I have provided a home exercise program to include knee strengthening, meniscus rehab.  If persisting like to see him back in 6 weeks or so, we may consider MRI of the left knee versus corticosteroid injection.    It was a pleasure seeing Nicolás.    Emmanuel Oro DO, CAM  Primary Care Sports Medicine

## 2022-03-31 NOTE — LETTER
3/31/2022         RE: iNcolás Fermin  [address] [place] [address]        Dear Colleague,    Thank you for referring your patient, Nicolás Fermin, to the [facility] [place]. Please see a copy of my visit note below.    ESTABLISHED PATIENT FOLLOW-UP:  Pain of the Left Hip       HISTORY OF PRESENT ILLNESS  Mr. Fermin is a pleasant 61 year old year old male who presents to clinic today for follow-up of left hip/ back issues    Date of injury/onset:   Date last seen: 1/26/2022  Following Therapeutic Plan: trial of copper leg stockings vs prescription compression stocking as recommended by Dr. Romero.    Pain: Persisting at left lateral and anterior thigh  Function: not able to walk as far, less active at this time since last visit  Interval History: No pain relief with the CSI to troch at last visit. Also notes very mild temporary relief from previous lumbar SHARAD.     Treatment to date has included:  Physical therapy with Pavel Meade in [place]  Lumbar epidural steroid injection  Trochanteric bursa injection  Currently on gabapentin  Naproxen      Rainer also wants to discuss his left knee pain.  He states his knee has been swollen for about 4 months.  He notes that 3 weeks ago he had a fall directly on his left knee.  He now has pain in the left knee with ambulation, in addition to the swelling which has been longstanding.    Review of Systems:    Do you have fever, chills, weight loss? No    Do you have any vision problems? No    Do you have any chest pain or edema? No    Do you have any shortness of breath or wheezing?  No    Do you have stomach problems? No    Do you have any numbness or focal weakness? No    Do you have diabetes? No    Do you have problems with bleeding or clotting? No    Do you have an rashes or other skin lesions? No    MEDICAL HISTORY  Patient Active Problem List   Diagnosis     Essential hypertension, benign     Human immunodeficiency virus  (HIV) disease (H)     CARDIOVASCULAR SCREENING; LDL GOAL LESS THAN 130     Graves disease     Neuropathy     Hip pain, left     Tobacco abuse disorder       Current Outpatient Medications   Medication Sig Dispense Refill     amitriptyline (ELAVIL) 25 MG tablet Take 75 mg by mouth At Bedtime       aspirin - buffered (ASCRIPTIN) 325 MG TABS tablet Take 325 mg by mouth daily       atenolol (TENORMIN) 100 MG tablet Take 1 tablet (100 mg) by mouth daily 90 tablet 3     DiphenhydrAMINE HCl (BENADRYL PO)        Dolutegravir Sodium (TIVICAY PO) Take 50 mg by mouth daily        DULoxetine (CYMBALTA) 60 MG EC capsule Take 60 mg by mouth daily        Emtricitabine-Tenofovir AF (DESCOVY PO) Take by mouth daily       fluocinonide (LIDEX) 0.05 % external cream Apply 1/4g to aa on thighs bid x 3 weeks. Do not use on face or body folds. Should last 30 days. 15 g 0     gabapentin (NEURONTIN) 300 MG capsule Take 300 mg by mouth        levothyroxine (SYNTHROID/LEVOTHROID) 112 MCG tablet TAKE 1 TABLET(112 MCG) BY MOUTH DAILY 90 tablet 2     levothyroxine (SYNTHROID/LEVOTHROID) 88 MCG tablet        lisinopril (ZESTRIL) 20 MG tablet TAKE 1 TABLET(20 MG) BY MOUTH TWICE DAILY 180 tablet 2     NAPROXEN PO        Omega-3 Fatty Acids (FISH OIL) 500 MG CAPS        predniSONE (DELTASONE) 20 MG tablet Take 2 tablets (40 mg) by mouth daily 10 tablet 0     psyllium (METAMUCIL) 58.6 % POWD Take by mouth daily       sildenafil (VIAGRA) 50 MG tablet Take by mouth daily as needed for erectile dysfunction          No Known Allergies    Family History   Problem Relation Age of Onset     Thyroid Disease Mother      Breast Cancer Mother      Skin Cancer Mother         face     Hypertension Father      Skin Cancer Father         head       Additional medical/Social/Surgical histories reviewed in [facility] and updated as appropriate.       PHYSICAL EXAM  There were no vitals taken for this visit.    General  - normal appearance, in no obvious  distress  Musculoskeletal - left knee  - stance: mildly antalgic gait  - inspection: generalized swelling, moderate effusion  - palpation: lateral joint line tenderness, patella and patellar tendon non-tender, normal popliteal pulse  - ROM: 110 degrees flexion, 0 degrees extension, painful motion terminally in flexion  - strength: 5/5 in flexion, 5/5 in extension  - special tests:  (-) Lachman  (-) anterior drawer  (-) Ganesh  (-) varus at 0 and 30 degrees flexion  (-) valgus at 0 and 30 degrees flexion  Neuro  - no sensory or motor deficit, grossly normal coordination, normal muscle tone  Skin  - no ecchymosis, erythema, warmth, or induration, no obvious rash  Psych  - interactive, appropriate, normal mood and affect    IMAGING : XR left knee 3V. Final results and radiologist's interpretation, available in the [facility] health record. Images were reviewed with the patient/family members in the office today. My personal interpretation of the performed imaging is no acute osseous abnormality. Mild lateral joint space narrowing early osteoarthritis.    PELVIS AND HIP LEFT ONE VIEW April 9, 2021 12:07 PM      HISTORY: Hip pain, left.     COMPARISON: None.                                                                      IMPRESSION: Mild to moderate left hip degenerative changes. Mild right  hip degenerative changes. No acute fracture.     RAMONE MADRID MD     MRI LUMBAR SPINE     IMPRESSION:  1. Diffuse degenerative change and prominent epidural fat of the  lumbar spine as detailed above.  2. No significant degenerative spinal canal narrowing at any level of  the lumbar spine; however, there is moderate narrowing of the thecal  sac due to epidural lipomatosis from the L2-L3 level down to the L5-S1  level.  3. Moderate degenerative neural foraminal stenosis bilaterally at  L3-L4, bilaterally L4-L5 and on the left at L5-S1.     SLICK MCNEIL MD     ASSESSMENT & PLAN  Mr. Fermin is a 61 year old year old male who  presents to clinic today to discuss chronic left hip pain with radiculitis at lateral and anterior thigh, as well as new evaluation for left knee effusion.    Diagnosis:  Chronic pain of left hip  Lumbar degenerative disc disease  Mild osteoarthritis of left hip  Acute pain of left knee    Knee pain and effusion secondary to early osteoarthritis versus possible meniscal tear. Ligamentously intact. His left leg pain pain persists even with lack of movement.  Distribution and lack of improvement from trochanteric injection points more toward lumbar radiculitis.  Previous SHARAD mildly  Helpful.    I would like him to see consultation with neurosurgery for further discussion of possible nerve root impingement, additional interventions available.  I would appreciate the recommendations.  In addition of this, his left knee does have swelling as well as persisting pain since a fall 3 weeks ago.  I recommended using a knee sleeve.  You continue to use ice and I have provided a home exercise program to include knee strengthening, meniscus rehab.  If persisting like to see him back in 6 weeks or so, we may consider MRI of the left knee versus corticosteroid injection.    It was a pleasure seeing Nicolás.    Emmanuel Oro DO, [facility]  Primary Care Sports Medicine          Again, thank you for allowing me to participate in the care of your patient.        Sincerely,        Emmanuel Oro DO

## 2022-06-06 ENCOUNTER — OFFICE VISIT (OUTPATIENT)
Dept: FAMILY MEDICINE | Facility: CLINIC | Age: 62
End: 2022-06-06
Payer: COMMERCIAL

## 2022-06-06 VITALS — SYSTOLIC BLOOD PRESSURE: 150 MMHG | DIASTOLIC BLOOD PRESSURE: 86 MMHG

## 2022-06-06 DIAGNOSIS — L81.4 LENTIGINES: ICD-10-CM

## 2022-06-06 DIAGNOSIS — Z85.828 HISTORY OF NONMELANOMA SKIN CANCER: Primary | ICD-10-CM

## 2022-06-06 DIAGNOSIS — D22.9 MULTIPLE BENIGN NEVI: ICD-10-CM

## 2022-06-06 DIAGNOSIS — Z87.898 HISTORY OF ATYPICAL NEVUS: ICD-10-CM

## 2022-06-06 DIAGNOSIS — D48.9 NEOPLASM OF UNCERTAIN BEHAVIOR: ICD-10-CM

## 2022-06-06 DIAGNOSIS — L82.1 SEBORRHEIC KERATOSIS: ICD-10-CM

## 2022-06-06 PROCEDURE — 11103 TANGNTL BX SKIN EA SEP/ADDL: CPT | Performed by: PHYSICIAN ASSISTANT

## 2022-06-06 PROCEDURE — 11102 TANGNTL BX SKIN SINGLE LES: CPT | Performed by: PHYSICIAN ASSISTANT

## 2022-06-06 PROCEDURE — 99214 OFFICE O/P EST MOD 30 MIN: CPT | Mod: 25 | Performed by: PHYSICIAN ASSISTANT

## 2022-06-06 PROCEDURE — 88305 TISSUE EXAM BY PATHOLOGIST: CPT | Performed by: DERMATOLOGY

## 2022-06-06 NOTE — PATIENT INSTRUCTIONS
Proper skin care from [facility]:    -Eliminate harsh soaps as they strip the natural oils from the skin, often resulting in dry itchy skin ( i.e. Dial, Zest, Una Spring)  -Use mild soaps such as Cetaphil or Dove Sensitive Skin in the shower. You do not need to use soap on arms, legs, and trunk every time you shower unless visibly soiled.   -Avoid hot or cold showers.  -After showering, lightly dry off and apply moisturizing within 2-3 minutes. This will help trap moisture in the skin.   -Aggressive use of a moisturizer at least 1-2 times a day to the entire body (including -Vanicream, Cetaphil, Aquaphor or Cerave) and moisturize hands after every washing.  -We recommend using moisturizers that come in a tub that needs to be scooped out, not a pump. This has more of an oil base. It will hold moisture in your skin much better than a water base moisturizer. The above recommended are non-pore clogging.      Wear a sunscreen with at least SPF 30 on your face, ears, neck and V of the chest daily. Wear sunscreen on other areas of the body if those areas are exposed to the sun throughout the day. Sunscreens can contain physical and/or chemical blockers. Physical blockers are less likely to clog pores, these include zinc oxide and titanium dioxide. Reapply every two hour and after swimming.     Sunscreen examples: https://www.ewg.org/sunscreen/    UV radiation  UVA radiation remains constant throughout the day and throughout the year. It is a longer wavelength than UVB and therefore penetrates deeper into the skin leading to immediate and delayed tanning, photoaging, and skin cancer. 70-80% of UVA and UVB radiation occurs between the hours of 10am-2pm.  UVB radiation  UVB radiation causes the most harmful effects and is more significant during the summer months. However, snow and ice can reflect UVB radiation leading to skin damage during the winter months as well. UVB radiation is responsible for tanning,  burning, inflammation, delayed erythema (pinkness), pigmentation (brown spots), and skin cancer.     I recommend self monthly full body exams and yearly full body exams with a dermatology provider. If you develop a new or changing lesion please follow up for examination. Most skin cancers are pink and scaly or pink and pearly. However, we do see blue/brown/black skin cancers.  Consider the ABCDEs of melanoma when giving yourself your monthly full body exam ( don't forget the groin, buttocks, feet, toes, etc). A-asymmetry, B-borders, C-color, D-diameter, E-elevation or evolving. If you see any of these changes please follow up in clinic. If you cannot see your back I recommend purchasing a hand held mirror to use with a larger wall mirror.       Wound Care After a Biopsy    What is a skin biopsy?  A skin biopsy allows the doctor to examine a very small piece of tissue under the microscope to determine the diagnosis and the best treatment for the skin condition. A local anesthetic (numbing medicine)  is injected with a very small needle into the skin area to be tested. A small piece of skin is taken from the area. Sometimes a suture (stitch) is used.     What are the risks of a skin biopsy?  I will experience scar, bleeding, swelling, pain, crusting and redness. I may experience incomplete removal or recurrence. Risks of this procedure are excessive bleeding, bruising, infection, nerve damage, numbness, thick (hypertrophic or keloidal) scar and non-diagnostic biopsy.    How should I care for my wound for the first 24 hours?  Keep the wound dry and covered for 24 hours  If it bleeds, hold direct pressure on the area for 15 minutes. If bleeding does not stop then go to the emergency room  Avoid strenuous exercise the first 1-2 days or as your doctor instructs you    How should I care for the wound after 24 hours?  After 24 hours, remove the bandage  You may bathe or shower as normal  If you had a scalp biopsy, you can  shampoo as usual and can use shower water to clean the biopsy site daily  Clean the wound twice a day with gentle soap and water  Do not scrub, be gentle  Apply white petroleum/Vaseline after cleaning the wound with a cotton swab or a clean finger, and keep the site covered with a Bandaid /bandage. Bandages are not necessary with a scalp biopsy  If you are unable to cover the site with a Bandaid /bandage, re-apply ointment 2-3 times a day to keep the site moist. Moisture will help with healing  Avoid strenuous activity for first 1-2 days  Avoid lakes, rivers, pools, and oceans until the stitches are removed or the site is healed    How do I clean my wound?  Wash hands thoroughly with soap or use hand  before all wound care  Clean the wound with gentle soap and water  Apply white petroleum/Vaseline  to wound after it is clean  Replace the Bandaid /bandage to keep the wound covered for the first few days or as instructed by your doctor  If you had a scalp biopsy, warm shower water to the area on a daily basis should suffice    What should I use to clean my wound?   Cotton-tipped applicators (Qtips )  White petroleum jelly (Vaseline ). Use a clean new container and use Q-tips to apply.  Bandaids   as needed  Gentle soap     How should I care for my wound long term?  Do not get your wound dirty  Keep up with wound care for one week or until the area is healed.  A small scab will form and fall off by itself when the area is completely healed. The area will be red and will become pink in color as it heals. Sun protection is very important for how your scar will turn out. Sunscreen with an SPF 30 or greater is recommended once the area is healed.  You should have some soreness but it should be mild and slowly go away over several days. Talk to your doctor about using tylenol for pain,    When should I call my doctor?  If you have increased:   Pain or swelling  Pus or drainage (clear or slightly yellow drainage is  ok)  Temperature over 100F  Spreading redness or warmth around wound    When will I hear about my results?  The biopsy results can take 2 weeks to come back.  Your results will automatically release to [facility] before your provider has even reviewed them.  The clinic will call you with the results, send you a [facility] message, or have you schedule a follow-up clinic or phone time to discuss the results.  Contact our clinics if you do not hear from us in 2 weeks.    Who should I call with questions?  [facility]: [phone]  [facility]: [phone]  For urgent needs outside of business hours call the [facility] at [phone] and ask for the dermatology resident on call

## 2022-06-06 NOTE — LETTER
6/6/2022         RE: Nicolás Fermin  [address]  [place] [address]        Dear Colleague,    Thank you for referring your patient, Nicolás Fermin, to the [facility] [place]. Please see a copy of my visit note below.    [facility] Dermatology Note  Encounter Date: Jun 6, 2022  Office Visit     Dermatology Problem List:  *Lesion to monitor - L mid back - photo 6/6/22  1. History of NMSC  - L NSW BCC mohs-12/5/19  - Right post thigh SCCIS mohs- 12/5/19  - SCCis, L ventral proximal leg, s/p MMS 11/11/21  2. Hx of atypical nevus  - right lateral arm, shave: Lentiginous junctional melanocytic nevus with mild atypia   - compound dysplastic nevus with moderate atypia, left lateral mid back, s/p bx 8/27/21  3. AKs  - HAK, mid upper abdomen, s/p bx 8/27/21  # NUBs x4 s/p bx 6/6/22  - R medial thigh, r/o NMSC  - R shin proximal, r/o NMSC  - R shin distal, r/o NMSC  - L abdomen, r/o NMSC  ____________________________________________    Assessment & Plan:    # NUBs x4  - R medial thigh, r/o NMSC  - R shin proximal, r/o NMSC  - R shin distal, r/o NMSC  - L abdomen, r/o NMSC  - Shave biopsy today x4, see procedure note below    # Lesion to monitor, L mid back, suspected SK  - Photodocumentation today.   - Recheck at next visit     # History of NMSC. No evidence of recurrent disease.  - Continue photoprotection - recommend SPF 30 or higher with frequent reapplication  - Continue yearly skin exams  - Advised to monitor for changing, non-healing, bleeding, painful, changing, or otherwise symptomatic lesions    # History of dysplastic nevi  - ABCDEs of melanoma advised  - Continue photoprotection  - Continue yearly skin exams  - Advised to monitor for changing, non-healing, bleeding, painful, changing, or otherwise symptomatic lesions    # Multiple benign nevi. Solar lentigines.   - No concerning lesions today  - Monitor for ABCDEs of melanoma   - Continue sun protection  - recommend SPF 30 or higher with frequent application   - Return sooner if noticing changing or symptomatic lesions    # Seborrheic keratoses.   Discussed the natural history and benign nature of this lesion. Reassurance provided that no additional treatment is necessary.     Procedures Performed:   - Shave biopsy x4 procedure note, location(s): See above. After discussion of benefits and risks including but not limited to bleeding, infection, scar, incomplete removal, recurrence, and non-diagnostic biopsy, written consent and photographs were obtained. The area was cleaned with isopropyl alcohol. 0.5mL of 1% lidocaine with epinephrine was injected to obtain adequate anesthesia of lesion(s). Shave biopsy at site(s) performed. Hemostasis was achieved with aluminium chloride. Petrolatum ointment and a sterile dressing were applied. The patient tolerated the procedure and no complications were noted. The patient was provided with verbal and written post care instructions.     Follow-up: 6 month(s) in-person, or earlier for new or changing lesions    Staff and Scribe:     Scribe Disclosure:  I, Bianca Hancock, am serving as a scribe to document services personally performed by Sophia Degroot PA-C based on data collection and the provider's statements to me.   Provider Disclosure:   The documentation recorded by the scribe accurately reflects the services I personally performed and the decisions made by me.    All risks, benefits and alternatives were discussed with patient.  Patient is in agreement and understands the assessment and plan.  All questions were answered.    Sophia Degroot PA-C, [facility]  [facility]: Phone: [phone], Fax: [phone]  [facility]: Phone: [phone],  Fax: [phone]  [facility]: Phone: [phone], Fax: [phone]  ____________________________________________    CC: Skin  Check    HPI:  Mr. Nicolás Fermin is a(n) 62 year old male who presents today as a return patient for [facility]. Last seen by Dr. Matt on 11/11/21 at which point he under went MMS for treatment of a SCCis on the L leg.     Today, the patient points to a particularly itchy lesion on his R arm. When prompted, there is a lesion on his back that does not itch. There is another lesion on his R thigh that is asymptomatic. Patient is otherwise feeling well, without additional skin concerns.    Labs Reviewed:  N/A    Physical Exam:  Vitals: BP (!) 150/86   SKIN: Full skin, which includes the head/face, both arms, chest, back, abdomen,both legs, genitalia and/or groin buttocks, digits and/or nails, was examined.  - R shin proximal and distal, with 2 pink scaly lesions, ~1 cm in size.   - R medial thigh, 5 mm pink scaly lesion.   - L mid back with a 7 mm pink scaly lesion.   - L abdomen, 4 mm pink and brown scaly macule  - Multiple regular brown pigmented macules and papules are identified on the trunk and extremities.   - Scattered brown macules on sun exposed areas.  - There are waxy stuck on tan to brown papules on the trunk, extremities, and face.   - Telangiectasias of the mid face.   - No other lesions of concern on areas examined.                       Medications:  Current Outpatient Medications   Medication     amitriptyline (ELAVIL) 25 MG tablet     aspirin - buffered (ASCRIPTIN) 325 MG TABS tablet     atenolol (TENORMIN) 100 MG tablet     DiphenhydrAMINE HCl (BENADRYL PO)     Dolutegravir Sodium (TIVICAY PO)     DULoxetine (CYMBALTA) 60 MG EC capsule     Emtricitabine-Tenofovir AF (DESCOVY PO)     fluocinonide (LIDEX) 0.05 % external cream     gabapentin (NEURONTIN) 300 MG capsule     levothyroxine (SYNTHROID/LEVOTHROID) 112 MCG tablet     lisinopril (ZESTRIL) 20 MG tablet     NAPROXEN PO     Omega-3 Fatty Acids (FISH OIL) 500 MG CAPS     predniSONE (DELTASONE) 20 MG tablet     psyllium (METAMUCIL) 58.6 % POWD      sildenafil (VIAGRA) 50 MG tablet     Current Facility-Administered Medications   Medication     methylPREDNISolone (DEPO-MEDROL) injection 40 mg      Past Medical History:   Patient Active Problem List   Diagnosis     Essential hypertension, benign     Human immunodeficiency virus (HIV) disease (H)     CARDIOVASCULAR SCREENING; LDL GOAL LESS THAN 130     Graves disease     Neuropathy     Hip pain, left     Tobacco abuse disorder     Past Medical History:   Diagnosis Date     Asymptomatic human immunodeficiency virus (HIV) infection status (H)      Graves disease 2006     Hypertension      Peripheral neuropathy      Squamous cell carcinoma of skin, unspecified               Again, thank you for allowing me to participate in the care of your patient.        Sincerely,        Sophia Degroot PA-C

## 2022-06-06 NOTE — PROGRESS NOTES
[facility] Dermatology Note  Encounter Date: Jun 6, 2022  Office Visit     Dermatology Problem List:  *Lesion to monitor - L mid back - photo 6/6/22  1. History of NMSC  - L NSW BCC mohs-12/5/19  - Right post thigh SCCIS mohs- 12/5/19  - SCCis, L ventral proximal leg, s/p MMS 11/11/21  2. Hx of atypical nevus  - right lateral arm, shave: Lentiginous junctional melanocytic nevus with mild atypia   - compound dysplastic nevus with moderate atypia, left lateral mid back, s/p bx 8/27/21  3. AKs  - HAK, mid upper abdomen, s/p bx 8/27/21  # NUBs x4 s/p bx 6/6/22  - R medial thigh, r/o NMSC  - R shin proximal, r/o NMSC  - R shin distal, r/o NMSC  - L abdomen, r/o NMSC  ____________________________________________    Assessment & Plan:    # NUBs x4  - R medial thigh, r/o NMSC  - R shin proximal, r/o NMSC  - R shin distal, r/o NMSC  - L abdomen, r/o NMSC  - Shave biopsy today x4, see procedure note below    # Lesion to monitor, L mid back, suspected SK  - Photodocumentation today.   - Recheck at next visit     # History of NMSC. No evidence of recurrent disease.  - Continue photoprotection - recommend SPF 30 or higher with frequent reapplication  - Continue yearly skin exams  - Advised to monitor for changing, non-healing, bleeding, painful, changing, or otherwise symptomatic lesions    # History of dysplastic nevi  - ABCDEs of melanoma advised  - Continue photoprotection  - Continue yearly skin exams  - Advised to monitor for changing, non-healing, bleeding, painful, changing, or otherwise symptomatic lesions    # Multiple benign nevi. Solar lentigines.   - No concerning lesions today  - Monitor for ABCDEs of melanoma   - Continue sun protection - recommend SPF 30 or higher with frequent application   - Return sooner if noticing changing or symptomatic lesions    # Seborrheic keratoses.   Discussed the natural history and benign nature of this lesion. Reassurance provided that no additional treatment is  necessary.     Procedures Performed:   - Shave biopsy x4 procedure note, location(s): See above. After discussion of benefits and risks including but not limited to bleeding, infection, scar, incomplete removal, recurrence, and non-diagnostic biopsy, written consent and photographs were obtained. The area was cleaned with isopropyl alcohol. 0.5mL of 1% lidocaine with epinephrine was injected to obtain adequate anesthesia of lesion(s). Shave biopsy at site(s) performed. Hemostasis was achieved with aluminium chloride. Petrolatum ointment and a sterile dressing were applied. The patient tolerated the procedure and no complications were noted. The patient was provided with verbal and written post care instructions.     Follow-up: 6 month(s) in-person, or earlier for new or changing lesions    Staff and Scribe:     Scribe Disclosure:  I, Bianca Hancock, am serving as a scribe to document services personally performed by Sophia Degroot PA-C based on data collection and the provider's statements to me.   Provider Disclosure:   The documentation recorded by the scribe accurately reflects the services I personally performed and the decisions made by me.    All risks, benefits and alternatives were discussed with patient.  Patient is in agreement and understands the assessment and plan.  All questions were answered.    Sophia Degroot PA-C, [facility]  [facility] [place]: Phone: [phone], Fax: [phone]  [facility]: Phone: [phone],  Fax: [phone]  [facility]: Phone: [phone], Fax: [phone]  ____________________________________________    CC: Skin Check    HPI:  Mr. Nicolás Fermin is a(n) 62 year old male who presents today as a return patient for FBS. Last seen by Dr. Matt on 11/11/21 at which point he under went [facility] for treatment of a SCCis on the L leg.     Today, the patient points to a  particularly itchy lesion on his R arm. When prompted, there is a lesion on his back that does not itch. There is another lesion on his R thigh that is asymptomatic. Patient is otherwise feeling well, without additional skin concerns.    Labs Reviewed:  N/A    Physical Exam:  Vitals: BP (!) 150/86   SKIN: Full skin, which includes the head/face, both arms, chest, back, abdomen,both legs, genitalia and/or groin buttocks, digits and/or nails, was examined.  - R shin proximal and distal, with 2 pink scaly lesions, ~1 cm in size.   - R medial thigh, 5 mm pink scaly lesion.   - L mid back with a 7 mm pink scaly lesion.   - L abdomen, 4 mm pink and brown scaly macule  - Multiple regular brown pigmented macules and papules are identified on the trunk and extremities.   - Scattered brown macules on sun exposed areas.  - There are waxy stuck on tan to brown papules on the trunk, extremities, and face.   - Telangiectasias of the mid face.   - No other lesions of concern on areas examined.                       Medications:  Current Outpatient Medications   Medication     amitriptyline (ELAVIL) 25 MG tablet     aspirin - buffered (ASCRIPTIN) 325 MG TABS tablet     atenolol (TENORMIN) 100 MG tablet     DiphenhydrAMINE HCl (BENADRYL PO)     Dolutegravir Sodium (TIVICAY PO)     DULoxetine (CYMBALTA) 60 MG EC capsule     Emtricitabine-Tenofovir AF (DESCOVY PO)     fluocinonide (LIDEX) 0.05 % external cream     gabapentin (NEURONTIN) 300 MG capsule     levothyroxine (SYNTHROID/LEVOTHROID) 112 MCG tablet     lisinopril (ZESTRIL) 20 MG tablet     NAPROXEN PO     Omega-3 Fatty Acids (FISH OIL) 500 MG CAPS     predniSONE (DELTASONE) 20 MG tablet     psyllium (METAMUCIL) 58.6 % POWD     sildenafil (VIAGRA) 50 MG tablet     Current Facility-Administered Medications   Medication     methylPREDNISolone (DEPO-MEDROL) injection 40 mg      Past Medical History:   Patient Active Problem List   Diagnosis     Essential hypertension, benign      Human immunodeficiency virus (HIV) disease (H)     CARDIOVASCULAR SCREENING; LDL GOAL LESS THAN 130     Graves disease     Neuropathy     Hip pain, left     Tobacco abuse disorder     Past Medical History:   Diagnosis Date     Asymptomatic human immunodeficiency virus (HIV) infection status (H)      Graves disease 2006     Hypertension      Peripheral neuropathy      Squamous cell carcinoma of skin, unspecified

## 2022-06-08 ENCOUNTER — TELEPHONE (OUTPATIENT)
Dept: FAMILY MEDICINE | Facility: CLINIC | Age: 62
End: 2022-06-08
Payer: COMMERCIAL

## 2022-06-08 LAB
PATH REPORT.COMMENTS IMP SPEC: ABNORMAL
PATH REPORT.COMMENTS IMP SPEC: YES
PATH REPORT.FINAL DX SPEC: ABNORMAL
PATH REPORT.GROSS SPEC: ABNORMAL
PATH REPORT.MICROSCOPIC SPEC OTHER STN: ABNORMAL
PATH REPORT.RELEVANT HX SPEC: ABNORMAL

## 2022-06-08 NOTE — TELEPHONE ENCOUNTER
S/w pt and gave Sophia's reply below.    Scheduled mohs for Thursday 8/18 and 8/25 at [facility] at 9 am with Dr. Matt.  Scheduled for Monday 6/13 at [facility] with Sophia for LN2 tx.    Mohs letter and information sent via my chart and mailed to pt.    Rose Mary LANCASTER RN  [facility] [place]  [phone]

## 2022-06-08 NOTE — LETTER
[address]  [address]  [phone]        6/8/2022       Nicolás Fermin  [address] [facility] [place] [address]      Dear Nicolás:    You are scheduled for Mohs Surgery on: Thursday August 18 and August 25, 2022 at 9 am.    Please check in at [facility], [address].     You don't need to arrive more than 5-10 minutes prior to your appointment time.     Be sure to eat a good breakfast and bathe and wash your hair prior to surgery.     If you are taking any anti-coagulants that are prescribed by your Doctor (such as Coumadin/Warfarin, Plavix, Aspirin, Ibuprofen), please continue taking them.     However, if you are taking anti-coagulants over the counter without a Doctor's order for a medical condition, please discontinue them 10 days prior to surgery.     Please wear loose comfortable clothing as it could possibly be 4-6 hours until your surgery is completed depending upon how many layers of tissue need to be removed.      Thank you,    Abundio Matt MD

## 2022-06-08 NOTE — TELEPHONE ENCOUNTER
----- Message from Sophia Degroot PA-C sent at 6/8/2022  1:44 PM CDT -----  A&C) - SCCIS - given location recommend referral to [facility] for MMS vs excision  B) needs follow up for LN2, please schedule  D) benign, no further treatment needed    Please call patient and let him know, thanks!

## 2022-06-13 ENCOUNTER — OFFICE VISIT (OUTPATIENT)
Dept: FAMILY MEDICINE | Facility: CLINIC | Age: 62
End: 2022-06-13
Payer: COMMERCIAL

## 2022-06-13 VITALS — SYSTOLIC BLOOD PRESSURE: 148 MMHG | DIASTOLIC BLOOD PRESSURE: 84 MMHG

## 2022-06-13 DIAGNOSIS — L40.9 PSORIASIS: ICD-10-CM

## 2022-06-13 DIAGNOSIS — L57.0 ACTINIC KERATOSIS: ICD-10-CM

## 2022-06-13 DIAGNOSIS — D09.9 SQUAMOUS CELL CARCINOMA IN SITU (SCCIS): Primary | ICD-10-CM

## 2022-06-13 PROCEDURE — 17003 DESTRUCT PREMALG LES 2-14: CPT | Performed by: PHYSICIAN ASSISTANT

## 2022-06-13 PROCEDURE — 17000 DESTRUCT PREMALG LESION: CPT | Performed by: PHYSICIAN ASSISTANT

## 2022-06-13 RX ORDER — FLUOCINONIDE 0.5 MG/G
CREAM TOPICAL
Qty: 60 G | Refills: 0 | Status: SHIPPED | OUTPATIENT
Start: 2022-06-13

## 2022-06-13 RX ORDER — FLUOCINONIDE 0.5 MG/G
CREAM TOPICAL
Qty: 15 G | Refills: 0 | Status: SHIPPED | OUTPATIENT
Start: 2022-06-13 | End: 2022-06-13

## 2022-06-13 NOTE — LETTER
6/13/2022         RE: Nicolás Fermin  [address]  [place] [address]        Dear Colleague,    Thank you for referring your patient, Nicolás Fermin, to the [facility]. Please see a copy of my visit note below.    [facility] Dermatology Note  Encounter Date: Jun 13, 2022  Office Visit     Dermatology Problem List:  *Lesion to monitor - L mid back - photo 6/6/22  1. History of NMSC  - L NSW BCC mohs-12/5/19  - Right post thigh SCCIS mohs- 12/5/19  - SCCis, L ventral proximal leg, s/p MMS 11/11/21  - SCCis, R medial thigh, s/p bx 6/6/22  - SCCis, R shin distal, s/p bc 6/6/22  2. Hx of atypical nevus  - right lateral arm, shave: Lentiginous junctional melanocytic nevus with mild atypia   - compound dysplastic nevus with moderate atypia, left lateral mid back, s/p bx 8/27/21  3. 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 Psoriasis vs dermatitis   - Lidex cream  ____________________________________________    Assessment & Plan:    # Biopsy proven AKs, R proximal shin and L abdomen.  - Cryotherapy today, see procedure note below    # Biopsy proven SCCis x2. Patient has upcoming surgical removal in 08/2022 with Dr. Matt.     # Psoriasis vs dermatitis, intermittent flares. Chronic stable problem. Sister with hx dermatitis.   Patient reports good control with a topical steroid. He would like to continue use.   - Apply Lidex 0.05% ointment to the affected area twice daily until resolved. Refills provided.     Procedures Performed:   - Cryotherapy procedure note, location(s): R proximal shin and L abdomen. After verbal consent and discussion of risks and benefits including, but not limited to, dyspigmentation/scar, blister, and pain, 2 lesion(s) was(were) treated with 1-2 mm freeze border for 1-2 cycles with liquid nitrogen. Post  cryotherapy instructions were provided.    Follow-up: 6 month(s) in-person, or earlier for new or changing lesions    Staff and Scribe:     Scribe Disclosure:  I, Bianca Hancock, am serving as a scribe to document services personally performed by Sophia Degroot PA-C based on data collection and the provider's statements to me.     Provider Disclosure:   The documentation recorded by the scribe accurately reflects the services I personally performed and the decisions made by me.    All risks, benefits and alternatives were discussed with patient.  Patient is in agreement and understands the assessment and plan.  All questions were answered.    Sophia Degroot PA-C, [facility]  [facility] [place]: Phone: [phone], Fax: [phone]  [facility]: Phone: [phone],  Fax: [phone]  [facility]: Phone: [phone], Fax: [phone]  _____________________________________    CC: Derm Problem (cryo)    HPI:  Mr. Nicolás Fermin is a(n) 62 year old male who presents today as a return patient for follow-up on AKs. Last seen by myself one week ago at which point two lesions biopsied on the R medial thigh and R shin distal returned as SCCis. A lesion on the R proximal shin returned as an HAK. Finally, a lesion on the L abdomen returned as a lichenoid AK.     The patient is here today for treatment of two previously biopsied AKs. He also requests a refill of Lidex for treatment of itchy patches on his body. Patient is otherwise feeling well, without additional skin concerns.    Labs Reviewed:  N/A    Physical Exam:  Vitals: There were no vitals taken for this visit.  SKIN: Focused examination of lower legs and abdomen was performed.  - well healed biopsy sites, no evidence of infection  - Patient recently had fall, so there are notable abrasions on the R knee, shin, forearms   - No other lesions of concern on areas  examined.     Medications:  Current Outpatient Medications   Medication     amitriptyline (ELAVIL) 25 MG tablet     aspirin - buffered (ASCRIPTIN) 325 MG TABS tablet     atenolol (TENORMIN) 100 MG tablet     DiphenhydrAMINE HCl (BENADRYL PO)     Dolutegravir Sodium (TIVICAY PO)     Emtricitabine-Tenofovir AF (DESCOVY PO)     fluocinonide (LIDEX) 0.05 % external cream     gabapentin (NEURONTIN) 300 MG capsule     levothyroxine (SYNTHROID/LEVOTHROID) 112 MCG tablet     lisinopril (ZESTRIL) 20 MG tablet     NAPROXEN PO     Omega-3 Fatty Acids (FISH OIL) 500 MG CAPS     psyllium (METAMUCIL) 58.6 % POWD     sildenafil (VIAGRA) 50 MG tablet     DULoxetine (CYMBALTA) 60 MG EC capsule     Current Facility-Administered Medications   Medication     methylPREDNISolone (DEPO-MEDROL) injection 40 mg      Past Medical History:   Patient Active Problem List   Diagnosis     Essential hypertension, benign     Human immunodeficiency virus (HIV) disease (H)     CARDIOVASCULAR SCREENING; LDL GOAL LESS THAN 130     Graves disease     Neuropathy     Hip pain, left     Tobacco abuse disorder     Past Medical History:   Diagnosis Date     Asymptomatic human immunodeficiency virus (HIV) infection status (H)      Graves disease 2006     Hypertension      Peripheral neuropathy      Squamous cell carcinoma of skin, unspecified                 Again, thank you for allowing me to participate in the care of your patient.        Sincerely,        Sophia Degroot PA-C

## 2022-06-13 NOTE — PROGRESS NOTES
[facility] Dermatology Note  Encounter Date: Jun 13, 2022  Office Visit     Dermatology Problem List:  *Lesion to monitor - L mid back - photo 6/6/22  1. History of NMSC  - L NSW BCC mohs-12/5/19  - Right post thigh SCCIS mohs- 12/5/19  - SCCis, L ventral proximal leg, s/p MMS 11/11/21  - SCCis, R medial thigh, s/p bx 6/6/22  - SCCis, R shin distal, s/p bc 6/6/22  2. Hx of atypical nevus  - right lateral arm, shave: Lentiginous junctional melanocytic nevus with mild atypia   - compound dysplastic nevus with moderate atypia, left lateral mid back, s/p bx 8/27/21  3. 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 Psoriasis vs dermatitis   - Lidex cream  ____________________________________________    Assessment & Plan:    # Biopsy proven AKs, R proximal shin and L abdomen.  - Cryotherapy today, see procedure note below    # Biopsy proven SCCis x2. Patient has upcoming surgical removal in 08/2022 with Dr. Matt.     # Psoriasis vs dermatitis, intermittent flares. Chronic stable problem. Sister with hx dermatitis.   Patient reports good control with a topical steroid. He would like to continue use.   - Apply Lidex 0.05% ointment to the affected area twice daily until resolved. Refills provided.     Procedures Performed:   - Cryotherapy procedure note, location(s): R proximal shin and L abdomen. After verbal consent and discussion of risks and benefits including, but not limited to, dyspigmentation/scar, blister, and pain, 2 lesion(s) was(were) treated with 1-2 mm freeze border for 1-2 cycles with liquid nitrogen. Post cryotherapy instructions were provided.    Follow-up: 6 month(s) in-person, or earlier for new or changing lesions    Staff and Scribe:     Scribe Disclosure:  I, Bianca Hancock, am serving as a scribe to document services personally performed by Sophia Degroot PA-C  based on data collection and the provider's statements to me.     Provider Disclosure:   The documentation recorded by the scribe accurately reflects the services I personally performed and the decisions made by me.    All risks, benefits and alternatives were discussed with patient.  Patient is in agreement and understands the assessment and plan.  All questions were answered.    Sophia Degroot PA-C, MPAS  [facility] [place]: Phone: [phone], Fax: [phone]  [facility]: Phone: [phone],  Fax: [phone]  [facility]: Phone: [phone], Fax: [phone]  _____________________________________    CC: Derm Problem (cryo)    HPI:  Mr. Nicolás Fermin is a(n) 62 year old male who presents today as a return patient for follow-up on AKs. Last seen by myself one week ago at which point two lesions biopsied on the R medial thigh and R shin distal returned as SCCis. A lesion on the R proximal shin returned as an HAK. Finally, a lesion on the L abdomen returned as a lichenoid AK.     The patient is here today for treatment of two previously biopsied AKs. He also requests a refill of Lidex for treatment of itchy patches on his body. Patient is otherwise feeling well, without additional skin concerns.    Labs Reviewed:  N/A    Physical Exam:  Vitals: There were no vitals taken for this visit.  SKIN: Focused examination of lower legs and abdomen was performed.  - well healed biopsy sites, no evidence of infection  - Patient recently had fall, so there are notable abrasions on the R knee, shin, forearms   - No other lesions of concern on areas examined.     Medications:  Current Outpatient Medications   Medication     amitriptyline (ELAVIL) 25 MG tablet     aspirin - buffered (ASCRIPTIN) 325 MG TABS tablet     atenolol (TENORMIN) 100 MG tablet     DiphenhydrAMINE HCl (BENADRYL PO)     Dolutegravir Sodium  (TIVICAY PO)     Emtricitabine-Tenofovir AF (DESCOVY PO)     fluocinonide (LIDEX) 0.05 % external cream     gabapentin (NEURONTIN) 300 MG capsule     levothyroxine (SYNTHROID/LEVOTHROID) 112 MCG tablet     lisinopril (ZESTRIL) 20 MG tablet     NAPROXEN PO     Omega-3 Fatty Acids (FISH OIL) 500 MG CAPS     psyllium (METAMUCIL) 58.6 % POWD     sildenafil (VIAGRA) 50 MG tablet     DULoxetine (CYMBALTA) 60 MG EC capsule     Current Facility-Administered Medications   Medication     methylPREDNISolone (DEPO-MEDROL) injection 40 mg      Past Medical History:   Patient Active Problem List   Diagnosis     Essential hypertension, benign     Human immunodeficiency virus (HIV) disease (H)     CARDIOVASCULAR SCREENING; LDL GOAL LESS THAN 130     Graves disease     Neuropathy     Hip pain, left     Tobacco abuse disorder     Past Medical History:   Diagnosis Date     Asymptomatic human immunodeficiency virus (HIV) infection status (H)      Graves disease 2006     Hypertension      Peripheral neuropathy      Squamous cell carcinoma of skin, unspecified

## 2022-07-27 NOTE — PROGRESS NOTES
INITIAL NEUROLOGY CONSULTATION    DATE OF VISIT: 7/28/2022  CLINIC LOCATION: [facility]  MRN: [id number]  PATIENT NAME: Nicolás Fermin  YOB: 1960    REASON FOR VISIT: No chief complaint on file.    HISTORY OF PRESENT ILLNESS:                                                    Mr. Nicolás Fermin is 62 year old right handed male patient with past medical history of HIV, hypertension, Graves' disease, and peripheral neuropathy, who was seen today for left hip pain.    Per patient's report, ***.    According to Care Everywhere, the patient is followed by Dr. 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    No recent pertinent labs.    Lumbar spine MRI without contrast 9/30/2021 demonstrated diffuse degenerative changes with moderate narrowing of the thecal sac due to epidural lipomatosis, moderate degenerative neural foraminal stenosis bilaterally at L3-4 bilaterally at L4-5, and on the left at L5-S1.  Images were personally reviewed and independently interpreted.  PAST MEDICAL/SURGICAL HISTORY:                                                    I personally reviewed patient's past medical and surgical history with the patient at today's visit.  MEDICATIONS:                                                    I personally reviewed patient's medications and allergies with the patient at today's visit.  ALLERGIES:                                                    No Known Allergies  EXAM:                                                    VITAL SIGNS:   There were no vitals taken for this visit.  Mini-Cog Assessment:       General: pt is in NAD, cooperative.  Skin: normal turgor, moist mucous membranes, no lesions/rashes noticed.  HEENT: ATNC, EOMI, PERRL, white sclera, normal conjunctiva, no nystagmus or ptosis. No carotid bruits bilaterally.  Respiratory: lung sounds clear to  auscultation bilaterally, no crackles, wheezes, rhonchi. Symmetric lung excursion, no accessory respiratory muscle use.  Cardiovascular: normal S1/S2, no murmurs/rubs/gallops.   Abdomen: Not distended.  : deferred.    Neurological:  Mental: alert, follows commands,  /5 with ***/3 on memory recall, no aphasia or dysarthria. Fund of knowledge is {MYAPPROPRIATE:822317}  Cranial Nerves:  CN II: visual acuity - able to accurately count fingers with each eye. Visual fields intact, fundi: discs sharp, no papilledema and normal vessels bilaterally.  CN III, IV, VI: EOM intact, pupils equal and reactive  CN V: facial sensation nl  CN VII: face symmetric, no facial droop  CN VIII: hearing normal  CN IX: palate elevation symmetric, uvula at midline  CN XI SCM normal, shoulder shrug nl  CN XII: tongue midline  Motor: Strength: 5/5 in all major groups of all extremities. Normal tone. No abnormal movements. No pronator drift b/l.  Reflexes: Triceps, biceps, brachioradialis, patellar, and achilles reflexes normal and symmetric. No clonus noted. Toes are down-going b/l.   Sensory: light touch, pinprick, and vibration intact. Romberg: negative.  Coordination: FNF and heel-shin tests intact b/l.   Gait:  Normal, able to tandem walk *** without difficulty.  DATA:   LABS/EEG/IMAGING/OTHER STUDIES: I reviewed pertinent medical records, as detailed in the history of present illness.  ASSESSMENT AND PLAN:      ASSESSMENT: Nicolás Fermin is a 62 year old male patient with listed above past medical history, who presents with ***.    We had a detailed discussion with the patient regarding his presenting complaints.  The neurological exam today is ***.    DIAGNOSES:  No diagnosis found.  PLAN: There are no Patient Instructions on file for this visit.    Total Time: *** minutes spent on the date of the encounter doing chart review, history and exam, documentation and further activities per the note.    Stone Nowak MD  [facility]  [facility]  (Chart documentation was completed in part with Dragon voice-recognition software. Even though reviewed, some grammatical, spelling, and word errors may remain.)

## 2022-07-28 ENCOUNTER — OFFICE VISIT (OUTPATIENT)
Dept: NEUROLOGY | Facility: CLINIC | Age: 62
End: 2022-07-28
Attending: FAMILY MEDICINE
Payer: COMMERCIAL

## 2022-07-28 VITALS
OXYGEN SATURATION: 95 % | WEIGHT: 256.8 LBS | HEART RATE: 67 BPM | DIASTOLIC BLOOD PRESSURE: 98 MMHG | BODY MASS INDEX: 32.96 KG/M2 | SYSTOLIC BLOOD PRESSURE: 160 MMHG | HEIGHT: 74 IN

## 2022-07-28 DIAGNOSIS — G60.8 PERIPHERAL SENSORY-MOTOR AXONAL POLYNEUROPATHY: Primary | ICD-10-CM

## 2022-07-28 PROCEDURE — 99205 OFFICE O/P NEW HI 60 MIN: CPT | Performed by: PSYCHIATRY & NEUROLOGY

## 2022-07-28 RX ORDER — DULOXETIN HYDROCHLORIDE 60 MG/1
60 CAPSULE, DELAYED RELEASE ORAL DAILY
Qty: 91 CAPSULE | Refills: 1 | Status: SHIPPED | OUTPATIENT
Start: 2022-07-28 | End: 2022-11-01

## 2022-07-28 RX ORDER — GABAPENTIN 300 MG/1
600 CAPSULE ORAL 2 TIMES DAILY
Qty: 363 CAPSULE | Refills: 1 | Status: SHIPPED | OUTPATIENT
Start: 2022-08-29 | End: 2022-11-01

## 2022-07-28 RX ORDER — AMITRIPTYLINE HYDROCHLORIDE 50 MG/1
50 TABLET ORAL AT BEDTIME
Qty: 91 TABLET | Refills: 1 | Status: SHIPPED | OUTPATIENT
Start: 2022-07-28 | End: 2022-12-05

## 2022-07-28 NOTE — LETTER
7/28/2022         RE: Nicolás Fermin  [address]  [place] [address]        Dear Colleague,    Thank you for referring your patient, Nicolás Fermin, to the [facility]UROLOG[facility]. Please see a copy of my visit note below.    INITIAL NEUROLOGY CONSULTATION    DATE OF VISIT: 7/28/2022  CLINIC LOCATION: [facility]  MRN: [id number]  PATIENT NAME: Nicolás Fermin  YOB: 1960    REASON FOR VISIT:   Chief Complaint   Patient presents with     Consult     Patient is looking to transfer neurology to us. He has numbness and tingling in feet bilateral, left side hip and knee pain      HISTORY OF PRESENT ILLNESS:                                                    Mr. Nicolás Fermin is 62 year old right handed male patient with past medical history of HIV, syphilis, hypertension, Graves' disease, and peripheral neuropathy, who was seen today for peripheral neuropathy and chronic left lower extremity pain.  He used to see [facility] neurologist, but would like to transfer care to [place] for location reasons.    Per patient's report, his bilateral feet numbness, tingling, and pain are dating back to 2011.  He had EMG in the past consistent with peripheral neuropathy.  At the present time, he feels that his symptoms are pretty well controlled on current therapy.   At the present time he is on combination of duloxetine, amitriptyline, and gabapentin.  He is concerned about weight gain wondering if it could be related to a possible side effect of the medications that he takes.  No additional side effects.    In addition, the patient also reports chronic left thigh pain that extends from his hip to his knee affecting the lateral surface and has burning quality along with tingling.  He tried various injections with Dr. Oro, he is not sure if his neuropathy medications help this pain.    Denies any additional focal neurological  "symptoms    Family history is positive for dementia and peripheral neuropathy.    According to Care Everywhere, the patient is followed by Dr. 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    No recent pertinent labs.    Lumbar spine MRI without contrast 9/30/2021 demonstrated diffuse degenerative changes with moderate narrowing of the thecal sac due to epidural lipomatosis, moderate degenerative neural foraminal stenosis bilaterally at L3-4 bilaterally at L4-5, and on the left at L5-S1.  Images were personally reviewed and independently interpreted.  PAST MEDICAL/SURGICAL HISTORY:                                                    I personally reviewed patient's past medical and surgical history with the patient at today's visit.  MEDICATIONS:                                                    I personally reviewed patient's medications and allergies with the patient at today's visit.  ALLERGIES:                                                    No Known Allergies  EXAM:                                                    VITAL SIGNS:   BP (!) 180/102 (BP Location: Right arm, Patient Position: Sitting, Cuff Size: Adult Large)   Pulse 70   Ht 1.88 m (6' 2\")   Wt 116.5 kg (256 lb 12.8 oz)   SpO2 97%   BMI 32.97 kg/m    Mini-Cog Assessment:  Mini Cog Assessment  Clock Draw Score: 2 Normal  3 Item Recall: 2 objects recalled  Mini Cog Total Score: 4  Administered by: : Spring FLORES    General: pt is in NAD, cooperative.  Skin: normal turgor, moist mucous membranes, no lesions/rashes noticed.  HEENT: ATNC, EOMI, PERRL, white sclera, normal conjunctiva, no nystagmus or ptosis. No carotid bruits bilaterally.  Respiratory: lung sounds clear to auscultation bilaterally, no crackles, wheezes, rhonchi. Symmetric lung excursion, no accessory respiratory muscle use.  Cardiovascular: normal " S1/S2, no murmurs/rubs/gallops.   Abdomen: Not distended.  : deferred.    Neurological:  Mental: alert, follows commands, Mini Cog Total Score: 4/5 with 2/3 on memory recall, no aphasia or dysarthria. Fund of knowledge is appropriate for age.  Cranial Nerves:  CN II: visual acuity - able to accurately count fingers with each eye. Visual fields intact, fundi: discs sharp, no papilledema and normal vessels bilaterally.  CN III, IV, VI: EOM intact, pupils equal and reactive  CN V: facial sensation nl  CN VII: face symmetric, no facial droop  CN VIII: hearing normal  CN IX: palate elevation symmetric, uvula at midline  CN XI SCM normal, shoulder shrug nl  CN XII: tongue midline  Motor: Strength: 5/5 in all major groups of all extremities. Normal tone. No abnormal movements. No pronator drift b/l.  Reflexes: Triceps, biceps, brachioradialis, and patellar reflexes normal and symmetric, achilles reflexes are absent bilaterally. No clonus noted. Toes are down-going b/l.   Sensory: light touch, pinprick, and vibration reduced in both feet extending up to about 7 cm above the ankles.  There is also diminished sensation in the distribution of the left femoral cutaneous nerve.  Romberg: Questionably positive.  Coordination: FNF and heel-shin tests intact b/l.   Gait:  Normal casual walk, able to tandem walk with mild difficulty.  DATA:   LABS/EEG/IMAGING/OTHER STUDIES: I reviewed pertinent medical records, as detailed in the history of present illness.  ASSESSMENT AND PLAN:      ASSESSMENT: Nicolás Fermin is a 62 year old male patient with listed above past medical history, who presents to transfer care of his peripheral neuropathy related to HIV and chronic left thigh pain.    We had a detailed discussion with the patient regarding his presenting complaints.  The neurological exam today is consistent with his known history of peripheral polyneuropathy.  In my opinion, his left thigh pain might be due to irritation of left  "lateral femoral cutaneous nerve causing meralgia paresthetica.    We reviewed in detail both diagnoses, available treatment options, and the plan.  We decided to slightly reduce the dose of amitriptyline to 50 mg at bedtime.  We also discussed option of lateral femoral cutaneous nerve block in the future if necessary.  The patient wanted to start with weight reduction to see if it improves his thigh pain.    Rainer to follow up with Primary Care provider regarding elevated blood pressure.     DIAGNOSES:    ICD-10-CM    1. Peripheral sensory-motor axonal polyneuropathy  G60.8 Neurosurgery Referral     PLAN: At today's visit we thoroughly discussed current symptoms, diagnoses, available treatment options, and the plan.    We decided to slightly reduce the dose of amitriptyline to 50 mg at bedtime.  He will continue the same doses of gabapentin and Cymbalta.  I updated all of his prescriptions.    We also discussed the importance of losing weight in effort to help his left thigh pain.    Next follow-up appointment is in the next 6 months or earlier if needed.    Total Time: 61 minutes spent on the date of the encounter doing chart review, history and exam, documentation and further activities per the note.    Stone Nowak MD  [facility]  (Chart documentation was completed in part with Dragon voice-recognition software. Even though reviewed, some grammatical, spelling, and word errors may remain.)          Nicolás Fermin is a 62 year old male who presents for:  Chief Complaint   Patient presents with     Consult     Patient is looking to transfer neurology to us. He has numbness and tingling in feet bilateral, left side hip and knee pain         Initial Vitals:  BP (!) 180/102 (BP Location: Right arm, Patient Position: Sitting, Cuff Size: Adult Large)   Pulse 70   Ht 1.88 m (6' 2\")   Wt 116.5 kg (256 lb 12.8 oz)   SpO2 97%   BMI 32.97 kg/m   Estimated body mass index is 32.97 kg/m  as " "calculated from the following:    Height as of this encounter: 1.88 m (6' 2\").    Weight as of this encounter: 116.5 kg (256 lb 12.8 oz).. Body surface area is 2.47 meters squared. BP completed using cuff size: regular    Visit Facilitator Comments: (2nd Set of Vitals taken on right arm, while seated with large cuff) BP- 160/98,  Pulse- 67  O2- 95%    Spring Bonilla      Again, thank you for allowing me to participate in the care of your patient.        Sincerely,        Stone Nowak MD    "

## 2022-07-28 NOTE — PROGRESS NOTES
INITIAL NEUROLOGY CONSULTATION    DATE OF VISIT: 7/28/2022  CLINIC LOCATION: [facility]  MRN: [id number]  PATIENT NAME: Nicolás Fermin  YOB: 1960    REASON FOR VISIT:   Chief Complaint   Patient presents with     Consult     Patient is looking to transfer neurology to us. He has numbness and tingling in feet bilateral, left side hip and knee pain      HISTORY OF PRESENT ILLNESS:                                                    Mr. Nicolás Fermin is 62 year old right handed male patient with past medical history of HIV, syphilis, hypertension, Graves' disease, and peripheral neuropathy, who was seen today for peripheral neuropathy and chronic left lower extremity pain.  He used to see [facility] neurologist, but would like to transfer care to [place] for location reasons.    Per patient's report, his bilateral feet numbness, tingling, and pain are dating back to 2011.  He had EMG in the past consistent with peripheral neuropathy.  At the present time, he feels that his symptoms are pretty well controlled on current therapy.   At the present time he is on combination of duloxetine, amitriptyline, and gabapentin.  He is concerned about weight gain wondering if it could be related to a possible side effect of the medications that he takes.  No additional side effects.    In addition, the patient also reports chronic left thigh pain that extends from his hip to his knee affecting the lateral surface and has burning quality along with tingling.  He tried various injections with Dr. Oro, he is not sure if his neuropathy medications help this pain.    Denies any additional focal neurological symptoms    Family history is positive for dementia and peripheral neuropathy.    According to Care Everywhere, the patient is followed by Dr. 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    No recent pertinent labs.    Lumbar spine MRI without contrast 9/30/2021 demonstrated diffuse degenerative changes with moderate narrowing of the thecal sac due to epidural lipomatosis, moderate degenerative neural foraminal stenosis bilaterally at L3-4 bilaterally at L4-5, and on the left at L5-S1.  Images were personally reviewed and independently interpreted.  PAST MEDICAL/SURGICAL HISTORY:                                                    I personally reviewed patient's past medical and surgical history with the patient at today's visit.  MEDICATIONS:                                                    I personally reviewed patient's medications and allergies with the patient at today's visit.  ALLERGIES:                                                    No Known Allergies  EXAM:                                                    VITAL SIGNS:   BP (!) 180/102 (BP Location: Right arm, Patient Position: Sitting, Cuff Size: Adult Large)   Pulse 70   Ht 1.88 m (6' 2\")   Wt 116.5 kg (256 lb 12.8 oz)   SpO2 97%   BMI 32.97 kg/m    Mini-Cog Assessment:  Mini Cog Assessment  Clock Draw Score: 2 Normal  3 Item Recall: 2 objects recalled  Mini Cog Total Score: 4  Administered by: : Spring FLORES    General: pt is in NAD, cooperative.  Skin: normal turgor, moist mucous membranes, no lesions/rashes noticed.  HEENT: ATNC, EOMI, PERRL, white sclera, normal conjunctiva, no nystagmus or ptosis. No carotid bruits bilaterally.  Respiratory: lung sounds clear to auscultation bilaterally, no crackles, wheezes, rhonchi. Symmetric lung excursion, no accessory respiratory muscle use.  Cardiovascular: normal S1/S2, no murmurs/rubs/gallops.   Abdomen: Not distended.  : deferred.    Neurological:  Mental: alert, follows commands, Mini Cog Total Score: 4/5 with 2/3 on memory recall, no aphasia or dysarthria. Fund of knowledge is appropriate for age.  Cranial Nerves:  CN II: visual acuity - " able to accurately count fingers with each eye. Visual fields intact, fundi: discs sharp, no papilledema and normal vessels bilaterally.  CN III, IV, VI: EOM intact, pupils equal and reactive  CN V: facial sensation nl  CN VII: face symmetric, no facial droop  CN VIII: hearing normal  CN IX: palate elevation symmetric, uvula at midline  CN XI SCM normal, shoulder shrug nl  CN XII: tongue midline  Motor: Strength: 5/5 in all major groups of all extremities. Normal tone. No abnormal movements. No pronator drift b/l.  Reflexes: Triceps, biceps, brachioradialis, and patellar reflexes normal and symmetric, achilles reflexes are absent bilaterally. No clonus noted. Toes are down-going b/l.   Sensory: light touch, pinprick, and vibration reduced in both feet extending up to about 7 cm above the ankles.  There is also diminished sensation in the distribution of the left femoral cutaneous nerve.  Romberg: Questionably positive.  Coordination: FNF and heel-shin tests intact b/l.   Gait:  Normal casual walk, able to tandem walk with mild difficulty.  DATA:   LABS/EEG/IMAGING/OTHER STUDIES: I reviewed pertinent medical records, as detailed in the history of present illness.  ASSESSMENT AND PLAN:      ASSESSMENT: Nicolás Fermin is a 62 year old male patient with listed above past medical history, who presents to transfer care of his peripheral neuropathy related to HIV and chronic left thigh pain.    We had a detailed discussion with the patient regarding his presenting complaints.  The neurological exam today is consistent with his known history of peripheral polyneuropathy.  In my opinion, his left thigh pain might be due to irritation of left lateral femoral cutaneous nerve causing meralgia paresthetica.    We reviewed in detail both diagnoses, available treatment options, and the plan.  We decided to slightly reduce the dose of amitriptyline to 50 mg at bedtime.  We also discussed option of lateral femoral cutaneous nerve  block in the future if necessary.  The patient wanted to start with weight reduction to see if it improves his thigh pain.    Rainer to follow up with Primary Care provider regarding elevated blood pressure.     DIAGNOSES:    ICD-10-CM    1. Peripheral sensory-motor axonal polyneuropathy  G60.8 Neurosurgery Referral     PLAN: At today's visit we thoroughly discussed current symptoms, diagnoses, available treatment options, and the plan.    We decided to slightly reduce the dose of amitriptyline to 50 mg at bedtime.  He will continue the same doses of gabapentin and Cymbalta.  I updated all of his prescriptions.    We also discussed the importance of losing weight in effort to help his left thigh pain.    Next follow-up appointment is in the next 6 months or earlier if needed.    Total Time: 61 minutes spent on the date of the encounter doing chart review, history and exam, documentation and further activities per the note.    Stone Nowak MD  [facility]  (Chart documentation was completed in part with Dragon voice-recognition software. Even though reviewed, some grammatical, spelling, and word errors may remain.)

## 2022-07-28 NOTE — PROGRESS NOTES
"Nicolás Fermin is a 62 year old male who presents for:  Chief Complaint   Patient presents with     Consult     Patient is looking to transfer neurology to us. He has numbness and tingling in feet bilateral, left side hip and knee pain         Initial Vitals:  BP (!) 180/102 (BP Location: Right arm, Patient Position: Sitting, Cuff Size: Adult Large)   Pulse 70   Ht 1.88 m (6' 2\")   Wt 116.5 kg (256 lb 12.8 oz)   SpO2 97%   BMI 32.97 kg/m   Estimated body mass index is 32.97 kg/m  as calculated from the following:    Height as of this encounter: 1.88 m (6' 2\").    Weight as of this encounter: 116.5 kg (256 lb 12.8 oz).. Body surface area is 2.47 meters squared. BP completed using cuff size: regular    Visit Facilitator Comments: (2nd Set of Vitals taken on right arm, while seated with large cuff) BP- 160/98,  Pulse- 67  O2- 95%    Spring Bonilla  "

## 2022-07-28 NOTE — PATIENT INSTRUCTIONS
AFTER VISIT SUMMARY (AVS):    At today's visit we thoroughly discussed current symptoms, diagnoses, available treatment options, and the plan.    We decided to slightly reduce the dose of amitriptyline to 50 mg at bedtime.  You should continue the same doses of gabapentin and Cymbalta.  I updated all of your prescriptions.    We also discussed the importance of losing weight in effort to help your left thigh pain.    Next follow-up appointment is in the next 6 months or earlier if needed.    Please do not hesitate to call me with any questions or concerns.    Thanks.

## 2022-08-16 DIAGNOSIS — I10 ESSENTIAL HYPERTENSION, BENIGN: ICD-10-CM

## 2022-08-17 ENCOUNTER — OFFICE VISIT (OUTPATIENT)
Dept: DERMATOLOGY | Facility: CLINIC | Age: 62
End: 2022-08-17
Payer: COMMERCIAL

## 2022-08-17 DIAGNOSIS — D09.9 SQUAMOUS CELL CARCINOMA IN SITU (SCCIS): ICD-10-CM

## 2022-08-17 PROCEDURE — 88305 TISSUE EXAM BY PATHOLOGIST: CPT | Performed by: DERMATOLOGY

## 2022-08-17 PROCEDURE — 12032 INTMD RPR S/A/T/EXT 2.6-7.5: CPT | Performed by: STUDENT IN AN ORGANIZED HEALTH CARE EDUCATION/TRAINING PROGRAM

## 2022-08-17 PROCEDURE — 11602 EXC TR-EXT MAL+MARG 1.1-2 CM: CPT | Performed by: STUDENT IN AN ORGANIZED HEALTH CARE EDUCATION/TRAINING PROGRAM

## 2022-08-17 NOTE — LETTER
8/17/2022         RE: Nicolás Fermin  [address]  [place] [address]        Dear Colleague,    Thank you for referring your patient, Nicolás Fermin, to the [facility]. Please see a copy of my visit note below.    DERMATOLOGIC SURGERY REPORT    NAME OF PROCEDURE:  EXCISION AND INTERMEDIATE CLOSURE    Surgeon:  Barak Pantoja MD    PREOPERATIVE DIAGNOSIS: SCCIS  POSTOPERATIVE DIAGNOSIS: Same  FINAL EXCISION SIZE: 10mm lesion with 4mm margins  TOTAL EXCISED DIAMETER: 1.8mm  FINAL REPAIR LENGTH:  35mm    INDICATIONS:  Excision was indicated for treatment. We discussed the principles of treatment and most likely complications including bleeding, infection, wound dehiscence, pain, nerve damage, and scarring. Informed consent was obtained and the patient underwent the procedure as follows.    PROCEDURE:  The patient was taken to the operative suite. The treatment area was anesthetized with 1% lidocaine with 1:[id number] epinephrine buffered with bicarbonate. The area was washed with Hibiclens, rinsed with saline and draped with sterile towels. The lesion was delineated and excised down to subcutaneous fat. Hemostasis was obtained by electrocoagulation.     REPAIR:  In order to repair this defect while maintaining the normal anatomic relations and function, we elected to utilize an intermediate linear closure. Closure was oriented so that the wound was in the patient's natural skin tension lines. Deeper layers of the subcutaneous tissue were undermined first. Deep dermal and subcutaneous layer closure was performed using 3-0 Vicryl deep, intradermal and subcutaneous sutures. Two redundant columns were removed by triangulation. Final cutaneous approximation was achieved with 5-0 Monocryl simple running sutures.     The final wound length was 35mm.  A total of 3mL of anesthesia was administered for all surgical sites. Estimated blood loss was less than 10mL for all surgical  sites. A sterile pressure dressing was applied and wound care instructions, with a written handout, were given. The [facility] alert and ambulatory.    Barak Pantoja M.D.      Department of Dermatology         Again, thank you for allowing me to participate in the care of your patient.        Sincerely,        Barak Pantoja MD

## 2022-08-17 NOTE — PATIENT INSTRUCTIONS
Excision Wound Care Instructions  I will experience scar, altered skin color, bleeding, swelling, pain, crusting and redness. I may experience altered sensation. Risks are excessive bleeding, infection, muscle weakness, thick (hypertrophic or keloidal) scar, and recurrence. A second procedure may be recommended to obtain the best cosmetic or functional result.  Possible complications of any surgical procedure are bleeding, infection, scarring, alteration in skin color and sensation, muscle weakness in the area, wound dehiscence or seperation, or recurrence of the lesion or disease. On occasion, after healing, a secondary procedure or revision may be recommended in order to obtain the best cosmetic or functional result.   After your surgery, a pressure bandage will be placed over the area that has sutures. This will help prevent bleeding. Please follow these instructions until you come back to clinic for suture removal on day 7, as they will help you to prevent complications as your wound heals.  For the First 48 hours After Surgery:  Leave the pressure bandage on and keep it dry. If it should come loose, you may retape it, but do not take it off.  Relax and take it easy. Do not do any vigorous exercise, heavy lifting, or bending forward. This could cause the wound to bleed.  Post-operative pain is usually mild. You may alternate between 1000 mg of Tylenol (acetaminophen) and 400 mg of Ibuprofen every 4 hours.  Do not take more than 4,000mg of acetaminophen in a 24 hour period or 3200 mg of Ibuprofen in a 24 hr period.  Avoid alcohol and vitamin E as these may increase your tendency to bleed.  You may put an ice pack around the bandaged area for 20 minutes every 2-3 hours. This may help reduce swelling, bruising, and pain. Make sure the ice pack is waterproof so that the pressure bandage does not get wet.   You may see a small amount of drainage or blood on your pressure bandage. This is normal. However, if  drainage or bleeding continues or saturates the bandage, you will need to apply firm pressure over the bandage with a washcloth for 15 minutes. If bleeding continues after applying pressure for 15 minutes then go to the nearest emergency room.  48 Hours After Surgery  Carefully remove the bandage and start daily wound care and dressing changes. You may also now shower and get the wound wet.  Daily Wound Care:  Wash wound with a mild soap and water.  Use caution when washing the wound, be gentle and do not let the forceful shower stream hit the wound directly.  Pat dry.  Apply Vaseline (from a new container or tube) over the suture line with a Q-tip. It is very important to keep the wound continuously moist, as wounds heal best in a moist environment.  If you had stitches placed keep the site covered until sutures have either been removed or dissolve.  You can cover it with a Telfa (non-stick) dressing and tape or a band-aid.    If you are unable to keep wound covered, you must apply Vaseline every 2-3 hours (while awake) to ensure it is being kept moist for optimal healing. A dressing overnight is recommended to keep the area moist.  Call Us If:  You have pain that is not controlled with Tylenol/Ibuprofen  You have signs or symptoms of an infection, such as: fever over 100 degrees F, redness, warmth, or foul-smelling or yellow drainage from the wound.  Who should I call with questions?  [facility]: [phone]   [facility]: [phone]  For urgent needs outside of business hours call the [facility] at [phone] and ask to speak with the dermatology resident on call

## 2022-08-17 NOTE — PROGRESS NOTES
DERMATOLOGIC SURGERY REPORT    NAME OF PROCEDURE:  EXCISION AND INTERMEDIATE CLOSURE    Surgeon:  Barak Pantoja MD    PREOPERATIVE DIAGNOSIS: SCCIS on R medial thigh   POSTOPERATIVE DIAGNOSIS: Same  FINAL EXCISION SIZE: 10mm lesion with 4mm margins  TOTAL EXCISED DIAMETER: 1.8mm  FINAL REPAIR LENGTH:  35mm    INDICATIONS:  Excision was indicated for treatment. We discussed the principles of treatment and most likely complications including bleeding, infection, wound dehiscence, pain, nerve damage, and scarring. Informed consent was obtained and the patient underwent the procedure as follows.    PROCEDURE:  The patient was taken to the operative suite. The treatment area was anesthetized with 1% lidocaine with [id number] epinephrine buffered with bicarbonate. The area was washed with Hibiclens, rinsed with saline and draped with sterile towels. The lesion was delineated and excised down to subcutaneous fat. Hemostasis was obtained by electrocoagulation.     REPAIR:  In order to repair this defect while maintaining the normal anatomic relations and function, we elected to utilize an intermediate linear closure. Closure was oriented so that the wound was in the patient's natural skin tension lines. Deeper layers of the subcutaneous tissue were undermined first. Deep dermal and subcutaneous layer closure was performed using 3-0 Vicryl deep, intradermal and subcutaneous sutures. Two redundant columns were removed by triangulation. Final cutaneous approximation was achieved with 5-0 Monocryl simple running sutures.     The final wound length was 35mm.  A total of 3mL of anesthesia was administered for all surgical sites. Estimated blood loss was less than 10mL for all surgical sites. A sterile pressure dressing was applied and wound care instructions, with a written handout, were given. The [facility] alert and ambulatory.              Barak Pantoja M.D.       Department of Dermatology

## 2022-08-18 RX ORDER — ATENOLOL 100 MG/1
100 TABLET ORAL DAILY
Qty: 90 TABLET | Refills: 0 | OUTPATIENT
Start: 2022-08-18

## 2022-08-18 NOTE — TELEPHONE ENCOUNTER
Routing refill request to provider for review/approval because:  BP Readings from Last 3 Encounters:   07/28/22 (!) 160/98   06/13/22 (!) 148/84   06/06/22 (!) 150/86         Loc Saavedra RN  [facility] Triage Nurse

## 2022-08-19 LAB
PATH REPORT.COMMENTS IMP SPEC: NORMAL
PATH REPORT.COMMENTS IMP SPEC: NORMAL
PATH REPORT.FINAL DX SPEC: NORMAL
PATH REPORT.GROSS SPEC: NORMAL
PATH REPORT.MICROSCOPIC SPEC OTHER STN: NORMAL
PATH REPORT.RELEVANT HX SPEC: NORMAL

## 2022-08-22 ENCOUNTER — NURSE TRIAGE (OUTPATIENT)
Dept: NURSING | Facility: CLINIC | Age: 62
End: 2022-08-22

## 2022-08-22 NOTE — TELEPHONE ENCOUNTER
Just got off the phone with scheduling, had refill request for atenolol, was told pt needs follow up appointment.   Next available and schedule appointment on October 10th with PCP for wellness check, No other appointments available sooner so Pt scheduled an e-visit with MD Romie Arellano on 9/19.  Still have a week worth of medication left.      Pt is wondering is E-visit is okay for refill request or if requires in-person visit, and if he may get a refill up to appointment?         Junior Santiago, RN, BSN  8/22/2022 at 4:48 PM  [facility]        Reason for Disposition    Caller requesting an appointment, triage offered and declined    Protocols used: PCP CALL - NO TRIAGE-A-

## 2022-08-23 RX ORDER — ATENOLOL 100 MG/1
100 TABLET ORAL DAILY
Qty: 90 TABLET | Refills: 0 | Status: SHIPPED | OUTPATIENT
Start: 2022-08-23 | End: 2022-11-18

## 2022-08-23 NOTE — TELEPHONE ENCOUNTER
Patient has appt scheduled soonest available 10/10/22 for annual wellness. Patient out of medication and needs vonda to make it through to appt.

## 2022-08-25 ENCOUNTER — OFFICE VISIT (OUTPATIENT)
Dept: DERMATOLOGY | Facility: CLINIC | Age: 62
End: 2022-08-25
Payer: COMMERCIAL

## 2022-08-25 DIAGNOSIS — D04.71 SQUAMOUS CELL CARCINOMA IN SITU (SCCIS) OF SKIN OF RIGHT LOWER LEG: Primary | ICD-10-CM

## 2022-08-25 PROCEDURE — 17313 MOHS 1 STAGE T/A/L: CPT | Mod: 79 | Performed by: DERMATOLOGY

## 2022-08-25 NOTE — PROGRESS NOTES
Surgical Office Location:  [facility]  [address] [place] [id number]

## 2022-08-25 NOTE — LETTER
8/25/2022         RE: Nicolás Fermin  [address] [place] [address]        Dear Colleague,    Thank you for referring your patient, Nicolás Fermin, to the [facility]. Please see a copy of my visit note below.    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  Patient has no other skin complaints today.  Remainder of the HPI, Meds, PMH, Allergies, FH, and SH was reviewed in chart.      Past Medical History:   Diagnosis Date     Asymptomatic human immunodeficiency virus (HIV) infection status (H)      Graves disease 2006     Hypertension      Peripheral neuropathy      Squamous cell carcinoma of skin, unspecified        History reviewed. No pertinent surgical history.     Family History   Problem Relation Age of Onset     Thyroid Disease Mother      Breast Cancer Mother      Skin Cancer Mother         face     Hypertension Father      Skin Cancer Father         head       Social History     Socioeconomic History     Marital status: Single     Spouse name: Not on file     Number of children: Not on file     Years of education: Not on file     Highest education level: Not on file   Occupational History     Not on file   Tobacco Use     Smoking status: Current Every Day Smoker     Packs/day: 0.50     Types: Cigarettes     Smokeless tobacco: Never Used     Tobacco comment: 10 cigarettes a day   Substance and Sexual Activity     Alcohol use: Yes     Alcohol/week: 0.0 standard drinks     Comment: 2-3/day     Drug use: No     Sexual activity: Not Currently   Other Topics Concern     Parent/sibling w/ CABG, MI or angioplasty before 65F 55M? No   Social History Narrative     Not on file     Social Determinants of Health     Financial Resource Strain:  Not on file   Food Insecurity: Not on file   Transportation Needs: Not on file   Physical Activity: Not on file   Stress: Not on file   Social Connections: Not on file   Intimate Partner Violence: Not on file   Housing Stability: Not on file       Outpatient Encounter Medications as of 8/25/2022   Medication Sig Dispense Refill     amitriptyline (ELAVIL) 50 MG tablet Take 1 tablet (50 mg) by mouth At Bedtime 91 tablet 1     aspirin - buffered (ASCRIPTIN) 325 MG TABS tablet Take 325 mg by mouth daily       atenolol (TENORMIN) 100 MG tablet Take 1 tablet (100 mg) by mouth daily 90 tablet 0     DiphenhydrAMINE HCl (BENADRYL PO)        Dolutegravir Sodium (TIVICAY PO) Take 50 mg by mouth daily        DULoxetine (CYMBALTA) 60 MG capsule Take 1 capsule (60 mg) by mouth daily 91 capsule 1     Emtricitabine-Tenofovir AF (DESCOVY PO) Take by mouth daily       fluocinonide (LIDEX) 0.05 % external cream Apply 1/4g to aa on thighs bid x 3 weeks. Do not use on face or body folds. Should last 30 days. 60 g 0     [START ON 8/29/2022] gabapentin (NEURONTIN) 300 MG capsule Take 2 capsules (600 mg) by mouth 2 times daily 363 capsule 1     levothyroxine (SYNTHROID/LEVOTHROID) 112 MCG tablet TAKE 1 TABLET(112 MCG) BY MOUTH DAILY 90 tablet 2     lisinopril (ZESTRIL) 20 MG tablet TAKE 1 TABLET(20 MG) BY MOUTH TWICE DAILY 180 tablet 2     NAPROXEN PO        Omega-3 Fatty Acids (FISH OIL) 500 MG CAPS        psyllium (METAMUCIL) 58.6 % POWD Take by mouth daily       sildenafil (VIAGRA) 50 MG tablet Take by mouth daily as needed for erectile dysfunction (Patient not taking: Reported on 8/17/2022)       Facility-Administered Encounter Medications as of 8/25/2022   Medication Dose Route Frequency Provider Last Rate Last Admin     methylPREDNISolone (DEPO-MEDROL) injection 40 mg  40 mg   Emmanuel Oro DO   40 mg at 01/26/22 1100             O:   NAD, WDWN, Alert & Oriented, Mood & Affect wnl, Vitals stable   Here today alone    General  appearance normal   Vitals stable   Alert, oriented and in no acute distress     R shin 1cm scaly papule       Eyes: Conjunctivae/lids:Normal     ENT: Lips, buccal mucosa, tongue: normal    MSK:Normal    Cardiovascular: peripheral edema none    Pulm: Breathing Normal    Neuro/Psych: Orientation:Alert and Orientedx3 ; Mood/Affect:normal       A/P:  1. R shin squamous cell carcinoma in situ   MOHS:   Size and Location    The rationale for Mohs surgery was discussed with the patient and consent was obtained.  The risks and benefits as well as alternatives to therapy were discussed, in detail.  Specifically, the risks of infection, scarring, bleeding, prolonged wound healing, incomplete removal, allergy to anesthesia, nerve injury and recurrence were addressed.  Indication for Mohs was Size and Location. Prior to the procedure, the treatment site was clearly identified and, if available, confirmed with previous photos and confirmed by the patient   All components of the Universal Protocol/PAUSE rule were completed.  The Mohs surgeon operated in two distinct and integrated capacities as the surgeon and pathologist.      The area was prepped with Betasept.  A rim of normal appearing skin was marked circumferentially around the lesion.  The area was infiltrated with local anesthesia.  The tumor was first debulked to remove all clinically apparent tumor.  An incision following the standard Mohs approach was done and the specimen was oriented,mapped and placed in 1 block(s).  Each specimen was then chromacoded and processed in the Mohs laboratory using standard Mohs technique and submitted for frozen section histology.  Frozen section analysis showed no residual tumor but CLEAR MARGINS.      The tumor was excised using standard Mohs technique in 1 stages(s).  CLEAR MARGINS OBTAINED and Final defect size was 1.5 cm.     We discussed the options for wound management in full with the patient including risks/benefits/ possible  outcomes.        REPAIR SECOND INTENT: We discussed the options for wound management in full with the patient including risks/benefits/possible outcomes. Decision made to allow the wound to heal by second intention. Cautery was used for for hemostasis. EBL minimal; complications none; wound care routine.  The patient was discharged in good condition and will return in one month or prn for wound evaluation.  It was a pleasure speaking to Nicolás Fermin today.  Previous clinic notes and pertinent laboratory tests were reviewed prior to Nicolás Fermin's visit.  Signs and Symptoms of skin cancer discussed with patient.  Patient encouraged to perform monthly skin exams.  UV precautions reviewed with patient.  Risks of non-melanoma skin cancer discussed with patient   Return to clinic 6 months        Again, thank you for allowing me to participate in the care of your patient.        Sincerely,        Abundio Matt MD

## 2022-08-25 NOTE — PROGRESS NOTES
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  Patient has no other skin complaints today.  Remainder of the HPI, Meds, PMH, Allergies, FH, and SH was reviewed in chart.      Past Medical History:   Diagnosis Date     Asymptomatic human immunodeficiency virus (HIV) infection status (H)      Graves disease 2006     Hypertension      Peripheral neuropathy      Squamous cell carcinoma of skin, unspecified        History reviewed. No pertinent surgical history.     Family History   Problem Relation Age of Onset     Thyroid Disease Mother      Breast Cancer Mother      Skin Cancer Mother         face     Hypertension Father      Skin Cancer Father         head       Social History     Socioeconomic History     Marital status: Single     Spouse name: Not on file     Number of children: Not on file     Years of education: Not on file     Highest education level: Not on file   Occupational History     Not on file   Tobacco Use     Smoking status: Current Every Day Smoker     Packs/day: 0.50     Types: Cigarettes     Smokeless tobacco: Never Used     Tobacco comment: 10 cigarettes a day   Substance and Sexual Activity     Alcohol use: Yes     Alcohol/week: 0.0 standard drinks     Comment: 2-3/day     Drug use: No     Sexual activity: Not Currently   Other Topics Concern     Parent/sibling w/ CABG, MI or angioplasty before 65F 55M? No   Social History Narrative     Not on file     Social Determinants of Health     Financial Resource Strain: Not on file   Food Insecurity: Not on file   Transportation Needs: Not on file   Physical Activity: Not on file   Stress: Not on file   Social Connections: Not on file   Intimate Partner Violence: Not on file   Housing Stability: Not on file       Outpatient Encounter Medications as of 8/25/2022   Medication Sig Dispense Refill     amitriptyline (ELAVIL) 50 MG tablet  Take 1 tablet (50 mg) by mouth At Bedtime 91 tablet 1     aspirin - buffered (ASCRIPTIN) 325 MG TABS tablet Take 325 mg by mouth daily       atenolol (TENORMIN) 100 MG tablet Take 1 tablet (100 mg) by mouth daily 90 tablet 0     DiphenhydrAMINE HCl (BENADRYL PO)        Dolutegravir Sodium (TIVICAY PO) Take 50 mg by mouth daily        DULoxetine (CYMBALTA) 60 MG capsule Take 1 capsule (60 mg) by mouth daily 91 capsule 1     Emtricitabine-Tenofovir AF (DESCOVY PO) Take by mouth daily       fluocinonide (LIDEX) 0.05 % external cream Apply 1/4g to aa on thighs bid x 3 weeks. Do not use on face or body folds. Should last 30 days. 60 g 0     [START ON 8/29/2022] gabapentin (NEURONTIN) 300 MG capsule Take 2 capsules (600 mg) by mouth 2 times daily 363 capsule 1     levothyroxine (SYNTHROID/LEVOTHROID) 112 MCG tablet TAKE 1 TABLET(112 MCG) BY MOUTH DAILY 90 tablet 2     lisinopril (ZESTRIL) 20 MG tablet TAKE 1 TABLET(20 MG) BY MOUTH TWICE DAILY 180 tablet 2     NAPROXEN PO        Omega-3 Fatty Acids (FISH OIL) 500 MG CAPS        psyllium (METAMUCIL) 58.6 % POWD Take by mouth daily       sildenafil (VIAGRA) 50 MG tablet Take by mouth daily as needed for erectile dysfunction (Patient not taking: Reported on 8/17/2022)       Facility-Administered Encounter Medications as of 8/25/2022   Medication Dose Route Frequency Provider Last Rate Last Admin     methylPREDNISolone (DEPO-MEDROL) injection 40 mg  40 mg   Emmanuel Oro DO   40 mg at 01/26/22 1100             O:   NAD, WDWN, Alert & Oriented, Mood & Affect wnl, Vitals stable   Here today alone    General appearance normal   Vitals stable   Alert, oriented and in no acute distress     R shin 1cm scaly papule       Eyes: Conjunctivae/lids:Normal     ENT: Lips, buccal mucosa, tongue: normal    MSK:Normal    Cardiovascular: peripheral edema none    Pulm: Breathing Normal    Neuro/Psych: Orientation:Alert and Orientedx3 ; Mood/Affect:normal       A/P:  1. R shin squamous cell  carcinoma in situ   MOHS:   Size and Location    The rationale for Mohs surgery was discussed with the patient and consent was obtained.  The risks and benefits as well as alternatives to therapy were discussed, in detail.  Specifically, the risks of infection, scarring, bleeding, prolonged wound healing, incomplete removal, allergy to anesthesia, nerve injury and recurrence were addressed.  Indication for Mohs was Size and Location. Prior to the procedure, the treatment site was clearly identified and, if available, confirmed with previous photos and confirmed by the patient   All components of the Universal Protocol/PAUSE rule were completed.  The Mohs surgeon operated in two distinct and integrated capacities as the surgeon and pathologist.      The area was prepped with Betasept.  A rim of normal appearing skin was marked circumferentially around the lesion.  The area was infiltrated with local anesthesia.  The tumor was first debulked to remove all clinically apparent tumor.  An incision following the standard Mohs approach was done and the specimen was oriented,mapped and placed in 1 block(s).  Each specimen was then chromacoded and processed in the Mohs laboratory using standard Mohs technique and submitted for frozen section histology.  Frozen section analysis showed no residual tumor but CLEAR MARGINS.      The tumor was excised using standard Mohs technique in 1 stages(s).  CLEAR MARGINS OBTAINED and Final defect size was 1.5 cm.     We discussed the options for wound management in full with the patient including risks/benefits/ possible outcomes.        REPAIR SECOND INTENT: We discussed the options for wound management in full with the patient including risks/benefits/possible outcomes. Decision made to allow the wound to heal by second intention. Cautery was used for for hemostasis. EBL minimal; complications none; wound care routine.  The patient was discharged in good condition and will return in one  month or prn for wound evaluation.  It was a pleasure speaking to Nicolás Fermin today.  Previous clinic notes and pertinent laboratory tests were reviewed prior to Nicolás Fermin's visit.  Signs and Symptoms of skin cancer discussed with patient.  Patient encouraged to perform monthly skin exams.  UV precautions reviewed with patient.  Risks of non-melanoma skin cancer discussed with patient   Return to clinic 6 months

## 2022-08-25 NOTE — PATIENT INSTRUCTIONS
Open Wound Care     for ______________        No strenuous activity for 48 hours    Take Tylenol as needed for discomfort.                                                .         Do not drink alcoholic beverages for 48 hours.    Keep the pressure bandage in place for 24 hours. If the bandage becomes blood tinged or loose, reinforce it with gauze and tape.        (Refer to the reverse side of this page for management of bleeding).    Remove bandage in 24 hours and begin wound care as follows:     Clean area with tap water using a Q tip or gauze pad, (shower / bathe normally)  Dry wound with Q tip or gauze pad  Apply Aquaphor, Vaseline, Polysporin or Bacitracin Ointment with a Q tip  Do NOT use Neosporin Ointment *  Cover the wound with a band-aid or nonstick gauze pad and paper tape.  Repeat wound care once a day until wound is completely healed.    It is an old wives tale that a wound heals better when it is exposed to air and allowed to dry out. The wound will heal faster with a better cosmetic result if it is kept moist with ointment and covered with a bandage.  Do not let the wound dry out.      Supplies Needed:                Qtips or gauze pads                Polysporin or Bacitracin Ointment                Bandaids or nonstick gauze pads and paper tape    Wound care kits and brown paper tape are available for purchase at   the pharmacy.    BLEEDING:    Use tightly rolled up gauze or cloth to apply direct pressure over the bandage for 20   minutes.  Reapply pressure for an additional 20 minutes if necessary  Call the office or go to the nearest emergency room if pressure fails to stop the bleeding.  Use additional gauze and tape to maintain pressure once the bleeding has stopped.  Begin wound care 24 hours after surgery as directed.                  WOUND HEALING    One week after surgery a pink / red halo will form around the outside of the wound.   This is new skin.  The center of the wound will appear  yellowish white and produce some drainage.  The pink halo will slowly migrate in toward the center of the wound until the wound is covered with new shiny pink skin.  There will be no more drainage when the wound is completely healed.  It will take six months to one year for the redness to fade.  The scar may be itchy, tight and sensitive to extreme temperatures for a year after the surgery.  Massaging the area several times a day for several minutes after the wound is completely healed will help the scar soften and normalize faster. Begin massage only after healing is complete.      In case of emergency call: Dr Matt: [phone]    [facility]: [phone]    [facility]:[phone]

## 2022-09-18 DIAGNOSIS — E05.00 GRAVES DISEASE: ICD-10-CM

## 2022-09-20 RX ORDER — LEVOTHYROXINE SODIUM 112 UG/1
TABLET ORAL
Qty: 90 TABLET | Refills: 0 | Status: SHIPPED | OUTPATIENT
Start: 2022-09-20 | End: 2022-12-06

## 2022-09-20 NOTE — TELEPHONE ENCOUNTER
Medication is being filled for 1 time refill only due to:  Patient needs to be seen because it has been more than one year since last visit.     Appointments in Next Year    Oct 10, 2022  2:30 PM  (Arrive by 2:10 PM)  Adult Preventative Visit with Brooks Walsh MD  [facility] ([facility] ) [phone]

## 2022-10-10 ENCOUNTER — OFFICE VISIT (OUTPATIENT)
Dept: INTERNAL MEDICINE | Facility: CLINIC | Age: 62
End: 2022-10-10
Payer: COMMERCIAL

## 2022-10-10 VITALS
TEMPERATURE: 97.8 F | HEART RATE: 75 BPM | HEIGHT: 74 IN | WEIGHT: 251.4 LBS | BODY MASS INDEX: 32.26 KG/M2 | OXYGEN SATURATION: 96 % | DIASTOLIC BLOOD PRESSURE: 96 MMHG | SYSTOLIC BLOOD PRESSURE: 164 MMHG

## 2022-10-10 DIAGNOSIS — E05.00 GRAVES DISEASE: ICD-10-CM

## 2022-10-10 DIAGNOSIS — Z13.6 CARDIOVASCULAR SCREENING; LDL GOAL LESS THAN 130: ICD-10-CM

## 2022-10-10 DIAGNOSIS — Z87.891 PERSONAL HISTORY OF TOBACCO USE, PRESENTING HAZARDS TO HEALTH: ICD-10-CM

## 2022-10-10 DIAGNOSIS — B20 HUMAN IMMUNODEFICIENCY VIRUS (HIV) DISEASE (H): ICD-10-CM

## 2022-10-10 DIAGNOSIS — I10 ESSENTIAL HYPERTENSION, BENIGN: ICD-10-CM

## 2022-10-10 DIAGNOSIS — Z12.11 COLON CANCER SCREENING: ICD-10-CM

## 2022-10-10 DIAGNOSIS — Z00.00 ROUTINE GENERAL MEDICAL EXAMINATION AT A HEALTH CARE FACILITY: Primary | ICD-10-CM

## 2022-10-10 DIAGNOSIS — Z12.5 SCREENING PSA (PROSTATE SPECIFIC ANTIGEN): ICD-10-CM

## 2022-10-10 LAB
ALBUMIN SERPL-MCNC: 3.9 G/DL (ref 3.4–5)
ALP SERPL-CCNC: 70 U/L (ref 40–150)
ALT SERPL W P-5'-P-CCNC: 24 U/L (ref 0–70)
ANION GAP SERPL CALCULATED.3IONS-SCNC: 6 MMOL/L (ref 3–14)
AST SERPL W P-5'-P-CCNC: 21 U/L (ref 0–45)
BILIRUB SERPL-MCNC: 0.7 MG/DL (ref 0.2–1.3)
BUN SERPL-MCNC: 10 MG/DL (ref 7–30)
CALCIUM SERPL-MCNC: 9.2 MG/DL (ref 8.5–10.1)
CHLORIDE BLD-SCNC: 100 MMOL/L (ref 94–109)
CHOLEST SERPL-MCNC: 186 MG/DL
CO2 SERPL-SCNC: 26 MMOL/L (ref 20–32)
CREAT SERPL-MCNC: 0.82 MG/DL (ref 0.66–1.25)
ERYTHROCYTE [DISTWIDTH] IN BLOOD BY AUTOMATED COUNT: 13 % (ref 10–15)
FASTING STATUS PATIENT QL REPORTED: YES
GFR SERPL CREATININE-BSD FRML MDRD: >90 ML/MIN/1.73M2
GLUCOSE BLD-MCNC: 95 MG/DL (ref 70–99)
HCT VFR BLD AUTO: 41.5 % (ref 40–53)
HDLC SERPL-MCNC: 62 MG/DL
HGB BLD-MCNC: 14.1 G/DL (ref 13.3–17.7)
LDLC SERPL CALC-MCNC: 101 MG/DL
MCH RBC QN AUTO: 33.8 PG (ref 26.5–33)
MCHC RBC AUTO-ENTMCNC: 34 G/DL (ref 31.5–36.5)
MCV RBC AUTO: 100 FL (ref 78–100)
NONHDLC SERPL-MCNC: 124 MG/DL
PLATELET # BLD AUTO: 254 10E3/UL (ref 150–450)
POTASSIUM BLD-SCNC: 3.7 MMOL/L (ref 3.4–5.3)
PROT SERPL-MCNC: 7.1 G/DL (ref 6.8–8.8)
PSA SERPL-MCNC: 0.32 UG/L (ref 0–4)
RBC # BLD AUTO: 4.17 10E6/UL (ref 4.4–5.9)
SODIUM SERPL-SCNC: 132 MMOL/L (ref 133–144)
TRIGL SERPL-MCNC: 117 MG/DL
TSH SERPL DL<=0.005 MIU/L-ACNC: 1.31 MU/L (ref 0.4–4)
WBC # BLD AUTO: 7.6 10E3/UL (ref 4–11)

## 2022-10-10 PROCEDURE — 90682 RIV4 VACC RECOMBINANT DNA IM: CPT | Performed by: INTERNAL MEDICINE

## 2022-10-10 PROCEDURE — 36415 COLL VENOUS BLD VENIPUNCTURE: CPT | Performed by: INTERNAL MEDICINE

## 2022-10-10 PROCEDURE — 99213 OFFICE O/P EST LOW 20 MIN: CPT | Mod: 25 | Performed by: INTERNAL MEDICINE

## 2022-10-10 PROCEDURE — 80061 LIPID PANEL: CPT | Performed by: INTERNAL MEDICINE

## 2022-10-10 PROCEDURE — 85027 COMPLETE CBC AUTOMATED: CPT | Performed by: INTERNAL MEDICINE

## 2022-10-10 PROCEDURE — 80053 COMPREHEN METABOLIC PANEL: CPT | Performed by: INTERNAL MEDICINE

## 2022-10-10 PROCEDURE — 0124A COVID-19,PF,PFIZER BOOSTER BIVALENT: CPT | Performed by: INTERNAL MEDICINE

## 2022-10-10 PROCEDURE — 99396 PREV VISIT EST AGE 40-64: CPT | Mod: 25 | Performed by: INTERNAL MEDICINE

## 2022-10-10 PROCEDURE — 90471 IMMUNIZATION ADMIN: CPT | Performed by: INTERNAL MEDICINE

## 2022-10-10 PROCEDURE — 91312 COVID-19,PF,PFIZER BOOSTER BIVALENT: CPT | Performed by: INTERNAL MEDICINE

## 2022-10-10 PROCEDURE — 84443 ASSAY THYROID STIM HORMONE: CPT | Performed by: INTERNAL MEDICINE

## 2022-10-10 PROCEDURE — G0103 PSA SCREENING: HCPCS | Performed by: INTERNAL MEDICINE

## 2022-10-10 RX ORDER — AMLODIPINE BESYLATE 5 MG/1
5 TABLET ORAL DAILY
Qty: 90 TABLET | Refills: 1 | Status: SHIPPED | OUTPATIENT
Start: 2022-10-10 | End: 2022-12-06

## 2022-10-10 ASSESSMENT — ENCOUNTER SYMPTOMS
FEVER: 0
FREQUENCY: 0
DIZZINESS: 0
WEAKNESS: 1
SORE THROAT: 0
HEMATOCHEZIA: 0
CHILLS: 0
EYE PAIN: 0
HEMATURIA: 0
ABDOMINAL PAIN: 0
DIZZINESS: 1
COUGH: 0
JOINT SWELLING: 1
DIARRHEA: 0
ARTHRALGIAS: 1
NERVOUS/ANXIOUS: 0
HEADACHES: 0
MYALGIAS: 0
WEAKNESS: 0
HEARTBURN: 0
PALPITATIONS: 0
PARESTHESIAS: 0
DYSURIA: 0
CONSTIPATION: 0
SHORTNESS OF BREATH: 0
CHILLS: 1
NAUSEA: 0
SHORTNESS OF BREATH: 1

## 2022-10-10 NOTE — PROGRESS NOTES
SUBJECTIVE:   CC: Rainer is an 62 year old who presents for preventative health visit.   Patient has been advised of split billing requirements and indicates understanding: Yes     Healthy Habits:     Getting at least 3 servings of Calcium per day:  Yes    Bi-annual eye exam:  NO    Dental care twice a year:  NO    Sleep apnea or symptoms of sleep apnea:  None    Diet:  Regular (no restrictions)    Frequency of exercise:  2-3 days/week    Duration of exercise:  30-45 minutes    Taking medications regularly:  Yes    Medication side effects:  None    PHQ-2 Total Score: 0    Additional concerns today:  No      Today's PHQ-2 Score:   PHQ-2 ( 1999 Pfizer) 10/10/2022   Q1: Little interest or pleasure in doing things 0   Q2: Feeling down, depressed or hopeless 0   PHQ-2 Score 0   PHQ-2 Total Score (12-17 Years)- Positive if 3 or more points; Administer PHQ-A if positive -   Q1: Little interest or pleasure in doing things Not at all   Q2: Feeling down, depressed or hopeless Not at all   PHQ-2 Score 0       Abuse: Current or Past(Physical, Sexual or Emotional)- No  Do you feel safe in your environment? Yes        Social History     Tobacco Use     Smoking status: Every Day     Packs/day: 0.50     Types: Cigarettes     Smokeless tobacco: Never     Tobacco comments:     10 cigarettes a day   Substance Use Topics     Alcohol use: Yes     Alcohol/week: 0.0 standard drinks     Comment: 2-3/day     If you drink alcohol do you typically have >3 drinks per day or >7 drinks per week? Yes      Alcohol Use 10/10/2022   Prescreen: >3 drinks/day or >7 drinks/week? Yes   AUDIT SCORE  5     AUDIT - Alcohol Use Disorders Identification Test - Reproduced from the World Health Organization Audit 2001 (Second Edition) 10/10/2022   1.  How often do you have a drink containing alcohol? 4 or more times a week   2.  How many drinks containing alcohol do you have on a typical day when you are drinking? 3 or 4   3.  How often do you have five or more  drinks on one occasion? Never   4.  How often during the last year have you found that you were not able to stop drinking once you had started? Never   5.  How often during the last year have you failed to do what was normally expected of you because of drinking? Never   6.  How often during the last year have you needed a first drink in the morning to get yourself going after a heavy drinking session? Never   7.  How often during the last year have you had a feeling of guilt or remorse after drinking? Never   8.  How often during the last year have you been unable to remember what happened the night before because of your drinking? Never   9.  Have you or someone else been injured because of your drinking? No   10. Has a relative, friend, doctor or other health care worker been concerned about your drinking or suggested you cut down? No   TOTAL SCORE 5       Last PSA:   PSA   Date Value Ref Range Status   07/16/2020 0.39 0 - 4 ug/L Final     Comment:     Assay Method:  Chemiluminescence using Siemens Vista analyzer     Prostate Specific Antigen Screen   Date Value Ref Range Status   08/31/2021 0.28 0.00 - 4.00 ug/L Final       Reviewed orders with patient. Reviewed health maintenance and updated orders accordingly - Yes      Reviewed and updated as needed this visit by clinical staff   Tobacco                Reviewed and updated as needed this visit by Provider                   Review of Systems   Constitutional: Negative for chills and fever.   HENT: Negative for congestion, ear pain, hearing loss and sore throat.    Eyes: Negative for pain and visual disturbance.   Respiratory: Negative for cough and shortness of breath.    Cardiovascular: Negative for chest pain, palpitations and peripheral edema.   Gastrointestinal: Negative for abdominal pain, constipation, diarrhea, heartburn, hematochezia and nausea.   Genitourinary: Positive for impotence. Negative for dysuria, frequency, genital sores, hematuria, penile  "discharge and urgency.   Musculoskeletal: Positive for arthralgias and joint swelling. Negative for myalgias.   Skin: Negative for rash.   Neurological: Negative for dizziness, weakness, headaches and paresthesias.   Psychiatric/Behavioral: Negative for mood changes. The patient is not nervous/anxious.        OBJECTIVE:   BP (!) 164/96   Pulse 75   Temp 97.8  F (36.6  C) (Temporal)   Ht 1.88 m (6' 2\")   Wt 114 kg (251 lb 6.4 oz)   SpO2 96%   BMI 32.28 kg/m      Physical Exam  GENERAL: alert and no distress  EYES: Eyes grossly normal to inspection, PERRL and conjunctivae and sclerae normal  HENT: ear canals and TM's normal, nose and mouth without ulcers or lesions  NECK: no adenopathy, no asymmetry, masses, or scars and thyroid normal to palpation  RESP: lungs clear to auscultation - no rales, rhonchi or wheezes  CV: regular rate and rhythm, normal S1 S2, no S3 or S4, no murmur, click or rub  ABDOMEN: soft, nontender, no hepatosplenomegaly, no masses and bowel sounds normal   (male): declined per patient  MS: no gross musculoskeletal defects noted USING CANE FOR ambulation, gait antalgic  NEURO: No focal changes  PSYCH: mentation appears normal, affect normal/bright      ASSESSMENT/PLAN:   (Z00.00) Routine general medical examination at a health care facility  (primary encounter diagnosis)  Comment: Advised patient to consider updating all vaccinations recommended.  Patient is interested only today and updating COVID and influenza.  Plan: CBC with platelets, Comprehensive metabolic         panel, Lipid Profile            (B20) Human immunodeficiency virus (HIV) disease (H)  Comment: Stable on medical therapy and following up in the infectious disease clinic as previously ordered.  Plan:     (I10) Essential hypertension, benign  Comment: Poorly controlled.  Add amlodipine 5 mg daily.  Recheck blood pressure in 4 to 6 weeks.  Patient to call with blood pressure result  Plan: Comprehensive metabolic panel, " "amLODIPine         (NORVASC) 5 MG tablet, OFFICE/OUTPT         VISIT,EST,LEVL IV            (Z13.6) CARDIOVASCULAR SCREENING; LDL GOAL LESS THAN 130  Comment: Labs ordered as fasting per routine  Plan: Lipid Profile            (E05.00) Graves disease  Comment: Noticed prior history, recheck thyroid function test  Plan: TSH with free T4 reflex            (Z12.5) Screening PSA (prostate specific antigen)  Comment: Recheck PSA level, digital exam declined  Plan: Prostate Specific Antigen Screen            (Z12.11) Colon cancer screening  Comment: Prior colonoscopy reviewed.  Unclear if patient should be getting 5 or 10-year colonoscopy.  Patient given information to contact Dr. Penn  Plan: Fecal colorectal cancer screen FIT            (Z87.891) Personal history of tobacco use, presenting hazards to health  Comment: Advised low-dose screening CT scan based on prior history of tobacco use.  Noted greater than 20-pack-year history  Plan: CT Chest Lung Cancer Screen Low Dose Without              Patient has been advised of split billing requirements and indicates understanding: Yes    COUNSELING:   Reviewed preventive health counseling, as reflected in patient instructions       Regular exercise       Healthy diet/nutrition    Estimated body mass index is 32.97 kg/m  as calculated from the following:    Height as of 7/28/22: 1.88 m (6' 2\").    Weight as of 7/28/22: 116.5 kg (256 lb 12.8 oz).       He reports that he has been smoking cigarettes. He has been smoking an average of .5 packs per day. He has never used smokeless tobacco.  Nicotine/Tobacco Cessation Plan:   Information offered: Patient not interested at this time      Counseling Resources:  ATP IV Guidelines  Pooled Cohorts Equation Calculator  FRAX Risk Assessment  ICSI Preventive Guidelines  Dietary Guidelines for Americans, 2010  [employer]'s MyPlate  ASA Prophylaxis  Lung CA Screening    Brooks Walsh MD  [facility]  "

## 2022-10-10 NOTE — PROGRESS NOTES
"SUBJECTIVE:   Rainer is a 62 year old who presents for Preventive Visit.  Patient has been advised of split billing requirements and indicates understanding: Yes  Are you in the first 12 months of your Medicare coverage?  No    HPI  Do you feel safe in your environment? Yes    Have you ever done Advance Care Planning? (For example, a Health Directive, POLST, or a discussion with a medical provider or your loved ones about your wishes): No, advance care planning information given to patient to review.  Patient declined advance care planning discussion at this time.       Fall risk:  low    Cognitive Screening   1) Repeat 3 items (Leader, Season, Table)    2) Clock draw: { :50591::\"NORMAL\"}  3) 3 item recall: { :728468}  Results: {MINICOG RESULTS:616563}    Mini-CogTM Copyright S Danielle. Licensed by the author for use in [facility]; reprinted with permission (michelle@[facility]). All rights reserved.      Reviewed and updated as needed this visit by clinical staff                  Reviewed and updated as needed this visit by Provider                 Social History     Tobacco Use     Smoking status: Every Day     Packs/day: 0.50     Types: Cigarettes     Smokeless tobacco: Never     Tobacco comments:     10 cigarettes a day   Substance Use Topics     Alcohol use: Yes     Alcohol/week: 0.0 standard drinks     Comment: 2-3/day     If you drink alcohol do you typically have >3 drinks per day or >7 drinks per week? No    Alcohol Use 8/24/2021   Prescreen: >3 drinks/day or >7 drinks/week? Yes   AUDIT SCORE  6     AUDIT - Alcohol Use Disorders Identification Test - Reproduced from the World Health Organization Audit 2001 (Second Edition) 8/24/2021   1.  How often do you have a drink containing alcohol? 4 or more times a week   2.  How many drinks containing alcohol do you have on a typical day when you are drinking? 3 or 4   3.  How often do you have five or more drinks on one occasion? Less than monthly   4.  How " often during the last year have you found that you were not able to stop drinking once you had started? Never   5.  How often during the last year have you failed to do what was normally expected of you because of drinking? Never   6.  How often during the last year have you needed a first drink in the morning to get yourself going after a heavy drinking session? Never   7.  How often during the last year have you had a feeling of guilt or remorse after drinking? Never   8.  How often during the last year have you been unable to remember what happened the night before because of your drinking? Never   9.  Have you or someone else been injured because of your drinking? No   10. Has a relative, friend, doctor or other health care worker been concerned about your drinking or suggested you cut down? No   TOTAL SCORE 6         {additional problems to add (Optional):814155}    Current providers sharing in care for this patient include:   Patient Care Team:  Brooks Walsh MD as PCP - General (Internal Medicine)  Brooks Walsh MD as Assigned PCP  Emmanuel Oro DO as Assigned Musculoskeletal Provider  Chelsea Pires APRN CNP as Nurse Practitioner (Dermatology)  Sophia Degroot PA-C as Physician Assistant (Dermatology)  Stone Nowak MD as Assigned Neuroscience Provider  Abundio Matt MD as Assigned Surgical Provider    The following health maintenance items are reviewed in Epic and correct as of today:  Health Maintenance   Topic Date Due     MENINGITIS IMMUNIZATION (1 - Risk start 2-23 months series) Never done     Pneumococcal Vaccine: Pediatrics (0 to 5 Years) and At-Risk Patients (6 to 64 Years) (1 - PCV) Never done     LUNG CANCER SCREENING  Never done     HEPATITIS B IMMUNIZATION (1 of 3 - Risk 3-dose series) Never done     COVID-19 Vaccine (3 - Pfizer risk series) 04/30/2021     PHQ-2 (once per calendar year)  01/01/2022     NICOTINE/TOBACCO CESSATION COUNSELING Q 1 YR   "08/31/2022     YEARLY PREVENTIVE VISIT  08/31/2022     ANNUAL REVIEW OF HM ORDERS  08/31/2022     INFLUENZA VACCINE (1) 09/01/2022     ADVANCE CARE PLANNING  08/29/2023     LIPID  08/31/2026     DTAP/TDAP/TD IMMUNIZATION (2 - Td or Tdap) 08/29/2028     COLORECTAL CANCER SCREENING  10/17/2028     HEPATITIS C SCREENING  Completed     ZOSTER IMMUNIZATION  Completed     IPV IMMUNIZATION  Aged Out       Immunization History   Administered Date(s) Administered     COVID-19,PF,Pfizer (12+ Yrs) 03/12/2021, 04/02/2021     Influenza Vaccine IM > 6 months Valent IIV4 (Alfuria,Fluzone) 10/13/2015, 10/11/2016, 10/01/2019, 10/16/2020     Influenza Vaccine, 6+MO IM (QUADRIVALENT W/PRESERVATIVES) 10/11/2018     TDAP Vaccine (Adacel) 08/29/2018     Zoster vaccine recombinant adjuvanted (SHINGRIX) 08/01/2019, 09/20/2019, 12/27/2019             Review of Systems  CONSTITUTIONAL: NEGATIVE for fever, chills, change in weight  EYES: NEGATIVE for vision changes or irritation  ENT/MOUTH: NEGATIVE for ear, mouth and throat problems  RESP: NEGATIVE for significant cough or SOB  CV: NEGATIVE for chest pain, palpitations or peripheral edema  GI: NEGATIVE for nausea, abdominal pain, heartburn, or change in bowel habits  : NEGATIVE for frequency, dysuria, or hematuria  MUSCULOSKELETAL: NEGATIVE for significant arthralgias or myalgia  NEURO: NEGATIVE for weakness, dizziness or paresthesias  ENDOCRINE: NEGATIVE for temperature intolerance, skin/hair changes  HEME: NEGATIVE for bleeding problems  PSYCHIATRIC: NEGATIVE for changes in mood or affect    OBJECTIVE:   There were no vitals taken for this visit. Estimated body mass index is 32.97 kg/m  as calculated from the following:    Height as of 7/28/22: 1.88 m (6' 2\").    Weight as of 7/28/22: 116.5 kg (256 lb 12.8 oz).  Physical Exam  GENERAL: healthy, alert and no distress  EYES: Eyes grossly normal to inspection, PERRL and conjunctivae and sclerae normal  HENT: ear canals and TM's normal, nose " "and mouth without ulcers or lesions  NECK: no adenopathy, no asymmetry, masses, or scars and thyroid normal to palpation  RESP: lungs clear to auscultation - no rales, rhonchi or wheezes  CV: regular rate and rhythm, normal S1 S2, no S3 or S4, no murmur, click or rub, no peripheral edema and peripheral pulses strong  ABDOMEN: {:810025}  RECTAL: normal sphincter tone, no rectal masses, prostate normal size, smooth, nontender without nodules or masses  NEURO: Normal strength and tone, mentation intact and speech normal  PSYCH: mentation appears normal, affect normal/bright        ASSESSMENT / PLAN:   {Diag Picklist:621373}    Patient has been advised of split billing requirements and indicates understanding: Yes    COUNSELING:  Reviewed preventive health counseling, as reflected in patient instructions       Regular exercise       Healthy diet/nutrition    Estimated body mass index is 32.97 kg/m  as calculated from the following:    Height as of 7/28/22: 1.88 m (6' 2\").    Weight as of 7/28/22: 116.5 kg (256 lb 12.8 oz).    Weight management plan: Discussed healthy diet and exercise guidelines    He reports that he has been smoking cigarettes. He has been smoking an average of .5 packs per day. He has never used smokeless tobacco.        Appropriate preventive services were discussed with this patient, including applicable screening as appropriate for cardiovascular disease, diabetes, osteopenia/osteoporosis, and glaucoma.  As appropriate for age/gender, discussed screening for colorectal cancer, prostate cancer, breast cancer, and cervical cancer. Checklist reviewing preventive services available has been given to the patient.    Reviewed patients plan of care and provided an AVS. The Basic Care Plan (routine screening as documented in Health Maintenance) for Nicolás meets the Care Plan requirement. This Care Plan has been established and reviewed with the Patient.    Counseling Resources:  ATP IV Guidelines  Pooled " Cohorts Equation Calculator  Breast Cancer Risk Calculator  Breast Cancer: Medication to Reduce Risk  FRAX Risk Assessment  ICSI Preventive Guidelines  Dietary Guidelines for Americans, 2010  USDA's MyPlate  ASA Prophylaxis  Lung CA Screening    Brooks Walsh MD  [facility]    Identified Health Risks:

## 2022-10-13 PROCEDURE — 82274 ASSAY TEST FOR BLOOD FECAL: CPT | Performed by: INTERNAL MEDICINE

## 2022-10-15 LAB — HEMOCCULT STL QL IA: NEGATIVE

## 2022-10-19 ENCOUNTER — HOSPITAL ENCOUNTER (OUTPATIENT)
Dept: CT IMAGING | Facility: CLINIC | Age: 62
Discharge: HOME | End: 2022-10-19
Attending: INTERNAL MEDICINE | Admitting: INTERNAL MEDICINE
Payer: COMMERCIAL

## 2022-10-19 DIAGNOSIS — Z87.891 PERSONAL HISTORY OF TOBACCO USE, PRESENTING HAZARDS TO HEALTH: ICD-10-CM

## 2022-10-19 PROCEDURE — 71271 CT THORAX LUNG CANCER SCR C-: CPT

## 2022-10-31 ENCOUNTER — TELEPHONE (OUTPATIENT)
Dept: NEUROLOGY | Facility: CLINIC | Age: 62
End: 2022-10-31

## 2022-10-31 DIAGNOSIS — G60.8 PERIPHERAL SENSORY-MOTOR AXONAL POLYNEUROPATHY: ICD-10-CM

## 2022-10-31 NOTE — TELEPHONE ENCOUNTER
[facility] Call Center    Phone Message    May a detailed message be left on voicemail: yes     Reason for Call: Medication Refill Request    Has the patient contacted the pharmacy for the refill? Yes   Name of medication being requested: gabapentin (NEURONTIN) 300 MG capsule  DULoxetine (CYMBALTA) 60 MG capsule  Provider who prescribed the medication: Dr. Nowak  Pharmacy: [id number] [place] [address] [address]  Date medication is needed: ASAP    New pharmacy     Action Taken: Message routed to:  [facility] ([facility] neurology    Travel Screening: Not Applicable

## 2022-11-01 RX ORDER — GABAPENTIN 300 MG/1
600 CAPSULE ORAL 2 TIMES DAILY
Qty: 363 CAPSULE | Refills: 0 | Status: SHIPPED | OUTPATIENT
Start: 2022-11-01 | End: 2022-12-05

## 2022-11-01 RX ORDER — DULOXETIN HYDROCHLORIDE 60 MG/1
60 CAPSULE, DELAYED RELEASE ORAL DAILY
Qty: 91 CAPSULE | Refills: 0 | Status: SHIPPED | OUTPATIENT
Start: 2022-11-01 | End: 2022-12-05

## 2022-11-01 NOTE — TELEPHONE ENCOUNTER
RN called pt, he should still have 90 day refill remaining for both medications at [facility], this can be transferred to new pharmacy.     Upon talking with patient he says that [facility] transferred everything they had on file to new location and they don't have refills on file for duloxetine and gabapentin.     RN sent 90 day rx for both medications to [facility]. Nothing further needed. Pt has f/u 1/27/23    Per [facility] note:  He will continue the same doses of gabapentin and Cymbalta.  I updated all of his prescriptions.      Joe Armstrong RN, BSN  [facility]      Signed Prescriptions:                        Disp   Refills    DULoxetine (CYMBALTA) 60 MG capsule        91 cap*0        Sig: Take 1 capsule (60 mg) by mouth daily  Authorizing Provider: GRACIE YANG  Ordering User: JOE ARMSTRONG    gabapentin (NEURONTIN) 300 MG capsule      363 ca*0        Sig: Take 2 capsules (600 mg) by mouth 2 times daily  Authorizing Provider: GRACIE YANG  Ordering User: JOE ARMSTRONG

## 2022-11-17 DIAGNOSIS — I10 ESSENTIAL HYPERTENSION, BENIGN: ICD-10-CM

## 2022-11-18 RX ORDER — ATENOLOL 100 MG/1
100 TABLET ORAL DAILY
Qty: 90 TABLET | Refills: 1 | Status: SHIPPED | OUTPATIENT
Start: 2022-11-18 | End: 2022-12-06

## 2022-11-18 NOTE — TELEPHONE ENCOUNTER
Routing refill request to provider for review/approval because:  Failed protocol due to:  BP Readings from Last 3 Encounters:   10/10/22 (!) 164/96   07/28/22 (!) 160/98   06/13/22 (!) 148/84     Sarah Yun RN

## 2022-12-05 ENCOUNTER — MYC MEDICAL ADVICE (OUTPATIENT)
Dept: INTERNAL MEDICINE | Facility: CLINIC | Age: 62
End: 2022-12-05

## 2022-12-05 ENCOUNTER — MYC MEDICAL ADVICE (OUTPATIENT)
Dept: NEUROLOGY | Facility: CLINIC | Age: 62
End: 2022-12-05

## 2022-12-05 DIAGNOSIS — E05.00 GRAVES DISEASE: ICD-10-CM

## 2022-12-05 DIAGNOSIS — I10 ESSENTIAL HYPERTENSION, BENIGN: ICD-10-CM

## 2022-12-05 DIAGNOSIS — G60.8 PERIPHERAL SENSORY-MOTOR AXONAL POLYNEUROPATHY: ICD-10-CM

## 2022-12-05 RX ORDER — DULOXETIN HYDROCHLORIDE 60 MG/1
60 CAPSULE, DELAYED RELEASE ORAL DAILY
Qty: 60 CAPSULE | Refills: 0 | Status: SHIPPED | OUTPATIENT
Start: 2022-12-05 | End: 2023-01-31

## 2022-12-05 RX ORDER — AMITRIPTYLINE HYDROCHLORIDE 50 MG/1
50 TABLET ORAL AT BEDTIME
Qty: 60 TABLET | Refills: 0 | Status: SHIPPED | OUTPATIENT
Start: 2022-12-05 | End: 2023-01-31

## 2022-12-05 RX ORDER — GABAPENTIN 300 MG/1
600 CAPSULE ORAL 2 TIMES DAILY
Qty: 240 CAPSULE | Refills: 0 | Status: SHIPPED | OUTPATIENT
Start: 2022-12-05 | End: 2023-01-31

## 2022-12-05 NOTE — TELEPHONE ENCOUNTER
Pt has appt 1/27/22, will send requested scripts to new pharmacy with enough medication to last until upcoming appt.     Marisel BINGHAM RN, BSN  [facility]

## 2022-12-06 RX ORDER — ATENOLOL 100 MG/1
100 TABLET ORAL DAILY
Qty: 90 TABLET | Refills: 1 | Status: SHIPPED | OUTPATIENT
Start: 2022-12-06 | End: 2023-07-24

## 2022-12-06 RX ORDER — LISINOPRIL 20 MG/1
20 TABLET ORAL 2 TIMES DAILY
Qty: 180 TABLET | Refills: 2 | Status: SHIPPED | OUTPATIENT
Start: 2022-12-06 | End: 2023-09-08

## 2022-12-06 RX ORDER — AMLODIPINE BESYLATE 5 MG/1
5 TABLET ORAL DAILY
Qty: 90 TABLET | Refills: 1 | Status: SHIPPED | OUTPATIENT
Start: 2022-12-06 | End: 2023-10-12

## 2022-12-06 RX ORDER — LEVOTHYROXINE SODIUM 112 UG/1
TABLET ORAL
Qty: 90 TABLET | Refills: 2 | Status: SHIPPED | OUTPATIENT
Start: 2022-12-06 | End: 2023-11-07

## 2022-12-06 NOTE — TELEPHONE ENCOUNTER
Routing refill request to provider for review/approval because:  Labs out of range:    BP Readings from Last 3 Encounters:   10/10/22 (!) 164/96   07/28/22 (!) 160/98   06/13/22 (!) 148/84     Justice L. Phoenix, RN

## 2022-12-06 NOTE — TELEPHONE ENCOUNTER
Prescription approved per [facility] Refill Protocol.  Justice L. Phoenix, RN

## 2022-12-12 ENCOUNTER — OFFICE VISIT (OUTPATIENT)
Dept: FAMILY MEDICINE | Facility: CLINIC | Age: 62
End: 2022-12-12
Payer: COMMERCIAL

## 2022-12-12 DIAGNOSIS — Z51.81 ENCOUNTER FOR THERAPEUTIC DRUG MONITORING: ICD-10-CM

## 2022-12-12 DIAGNOSIS — D49.2 NEOPLASM OF UNSPECIFIED BEHAVIOR OF BONE, SOFT TISSUE, AND SKIN: ICD-10-CM

## 2022-12-12 DIAGNOSIS — Z85.828 HISTORY OF NONMELANOMA SKIN CANCER: ICD-10-CM

## 2022-12-12 DIAGNOSIS — Z86.018 HISTORY OF DYSPLASTIC NEVUS: Primary | ICD-10-CM

## 2022-12-12 DIAGNOSIS — Z87.898 HISTORY OF ATYPICAL NEVUS: ICD-10-CM

## 2022-12-12 DIAGNOSIS — L82.1 SEBORRHEIC KERATOSIS: ICD-10-CM

## 2022-12-12 DIAGNOSIS — L81.4 SOLAR LENTIGO: ICD-10-CM

## 2022-12-12 DIAGNOSIS — D22.9 MULTIPLE BENIGN NEVI: ICD-10-CM

## 2022-12-12 DIAGNOSIS — D18.01 CHERRY ANGIOMA: ICD-10-CM

## 2022-12-12 DIAGNOSIS — B35.1 ONYCHOMYCOSIS: ICD-10-CM

## 2022-12-12 PROCEDURE — 11103 TANGNTL BX SKIN EA SEP/ADDL: CPT | Performed by: PHYSICIAN ASSISTANT

## 2022-12-12 PROCEDURE — 99214 OFFICE O/P EST MOD 30 MIN: CPT | Mod: 25 | Performed by: PHYSICIAN ASSISTANT

## 2022-12-12 PROCEDURE — 88305 TISSUE EXAM BY PATHOLOGIST: CPT | Performed by: DERMATOLOGY

## 2022-12-12 PROCEDURE — 11102 TANGNTL BX SKIN SINGLE LES: CPT | Performed by: PHYSICIAN ASSISTANT

## 2022-12-12 RX ORDER — TERBINAFINE HYDROCHLORIDE 250 MG/1
250 TABLET ORAL DAILY
Qty: 90 TABLET | Refills: 0 | Status: SHIPPED | OUTPATIENT
Start: 2022-12-12 | End: 2023-03-12

## 2022-12-12 ASSESSMENT — PAIN SCALES - GENERAL: PAINLEVEL: NO PAIN (0)

## 2022-12-12 NOTE — PATIENT INSTRUCTIONS
Wound Care After a Biopsy    What is a skin biopsy?  A skin biopsy allows the doctor to examine a very small piece of tissue under the microscope to determine the diagnosis and the best treatment for the skin condition. A local anesthetic (numbing medicine)  is injected with a very small needle into the skin area to be tested. A small piece of skin is taken from the area. Sometimes a suture (stitch) is used.     What are the risks of a skin biopsy?  I will experience scar, bleeding, swelling, pain, crusting and redness. I may experience incomplete removal or recurrence. Risks of this procedure are excessive bleeding, bruising, infection, nerve damage, numbness, thick (hypertrophic or keloidal) scar and non-diagnostic biopsy.    How should I care for my wound for the first 24 hours?  Keep the wound dry and covered for 24 hours  If it bleeds, hold direct pressure on the area for 15 minutes. If bleeding does not stop then go to the emergency room  Avoid strenuous exercise the first 1-2 days or as your doctor instructs you    How should I care for the wound after 24 hours?  After 24 hours, remove the bandage  You may bathe or shower as normal  If you had a scalp biopsy, you can shampoo as usual and can use shower water to clean the biopsy site daily  Clean the wound twice a day with gentle soap and water  Do not scrub, be gentle  Apply white petroleum/Vaseline after cleaning the wound with a cotton swab or a clean finger, and keep the site covered with a Bandaid /bandage. Bandages are not necessary with a scalp biopsy  If you are unable to cover the site with a Bandaid /bandage, re-apply ointment 2-3 times a day to keep the site moist. Moisture will help with healing  Avoid strenuous activity for first 1-2 days  Avoid lakes, rivers, pools, and oceans until the stitches are removed or the site is healed    How do I clean my wound?  Wash hands thoroughly with soap or use hand  before all wound care  Clean the  wound with gentle soap and water  Apply white petroleum/Vaseline  to wound after it is clean  Replace the Bandaid /bandage to keep the wound covered for the first few days or as instructed by your doctor  If you had a scalp biopsy, warm shower water to the area on a daily basis should suffice    What should I use to clean my wound?   Cotton-tipped applicators (Qtips )  White petroleum jelly (Vaseline ). Use a clean new container and use Q-tips to apply.  Bandaids   as needed  Gentle soap     How should I care for my wound long term?  Do not get your wound dirty  Keep up with wound care for one week or until the area is healed.  A small scab will form and fall off by itself when the area is completely healed. The area will be red and will become pink in color as it heals. Sun protection is very important for how your scar will turn out. Sunscreen with an SPF 30 or greater is recommended once the area is healed.  You should have some soreness but it should be mild and slowly go away over several days. Talk to your doctor about using tylenol for pain,    When should I call my doctor?  If you have increased:   Pain or swelling  Pus or drainage (clear or slightly yellow drainage is ok)  Temperature over 100F  Spreading redness or warmth around wound    When will I hear about my results?  The biopsy results can take 2 weeks to come back.  Your results will automatically release to [employer] before your provider has even reviewed them.  The clinic will call you with the results, send you a Socialare message, or have you schedule a follow-up clinic or phone time to discuss the results.  Contact our clinics if you do not hear from us in 2 weeks.    Who should I call with questions?  [facility]: [phone]  [facility]: [phone]  For urgent needs outside of business hours call the [facility] at [phone] and ask for the dermatology resident on call        Patient Education     Checking for Skin Cancer  You can find cancer early by checking your skin each month. There are 3 kinds of skin cancer. They are melanoma, basal cell carcinoma, and squamous cell carcinoma. Doing monthly skin checks is the best way to find new marks or skin changes. Follow the instructions below for checking your skin.   The ABCDEs of checking moles for melanoma   Check your moles or growths for signs of melanoma using ABCDE:   Asymmetry: the sides of the mole or growth don t match  Border: the edges are ragged, notched, or blurred  Color: the color within the mole or growth varies  Diameter: the mole or growth is larger than 6 mm (size of a pencil eraser)  Evolving: the size, shape, or color of the mole or growth is changing (evolving is not shown in the images below)    Checking for other types of skin cancer  Basal cell carcinoma or squamous cell carcinoma have symptoms such as:     A spot or mole that looks different from all other marks on your skin  Changes in how an area feels, such as itching, tenderness, or pain  Changes in the skin's surface, such as oozing, bleeding, or scaliness  A sore that does not heal  New swelling or redness beyond the border of a mole    Who s at risk?  Anyone can get skin cancer. But you are at greater risk if you have:   Fair skin, light-colored hair, or light-colored eyes  Many moles or abnormal moles on your skin  A history of sunburns from sunlight or tanning beds  A family history of skin cancer  A history of exposure to radiation or chemicals  A weakened immune system  If you have had skin cancer in the past, you are at risk for recurring skin cancer.   How to check your skin  Do your monthly skin checkups in front of a full-length mirror. Check all parts of your body, including your:   Head (ears, face, neck, and scalp)  Torso (front, back, and sides)  Arms (tops, undersides, upper, and lower armpits)  Hands (palms, backs, and fingers, including  under the nails)  Buttocks and genitals  Legs (front, back, and sides)  Feet (tops, soles, toes, including under the nails, and between toes)  If you have a lot of moles, take digital photos of them each month. Make sure to take photos both up close and from a distance. These can help you see if any moles change over time.   Most skin changes are not cancer. But if you see any changes in your skin, call your doctor right away. Only he or she can diagnose a problem. If you have skin cancer, seeing your doctor can be the first step toward getting the treatment that could save your life.   [employer] last reviewed this educational content on 4/1/2019 2000-2020 [employer]. [address] [place] [address], [place] [address]. All rights reserved. This information is not intended as a substitute for professional medical care. Always follow your healthcare professional's instructions.       When should I call my doctor?  If you are worsening or not improving, please, contact us or seek urgent care as noted below.     Who should I call with questions (adults)?  [facility] (adult and pediatric): [phone]  [facility] (adult): [phone]  For urgent needs outside of business hours call the [facility] at [phone] and ask for the dermatology resident on call to be paged  If this is a medical emergency and you are unable to reach an ER, Call [phone]    Who should I call with questions (pediatric)?  [facility]- Pediatric Dermatology  Dr. Ann Marie Yung, Dr. Malaika Hannah, Dr. Jeanna Lynn, FRANKIE Cross, Dr. Disha Frost, Dr. Kacey Gandara & Dr. Emmanuel James  Non-urgent nurse triage line; [phone]- Shazia and Nieves FAY Care Coordinatoranders Garcia (/Complex ) [phone]    If you need a prescription refill, please contact your pharmacy. Refills are approved or denied by our  Physicians during normal business hours, Monday through Fridays  Per office policy, refills will not be granted if you have not been seen within the past year (or sooner depending on your child's condition)    Scheduling Information:  Pediatric Appointment Scheduling and Call Center [phone]  Radiology Scheduling- [phone]  Sedation Unit Scheduling- [phone]  [place] Scheduling- General [phone]; Pediatric Dermatology [phone]  Main  Services: [phone]  Latvian: [phone]  Afghan: [phone]  Hmong/Honduran/Croatian: [phone]  Preadmission Nursing Department Fax Number: [phone] (Fax all pre-operative paperwork to this number)    For urgent matters arising during evenings, weekends, or holidays that cannot wait for normal business hours please call [phone] and ask for the dermatology resident on call to be paged.

## 2022-12-12 NOTE — PROGRESS NOTES
[facility] Dermatology Note  Encounter Date: Dec 12, 2022  Office Visit     Dermatology Problem List:  0. NUBs  - right proximal lateral thigh s/p shave biopsy 12/12/2022  - right distal lateral thigh s/p shave biopsy 12/12/2022  1. History of NMSC  - BCC, L NSW s/p MMS 12/5/2019  - SCCis, R posterior thigh s/p MMS 12/5/2019  - SCCis, L ventral proximal leg s/p MMS 11/11/2021  - SCCis, R medial thigh s/p exicision  8/17/2022  - SCCis, R gillette distal s/p MMS 8/25/2022  2. History of atypical nevus  - Lentiginous junctional melanocytic nevus with mild atypia, right lateral arm s/p shave biopsy 6/11/2020   - Compound dysplastic nevus with moderate atypia, left lateral mid back, s/p shave biopsy 8/27/2021  3. AKs  - HAK, mid upper abdomen, s/p bx 8/27/2021, s/p cryo 6/13/2022  - HAK, R gillette proximal, s/p bx 6/6/2022 s/p cryo 6/13/2022  - Lichenoid AK L abdomen, s/p bx 6/6/2022  4. Psoriasis vs dermatitis   - Previous tx: fluocinonide 0.05% cream    Lesion to monitor, right mid back (photo 12/12/2022)  ____________________________________________    Assessment & Plan:    # NUB, right proximal lateral thigh ddx SCC vs ISK  # NUB, right distal lateral thigh ddx SCC vs ISK  - Shave biopsy today; see procedure note below.     # Lesion to monitor, right mid back. Suspect seborrheic keratosis.  - Photodocumentation today.  - Patient to monitor for next visit.    # Lesion to monitor, left mid back. Resolved.  - photos reviewed today    # Onychomycosis, bilateral feet  - Start terbinafine 250 mg for 3 months. edu on potential side effects  - reviewed LFTs from October 2022 - wnl  - Labs to be completed at next visit: ALT, AST.    # Benign lesions: Multiple benign nevi, solar lentigos, seborrheic keratoses, cherry angiomas. Explained to patient benign nature of lesion. No treatment is necessary at this time unless the lesion changes or becomes symptomatic.   - ABCDs of melanoma were discussed and self skin  checks were advised.  - Sun precaution was advised including the use of sun screens of SPF 30 or higher, sun protective clothing, and avoidance of tanning beds.    # History of atypical nevi  # History of dysplastic nevi  # History of NMSC  - ABCDEs of melanoma advised  - Continue photoprotection  - Continue skin exams every 6 months  - Advised to monitor for changing, non-healing, bleeding, painful, changing, or otherwise symptomatic lesions    Procedures Performed:   - Shave biopsy x2 procedure note, location(s): see above. After discussion of benefits and risks including but not limited to bleeding, infection, scar, incomplete removal, recurrence, and non-diagnostic biopsy, written consent and photographs were obtained. The area was cleaned with isopropyl alcohol. 0.5mL of 1% lidocaine with epinephrine was injected to obtain adequate anesthesia of lesion(s). Shave biopsy at site(s) performed. Hemostasis was achieved with aluminium chloride. Petrolatum ointment and a sterile dressing were applied. The patient tolerated the procedure and no complications were noted. The patient was provided with verbal and written post care instructions.     Follow-up: 6 month(s) in-person, or earlier pending pathology for new or changing lesions    Staff and Scribe:     Scribe Disclosure:  Arthur RANKIN, am serving as a scribe to document services personally performed by Sophia Degroot PA-C based on data collection and the provider's statements to me.   Provider Disclosure:   The documentation recorded by the scribe accurately reflects the services I personally performed and the decisions made by me.    All risks, benefits and alternatives were discussed with patient.  Patient is in agreement and understands the assessment and plan.  All questions were answered.    Sophia Degroot PA-C, [facility]  [facility]: Phone: [phone], Fax: [phone]  [facility]  - [place]: Phone: [phone],  Fax: [phone]  [facility]: Phone: [phone], Fax: [phone]  ____________________________________________    CC: Skin Check (FBSC. . Of )    HPI:  Mr. Nicolás Fermin is a(n) 62 year old male who presents today as a return patient for a FBSE. Last seen in dermatology by Dr. Matt on 8/25/2022, at which time patient underwent MMS for treatment of SCCis on the right shin.    Today, patient endorses a spot of concern on his back. Notes also fungal toenails he would like to address.     Patient is otherwise feeling well, without additional skin concerns.    Labs Reviewed:  AST, ALT (10/10/2022 - WNL)    ALT   Date Value Ref Range Status   10/10/2022 24 0 - 70 U/L Final   07/16/2020 30 0 - 70 U/L Final     AST   Date Value Ref Range Status   10/10/2022 21 0 - 45 U/L Final   07/16/2020 30 0 - 45 U/L Final     Physical Exam:  Vitals: There were no vitals taken for this visit.  SKIN: Total skin excluding the undergarment areas was performed. The exam included the head/face, neck, both arms, chest, back, abdomen, both legs, digits and/or nails.   - Well-healed scars on the right medial thigh and right distal shin.  - 5 mm pink scaly macule on the right proximal lateral thigh.  - 1 cm pink scaly macule on the right proximal lateral thigh.  - 6 mm brown scaly papule most consistent with a seborrheic keratosis on the right mid back.  - Significant yellowing and nail plate thickening with subungual debris on all 10 toenails of the bilateral feet.  - There are dome shaped bright red papules on the trunk and extremities.   - Multiple regular brown pigmented macules and papules are identified on the trunk and extremities.   - Scattered brown macules on sun exposed areas.  - There are waxy stuck on tan to brown papules on the trunk and extremities.   - Edema to the bilateral lower legs.  - No other lesions of concern on areas examined.                      Medications:  Current Outpatient Medications   Medication     amitriptyline (ELAVIL) 50 MG tablet     amLODIPine (NORVASC) 5 MG tablet     aspirin - buffered (ASCRIPTIN) 325 MG TABS tablet     atenolol (TENORMIN) 100 MG tablet     DiphenhydrAMINE HCl (BENADRYL PO)     Dolutegravir Sodium (TIVICAY PO)     DULoxetine (CYMBALTA) 60 MG capsule     Emtricitabine-Tenofovir AF (DESCOVY PO)     fluocinonide (LIDEX) 0.05 % external cream     gabapentin (NEURONTIN) 300 MG capsule     levothyroxine (SYNTHROID/LEVOTHROID) 112 MCG tablet     lisinopril (ZESTRIL) 20 MG tablet     NAPROXEN PO     Omega-3 Fatty Acids (FISH OIL) 500 MG CAPS     psyllium (METAMUCIL) 58.6 % POWD     Current Facility-Administered Medications   Medication     methylPREDNISolone (DEPO-MEDROL) injection 40 mg      Past Medical History:   Patient Active Problem List   Diagnosis     Essential hypertension, benign     Human immunodeficiency virus (HIV) disease (H)     CARDIOVASCULAR SCREENING; LDL GOAL LESS THAN 130     Graves disease     Neuropathy     Hip pain, left     Tobacco abuse disorder     Past Medical History:   Diagnosis Date     Asymptomatic human immunodeficiency virus (HIV) infection status (H)      Graves disease 2006     Hypertension      Peripheral neuropathy      Squamous cell carcinoma of skin, unspecified

## 2022-12-12 NOTE — LETTER
12/12/2022         RE: Nicolás Fermin  [address]  [facility] [address]        Dear Colleague,    Thank you for referring your patient, Nicolás Fermin, to the [facility]. Please see a copy of my visit note below.    [facility] Dermatology Note  Encounter Date: Dec 12, 2022  Office Visit     Dermatology Problem List:  0. NUBs  - right proximal lateral thigh s/p shave biopsy 12/12/2022  - right distal lateral thigh s/p shave biopsy 12/12/2022  1. History of NMSC  - BCC, L NSW s/p MMS 12/5/2019  - SCCis, R posterior thigh s/p MMS 12/5/2019  - SCCis, L ventral proximal leg s/p MMS 11/11/2021  - SCCis, R medial thigh s/p exicision  8/17/2022  - SCCis, R gillette distal s/p MMS 8/25/2022  2. History of atypical nevus  - Lentiginous junctional melanocytic nevus with mild atypia, right lateral arm s/p shave biopsy 6/11/2020   - Compound dysplastic nevus with moderate atypia, left lateral mid back, s/p shave biopsy 8/27/2021  3. AKs  - HAK, mid upper abdomen, s/p bx 8/27/2021, s/p cryo 6/13/2022  - HAK, R gillette proximal, s/p bx 6/6/2022 s/p cryo 6/13/2022  - Lichenoid AK L abdomen, s/p bx 6/6/2022  4. Psoriasis vs dermatitis   - Previous tx: fluocinonide 0.05% cream    Lesion to monitor, right mid back (photo 12/12/2022)  ____________________________________________    Assessment & Plan:    # NUB, right proximal lateral thigh ddx SCC vs ISK  # NUB, right distal lateral thigh ddx SCC vs ISK  - Shave biopsy today; see procedure note below.     # Lesion to monitor, right mid back. Suspect seborrheic keratosis.  - Photodocumentation today.  - Patient to monitor for next visit.    # Lesion to monitor, left mid back. Resolved.  - photos reviewed today    # Onychomycosis, bilateral feet  - Start terbinafine 250 mg for 3 months. edu on potential side effects  - reviewed LFTs from October 2022 - wnl  - Labs to be completed at next visit: ALT, AST.    # Benign lesions: Multiple  benign nevi, solar lentigos, seborrheic keratoses, cherry angiomas. Explained to patient benign nature of lesion. No treatment is necessary at this time unless the lesion changes or becomes symptomatic.   - ABCDs of melanoma were discussed and self skin checks were advised.  - Sun precaution was advised including the use of sun screens of SPF 30 or higher, sun protective clothing, and avoidance of tanning beds.    # History of atypical nevi  # History of dysplastic nevi  # History of NMSC  - ABCDEs of melanoma advised  - Continue photoprotection  - Continue skin exams every 6 months  - Advised to monitor for changing, non-healing, bleeding, painful, changing, or otherwise symptomatic lesions    Procedures Performed:   - Shave biopsy x2 procedure note, location(s): see above. After discussion of benefits and risks including but not limited to bleeding, infection, scar, incomplete removal, recurrence, and non-diagnostic biopsy, written consent and photographs were obtained. The area was cleaned with isopropyl alcohol. 0.5mL of 1% lidocaine with epinephrine was injected to obtain adequate anesthesia of lesion(s). Shave biopsy at site(s) performed. Hemostasis was achieved with aluminium chloride. Petrolatum ointment and a sterile dressing were applied. The patient tolerated the procedure and no complications were noted. The patient was provided with verbal and written post care instructions.     Follow-up: 6 month(s) in-person, or earlier pending pathology for new or changing lesions    Staff and Scribe:     Scribe Disclosure:  Arthur RANKIN, am serving as a scribe to document services personally performed by Sophia Degroot PA-C based on data collection and the provider's statements to me.   Provider Disclosure:   The documentation recorded by the scribe accurately reflects the services I personally performed and the decisions made by me.    All risks, benefits and alternatives were discussed with patient.  Patient is  in agreement and understands the assessment and plan.  All questions were answered.    Sophia Degroot PA-C, MPAS  [facility] [place]: Phone: [phone], Fax: [phone]  [facility]: Phone: [phone],  Fax: [phone]  [facility]: Phone: [phone], Fax: [phone]  ____________________________________________    CC: Skin Check (FBSC. Hx. Of )    HPI:  Mr. Nicolás Fermin is a(n) 62 year old male who presents today as a return patient for a FBSE. Last seen in dermatology by Dr. Matt on 8/25/2022, at which time patient underwent MMS for treatment of SCCis on the right shin.    Today, patient endorses a spot of concern on his back. Notes also fungal toenails he would like to address.     Patient is otherwise feeling well, without additional skin concerns.    Labs Reviewed:  AST, ALT (10/10/2022 - WNL)    ALT   Date Value Ref Range Status   10/10/2022 24 0 - 70 U/L Final   07/16/2020 30 0 - 70 U/L Final     AST   Date Value Ref Range Status   10/10/2022 21 0 - 45 U/L Final   07/16/2020 30 0 - 45 U/L Final     Physical Exam:  Vitals: There were no vitals taken for this visit.  SKIN: Total skin excluding the undergarment areas was performed. The exam included the head/face, neck, both arms, chest, back, abdomen, both legs, digits and/or nails.   - Well-healed scars on the right medial thigh and right distal shin.  - 5 mm pink scaly macule on the right proximal lateral thigh.  - 1 cm pink scaly macule on the right proximal lateral thigh.  - 6 mm brown scaly papule most consistent with a seborrheic keratosis on the right mid back.  - Significant yellowing and nail plate thickening with subungual debris on all 10 toenails of the bilateral feet.  - There are dome shaped bright red papules on the trunk and extremities.   - Multiple regular brown pigmented macules and papules are identified on the trunk and  extremities.   - Scattered brown macules on sun exposed areas.  - There are waxy stuck on tan to brown papules on the trunk and extremities.   - Edema to the bilateral lower legs.  - No other lesions of concern on areas examined.                     Medications:  Current Outpatient Medications   Medication     amitriptyline (ELAVIL) 50 MG tablet     amLODIPine (NORVASC) 5 MG tablet     aspirin - buffered (ASCRIPTIN) 325 MG TABS tablet     atenolol (TENORMIN) 100 MG tablet     DiphenhydrAMINE HCl (BENADRYL PO)     Dolutegravir Sodium (TIVICAY PO)     DULoxetine (CYMBALTA) 60 MG capsule     Emtricitabine-Tenofovir AF (DESCOVY PO)     fluocinonide (LIDEX) 0.05 % external cream     gabapentin (NEURONTIN) 300 MG capsule     levothyroxine (SYNTHROID/LEVOTHROID) 112 MCG tablet     lisinopril (ZESTRIL) 20 MG tablet     NAPROXEN PO     Omega-3 Fatty Acids (FISH OIL) 500 MG CAPS     psyllium (METAMUCIL) 58.6 % POWD     Current Facility-Administered Medications   Medication     methylPREDNISolone (DEPO-MEDROL) injection 40 mg      Past Medical History:   Patient Active Problem List   Diagnosis     Essential hypertension, benign     Human immunodeficiency virus (HIV) disease (H)     CARDIOVASCULAR SCREENING; LDL GOAL LESS THAN 130     Graves disease     Neuropathy     Hip pain, left     Tobacco abuse disorder     Past Medical History:   Diagnosis Date     Asymptomatic human immunodeficiency virus (HIV) infection status (H)      Graves disease 2006     Hypertension      Peripheral neuropathy      Squamous cell carcinoma of skin, unspecified            Again, thank you for allowing me to participate in the care of your patient.        Sincerely,        Sophia Degroot PA-C

## 2022-12-14 LAB
PATH REPORT.COMMENTS IMP SPEC: ABNORMAL
PATH REPORT.COMMENTS IMP SPEC: ABNORMAL
PATH REPORT.COMMENTS IMP SPEC: YES
PATH REPORT.FINAL DX SPEC: ABNORMAL
PATH REPORT.GROSS SPEC: ABNORMAL
PATH REPORT.MICROSCOPIC SPEC OTHER STN: ABNORMAL
PATH REPORT.RELEVANT HX SPEC: ABNORMAL

## 2022-12-15 ENCOUNTER — TELEPHONE (OUTPATIENT)
Dept: FAMILY MEDICINE | Facility: CLINIC | Age: 62
End: 2022-12-15

## 2022-12-15 NOTE — TELEPHONE ENCOUNTER
----- Message from Sophia Degroot PA-C sent at 12/14/2022  4:36 PM CST -----  -Needs excision -SCCIS - R lateral, distal, thigh  -AK - R lateral proximal thigh  (needs LN2) and the person who excises it can do this during that visit    We got your biopsy results back regarding the 2 biopsies. The lower one (distal) was in fact a squamous cell skin cancer. Squamous Cell skin cancer is a very common type of skin cancer, and usually caused by years of sun exposure. This is a type of skin cancer than can eventually spread to the lymph nodes and other tissues. Complete removal is therefore recommended. The spot above it, closer to the hip was an AK which is a precancerous spot. This will need treatment with liquid nitrogen. I will send you to see one of our surgeons to have both spots taken care of. One of their nurses will reach out to you to schedule this shortly. Let me know if you have any other questions.      Sophia Degroot PA-C, MPAS

## 2022-12-15 NOTE — TELEPHONE ENCOUNTER
S/w pt and went over Sophia's message below.  Scheduled with Dr. Matt on Thursday 1/5/23 at 8:45 am.    Mohs letter and information sent via my chart and mailed home.    Rose Mary LANCASTER RN  [facility] [place]  [phone]

## 2022-12-29 NOTE — PROGRESS NOTES
Surgical Office Location:  [facility]  [address] [place] [id number]

## 2023-01-05 ENCOUNTER — OFFICE VISIT (OUTPATIENT)
Dept: DERMATOLOGY | Facility: CLINIC | Age: 63
End: 2023-01-05
Payer: COMMERCIAL

## 2023-01-05 DIAGNOSIS — L82.1 SEBORRHEIC KERATOSIS: ICD-10-CM

## 2023-01-05 DIAGNOSIS — D23.9 DERMAL NEVUS: ICD-10-CM

## 2023-01-05 DIAGNOSIS — Z85.828 HISTORY OF SKIN CANCER: ICD-10-CM

## 2023-01-05 DIAGNOSIS — D04.71 SQUAMOUS CELL CARCINOMA IN SITU (SCCIS) OF SKIN OF RIGHT LOWER LEG: ICD-10-CM

## 2023-01-05 DIAGNOSIS — L81.4 LENTIGO: Primary | ICD-10-CM

## 2023-01-05 DIAGNOSIS — L57.0 AK (ACTINIC KERATOSIS): ICD-10-CM

## 2023-01-05 DIAGNOSIS — D18.01 ANGIOMA OF SKIN: ICD-10-CM

## 2023-01-05 PROCEDURE — 99243 OFF/OP CNSLTJ NEW/EST LOW 30: CPT | Mod: 25 | Performed by: DERMATOLOGY

## 2023-01-05 PROCEDURE — 17000 DESTRUCT PREMALG LESION: CPT | Performed by: DERMATOLOGY

## 2023-01-05 PROCEDURE — 17313 MOHS 1 STAGE T/A/L: CPT | Performed by: DERMATOLOGY

## 2023-01-05 ASSESSMENT — PAIN SCALES - GENERAL: PAINLEVEL: NO PAIN (0)

## 2023-01-05 NOTE — PATIENT INSTRUCTIONS
Open Wound Care     for right thigh (lower lesion)    No strenuous activity for 48 hours    Take Tylenol as needed for discomfort.                                                .         Do not drink alcoholic beverages for 48 hours.    Keep the pressure bandage in place for 24 hours. If the bandage becomes blood tinged or loose, reinforce it with gauze and tape.        (Refer to the reverse side of this page for management of bleeding).    Remove bandage in 24 hours and begin wound care as follows:     Clean area with tap water using a Q tip or gauze pad, (shower / bathe normally)  Dry wound with Q tip or gauze pad  Apply Aquaphor, Vaseline, Polysporin or Bacitracin Ointment with a Q tip  Do NOT use Neosporin Ointment   Cover the wound with a band-aid or nonstick gauze pad and paper tape.  Repeat wound care once a day until wound is completely healed.    It is an old wives tale that a wound heals better when it is exposed to air and allowed to dry out. The wound will heal faster with a better cosmetic result if it is kept moist with ointment and covered with a bandage.  Do not let the wound dry out.    Supplies Needed:                Qtips or gauze pads                Vaseline, Aquaphor, Polysporin Ointment or Bacitracin Ointment (NOT NEOSPORIN)                Bandaids or nonstick gauze pads and paper tape    Wound care kits and brown paper tape are available for purchase at   the pharmacy.    BLEEDING:    Use tightly rolled up gauze or cloth to apply direct pressure over the bandage for 20   minutes.  Reapply pressure for an additional 20 minutes if necessary  Call the office or go to the nearest emergency room if pressure fails to stop the bleeding.  Use additional gauze and tape to maintain pressure once the bleeding has stopped.  Begin wound care 24 hours after surgery as directed.                WOUND HEALING    One week after surgery a pink / red halo will form around the outside of the wound.   This is new  skin.  The center of the wound will appear yellowish white and produce some drainage.  The pink halo will slowly migrate in toward the center of the wound until the wound is covered with new shiny pink skin.  There will be no more drainage when the wound is completely healed.  It will take six months to one year for the redness to fade.  The scar may be itchy, tight and sensitive to extreme temperatures for a year after the surgery.  Massaging the area several times a day for several minutes after the wound is completely healed will help the scar soften and normalize faster. Begin massage only after healing is complete.    In case of emergency call: Dr Matt: [phone]     [facility]: [phone]  [facility]: [phone]

## 2023-01-05 NOTE — LETTER
1/5/2023         RE: Nicolás Fermin  [address]  [facility] [address]        Dear Colleague,    Thank you for referring your patient, Nicolás Fermin, to the [facility]. Please see a copy of my visit note below.    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  Patient has no other skin complaints today.  Remainder of the HPI, Meds, PMH, Allergies, FH, and SH was reviewed in chart.      Past Medical History:   Diagnosis Date     Asymptomatic human immunodeficiency virus (HIV) infection status (H)      Graves disease 2006     Hypertension      Peripheral neuropathy      Squamous cell carcinoma of skin, unspecified        No past surgical history on file.     Family History   Problem Relation Age of Onset     Thyroid Disease Mother      Breast Cancer Mother      Skin Cancer Mother         face     Hypertension Father      Skin Cancer Father         behind ear     Diabetes Maternal Grandmother      Coronary Artery Disease Paternal Grandmother        Social History     Socioeconomic History     Marital status: Single     Spouse name: Not on file     Number of children: Not on file     Years of education: Not on file     Highest education level: Not on file   Occupational History     Not on file   Tobacco Use     Smoking status: Every Day     Packs/day: 0.50     Years: 50.00     Pack years: 25.00     Types: Cigarettes     Smokeless tobacco: Never     Tobacco comments:     10 cigarettes a day   Substance and Sexual Activity     Alcohol use: Yes     Comment: 2-3/day     Drug use: No     Sexual activity: Not Currently     Partners: Male   Other Topics Concern     Parent/sibling w/ CABG, MI or angioplasty before 65F 55M? No   Social History Narrative     Not on  file     Social Determinants of Health     Financial Resource Strain: Not on file   Food Insecurity: Not on file   Transportation Needs: Not on file   Physical Activity: Not on file   Stress: Not on file   Social Connections: Not on file   Intimate Partner Violence: Not on file   Housing Stability: Not on file       Outpatient Encounter Medications as of 1/5/2023   Medication Sig Dispense Refill     amitriptyline (ELAVIL) 50 MG tablet Take 1 tablet (50 mg) by mouth At Bedtime 60 tablet 0     amLODIPine (NORVASC) 5 MG tablet Take 1 tablet (5 mg) by mouth daily 90 tablet 1     aspirin - buffered (ASCRIPTIN) 325 MG TABS tablet Take 325 mg by mouth daily       atenolol (TENORMIN) 100 MG tablet Take 1 tablet (100 mg) by mouth daily 90 tablet 1     DiphenhydrAMINE HCl (BENADRYL PO) Take by mouth as needed       Dolutegravir Sodium (TIVICAY PO) Take 50 mg by mouth daily        DULoxetine (CYMBALTA) 60 MG capsule Take 1 capsule (60 mg) by mouth daily 60 capsule 0     Emtricitabine-Tenofovir AF (DESCOVY PO) Take by mouth daily       gabapentin (NEURONTIN) 300 MG capsule Take 2 capsules (600 mg) by mouth 2 times daily 240 capsule 0     levothyroxine (SYNTHROID/LEVOTHROID) 112 MCG tablet TAKE 1 TABLET(112 MCG) BY MOUTH DAILY 90 tablet 2     lisinopril (ZESTRIL) 20 MG tablet Take 1 tablet (20 mg) by mouth 2 times daily 180 tablet 2     NAPROXEN PO        Omega-3 Fatty Acids (FISH OIL) 500 MG CAPS        psyllium (METAMUCIL) 58.6 % POWD Take by mouth daily       terbinafine (LAMISIL) 250 MG tablet Take 1 tablet (250 mg) by mouth daily for 90 days 90 tablet 0     fluocinonide (LIDEX) 0.05 % external cream Apply 1/4g to aa on thighs bid x 3 weeks. Do not use on face or body folds. Should last 30 days. (Patient not taking: Reported on 1/5/2023) 60 g 0     Facility-Administered Encounter Medications as of 1/5/2023   Medication Dose Route Frequency Provider Last Rate Last Admin     methylPREDNISolone (DEPO-MEDROL) injection 40 mg  40  mg   Emmanuel Oro DO   40 mg at 01/26/22 1100             Review Of Systems  Skin: As above  Eyes: negative  Ears/Nose/Throat: negative  Respiratory: No shortness of breath, dyspnea on exertion, cough, or hemoptysis  Cardiovascular: negative  Gastrointestinal: negative  Genitourinary: negative  Musculoskeletal: negative  Neurologic: negative  Psychiatric: negative  Hematologic/Lymphatic/Immunologic: negative  Endocrine: negative      O:   NAD, WDWN, Alert & Oriented, Mood & Affect wnl, Vitals stable   Here today alone   General appearance joe ii   Vitals stable   Alert, oriented and in no acute distress   R thigh superior gritty plaque  R thigh inferior 1.2cm red scaly ill-defined plaque    Stuck on papules and brown macules on trunk and ext    Red papules on trunk   Flesh colored papules on trunk          Eyes: Conjunctivae/lids:Normal     ENT: Lips, buccal mucosa, tongue: normal    MSK:Normal    Cardiovascular: peripheral edema none    Pulm: Breathing Normal    Neuro/Psych: Orientation:Normal; Mood/Affect:Normal      A/P:  1. Seborrheic keratosis, lentigo, angioma, dermal nevus, hx of non-melanoma skin cancer   2. R thigh superior actinic keratosis   LN2:  Treated with LN2 for 5s for 1-2 cycles. Warned risks of blistering, pain, pigment change, scarring, and incomplete resolution.  Advised patient to return if lesions do not completely resolve.  Wound care sheet given.  3. R thigh inf squamous cell carcinoma in situ   It was a pleasure speaking to Nicolás Fermin today.  Previous clinic  notes and pertinent laboratory tests were reviewed prior to Nicolás Fermin's visit.  Signs and Symptoms of skin cancer discussed with patient.  Patient encouraged to perform monthly skin exams.  UV precautions reviewed with patient.  Risks of non-melanoma skin cancer discussed with patient   Return to clinic 6 months  PROCEDURE NOTE  R thigh inferior squamous cell carcinoma in situ   MOHS:   Size, Location and Ill-defined  margins    The rationale for Mohs surgery was discussed with the patient and consent was obtained.  The risks and benefits as well as alternatives to therapy were discussed, in detail.  Specifically, the risks of infection, scarring, bleeding, prolonged wound healing, incomplete removal, allergy to anesthesia, nerve injury and recurrence were addressed.  Indication for Mohs was Size, Location and Ill-defined margins. Prior to the procedure, the treatment site was clearly identified and, if available, confirmed with previous photos and confirmed by the patient   All components of the Universal Protocol/PAUSE rule were completed.  The Mohs surgeon operated in two distinct and integrated capacities as the surgeon and pathologist.      The area was prepped with Betasept.  A rim of normal appearing skin was marked circumferentially around the lesion.  The area was infiltrated with local anesthesia.  The tumor was first debulked to remove all clinically apparent tumor.  An incision following the standard Mohs approach was done and the specimen was oriented,mapped and placed in 1 block(s).  Each specimen was then chromacoded and processed in the Mohs laboratory using standard Mohs technique and submitted for frozen section histology.  Frozen section analysis showed no residual tumor but CLEAR MARGINS.      The tumor was excised using standard Mohs technique in 1 stages(s).  CLEAR MARGINS OBTAINED and Final defect size was 1.8x2 cm.     We discussed the options for wound management in full with the patient including risks/benefits/ possible outcomes.          REPAIR SECOND INTENT: We discussed the options for wound management in full with the patient including risks/benefits/possible outcomes. Decision made to allow the wound to heal by second intention. Cautery was used for for hemostasis. EBL minimal; complications none; wound care routine.  The patient was discharged in good condition and will return in one month or prn  for wound evaluation.        Again, thank you for allowing me to participate in the care of your patient.        Sincerely,        Abundio Matt MD

## 2023-01-05 NOTE — PROGRESS NOTES
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  Patient has no other skin complaints today.  Remainder of the HPI, Meds, PMH, Allergies, FH, and SH was reviewed in chart.      Past Medical History:   Diagnosis Date     Asymptomatic human immunodeficiency virus (HIV) infection status (H)      Graves disease 2006     Hypertension      Peripheral neuropathy      Squamous cell carcinoma of skin, unspecified        No past surgical history on file.     Family History   Problem Relation Age of Onset     Thyroid Disease Mother      Breast Cancer Mother      Skin Cancer Mother         face     Hypertension Father      Skin Cancer Father         behind ear     Diabetes Maternal Grandmother      Coronary Artery Disease Paternal Grandmother        Social History     Socioeconomic History     Marital status: Single     Spouse name: Not on file     Number of children: Not on file     Years of education: Not on file     Highest education level: Not on file   Occupational History     Not on file   Tobacco Use     Smoking status: Every Day     Packs/day: 0.50     Years: 50.00     Pack years: 25.00     Types: Cigarettes     Smokeless tobacco: Never     Tobacco comments:     10 cigarettes a day   Substance and Sexual Activity     Alcohol use: Yes     Comment: 2-3/day     Drug use: No     Sexual activity: Not Currently     Partners: Male   Other Topics Concern     Parent/sibling w/ CABG, MI or angioplasty before 65F 55M? No   Social History Narrative     Not on file     Social Determinants of Health     Financial Resource Strain: Not on file   Food Insecurity: Not on file   Transportation Needs: Not on file   Physical Activity: Not on file   Stress: Not on file   Social Connections: Not on file   Intimate Partner Violence: Not on file   Housing Stability: Not on file       Outpatient Encounter Medications as of  1/5/2023   Medication Sig Dispense Refill     amitriptyline (ELAVIL) 50 MG tablet Take 1 tablet (50 mg) by mouth At Bedtime 60 tablet 0     amLODIPine (NORVASC) 5 MG tablet Take 1 tablet (5 mg) by mouth daily 90 tablet 1     aspirin - buffered (ASCRIPTIN) 325 MG TABS tablet Take 325 mg by mouth daily       atenolol (TENORMIN) 100 MG tablet Take 1 tablet (100 mg) by mouth daily 90 tablet 1     DiphenhydrAMINE HCl (BENADRYL PO) Take by mouth as needed       Dolutegravir Sodium (TIVICAY PO) Take 50 mg by mouth daily        DULoxetine (CYMBALTA) 60 MG capsule Take 1 capsule (60 mg) by mouth daily 60 capsule 0     Emtricitabine-Tenofovir AF (DESCOVY PO) Take by mouth daily       gabapentin (NEURONTIN) 300 MG capsule Take 2 capsules (600 mg) by mouth 2 times daily 240 capsule 0     levothyroxine (SYNTHROID/LEVOTHROID) 112 MCG tablet TAKE 1 TABLET(112 MCG) BY MOUTH DAILY 90 tablet 2     lisinopril (ZESTRIL) 20 MG tablet Take 1 tablet (20 mg) by mouth 2 times daily 180 tablet 2     NAPROXEN PO        Omega-3 Fatty Acids (FISH OIL) 500 MG CAPS        psyllium (METAMUCIL) 58.6 % POWD Take by mouth daily       terbinafine (LAMISIL) 250 MG tablet Take 1 tablet (250 mg) by mouth daily for 90 days 90 tablet 0     fluocinonide (LIDEX) 0.05 % external cream Apply 1/4g to aa on thighs bid x 3 weeks. Do not use on face or body folds. Should last 30 days. (Patient not taking: Reported on 1/5/2023) 60 g 0     Facility-Administered Encounter Medications as of 1/5/2023   Medication Dose Route Frequency Provider Last Rate Last Admin     methylPREDNISolone (DEPO-MEDROL) injection 40 mg  40 mg   Emmanuel Oro DO   40 mg at 01/26/22 1100             Review Of Systems  Skin: As above  Eyes: negative  Ears/Nose/Throat: negative  Respiratory: No shortness of breath, dyspnea on exertion, cough, or hemoptysis  Cardiovascular: negative  Gastrointestinal: negative  Genitourinary: negative  Musculoskeletal: negative  Neurologic:  negative  Psychiatric: negative  Hematologic/Lymphatic/Immunologic: negative  Endocrine: negative      O:   NAD, WDWN, Alert & Oriented, Mood & Affect wnl, Vitals stable   Here today alone   General appearance joe ii   Vitals stable   Alert, oriented and in no acute distress   R thigh superior gritty plaque  R thigh inferior 1.2cm red scaly ill-defined plaque    Stuck on papules and brown macules on trunk and ext    Red papules on trunk   Flesh colored papules on trunk          Eyes: Conjunctivae/lids:Normal     ENT: Lips, buccal mucosa, tongue: normal    MSK:Normal    Cardiovascular: peripheral edema none    Pulm: Breathing Normal    Neuro/Psych: Orientation:Normal; Mood/Affect:Normal      A/P:  1. Seborrheic keratosis, lentigo, angioma, dermal nevus, hx of non-melanoma skin cancer   2. R thigh superior actinic keratosis   LN2:  Treated with LN2 for 5s for 1-2 cycles. Warned risks of blistering, pain, pigment change, scarring, and incomplete resolution.  Advised patient to return if lesions do not completely resolve.  Wound care sheet given.  3. R thigh inf squamous cell carcinoma in situ   It was a pleasure speaking to Nicolás Fermin today.  Previous clinic  notes and pertinent laboratory tests were reviewed prior to Nicolás Fermin's visit.  Signs and Symptoms of skin cancer discussed with patient.  Patient encouraged to perform monthly skin exams.  UV precautions reviewed with patient.  Risks of non-melanoma skin cancer discussed with patient   Return to clinic 6 months  PROCEDURE NOTE  R thigh inferior squamous cell carcinoma in situ   MOHS:   Size, Location and Ill-defined margins    The rationale for Mohs surgery was discussed with the patient and consent was obtained.  The risks and benefits as well as alternatives to therapy were discussed, in detail.  Specifically, the risks of infection, scarring, bleeding, prolonged wound healing, incomplete removal, allergy to anesthesia, nerve injury and  recurrence were addressed.  Indication for Mohs was Size, Location and Ill-defined margins. Prior to the procedure, the treatment site was clearly identified and, if available, confirmed with previous photos and confirmed by the patient   All components of the Universal Protocol/PAUSE rule were completed.  The Mohs surgeon operated in two distinct and integrated capacities as the surgeon and pathologist.      The area was prepped with Betasept.  A rim of normal appearing skin was marked circumferentially around the lesion.  The area was infiltrated with local anesthesia.  The tumor was first debulked to remove all clinically apparent tumor.  An incision following the standard Mohs approach was done and the specimen was oriented,mapped and placed in 1 block(s).  Each specimen was then chromacoded and processed in the Mohs laboratory using standard Mohs technique and submitted for frozen section histology.  Frozen section analysis showed no residual tumor but CLEAR MARGINS.      The tumor was excised using standard Mohs technique in 1 stages(s).  CLEAR MARGINS OBTAINED and Final defect size was 1.8x2 cm.     We discussed the options for wound management in full with the patient including risks/benefits/ possible outcomes.          REPAIR SECOND INTENT: We discussed the options for wound management in full with the patient including risks/benefits/possible outcomes. Decision made to allow the wound to heal by second intention. Cautery was used for for hemostasis. EBL minimal; complications none; wound care routine.  The patient was discharged in good condition and will return in one month or prn for wound evaluation.

## 2023-01-16 ENCOUNTER — LAB (OUTPATIENT)
Dept: LAB | Facility: CLINIC | Age: 63
End: 2023-01-16
Payer: COMMERCIAL

## 2023-01-16 DIAGNOSIS — Z51.81 ENCOUNTER FOR THERAPEUTIC DRUG MONITORING: ICD-10-CM

## 2023-01-16 LAB
ALT SERPL W P-5'-P-CCNC: 17 U/L (ref 10–50)
AST SERPL W P-5'-P-CCNC: 23 U/L (ref 10–50)

## 2023-01-16 PROCEDURE — 84450 TRANSFERASE (AST) (SGOT): CPT

## 2023-01-16 PROCEDURE — 36415 COLL VENOUS BLD VENIPUNCTURE: CPT

## 2023-01-16 PROCEDURE — 84460 ALANINE AMINO (ALT) (SGPT): CPT

## 2023-01-31 ENCOUNTER — OFFICE VISIT (OUTPATIENT)
Dept: NEUROLOGY | Facility: CLINIC | Age: 63
End: 2023-01-31
Payer: COMMERCIAL

## 2023-01-31 VITALS — SYSTOLIC BLOOD PRESSURE: 139 MMHG | OXYGEN SATURATION: 98 % | HEART RATE: 64 BPM | DIASTOLIC BLOOD PRESSURE: 83 MMHG

## 2023-01-31 DIAGNOSIS — G60.8 PERIPHERAL SENSORY-MOTOR AXONAL POLYNEUROPATHY: Primary | ICD-10-CM

## 2023-01-31 DIAGNOSIS — G57.12 MERALGIA PARESTHETICA OF LEFT SIDE: ICD-10-CM

## 2023-01-31 PROCEDURE — 99213 OFFICE O/P EST LOW 20 MIN: CPT | Performed by: PSYCHIATRY & NEUROLOGY

## 2023-01-31 RX ORDER — AMITRIPTYLINE HYDROCHLORIDE 50 MG/1
50 TABLET ORAL AT BEDTIME
Qty: 90 TABLET | Refills: 1 | Status: SHIPPED | OUTPATIENT
Start: 2023-01-31 | End: 2023-07-21

## 2023-01-31 RX ORDER — DULOXETIN HYDROCHLORIDE 60 MG/1
60 CAPSULE, DELAYED RELEASE ORAL DAILY
Qty: 90 CAPSULE | Refills: 1 | Status: SHIPPED | OUTPATIENT
Start: 2023-01-31 | End: 2023-07-21

## 2023-01-31 RX ORDER — GABAPENTIN 300 MG/1
600 CAPSULE ORAL 2 TIMES DAILY
Qty: 360 CAPSULE | Refills: 1 | Status: SHIPPED | OUTPATIENT
Start: 2023-01-31 | End: 2023-07-21

## 2023-01-31 NOTE — PROGRESS NOTES
ESTABLISHED PATIENT NEUROLOGY NOTE    DATE OF VISIT: 1/31/2023  CLINIC LOCATION: [facility]  MRN: [id number]  PATIENT NAME: Nicolás Fermin  YOB: 1960    REASON FOR VISIT:   Chief Complaint   Patient presents with     Follow Up     Neuropathy- still can't feel feet, left leg calf area is still painful.      SUBJECTIVE:                                                      HISTORY OF PRESENT ILLNESS: Patient is here to follow up regarding peripheral polyneuropathy and left meralgia paresthetica. Please refer to my initial/other prior notes for further information.    Since the last visit, the patient reports that his symptoms are unchanged after reducing the dose of amitriptyline due to concerns about weight gain.  He lost several pounds.  At the present time, he is 50 mg of amitriptyline at bedtime, 60 mg of duloxetine daily, and 600 mg of gabapentin twice daily.  No significant side effects or interval development of new focal neurological symptoms.  EXAM:                                                    Physical Exam:   Vitals: /83 (BP Location: Right arm, Patient Position: Sitting, Cuff Size: Adult Regular)   Pulse 64   SpO2 98%     General: pt is in NAD, cooperative.  Skin: normal turgor, moist mucous membranes, no lesions/rashes noticed.  HEENT: ATNC, white sclera, normal conjunctiva.  Respiratory: Symmetric lung excursion, no accessory respiratory muscle use.  Abdomen: Non distended.  Neurological: awake, cooperative, follows commands, no exam changes compared to the initial visit.  ASSESSMENT AND PLAN:                                                    Assessment: 62 year old male patient presents for follow-up of peripheral polyneuropathy and left-sided meralgia paresthetica.  He reports that his symptoms are unchanged after reducing the dose of amitriptyline.  It was beneficial for weight loss.  At the present time I do not believe that we need to make any  medication changes.  We discussed that losing weight may also have his left meralgia paresthetica.    Diagnoses:    ICD-10-CM    1. 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 Meralgia paresthetica of left side  G57.12         Plan: At today's visit we thoroughly discussed current symptoms, available treatment options, and the plan.  I am pleased to hear that his symptoms are stable on current therapy.    We decided not to make any medication changes.  Cymbalta, amitriptyline, and gabapentin prescriptions were refilled.    Next follow-up appointment is in the next 6 months or earlier if needed.    Total Time: 24 minutes spent on the date of the encounter doing chart review, history and exam, documentation and further activities per the note.    Stone Nowak MD  [facility]  (Chart documentation was completed in part with Dragon voice-recognition software. Even though reviewed, some grammatical, spelling, and word errors may remain.)

## 2023-01-31 NOTE — PATIENT INSTRUCTIONS
AFTER VISIT SUMMARY (AVS):    At today's visit we thoroughly discussed current symptoms, available treatment options, and the plan.  I am pleased to hear that your symptoms are stable on current therapy.    We decided not to make any medication changes.  Cymbalta, amitriptyline, and gabapentin prescriptions were refilled.    Next follow-up appointment is in the next 6 months or earlier if needed.    Please do not hesitate to call me with any questions or concerns.    Thanks.

## 2023-01-31 NOTE — LETTER
1/31/2023         RE: Nicolás Fermin  [address]  [facility] [address]        Dear Colleague,    Thank you for referring your patient, Nicolás Fermin, to the [facility] NEUROLOGY CLINICS [facility]. Please see a copy of my visit note below.    ESTABLISHED PATIENT NEUROLOGY NOTE    DATE OF VISIT: 1/31/2023  CLINIC LOCATION: [facility]  MRN: [id number]  PATIENT NAME: Nicolás Fermin  YOB: 1960    REASON FOR VISIT:   Chief Complaint   Patient presents with     Follow Up     Neuropathy- still can't feel feet, left leg calf area is still painful.      SUBJECTIVE:                                                      HISTORY OF PRESENT ILLNESS: Patient is here to follow up regarding peripheral polyneuropathy and left meralgia paresthetica. Please refer to my initial/other prior notes for further information.    Since the last visit, the patient reports that his symptoms are unchanged after reducing the dose of amitriptyline due to concerns about weight gain.  He lost several pounds.  At the present time, he is 50 mg of amitriptyline at bedtime, 60 mg of duloxetine daily, and 600 mg of gabapentin twice daily.  No significant side effects or interval development of new focal neurological symptoms.  EXAM:                                                    Physical Exam:   Vitals: /83 (BP Location: Right arm, Patient Position: Sitting, Cuff Size: Adult Regular)   Pulse 64   SpO2 98%     General: pt is in NAD, cooperative.  Skin: normal turgor, moist mucous membranes, no lesions/rashes noticed.  HEENT: ATNC, white sclera, normal conjunctiva.  Respiratory: Symmetric lung excursion, no accessory respiratory muscle use.  Abdomen: Non distended.  Neurological: awake, cooperative, follows commands, no exam changes compared to the initial visit.  ASSESSMENT AND PLAN:                                                    Assessment: 62 year old male patient presents for  "follow-up of peripheral polyneuropathy and left-sided meralgia paresthetica.  He reports that his symptoms are unchanged after reducing the dose of amitriptyline.  It was beneficial for weight loss.  At the present time I do not believe that we need to make any medication changes.  We discussed that losing weight may also have his left meralgia paresthetica.    Diagnoses:    ICD-10-CM    1. 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 Meralgia paresthetica of left side  G57.12         Plan: At today's visit we thoroughly discussed current symptoms, available treatment options, and the plan.  I am pleased to hear that his symptoms are stable on current therapy.    We decided not to make any medication changes.  Cymbalta, amitriptyline, and gabapentin prescriptions were refilled.    Next follow-up appointment is in the next 6 months or earlier if needed.    Total Time: 24 minutes spent on the date of the encounter doing chart review, history and exam, documentation and further activities per the note.    Stone Nowka MD  [facility] Neurology  (Chart documentation was completed in part with Dragon voice-recognition software. Even though reviewed, some grammatical, spelling, and word errors may remain.)      Nicolás Fermin is a 62 year old male who presents for:  Chief Complaint   Patient presents with     Follow Up     Neuropathy- still can't feel feet, left leg calf area is still painful.         Initial Vitals:  There were no vitals taken for this visit. Estimated body mass index is 32.28 kg/m  as calculated from the following:    Height as of 10/10/22: 1.88 m (6' 2\").    Weight as of 10/10/22: 114 kg (251 lb 6.4 oz).. There is no height or weight on file to calculate BSA. BP completed using cuff size: arabella Bonilla      Again, thank you for allowing me to participate in the care of your patient.        Sincerely,        Stone Nowak MD    "

## 2023-01-31 NOTE — PROGRESS NOTES
ESTABLISHED PATIENT NEUROLOGY NOTE    DATE OF VISIT: 1/31/2023  CLINIC LOCATION: [facility]  MRN: [id number]  PATIENT NAME: Nicolás Fermin  YOB: 1960    REASON FOR VISIT: No chief complaint on file.    SUBJECTIVE:                                                      HISTORY OF PRESENT ILLNESS: Patient is here to follow up regarding peripheral polyneuropathy and left meralgia paresthetica. Please refer to my initial/other prior notes for further information.    Since the last visit, the patient reports *** after reducing the dose of amitriptyline due to concerns about weight gain.  At the present time, he is 50 mg of amitriptyline at bedtime, 60 mg of duloxetine daily, and 600 mg of gabapentin twice daily.  No significant side effects or interval development of new focal neurological symptoms.  EXAM:                                                    Physical Exam:   Vitals: There were no vitals taken for this visit.    General: pt is in NAD, cooperative.  Skin: normal turgor, moist mucous membranes, no lesions/rashes noticed.  HEENT: ATNC, white sclera, normal conjunctiva.  Respiratory: Symmetric lung excursion, no accessory respiratory muscle use.  Abdomen: Non distended.  Neurological: awake, cooperative, follows commands, no exam changes compared to the initial visit.  ASSESSMENT AND PLAN:                                                    Assessment: 62 year old male patient presents for follow-up of peripheral polyneuropathy.  He reports that his symptoms are *** after reducing the dose of amitriptyline.    Diagnoses:  No diagnosis found.  Plan:  There are no Patient Instructions on file for this visit.    Total Time: *** minutes spent on the date of the encounter doing chart review, history and exam, documentation and further activities per the note.    Stone Nowak MD  [facility] Neurology  (Chart documentation was completed in part with Krystal  voice-recognition software. Even though reviewed, some grammatical, spelling, and word errors may remain.)

## 2023-03-31 ENCOUNTER — E-VISIT (OUTPATIENT)
Dept: URGENT CARE | Facility: URGENT CARE | Age: 63
End: 2023-03-31
Payer: COMMERCIAL

## 2023-03-31 DIAGNOSIS — R05.9 COUGH, UNSPECIFIED TYPE: Primary | ICD-10-CM

## 2023-03-31 PROCEDURE — 99207 PR NON-BILLABLE SERV PER CHARTING: CPT | Performed by: NURSE PRACTITIONER

## 2023-03-31 NOTE — PATIENT INSTRUCTIONS
Dear Nicolás Fermin,    We are sorry you are not feeling well. Based on the responses you provided, it is recommended that you be seen in-person in urgent care so we can better evaluate your symptoms. Please click here to find the nearest urgent care location to you.   You will not be charged for this Visit. Thank you for trusting us with your care.    AKBAR Burrows CNP

## 2023-04-01 ENCOUNTER — OFFICE VISIT (OUTPATIENT)
Dept: URGENT CARE | Facility: URGENT CARE | Age: 63
End: 2023-04-01
Payer: COMMERCIAL

## 2023-04-01 VITALS
HEART RATE: 67 BPM | TEMPERATURE: 98 F | DIASTOLIC BLOOD PRESSURE: 78 MMHG | SYSTOLIC BLOOD PRESSURE: 129 MMHG | RESPIRATION RATE: 20 BRPM | OXYGEN SATURATION: 94 %

## 2023-04-01 DIAGNOSIS — J20.9 ACUTE BRONCHITIS, UNSPECIFIED ORGANISM: Primary | ICD-10-CM

## 2023-04-01 PROCEDURE — 99213 OFFICE O/P EST LOW 20 MIN: CPT

## 2023-04-01 RX ORDER — AZITHROMYCIN 250 MG/1
TABLET, FILM COATED ORAL
Qty: 6 TABLET | Refills: 0 | Status: SHIPPED | OUTPATIENT
Start: 2023-04-01 | End: 2023-04-06

## 2023-04-01 NOTE — PROGRESS NOTES
Assessment & Plan     Acute bronchitis, unspecified organism    - azithromycin (ZITHROMAX) 250 MG tablet; Take 2 tablets (500 mg) by mouth daily for 1 day, THEN 1 tablet (250 mg) daily for 4 days.    Treat with Zpak.  Continue with rest and fluids.  Close Follow-up if no change or new or worsening sx prn.    Erin Sanchez MD   Urgent Care Provider  [facility] TROY Spear is a 62 year old, presenting for the following health issues:  Cold Symptoms (X 1 week with cough, had COVID tests twice already this past week and were negative )  No flowsheet data found.  HPI     URI sx x 1 week-- tested negative for COVID x 2 this past week.  No fever or chills. Harsh cough-- worsening.  Immunocompromised on Descovy-  Works as - multiple sick contacts.    Review of Systems   Constitutional, HEENT, cardiovascular, pulmonary, GI, , musculoskeletal, neuro, skin, endocrine and psych systems are negative, except as otherwise noted.      Objective    /78 (BP Location: Left arm, Patient Position: Sitting, Cuff Size: Adult Large)   Pulse 67   Temp 98  F (36.7  C) (Oral)   Resp 20   SpO2 94%   There is no height or weight on file to calculate BMI.  Physical Exam   GENERAL: healthy, alert and no distress  EYES: Eyes grossly normal to inspection, PERRL and conjunctivae and sclerae normal  NECK: no adenopathy, no asymmetry, masses, or scars and thyroid normal to palpation  RESP: lungs clear to auscultation - no rales, rhonchi or wheezes, harsh congested cough  CV: regular rate and rhythm, normal S1 S2, no S3 or S4, no murmur, click or rub, no peripheral edema and peripheral pulses strong  MS: no gross musculoskeletal defects noted, no edema  PSYCH: mentation appears normal, affect normal/bright

## 2023-05-31 NOTE — LETTER
[address]  [address]  [phone]        12/15/2022       Nicolás Fermin  [address] [facility] [address]      Dear Nicolás:    You are scheduled for Mohs Surgery on: Thursday January 5, 2023 at 8:45 am.    Please check in at [facility]rd [facility], [address].     You don't need to arrive more than 5-10 minutes prior to your appointment time.     Be sure to eat a good breakfast and bathe and wash your hair prior to surgery.     If you are taking any anti-coagulants that are prescribed by your Doctor (such as Coumadin/Warfarin, Plavix, Aspirin, Ibuprofen), please continue taking them.     However, if you are taking anti-coagulants over the counter without a Doctor's order for a medical condition, please discontinue them 10 days prior to surgery.     Please wear loose comfortable clothing as it could possibly be 4-6 hours until your surgery is completed depending upon how many layers of tissue need to be removed.      Thank you,    Abundio Matt MD   no

## 2023-06-12 ENCOUNTER — OFFICE VISIT (OUTPATIENT)
Dept: FAMILY MEDICINE | Facility: CLINIC | Age: 63
End: 2023-06-12
Payer: COMMERCIAL

## 2023-06-12 DIAGNOSIS — L57.0 AK (ACTINIC KERATOSIS): ICD-10-CM

## 2023-06-12 DIAGNOSIS — D48.9 NEOPLASM OF UNCERTAIN BEHAVIOR: ICD-10-CM

## 2023-06-12 DIAGNOSIS — B35.1 ONYCHOMYCOSIS: Primary | ICD-10-CM

## 2023-06-12 PROCEDURE — 99214 OFFICE O/P EST MOD 30 MIN: CPT | Mod: 25 | Performed by: PHYSICIAN ASSISTANT

## 2023-06-12 PROCEDURE — 11102 TANGNTL BX SKIN SINGLE LES: CPT | Performed by: PHYSICIAN ASSISTANT

## 2023-06-12 PROCEDURE — 17000 DESTRUCT PREMALG LESION: CPT | Mod: XS | Performed by: PHYSICIAN ASSISTANT

## 2023-06-12 PROCEDURE — 11103 TANGNTL BX SKIN EA SEP/ADDL: CPT | Performed by: PHYSICIAN ASSISTANT

## 2023-06-12 PROCEDURE — 88305 TISSUE EXAM BY PATHOLOGIST: CPT | Performed by: PATHOLOGY

## 2023-06-12 PROCEDURE — 17003 DESTRUCT PREMALG LES 2-14: CPT | Performed by: PHYSICIAN ASSISTANT

## 2023-06-12 RX ORDER — ITRACONAZOLE 100 MG/1
CAPSULE ORAL
Qty: 84 CAPSULE | Refills: 0 | Status: SHIPPED | OUTPATIENT
Start: 2023-06-12 | End: 2023-10-31

## 2023-06-12 ASSESSMENT — PAIN SCALES - GENERAL: PAINLEVEL: NO PAIN (0)

## 2023-06-12 NOTE — LETTER
6/12/2023         RE: Nicolás Fermin  [address]  [facility] [address]        Dear Colleague,    Thank you for referring your patient, Nicolás Fermin, to the [facility] [place]. Please see a copy of my visit note below.    [facility] Dermatology Note  Encounter Date: Jun 12, 2023  Office Visit      Dermatology Problem List:  0. NUB x3 - bx 6/12/23 - R mid back, L proximal thigh, R mid back, inferior  1. History of NMSC  - BCC, L NSW s/p MMS 12/5/2019  - SCCis, R posterior thigh s/p MMS 12/5/2019  - SCCis, L ventral proximal leg s/p MMS 11/11/2021  - SCCis, R medial thigh s/p exicision  8/17/2022  - SCCis, R gillette distal s/p MMS 8/25/2022  - SCCis, R distal lateral thigh s/p MMS 1/5/23  2. History of atypical nevus  - Lentiginous junctional melanocytic nevus with mild atypia, right lateral arm s/p shave biopsy 6/11/2020   - Compound dysplastic nevus with moderate atypia, left lateral mid back, s/p shave biopsy 8/27/2021  3. 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 Psoriasis vs dermatitis   - Previous tx: fluocinonide 0.05% cream  ____________________________________________    Assessment & Plan:  # NUB, right mid back ddx SCC vs ISK vs BLK  # NUB, right mid back inferior ddx SCC vs ISK vs BLK  # NUB, L proximal thigh ddx SCC vs ISK vs BLK  - Shave biopsy today; see procedure note below.     # AKs x4  - cryo - see below     # Benign lesions: Multiple benign nevi, solar lentigos, seborrheic keratoses, cherry angiomas. Explained to patient benign nature of lesion. No treatment is necessary at this time unless the lesion changes or becomes symptomatic.   - ABCDs of melanoma were discussed and self skin checks were advised.  - Sun precaution was advised including the use of sun  screens of SPF 30 or higher, sun protective clothing, and avoidance of tanning beds.    # Onychomycosis - no improvement from last visit despite 3mo of terbinafine  - failed 3mo oral terbinafine  - start oral itraconazole 200mg po BID x 7 days, repeat every 4 weeks for 3 consecutive months, hold ASA when taking this medication. May restart in weeks he takes off from the medication.   - follow up with podiatry due to various foot concerns     # History of atypical nevi  # History of dysplastic nevi   # History of NMSC  - ABCDEs of melanoma advised  - Continue photoprotection  - Continue skin exams every 6 months  - Advised to monitor for changing, non-healing, bleeding, painful, changing, or otherwise symptomatic lesions    Procedures Performed:   - Shave biopsy procedure note, location(s): see above. After discussion of benefits and risks including but not limited to bleeding, infection, scar, incomplete removal, recurrence, and non-diagnostic biopsy, written consent and photographs were obtained. The area was cleaned with isopropyl alcohol. 0.5mL of 1% lidocaine with epinephrine was injected to obtain adequate anesthesia of lesion(s). Shave biopsy at site(s) performed. Hemostasis was achieved with aluminium chloride. Petrolatum ointment and a sterile dressing were applied. The patient tolerated the procedure and no complications were noted. The patient was provided with verbal and written post care instructions.      - Cryotherapy procedure note, location(s): eyebrow, L cheek. After verbal consent and discussion of risks and benefits including, but not limited to, dyspigmentation/scar, blister, and pain, 4 lesion(s) was(were) treated with 1-2 mm freeze border for 1-2 cycles with liquid nitrogen. Post cryotherapy instructions were provided.     Follow-up: 6 month(s) in-person, or earlier for new or changing lesions    Staff:     All risks, benefits and alternatives were discussed with patient.  Patient is in  agreement and understands the assessment and plan.  All questions were answered.    Sophia Degroot PA-C, MPAS  [facility] [place]: Phone: [phone], Fax: [phone]  [facility]: Phone: [phone],  Fax: [phone]  [facility]: Phone: [phone], Fax: [phone]  ____________________________________________    CC: Skin Check ([facility]SC)      HPI:  Mr. Nicolás Fermin is a 63 year old male who presents today as a return patient for FBSE. Last seen by Dr. Matt for MMS to an SCCIS on the R distal lateral thigh. Notes a new spot on the L thigh. Notes his toenails are not improved from baseline. The L great toenail has fallen off and now he keeps it covered so try and prevent infection by the other nails. He tolerated the terbinafine well, but it did not help.     Patient is otherwise feeling well, without additional concerns.    Labs:  none    Physical Exam:  Vitals: There were no vitals taken for this visit.  SKIN: Full skin, which includes the head/face, both arms, chest, back, abdomen,both legs, genitalia and/or groin buttocks, digits and/or nails, was examined.   - L proximal thigh - 8mm brown scaly papule  - R mid back - 8mm brown and pink scaly papule  - R mid back, inferior - 6mm pink shiny papule  - There is no erythema, telangectasias, nodularity, or pigmentation on the above listed sites of NMSC.  - There are erythematous macules with overyling adherent scale on the R eyebrow x3, cheek x1.   - Oviedo's skin type II, <100 nevi  - There are dome shaped bright red papules on the trunk.   - Multiple regular brown pigmented macules and papules are identified on the trunk and extremities.   - Scattered brown macules on sun exposed areas.  - There are waxy stuck on tan to brown papules on the trunk.   - all 10 toenails with yellow hyperkeratosis  - ulcer open over the L great toe DIP  - No other  lesions of concern on areas examined.               Medications:  Current Outpatient Medications   Medication     amitriptyline (ELAVIL) 50 MG tablet     amLODIPine (NORVASC) 5 MG tablet     aspirin - buffered (ASCRIPTIN) 325 MG TABS tablet     atenolol (TENORMIN) 100 MG tablet     DiphenhydrAMINE HCl (BENADRYL PO)     Dolutegravir Sodium (TIVICAY PO)     DULoxetine (CYMBALTA) 60 MG capsule     Emtricitabine-Tenofovir AF (DESCOVY PO)     fluocinonide (LIDEX) 0.05 % external cream     gabapentin (NEURONTIN) 300 MG capsule     levothyroxine (SYNTHROID/LEVOTHROID) 112 MCG tablet     lisinopril (ZESTRIL) 20 MG tablet     NAPROXEN PO     Omega-3 Fatty Acids (FISH OIL) 500 MG CAPS     psyllium (METAMUCIL) 58.6 % POWD     Current Facility-Administered Medications   Medication     methylPREDNISolone (DEPO-MEDROL) injection 40 mg      Past Medical/Surgical History:   Patient Active Problem List   Diagnosis     Essential hypertension, benign     Human immunodeficiency virus (HIV) disease (H)     CARDIOVASCULAR SCREENING; LDL GOAL LESS THAN 130     Graves disease     Neuropathy     Hip pain, left     Tobacco abuse disorder     Past Medical History:   Diagnosis Date     Asymptomatic human immunodeficiency virus (HIV) infection status (H)      Graves disease 2006     Hypertension      Peripheral neuropathy      Squamous cell carcinoma of skin, unspecified                           Again, thank you for allowing me to participate in the care of your patient.        Sincerely,        Sophia Degroot PA-C

## 2023-06-12 NOTE — PATIENT INSTRUCTIONS
Patient Education       Proper skin care from [facility]:    -Eliminate harsh soaps as they strip the natural oils from the skin, often resulting in dry itchy skin ( i.e. Dial, Zest, [place])  -Use mild soaps such as Cetaphil or Dove Sensitive Skin in the shower. You do not need to use soap on arms, legs, and trunk every time you shower unless visibly soiled.   -Avoid hot or cold showers.  -After showering, lightly dry off and apply moisturizing within 2-3 minutes. This will help trap moisture in the skin.   -Aggressive use of a moisturizer at least 1-2 times a day to the entire body (including -Vanicream, Cetaphil, Aquaphor or Cerave) and moisturize hands after every washing.  -We recommend using moisturizers that come in a tub that needs to be scooped out, not a pump. This has more of an oil base. It will hold moisture in your skin much better than a water base moisturizer. The above recommended are non-pore clogging.      Wear a sunscreen with at least SPF 30 on your face, ears, neck and V of the chest daily. Wear sunscreen on other areas of the body if those areas are exposed to the sun throughout the day. Sunscreens can contain physical and/or chemical blockers. Physical blockers are less likely to clog pores, these include zinc oxide and titanium dioxide. Reapply every two hour and after swimming.     Sunscreen examples: https://www.ewg.org/sunscreen/    UV radiation  UVA radiation remains constant throughout the day and throughout the year. It is a longer wavelength than UVB and therefore penetrates deeper into the skin leading to immediate and delayed tanning, photoaging, and skin cancer. 70-80% of UVA and UVB radiation occurs between the hours of 10am-2pm.  UVB radiation  UVB radiation causes the most harmful effects and is more significant during the summer months. However, snow and ice can reflect UVB radiation leading to skin damage during the winter months as well. UVB radiation is  responsible for tanning, burning, inflammation, delayed erythema (pinkness), pigmentation (brown spots), and skin cancer.     I recommend self monthly full body exams and yearly full body exams with a dermatology provider. If you develop a new or changing lesion please follow up for examination. Most skin cancers are pink and scaly or pink and pearly. However, we do see blue/brown/black skin cancers.  Consider the ABCDEs of melanoma when giving yourself your monthly full body exam ( don't forget the groin, buttocks, feet, toes, etc). A-asymmetry, B-borders, C-color, D-diameter, E-elevation or evolving. If you see any of these changes please follow up in clinic. If you cannot see your back I recommend purchasing a hand held mirror to use with a larger wall mirror.       Checking for Skin Cancer  You can find cancer early by checking your skin each month. There are 3 kinds of skin cancer. They are melanoma, basal cell carcinoma, and squamous cell carcinoma. Doing monthly skin checks is the best way to find new marks or skin changes. Follow the instructions below for checking your skin.   The ABCDEs of checking moles for melanoma   Check your moles or growths for signs of melanoma using ABCDE:   Asymmetry: the sides of the mole or growth don t match  Border: the edges are ragged, notched, or blurred  Color: the color within the mole or growth varies  Diameter: the mole or growth is larger than 6 mm (size of a pencil eraser)  Evolving: the size, shape, or color of the mole or growth is changing (evolving is not shown in the images below)    Checking for other types of skin cancer  Basal cell carcinoma or squamous cell carcinoma have symptoms such as:     A spot or mole that looks different from all other marks on your skin  Changes in how an area feels, such as itching, tenderness, or pain  Changes in the skin's surface, such as oozing, bleeding, or scaliness  A sore that does not heal  New swelling or redness beyond  the border of a mole    Who s at risk?  Anyone can get skin cancer. But you are at greater risk if you have:   Fair skin, light-colored hair, or light-colored eyes  Many moles or abnormal moles on your skin  A history of sunburns from sunlight or tanning beds  A family history of skin cancer  A history of exposure to radiation or chemicals  A weakened immune system  If you have had skin cancer in the past, you are at risk for recurring skin cancer.   How to check your skin  Do your monthly skin checkups in front of a full-length mirror. Check all parts of your body, including your:   Head (ears, face, neck, and scalp)  Torso (front, back, and sides)  Arms (tops, undersides, upper, and lower armpits)  Hands (palms, backs, and fingers, including under the nails)  Buttocks and genitals  Legs (front, back, and sides)  Feet (tops, soles, toes, including under the nails, and between toes)  If you have a lot of moles, take digital photos of them each month. Make sure to take photos both up close and from a distance. These can help you see if any moles change over time.   Most skin changes are not cancer. But if you see any changes in your skin, call your doctor right away. Only he or she can diagnose a problem. If you have skin cancer, seeing your doctor can be the first step toward getting the treatment that could save your life.   [employer] last reviewed this educational content on 4/1/2019 2000-2020 [employer]. [address] [place] [address], [place] [address]. All rights reserved. This information is not intended as a substitute for professional medical care. Always follow your healthcare professional's instructions.       When should I call my doctor?  If you are worsening or not improving, please, contact us or seek urgent care as noted below.     Who should I call with questions (adults)?  [facility] (adult and pediatric): [phone]  [facility]  [place] (adult): [phone]  [facility] ([place], [place], [place] and [place]) [phone]  For urgent needs outside of business hours call the [facility] at [phone] and ask for the dermatology resident on call to be paged  If this is a medical emergency and you are unable to reach an ER, Call 911      If you need a prescription refill, please contact your pharmacy. Refills are approved or denied by our Physicians during normal business hours, Monday through Fridays  Per office policy, refills will not be granted if you have not been seen within the past year (or sooner depending on your child's condition)   Wound Care After a Biopsy    What is a skin biopsy?  A skin biopsy allows the doctor to examine a very small piece of tissue under the microscope to determine the diagnosis and the best treatment for the skin condition. A local anesthetic (numbing medicine) is injected with a very small needle into the skin area to be tested. A small piece of skin is taken from the area. Sometimes a suture (stitch) is used.     What are the risks of a skin biopsy?  I will experience scar, bleeding, swelling, pain, crusting and redness. I may experience incomplete removal or recurrence. Risks of this procedure are excessive bleeding, bruising, infection, nerve damage, numbness, thick (hypertrophic or keloidal) scar and non-diagnostic biopsy.    How should I care for my wound for the first 24 hours?  Keep the wound dry and covered for 24 hours  If it bleeds, hold direct pressure on the area for 15 minutes. If bleeding does not stop, call us or go to the emergency room  Avoid strenuous exercise the first 1-2 days or as your doctor instructs you    How should I care for the wound after 24 hours?  After 24 hours, remove the bandage  You may bathe or shower as normal  If you had a scalp biopsy, you can shampoo as usual and can use shower water to clean the biopsy site daily  Clean  the wound once a day with gentle soap and water  Do not scrub, be gentle  Apply white petroleum/Vaseline after cleaning the wound with a cotton swab or a clean finger, and keep the site covered with a Bandaid /bandage. Bandages are not necessary with a scalp biopsy  If you are unable to cover the site with a Bandaid /bandage, re-apply ointment 2-3 times a day to keep the site moist. Moisture will help with healing  Avoid strenuous activity for first 1-2 days  Avoid lakes, rivers, pools, and oceans until the stitches are removed or the site is healed    How do I clean my wound?  Wash hands thoroughly with soap or use hand  before all wound care  Clean the wound with gentle soap and water  Apply white petroleum/Vaseline  to wound after it is clean  Replace the Bandaid /bandage to keep the wound covered for the first few days or as instructed by your doctor  If you had a scalp biopsy, warm shower water to the area on a daily basis should suffice    What should I use to clean my wound?   Cotton-tipped applicators (Qtips )  White petroleum jelly (Vaseline ). Use a clean new container and use Q-tips to apply.  Bandaids  as needed  Gentle soap     How should I care for my wound long term?  Do not get your wound dirty  Keep up with wound care for one week or until the area is healed.  A small scab will form and fall off by itself when the area is completely healed. The area will be red and will become pink in color as it heals. Sun protection is very important for how your scar will turn out. Sunscreen with an SPF 30 or greater is recommended once the area is healed.  You should have some soreness but it should be mild and slowly go away over several days. Talk to your doctor about using tylenol for pain,    When should I call my doctor?  If you have increased:   Pain or swelling  Pus or drainage (clear or slightly yellow drainage is ok)  Temperature over 100F  Spreading redness or warmth around wound    When will  I hear about my results?  The biopsy results can take 2 weeks to come back.  Your results will automatically release to [employer] before your provider has even reviewed them.  The clinic will call you with the results, send you a [employer] message, or have you schedule a follow-up clinic or phone time to discuss the results.  Contact our clinics if you do not hear from us in 2 weeks.    Who should I call with questions?  [facility]: [phone]  [facility]: [phone]  For urgent needs outside of business hours call the [facility] at [phone] and ask for the dermatology resident on call

## 2023-06-12 NOTE — PROGRESS NOTES
[facility] Note  Encounter Date: Jun 12, 2023  Office Visit      Dermatology Problem List:  0. NUB x3 - bx 6/12/23 - R mid back, L proximal thigh, R mid back, inferior  1. History of NMSC  - BCC, L NSW s/p MMS 12/5/2019  - SCCis, R posterior thigh s/p MMS 12/5/2019  - SCCis, L ventral proximal leg s/p MMS 11/11/2021  - SCCis, R medial thigh s/p exicision  8/17/2022  - SCCis, R gillette distal s/p MMS 8/25/2022  - SCCis, R distal lateral thigh s/p MMS 1/5/23  2. History of atypical nevus  - Lentiginous junctional melanocytic nevus with mild atypia, right lateral arm s/p shave biopsy 6/11/2020   - Compound dysplastic nevus with moderate atypia, left lateral mid back, s/p shave biopsy 8/27/2021  3. 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 Psoriasis vs dermatitis   - Previous tx: fluocinonide 0.05% cream  ____________________________________________    Assessment & Plan:  # NUB, right mid back ddx SCC vs ISK vs BLK  # NUB, right mid back inferior ddx SCC vs ISK vs BLK  # NUB, L proximal thigh ddx SCC vs ISK vs BLK  - Shave biopsy today; see procedure note below.     # AKs x4  - cryo - see below     # Benign lesions: Multiple benign nevi, solar lentigos, seborrheic keratoses, cherry angiomas. Explained to patient benign nature of lesion. No treatment is necessary at this time unless the lesion changes or becomes symptomatic.   - ABCDs of melanoma were discussed and self skin checks were advised.  - Sun precaution was advised including the use of sun screens of SPF 30 or higher, sun protective clothing, and avoidance of tanning beds.    # Onychomycosis - no improvement from last visit despite 3mo of terbinafine  - failed 3mo oral terbinafine  - start oral itraconazole 200mg po BID x 7 days, repeat  every 4 weeks for 3 consecutive months, hold ASA when taking this medication. May restart in weeks he takes off from the medication.   - follow up with podiatry due to various foot concerns     # History of atypical nevi  # History of dysplastic nevi   # History of NMSC  - ABCDEs of melanoma advised  - Continue photoprotection  - Continue skin exams every 6 months  - Advised to monitor for changing, non-healing, bleeding, painful, changing, or otherwise symptomatic lesions    Procedures Performed:   - Shave biopsy procedure note, location(s): see above. After discussion of benefits and risks including but not limited to bleeding, infection, scar, incomplete removal, recurrence, and non-diagnostic biopsy, written consent and photographs were obtained. The area was cleaned with isopropyl alcohol. 0.5mL of 1% lidocaine with epinephrine was injected to obtain adequate anesthesia of lesion(s). Shave biopsy at site(s) performed. Hemostasis was achieved with aluminium chloride. Petrolatum ointment and a sterile dressing were applied. The patient tolerated the procedure and no complications were noted. The patient was provided with verbal and written post care instructions.      - Cryotherapy procedure note, location(s): eyebrow, L cheek. After verbal consent and discussion of risks and benefits including, but not limited to, dyspigmentation/scar, blister, and pain, 4 lesion(s) was(were) treated with 1-2 mm freeze border for 1-2 cycles with liquid nitrogen. Post cryotherapy instructions were provided.     Follow-up: 6 month(s) in-person, or earlier for new or changing lesions    Staff:     All risks, benefits and alternatives were discussed with patient.  Patient is in agreement and understands the assessment and plan.  All questions were answered.    Sophia Degroot PA-C, MPAS  [facility]: Phone: [phone], Fax: [phone]  [facility]: Phone: [phone],  Fax: [phone]  [facility]: Phone: [phone], Fax: [phone]  ____________________________________________    CC: Skin Check (FBSC)      HPI:  Mr. Nicolás Fermin is a 63 year old male who presents today as a return patient for FBSE. Last seen by Dr. Matt for MMS to an SCCIS on the R distal lateral thigh. Notes a new spot on the L thigh. Notes his toenails are not improved from baseline. The L great toenail has fallen off and now he keeps it covered so try and prevent infection by the other nails. He tolerated the terbinafine well, but it did not help.     Patient is otherwise feeling well, without additional concerns.    Labs:  none    Physical Exam:  Vitals: There were no vitals taken for this visit.  SKIN: Full skin, which includes the head/face, both arms, chest, back, abdomen,both legs, genitalia and/or groin buttocks, digits and/or nails, was examined.   - L proximal thigh - 8mm brown scaly papule  - R mid back - 8mm brown and pink scaly papule  - R mid back, inferior - 6mm pink shiny papule  - There is no erythema, telangectasias, nodularity, or pigmentation on the above listed sites of NMSC.  - There are erythematous macules with overyling adherent scale on the R eyebrow x3, cheek x1.   - Oviedo's skin type II, <100 nevi  - There are dome shaped bright red papules on the trunk.   - Multiple regular brown pigmented macules and papules are identified on the trunk and extremities.   - Scattered brown macules on sun exposed areas.  - There are waxy stuck on tan to brown papules on the trunk.   - all 10 toenails with yellow hyperkeratosis  - ulcer open over the L great toe DIP  - No other lesions of concern on areas examined.               Medications:  Current Outpatient Medications   Medication     amitriptyline (ELAVIL) 50 MG tablet     amLODIPine (NORVASC) 5 MG tablet     aspirin - buffered (ASCRIPTIN) 325 MG TABS tablet     atenolol  (TENORMIN) 100 MG tablet     DiphenhydrAMINE HCl (BENADRYL PO)     Dolutegravir Sodium (TIVICAY PO)     DULoxetine (CYMBALTA) 60 MG capsule     Emtricitabine-Tenofovir AF (DESCOVY PO)     fluocinonide (LIDEX) 0.05 % external cream     gabapentin (NEURONTIN) 300 MG capsule     levothyroxine (SYNTHROID/LEVOTHROID) 112 MCG tablet     lisinopril (ZESTRIL) 20 MG tablet     NAPROXEN PO     Omega-3 Fatty Acids (FISH OIL) 500 MG CAPS     psyllium (METAMUCIL) 58.6 % POWD     Current Facility-Administered Medications   Medication     methylPREDNISolone (DEPO-MEDROL) injection 40 mg      Past Medical/Surgical History:   Patient Active Problem List   Diagnosis     Essential hypertension, benign     Human immunodeficiency virus (HIV) disease (H)     CARDIOVASCULAR SCREENING; LDL GOAL LESS THAN 130     Graves disease     Neuropathy     Hip pain, left     Tobacco abuse disorder     Past Medical History:   Diagnosis Date     Asymptomatic human immunodeficiency virus (HIV) infection status (H)      Graves disease 2006     Hypertension      Peripheral neuropathy      Squamous cell carcinoma of skin, unspecified

## 2023-06-14 ENCOUNTER — TELEPHONE (OUTPATIENT)
Dept: FAMILY MEDICINE | Facility: CLINIC | Age: 63
End: 2023-06-14
Payer: COMMERCIAL

## 2023-06-14 NOTE — TELEPHONE ENCOUNTER
Prior Authorization Retail Medication Request    Medication/Dose: itraconazole 100 mg  ICD code (if different than what is on RX):    Previously Tried and Failed:    Rationale:      Insurance Name:    Insurance ID:        Pharmacy Information (if different than what is on RX)  Name:  [facility]  Phone:

## 2023-06-15 ENCOUNTER — TELEPHONE (OUTPATIENT)
Dept: FAMILY MEDICINE | Facility: CLINIC | Age: 63
End: 2023-06-15
Payer: COMMERCIAL

## 2023-06-15 NOTE — TELEPHONE ENCOUNTER
S/w pt and scheduled for ED&C on Monday July 24th at 10:30 am with Sophia Degroot.    Rose Mary LANCASTER RN  [facility] [place]  [phone]

## 2023-06-15 NOTE — TELEPHONE ENCOUNTER
----- Message from Sophia Degroot PA-C sent at 6/14/2023  5:17 PM CDT -----  - R mid back, inferior- ED&C with me  - L thigh and R mid back - needs LN2     Can do same day or separate, patient can decide    The spot we biopsied on your lower back was in fact a skin cancer called basal cell carcinoma. This is the most common type of skin cancer and is related to sun damage. We do recommend a secondary procedure at the site to ensure the entire cancer is removed, it is called an ED&C. ED&C stands for electrodesiccation and curettage. This has a 95% cure rate. It is done under local anesthesia (just like the biopsy) and we essentially scrape and burn the area. It results is a pink scar, slightly larger than the biopsy itself. No stiches.     Additionally, the other two spots were both precancerous lesions called AKs. These will need to be treated again as well, but wit liquid nitrogen.      I will have my nurses reach out to you to discuss further and schedule the secondary procedures.

## 2023-06-19 NOTE — TELEPHONE ENCOUNTER
Central Prior Authorization Team   Phone: [phone]    PA Initiation    Medication: Itraconazole 100 mg  Insurance Company: [employer] [place] - Phone [phone] Fax [phone]  Pharmacy Filling the Rx: [id number] - [place], [place] - [address]   Filling Pharmacy Phone: [phone]  Filling Pharmacy Fax:    Start Date: 6/19/2023

## 2023-06-19 NOTE — TELEPHONE ENCOUNTER
Prior Authorization Approval    Authorization Effective Date: 3/21/2023  Authorization Expiration Date: 6/19/2024  Medication: Itraconazole 100 mg  Approved Dose/Quantity:    Reference #:     Insurance Company: [employer] [place] - Phone [phone] Fax [phone]  Expected CoPay:       CoPay Card Available:      Foundation Assistance Needed:    Which Pharmacy is filling the prescription (Not needed for infusion/clinic administered): [id number] - [place], [place] - [address]   Pharmacy Notified: Yes  Patient Notified: Yes  **Instructed pharmacy to notify patient when script is ready to /ship.**

## 2023-07-20 ENCOUNTER — TELEPHONE (OUTPATIENT)
Dept: NEUROLOGY | Facility: CLINIC | Age: 63
End: 2023-07-20
Payer: COMMERCIAL

## 2023-07-21 ENCOUNTER — OFFICE VISIT (OUTPATIENT)
Dept: NEUROLOGY | Facility: CLINIC | Age: 63
End: 2023-07-21
Payer: COMMERCIAL

## 2023-07-21 VITALS — HEART RATE: 61 BPM | DIASTOLIC BLOOD PRESSURE: 84 MMHG | OXYGEN SATURATION: 100 % | SYSTOLIC BLOOD PRESSURE: 146 MMHG

## 2023-07-21 DIAGNOSIS — G60.8 PERIPHERAL SENSORY-MOTOR AXONAL POLYNEUROPATHY: Primary | ICD-10-CM

## 2023-07-21 DIAGNOSIS — I10 ESSENTIAL HYPERTENSION, BENIGN: ICD-10-CM

## 2023-07-21 DIAGNOSIS — G57.12 MERALGIA PARESTHETICA OF LEFT SIDE: ICD-10-CM

## 2023-07-21 PROCEDURE — 99214 OFFICE O/P EST MOD 30 MIN: CPT | Performed by: PSYCHIATRY & NEUROLOGY

## 2023-07-21 RX ORDER — AMITRIPTYLINE HYDROCHLORIDE 50 MG/1
50 TABLET ORAL AT BEDTIME
Qty: 90 TABLET | Refills: 1 | Status: SHIPPED | OUTPATIENT
Start: 2023-07-21 | End: 2023-12-08

## 2023-07-21 RX ORDER — GABAPENTIN 300 MG/1
600 CAPSULE ORAL 2 TIMES DAILY
Qty: 360 CAPSULE | Refills: 1 | Status: SHIPPED | OUTPATIENT
Start: 2023-07-21 | End: 2023-12-08

## 2023-07-21 RX ORDER — DULOXETIN HYDROCHLORIDE 60 MG/1
60 CAPSULE, DELAYED RELEASE ORAL DAILY
Qty: 90 CAPSULE | Refills: 1 | Status: SHIPPED | OUTPATIENT
Start: 2023-07-21 | End: 2023-12-08

## 2023-07-21 NOTE — PROGRESS NOTES
"Nicolás Fermin is a 63 year old male who presents for:  Chief Complaint   Patient presents with     Follow Up     Neuropathy- no changes        Initial Vitals:  BP (!) 174/89 (BP Location: Right arm, Patient Position: Sitting, Cuff Size: Adult Large)   Pulse 61   SpO2 100%  Estimated body mass index is 32.28 kg/m  as calculated from the following:    Height as of 10/10/22: 1.88 m (6' 2\").    Weight as of 10/10/22: 114 kg (251 lb 6.4 oz).. There is no height or weight on file to calculate BSA. BP completed using cuff size: [place]  "

## 2023-07-21 NOTE — PROGRESS NOTES
ESTABLISHED PATIENT NEUROLOGY NOTE    DATE OF VISIT: 7/21/2023  CLINIC LOCATION: [facility]  MRN: [id number]  PATIENT NAME: Nicolás Fermin  YOB: 1960    REASON FOR VISIT:   Chief Complaint   Patient presents with     Follow Up     Neuropathy- no changes     SUBJECTIVE:                                                      HISTORY OF PRESENT ILLNESS: Patient is here to follow up regarding peripheral polyneuropathy and left meralgia paresthetica.  Last visit was on 1/31/2023.  Please refer to my initial/other prior notes for further information.    Since the last visit, the patient reports that his symptoms are stable.  He is on 50 mg of amitriptyline at bedtime, 60 mg of duloxetine daily, and 600 mg of gabapentin twice daily.  Denies any significant side effects or interval development of new neurological symptoms.  EXAM:                                                    Physical Exam:   Vitals: BP (!) 174/89 (BP Location: Right arm, Patient Position: Sitting, Cuff Size: Adult Large)   Pulse 61   SpO2 100%     General: pt is in NAD, cooperative.  Skin: normal turgor, moist mucous membranes, no lesions/rashes noticed.  HEENT: ATNC, white sclera, normal conjunctiva.  Respiratory: Symmetric lung excursion, no accessory respiratory muscle use.  Abdomen: Non distended.  Neurological: awake, cooperative, follows commands, no aphasia or dysarthria noted, cranial nerves II-XII: no ptosis, face is symmetric, equally moves all extremities. Achilles reflexes are absent bilaterally, light touch/pinprick and vibration reduced in both feet, pinprick is also reduced in the distribution of left femoral cutaneous nerve, no dysmetria bilaterally, casual gait is normal.  ASSESSMENT AND PLAN:                                                    Assessment: 63 year old male patient presents for follow-up of peripheral polyneuropathy and left-sided meralgia paresthetica.  He reports that his symptoms  are stable.  Previously we discussed option of lateral femoral cutaneous nerve block.  Additional options include increasing the dose of gabapentin and/or duloxetine.  In the past, we reduced the dose of amitriptyline due to concerns about weight gain.  At the present time, I do not think any medication changes are necessary.  I refilled his prescription for amitriptyline, duloxetine, and gabapentin.    Rainer to follow up with Primary Care provider regarding elevated blood pressure.     Diagnoses:    ICD-10-CM    1. 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 Meralgia paresthetica of left side  G57.12         Plan: At today's visit we thoroughly discussed current symptoms, available treatment options, and the plan.    We decided not to make any medication changes. I refilled prescriptions for amitriptyline, gabapentin, and Cymbalta.    Next follow-up appointment is in the next 6 months or sooner if needed.    Total Time: 15 minutes spent on the date of the encounter doing chart review, history and exam, documentation and further activities per the note.    Stone Nowak MD  [facility]  (Chart documentation was completed in part with Dragon voice-recognition software. Even though reviewed, some grammatical, spelling, and word errors may remain.)

## 2023-07-21 NOTE — PATIENT INSTRUCTIONS
AFTER VISIT SUMMARY (AVS):    At today's visit we thoroughly discussed current symptoms, available treatment options, and the plan.    We decided not to make any medication changes. I refilled your prescriptions for amitriptyline, gabapentin, and Cymbalta.    Next follow-up appointment is in the next 6 months or sooner if needed.    Please do not hesitate to call me with any questions or concerns.    Thanks.

## 2023-07-21 NOTE — LETTER
7/21/2023         RE: Nicolás Fermin  [address]  [facility] [address]        Dear Colleague,    Thank you for referring your patient, Nicolás Fermin, to the [facility] NEUROLOGY CLINICS [facility]. Please see a copy of my visit note below.    ESTABLISHED PATIENT NEUROLOGY NOTE    DATE OF VISIT: 7/21/2023  CLINIC LOCATION: [facility]  MRN: [id number]  PATIENT NAME: Nicolás Fermin  YOB: 1960    REASON FOR VISIT:   Chief Complaint   Patient presents with     Follow Up     Neuropathy- no changes     SUBJECTIVE:                                                      HISTORY OF PRESENT ILLNESS: Patient is here to follow up regarding peripheral polyneuropathy and left meralgia paresthetica.  Last visit was on 1/31/2023.  Please refer to my initial/other prior notes for further information.    Since the last visit, the patient reports that his symptoms are stable.  He is on 50 mg of amitriptyline at bedtime, 60 mg of duloxetine daily, and 600 mg of gabapentin twice daily.  Denies any significant side effects or interval development of new neurological symptoms.  EXAM:                                                    Physical Exam:   Vitals: BP (!) 174/89 (BP Location: Right arm, Patient Position: Sitting, Cuff Size: Adult Large)   Pulse 61   SpO2 100%     General: pt is in NAD, cooperative.  Skin: normal turgor, moist mucous membranes, no lesions/rashes noticed.  HEENT: ATNC, white sclera, normal conjunctiva.  Respiratory: Symmetric lung excursion, no accessory respiratory muscle use.  Abdomen: Non distended.  Neurological: awake, cooperative, follows commands, no aphasia or dysarthria noted, cranial nerves II-XII: no ptosis, face is symmetric, equally moves all extremities. Achilles reflexes are absent bilaterally, light touch/pinprick and vibration reduced in both feet, pinprick is also reduced in the distribution of left femoral cutaneous nerve, no dysmetria  bilaterally, casual gait is normal.  ASSESSMENT AND PLAN:                                                    Assessment: 63 year old male patient presents for follow-up of peripheral polyneuropathy and left-sided meralgia paresthetica.  He reports that his symptoms are stable.  Previously we discussed option of lateral femoral cutaneous nerve block.  Additional options include increasing the dose of gabapentin and/or duloxetine.  In the past, we reduced the dose of amitriptyline due to concerns about weight gain.  At the present time, I do not think any medication changes are necessary.  I refilled his prescription for amitriptyline, duloxetine, and gabapentin.    Rainer to follow up with Primary Care provider regarding elevated blood pressure.     Diagnoses:    ICD-10-CM    1. 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 Meralgia paresthetica of left side  G57.12         Plan: At today's visit we thoroughly discussed current symptoms, available treatment options, and the plan.    We decided not to make any medication changes. I refilled prescriptions for amitriptyline, gabapentin, and Cymbalta.    Next follow-up appointment is in the next 6 months or sooner if needed.    Total Time: 15 minutes spent on the date of the encounter doing chart review, history and exam, documentation and further activities per the note.    Stone Nowak MD  [facility]  (Chart documentation was completed in part with Dragon voice-recognition software. Even though reviewed, some grammatical, spelling, and word errors may remain.)      Nicolás Fermin is a 63 year old male who presents for:  Chief Complaint   Patient presents with     Follow Up     Neuropathy- no changes        Initial Vitals:  BP (!) 174/89 (BP Location: Right arm, Patient Position: Sitting, Cuff Size: Adult Large)   Pulse 61   SpO2 100%  Estimated body mass index is 32.28 kg/m  as calculated from the following:    Height as  "of 10/10/22: 1.88 m (6' 2\").    Weight as of 10/10/22: 114 kg (251 lb 6.4 oz).. There is no height or weight on file to calculate BSA. BP completed using cuff size: [place]      Again, thank you for allowing me to participate in the care of your patient.        Sincerely,        Stone Nowak MD    "

## 2023-07-24 ENCOUNTER — OFFICE VISIT (OUTPATIENT)
Dept: FAMILY MEDICINE | Facility: CLINIC | Age: 63
End: 2023-07-24
Payer: COMMERCIAL

## 2023-07-24 DIAGNOSIS — B35.1 ONYCHOMYCOSIS: ICD-10-CM

## 2023-07-24 DIAGNOSIS — L57.0 ACTINIC KERATOSIS: ICD-10-CM

## 2023-07-24 DIAGNOSIS — C44.519 BASAL CELL CARCINOMA (BCC) OF BACK: Primary | ICD-10-CM

## 2023-07-24 PROCEDURE — 17003 DESTRUCT PREMALG LES 2-14: CPT | Performed by: PHYSICIAN ASSISTANT

## 2023-07-24 PROCEDURE — 17000 DESTRUCT PREMALG LESION: CPT | Mod: XS | Performed by: PHYSICIAN ASSISTANT

## 2023-07-24 PROCEDURE — 17262 DSTRJ MAL LES T/A/L 1.1-2.0: CPT | Performed by: PHYSICIAN ASSISTANT

## 2023-07-24 RX ORDER — ATENOLOL 100 MG/1
100 TABLET ORAL DAILY
Qty: 90 TABLET | Refills: 0 | Status: SHIPPED | OUTPATIENT
Start: 2023-07-24 | End: 2023-11-07

## 2023-07-24 RX ORDER — ITRACONAZOLE 100 MG/1
CAPSULE ORAL
Qty: 84 CAPSULE | Refills: 0 | Status: CANCELLED | OUTPATIENT
Start: 2023-07-24

## 2023-07-24 ASSESSMENT — PAIN SCALES - GENERAL: PAINLEVEL: NO PAIN (0)

## 2023-07-24 NOTE — LETTER
7/24/2023         RE: Nicolás Fermin  [address]  [facility] [address]        Dear Colleague,    Thank you for referring your patient, Nicolás Fermin, to the [facility]. Please see a copy of my visit note below.    [facility] Dermatology Note  Encounter Date: Jul 24, 2023  Office Visit      Dermatology Problem List:  1. History of NMSC  - BCC, L NSW s/p MMS 12/5/2019  - SCCis, R posterior thigh s/p MMS 12/5/2019  - SCCis, L ventral proximal leg s/p MMS 11/11/2021  - SCCis, R medial thigh s/p exicision  8/17/2022  - SCCis, R gillette distal s/p MMS 8/25/2022  - SCCis, R distal lateral thigh s/p MMS 1/5/23  - BCC - R mid back inferior - s/p ED&C 7/24/23  2. History of atypical nevus  - Lentiginous junctional melanocytic nevus with mild atypia, right lateral arm s/p shave biopsy 6/11/2020   - Compound dysplastic nevus with moderate atypia, left lateral mid back, s/p shave biopsy 8/27/2021  3. 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 Psoriasis vs dermatitis   - Previous tx: fluocinonide 0.05% cream  ____________________________________________    Assessment & Plan:  # Basal Cell Carcinoma - R mid back, inferior  - ED&C - see procedure below    # Actinic keratosis x2 - biopsy proven on the L proximal thigh and the R mid back.   - Cryotherapy performed today, see procedure note below.    Procedures Performed:    - Cryotherapy procedure note, location(s): see above. After verbal consent and discussion of risks and benefits including, but not limited to, dyspigmentation/scar, blister, and pain, 2 lesion(s) was(were) treated with 1-2 mm freeze border for 1-2 cycles with liquid nitrogen. Post cryotherapy  instructions were provided.     Dermatology Procedure Note: Electrodesiccation and Curettage    PREOPERATIVE DIAGNOSIS: Superficial BCC    POSTOPERATIVE DIAGNOSIS: same    LOCATION: R mid back, inferior    SIZE: original size 6mm1    Treatment options including electrodessiccation and curettage (ED and C), excision and topicals were reviewed.  The expected cure rates, healing times and anticipated scars of each option were discussed and the patient elects to proceed with ED and C.     The risks and benefits of the procedure were described to the patient.  These include but are not limited to bleeding, infection, scar, incomplete removal, and recurrence. Written informed consent was obtained. Time-out was performed. The above site was cleansed with and injected with 1mL1% lidocaine with epinephrine. Once anesthesia was obtained, the site was prepped with Alcohol. The lesion was curetted with  in 3 directions with a 5 mm margin and this was followed by electrodessication.  This process was repeated three times. The defect measured 1.2cm final size. Vaseline and a bandage were applied to the wound. The patient tolerated the procedure well and was given post care instructions.    Follow-up: 6 month(s) in-person, or earlier for new or changing lesions    Staff:     All risks, benefits and alternatives were discussed with patient.  Patient is in agreement and understands the assessment and plan.  All questions were answered.    Sophia Degroot PA-C, MPAS  [facility]: Phone: [phone], Fax: [phone]  [facility]: Phone: [phone],  Fax: [phone]  [facility]: Phone: [phone], Fax: [phone]  ____________________________________________    CC: Derm Problem (ED&C on right back, LN2 on Left thigh and mid back)      HPI:  Mr. Nicolás Fermin is a 63 year old male who presents today as a return  patient for ED&C of the BCC on the back as well as tx for previously biopsied AKs.     Patient is otherwise feeling well, without additional concerns.    Labs:  none    Physical Exam:  Vitals: There were no vitals taken for this visit.  SKIN: Total skin excluding the undergarment areas was performed. The exam included the head/face, neck, both arms, chest, back, abdomen, both legs, digits and/or nails.    - well healed biopsy sites on the L proximal thigh, R mid back and R mid back inferior   - No other lesions of concern on areas examined.     Medications:  Current Outpatient Medications   Medication     amitriptyline (ELAVIL) 50 MG tablet     amLODIPine (NORVASC) 5 MG tablet     aspirin - buffered (ASCRIPTIN) 325 MG TABS tablet     DiphenhydrAMINE HCl (BENADRYL PO)     Dolutegravir Sodium (TIVICAY PO)     DULoxetine (CYMBALTA) 60 MG capsule     Emtricitabine-Tenofovir AF (DESCOVY PO)     fluocinonide (LIDEX) 0.05 % external cream     gabapentin (NEURONTIN) 300 MG capsule     itraconazole (SPORANOX) 100 MG capsule     levothyroxine (SYNTHROID/LEVOTHROID) 112 MCG tablet     lisinopril (ZESTRIL) 20 MG tablet     NAPROXEN PO     Omega-3 Fatty Acids (FISH OIL) 500 MG CAPS     psyllium (METAMUCIL) 58.6 % POWD     atenolol (TENORMIN) 100 MG tablet     Current Facility-Administered Medications   Medication     methylPREDNISolone (DEPO-MEDROL) injection 40 mg      Past Medical/Surgical History:   Patient Active Problem List   Diagnosis     Essential hypertension, benign     Human immunodeficiency virus (HIV) disease (H)     CARDIOVASCULAR SCREENING; LDL GOAL LESS THAN 130     Graves disease     Neuropathy     Hip pain, left     Tobacco abuse disorder     Past Medical History:   Diagnosis Date     Asymptomatic human immunodeficiency virus (HIV) infection status (H)      Graves disease 2006     Hypertension      Peripheral neuropathy      Squamous cell carcinoma of skin, unspecified                         Again, thank you  for allowing me to participate in the care of your patient.        Sincerely,        Sophia Degroot PA-C

## 2023-07-24 NOTE — PROGRESS NOTES
[facility] Dermatology Note  Encounter Date: Jul 24, 2023  Office Visit      Dermatology Problem List:  1. History of NMSC  - BCC, L NSW s/p MMS 12/5/2019  - SCCis, R posterior thigh s/p MMS 12/5/2019  - SCCis, L ventral proximal leg s/p MMS 11/11/2021  - SCCis, R medial thigh s/p exicision  8/17/2022  - SCCis, R gillette distal s/p MMS 8/25/2022  - SCCis, R distal lateral thigh s/p MMS 1/5/23  - BCC - R mid back inferior - s/p ED&C 7/24/23  2. History of atypical nevus  - Lentiginous junctional melanocytic nevus with mild atypia, right lateral arm s/p shave biopsy 6/11/2020   - Compound dysplastic nevus with moderate atypia, left lateral mid back, s/p shave biopsy 8/27/2021  3. 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 Psoriasis vs dermatitis   - Previous tx: fluocinonide 0.05% cream  ____________________________________________    Assessment & Plan:  # Basal Cell Carcinoma - R mid back, inferior  - ED&C - see procedure below    # Actinic keratosis x2 - biopsy proven on the L proximal thigh and the R mid back.   - Cryotherapy performed today, see procedure note below.    Procedures Performed:    - Cryotherapy procedure note, location(s): see above. After verbal consent and discussion of risks and benefits including, but not limited to, dyspigmentation/scar, blister, and pain, 2 lesion(s) was(were) treated with 1-2 mm freeze border for 1-2 cycles with liquid nitrogen. Post cryotherapy instructions were provided.     Dermatology Procedure Note: Electrodesiccation and Curettage    PREOPERATIVE DIAGNOSIS: Superficial BCC    POSTOPERATIVE DIAGNOSIS: same    LOCATION: R mid back, inferior    SIZE: original size 6mm1    Treatment options including  electrodessiccation and curettage (ED and C), excision and topicals were reviewed.  The expected cure rates, healing times and anticipated scars of each option were discussed and the patient elects to proceed with ED and C.     The risks and benefits of the procedure were described to the patient.  These include but are not limited to bleeding, infection, scar, incomplete removal, and recurrence. Written informed consent was obtained. Time-out was performed. The above site was cleansed with and injected with 1mL1% lidocaine with epinephrine. Once anesthesia was obtained, the site was prepped with Alcohol. The lesion was curetted with  in 3 directions with a 5 mm margin and this was followed by electrodessication.  This process was repeated three times. The defect measured 1.2cm final size. Vaseline and a bandage were applied to the wound. The patient tolerated the procedure well and was given post care instructions.    Follow-up: 6 month(s) in-person, or earlier for new or changing lesions    Staff:     All risks, benefits and alternatives were discussed with patient.  Patient is in agreement and understands the assessment and plan.  All questions were answered.    Sophia Degroot PA-C, MPAS  [facility]: Phone: [phone], Fax: [phone]  [facility]: Phone: [phone],  Fax: [phone]  [facility]: Phone: [phone], Fax: [phone]  ____________________________________________    CC: Derm Problem (ED&C on right back, LN2 on Left thigh and mid back)      HPI:  Mr. Nicolás Fermin is a 63 year old male who presents today as a return patient for ED&C of the BCC on the back as well as tx for previously biopsied AKs.     Patient is otherwise feeling well, without additional concerns.    Labs:  none    Physical Exam:  Vitals: There were no vitals taken for this visit.  SKIN: Total skin excluding  the undergarment areas was performed. The exam included the head/face, neck, both arms, chest, back, abdomen, both legs, digits and/or nails.    - well healed biopsy sites on the L proximal thigh, R mid back and R mid back inferior   - No other lesions of concern on areas examined.     Medications:  Current Outpatient Medications   Medication    amitriptyline (ELAVIL) 50 MG tablet    amLODIPine (NORVASC) 5 MG tablet    aspirin - buffered (ASCRIPTIN) 325 MG TABS tablet    DiphenhydrAMINE HCl (BENADRYL PO)    Dolutegravir Sodium (TIVICAY PO)    DULoxetine (CYMBALTA) 60 MG capsule    Emtricitabine-Tenofovir AF (DESCOVY PO)    fluocinonide (LIDEX) 0.05 % external cream    gabapentin (NEURONTIN) 300 MG capsule    itraconazole (SPORANOX) 100 MG capsule    levothyroxine (SYNTHROID/LEVOTHROID) 112 MCG tablet    lisinopril (ZESTRIL) 20 MG tablet    NAPROXEN PO    Omega-3 Fatty Acids (FISH OIL) 500 MG CAPS    psyllium (METAMUCIL) 58.6 % POWD    atenolol (TENORMIN) 100 MG tablet     Current Facility-Administered Medications   Medication    methylPREDNISolone (DEPO-MEDROL) injection 40 mg      Past Medical/Surgical History:   Patient Active Problem List   Diagnosis    Essential hypertension, benign    Human immunodeficiency virus (HIV) disease (H)    CARDIOVASCULAR SCREENING; LDL GOAL LESS THAN 130    Graves disease    Neuropathy    Hip pain, left    Tobacco abuse disorder     Past Medical History:   Diagnosis Date    Asymptomatic human immunodeficiency virus (HIV) infection status (H)     Graves disease 2006    Hypertension     Peripheral neuropathy     Squamous cell carcinoma of skin, unspecified

## 2023-09-08 ENCOUNTER — MYC MEDICAL ADVICE (OUTPATIENT)
Dept: INTERNAL MEDICINE | Facility: CLINIC | Age: 63
End: 2023-09-08
Payer: COMMERCIAL

## 2023-09-08 DIAGNOSIS — I10 ESSENTIAL HYPERTENSION, BENIGN: ICD-10-CM

## 2023-09-08 RX ORDER — LISINOPRIL 20 MG/1
20 TABLET ORAL 2 TIMES DAILY
Qty: 180 TABLET | Refills: 2 | Status: SHIPPED | OUTPATIENT
Start: 2023-09-08 | End: 2024-06-21

## 2023-09-08 NOTE — TELEPHONE ENCOUNTER
Routing refill request to provider for review/approval because:  Labs out of range:    BP Readings from Last 3 Encounters:   07/21/23 (!) 146/84   04/01/23 129/78   01/31/23 139/83     Justice L. Phoenix, RN

## 2023-10-11 DIAGNOSIS — I10 ESSENTIAL HYPERTENSION, BENIGN: ICD-10-CM

## 2023-10-12 RX ORDER — AMLODIPINE BESYLATE 5 MG/1
5 TABLET ORAL DAILY
Qty: 90 TABLET | Refills: 0 | Status: SHIPPED | OUTPATIENT
Start: 2023-10-12 | End: 2024-01-18

## 2023-10-27 ASSESSMENT — ENCOUNTER SYMPTOMS
HEMATOCHEZIA: 0
JOINT SWELLING: 1
EYE PAIN: 0
SHORTNESS OF BREATH: 0
PALPITATIONS: 0
CHILLS: 1
HEADACHES: 0
PARESTHESIAS: 0
NERVOUS/ANXIOUS: 0
ARTHRALGIAS: 1
HEMATURIA: 0
FEVER: 0
CONSTIPATION: 0
FREQUENCY: 0
WEAKNESS: 0
ABDOMINAL PAIN: 0
HEARTBURN: 0
NAUSEA: 0
MYALGIAS: 0
DIZZINESS: 0
DYSURIA: 0
SORE THROAT: 0
COUGH: 0
DIARRHEA: 0

## 2023-10-28 NOTE — COMMUNITY RESOURCES LIST (ENGLISH)
10/28/2023   [facility]  N/A  For questions about this resource list or additional care needs, please contact your primary care clinic or care manager.  Phone: [phone]   Email: N/A   Address: [address] [place] [id number]   Hours: N/A        Food and Nutrition       Food pantry  1  [facility] ([employer]) - [employer] - Emergency Bags Distance: 3.08 miles      In-Person, Pickup   [address] [place] [address]  Language: English, Chadian, Ugandan  Hours: Mon 12:00 AM - 7:00 PM , Tue 10:00 AM - 3:00 PM , Wed - Thu 10:00 AM - 2:30 PM  Fees: Free   Phone: (868) 166-1692 Email: ica@[employer].evocatal Website: http://www.[email or web address].org     2  [employer] People (PROP) Distance: 3.14 miles      Pickup   [address] [place] [address]  Language: English, Ugandan  Hours: Mon - Tue 9:30 AM - 1:00 PM , Wed 3:00 PM - 6:30 PM , Thu - Fri 9:30 AM - 1:00 PM  Fees: Free   Phone: (330) 857-6749 Email: prop@[employer].org Website: http://www.[employer] Pharmaceuticalsod.org     SNAP application assistance  3  [facility] Distance: 10.93 miles      In-Person, Phone/Virtual   [address] [address]  Language: English, Bolivian, Tamazight  Hours: Mon - Fri 9:00 AM - 5:00 PM  Fees: Free   Phone: (900) 652-3043 Email: ferny@[employer].evocatal Website: https://www.[employer].com/[employer].org/     4  [facility] Distance: 11.02 miles      In-Person, Phone/Virtual   [address] [place] [address]  Language: English, Ugandan  Hours: Mon - Fri 9:00 AM - 4:30 PM  Fees: Free   Phone: (748) 747-7473 Ext.113 Email: info@[employer].org Website: https://veap.org     Soup kitchen or free meals  5  [facility] [employer] Distance: 3.71 miles      [facility]   [address] [place] [address]  Language: English  Hours: Mon - Tue 5:30 PM - 6:30 PM , Sat - Sun 5:30  PM - 6:30 PM  Fees: Free   Phone: (618) 936-8332 Email: office@[employer] Website: https://www.[employer]     6  [facility] [employer] [facility] - Community Kitchen Distance: 8.38 miles      In-Person   [address] [place] [address]  Language: English  Hours: Mon - Fri 3:00 PM - 9:00 PM , Sat 12:00 PM - 6:00 PM  Fees: Free   Phone: (718) 603-7647 Email: roger@[employer] Website: https://www."[employer]/parks__destinations/recreation_centers/[place]_[facility]_[place]/          Important Numbers & Websites       Emergency Services   911  [facility] Services   311  Poison Control   (963) 505-6322  Suicide Prevention Lifeline   (141) 523-1212 (TALK)  Child Abuse Hotline   (648) 538-9695 (4-A-Child)  Sexual Assault Hotline   (447) 286-9726 (HOPE)  National Runaway Safeline   (526) 168-5626 (RUNAWAY)  All-Options Talkline   (885) 849-6748  Substance Abuse Referral   (267) 908-4529 (HELP)

## 2023-10-31 ENCOUNTER — OFFICE VISIT (OUTPATIENT)
Dept: INTERNAL MEDICINE | Facility: CLINIC | Age: 63
End: 2023-10-31
Payer: COMMERCIAL

## 2023-10-31 VITALS
DIASTOLIC BLOOD PRESSURE: 84 MMHG | HEART RATE: 61 BPM | RESPIRATION RATE: 17 BRPM | OXYGEN SATURATION: 96 % | TEMPERATURE: 96.7 F | HEIGHT: 76 IN | BODY MASS INDEX: 27.2 KG/M2 | SYSTOLIC BLOOD PRESSURE: 136 MMHG | WEIGHT: 223.4 LBS

## 2023-10-31 DIAGNOSIS — Z12.5 SCREENING PSA (PROSTATE SPECIFIC ANTIGEN): ICD-10-CM

## 2023-10-31 DIAGNOSIS — B20 NEUROPATHY DUE TO HUMAN IMMUNODEFICIENCY VIRUS INFECTION (H): ICD-10-CM

## 2023-10-31 DIAGNOSIS — Z13.6 CARDIOVASCULAR SCREENING; LDL GOAL LESS THAN 130: ICD-10-CM

## 2023-10-31 DIAGNOSIS — G63 NEUROPATHY DUE TO HUMAN IMMUNODEFICIENCY VIRUS INFECTION (H): ICD-10-CM

## 2023-10-31 DIAGNOSIS — E05.00 GRAVES DISEASE: ICD-10-CM

## 2023-10-31 DIAGNOSIS — I10 ESSENTIAL HYPERTENSION, BENIGN: ICD-10-CM

## 2023-10-31 DIAGNOSIS — M20.62 ACQUIRED DEFORMITY OF LEFT TOE: ICD-10-CM

## 2023-10-31 DIAGNOSIS — B20 HUMAN IMMUNODEFICIENCY VIRUS (HIV) DISEASE (H): ICD-10-CM

## 2023-10-31 DIAGNOSIS — Z12.11 COLON CANCER SCREENING: ICD-10-CM

## 2023-10-31 DIAGNOSIS — Z87.891 PERSONAL HISTORY OF TOBACCO USE, PRESENTING HAZARDS TO HEALTH: ICD-10-CM

## 2023-10-31 DIAGNOSIS — Z00.00 ROUTINE HISTORY AND PHYSICAL EXAMINATION OF ADULT: Primary | ICD-10-CM

## 2023-10-31 LAB
ALBUMIN SERPL BCG-MCNC: 4.5 G/DL (ref 3.5–5.2)
ALP SERPL-CCNC: 79 U/L (ref 40–129)
ALT SERPL W P-5'-P-CCNC: 16 U/L (ref 0–70)
ANION GAP SERPL CALCULATED.3IONS-SCNC: 9 MMOL/L (ref 7–15)
AST SERPL W P-5'-P-CCNC: 30 U/L (ref 0–45)
BILIRUB SERPL-MCNC: 0.8 MG/DL
BUN SERPL-MCNC: 8.1 MG/DL (ref 8–23)
CALCIUM SERPL-MCNC: 9.7 MG/DL (ref 8.8–10.2)
CHLORIDE SERPL-SCNC: 100 MMOL/L (ref 98–107)
CHOLEST SERPL-MCNC: 211 MG/DL
CREAT SERPL-MCNC: 1.03 MG/DL (ref 0.67–1.17)
DEPRECATED HCO3 PLAS-SCNC: 25 MMOL/L (ref 22–29)
EGFRCR SERPLBLD CKD-EPI 2021: 81 ML/MIN/1.73M2
ERYTHROCYTE [DISTWIDTH] IN BLOOD BY AUTOMATED COUNT: 12.8 % (ref 10–15)
GLUCOSE SERPL-MCNC: 92 MG/DL (ref 70–99)
HCT VFR BLD AUTO: 46.4 % (ref 40–53)
HDLC SERPL-MCNC: 72 MG/DL
HGB BLD-MCNC: 15.7 G/DL (ref 13.3–17.7)
LDLC SERPL CALC-MCNC: 121 MG/DL
MCH RBC QN AUTO: 33.1 PG (ref 26.5–33)
MCHC RBC AUTO-ENTMCNC: 33.8 G/DL (ref 31.5–36.5)
MCV RBC AUTO: 98 FL (ref 78–100)
NONHDLC SERPL-MCNC: 139 MG/DL
PLATELET # BLD AUTO: 223 10E3/UL (ref 150–450)
POTASSIUM SERPL-SCNC: 4.4 MMOL/L (ref 3.4–5.3)
PROT SERPL-MCNC: 7.6 G/DL (ref 6.4–8.3)
PSA SERPL DL<=0.01 NG/ML-MCNC: 0.39 NG/ML (ref 0–4.5)
RBC # BLD AUTO: 4.74 10E6/UL (ref 4.4–5.9)
SODIUM SERPL-SCNC: 134 MMOL/L (ref 135–145)
TRIGL SERPL-MCNC: 88 MG/DL
TSH SERPL DL<=0.005 MIU/L-ACNC: 1.22 UIU/ML (ref 0.3–4.2)
WBC # BLD AUTO: 8.3 10E3/UL (ref 4–11)

## 2023-10-31 PROCEDURE — 99214 OFFICE O/P EST MOD 30 MIN: CPT | Mod: 25 | Performed by: INTERNAL MEDICINE

## 2023-10-31 PROCEDURE — G0103 PSA SCREENING: HCPCS | Performed by: INTERNAL MEDICINE

## 2023-10-31 PROCEDURE — 80053 COMPREHEN METABOLIC PANEL: CPT | Performed by: INTERNAL MEDICINE

## 2023-10-31 PROCEDURE — 36415 COLL VENOUS BLD VENIPUNCTURE: CPT | Performed by: INTERNAL MEDICINE

## 2023-10-31 PROCEDURE — 99396 PREV VISIT EST AGE 40-64: CPT | Performed by: INTERNAL MEDICINE

## 2023-10-31 PROCEDURE — 85027 COMPLETE CBC AUTOMATED: CPT | Performed by: INTERNAL MEDICINE

## 2023-10-31 PROCEDURE — 80061 LIPID PANEL: CPT | Performed by: INTERNAL MEDICINE

## 2023-10-31 PROCEDURE — 84443 ASSAY THYROID STIM HORMONE: CPT | Performed by: INTERNAL MEDICINE

## 2023-10-31 ASSESSMENT — ENCOUNTER SYMPTOMS
WEAKNESS: 0
FEVER: 0
DIARRHEA: 0
JOINT SWELLING: 1
MYALGIAS: 0
PARESTHESIAS: 0
PALPITATIONS: 0
FREQUENCY: 0
CHILLS: 1
CONSTIPATION: 0
EYE PAIN: 0
NAUSEA: 0
SORE THROAT: 0
DIZZINESS: 0
HEADACHES: 0
SHORTNESS OF BREATH: 0
ABDOMINAL PAIN: 0
DYSURIA: 0
HEMATOCHEZIA: 0
ARTHRALGIAS: 1
HEMATURIA: 0
NERVOUS/ANXIOUS: 0
HEARTBURN: 0
COUGH: 0

## 2023-10-31 NOTE — PROGRESS NOTES
SUBJECTIVE:   CC: Rainer is an 63 year old who presents for preventative health visit.     Healthy Habits:     Getting at least 3 servings of Calcium per day:  Yes    Bi-annual eye exam:  Yes    Dental care twice a year:  Yes    Sleep apnea or symptoms of sleep apnea:  None    Diet:  Regular (no restrictions)    Frequency of exercise:  4-5 days/week    Duration of exercise:  30-45 minutes    Taking medications regularly:  Yes    Additional concerns today:  No    PROBLEMS TO ADD ON...  Left foot toes crossing to [facility] and would like to see podiatry for assessment    Have you ever done Advance Care Planning? (For example, a Health Directive, POLST, or a discussion with a medical provider or your loved ones about your wishes): No, advance care planning information given to patient to review.  Advanced care planning was discussed at today's visit.    Social History     Tobacco Use    Smoking status: Every Day     Packs/day: 0.50     Years: 50.00     Additional pack years: 0.00     Total pack years: 25.00     Types: Cigarettes     Start date: 4/13/1975    Smokeless tobacco: Never    Tobacco comments:     it sucks!   Substance Use Topics    Alcohol use: Yes     Comment: 2-3/day           10/27/2023     9:59 PM   Alcohol Use   Prescreen: >3 drinks/day or >7 drinks/week? No       Last PSA:   PSA   Date Value Ref Range Status   07/16/2020 0.39 0 - 4 ug/L Final     Comment:     Assay Method:  Chemiluminescence using Siemens Vista analyzer     Prostate Specific Antigen Screen   Date Value Ref Range Status   10/10/2022 0.32 0.00 - 4.00 ug/L Final       Reviewed orders with patient. Reviewed health maintenance and updated orders accordingly - Yes    Immunization History   Administered Date(s) Administered    COVID-19 12+ (2023-24) (Pfizer) 10/17/2023    COVID-19 Bivalent 12+ (Pfizer) 10/10/2022    COVID-19 MONOVALENT 12+ (Pfizer) 03/12/2021, 04/02/2021, 01/03/2022, 07/18/2022    Influenza Vaccine 18-64 (Flublok) 10/10/2022     Influenza Vaccine >6 months (Alfuria,Fluzone) 10/13/2015, 10/11/2016, 10/01/2019, 10/16/2020, 10/17/2023    Influenza Vaccine, 6+MO IM (QUADRIVALENT W/PRESERVATIVES) 10/11/2018    RSV Vaccine (Arexvy) 10/17/2023    TDAP Vaccine (Adacel) 08/29/2018    Zoster recombinant adjuvanted (SHINGRIX) 08/01/2019, 09/20/2019, 12/27/2019         Reviewed and updated as needed this visit by clinical staff   Tobacco  Allergies  Meds              Reviewed and updated as needed this visit by Provider                     Review of Systems   Constitutional:  Positive for chills. Negative for fever.   HENT:  Negative for congestion, ear pain, hearing loss and sore throat.    Eyes:  Negative for pain and visual disturbance.   Respiratory:  Negative for cough and shortness of breath.    Cardiovascular:  Positive for peripheral edema. Negative for chest pain and palpitations.   Gastrointestinal:  Negative for abdominal pain, constipation, diarrhea, heartburn, hematochezia and nausea.   Genitourinary:  Positive for impotence. Negative for dysuria, frequency, genital sores, hematuria, penile discharge and urgency.   Musculoskeletal:  Positive for arthralgias and joint swelling. Negative for myalgias.   Skin:  Negative for rash.   Neurological:  Negative for dizziness, weakness, headaches and paresthesias.   Psychiatric/Behavioral:  Negative for mood changes. The patient is not nervous/anxious.      Current Outpatient Medications   Medication    amitriptyline (ELAVIL) 50 MG tablet    amLODIPine (NORVASC) 5 MG tablet    aspirin - buffered (ASCRIPTIN) 325 MG TABS tablet    atenolol (TENORMIN) 100 MG tablet    DiphenhydrAMINE HCl (BENADRYL PO)    Dolutegravir Sodium (TIVICAY PO)    DULoxetine (CYMBALTA) 60 MG capsule    Emtricitabine-Tenofovir AF (DESCOVY PO)    fluocinonide (LIDEX) 0.05 % external cream    gabapentin (NEURONTIN) 300 MG capsule    itraconazole (SPORANOX) 100 MG capsule    levothyroxine (SYNTHROID/LEVOTHROID) 112 MCG tablet  "   lisinopril (ZESTRIL) 20 MG tablet    NAPROXEN PO    Omega-3 Fatty Acids (FISH OIL) 500 MG CAPS    psyllium (METAMUCIL) 58.6 % POWD     Current Facility-Administered Medications   Medication    methylPREDNISolone (DEPO-MEDROL) injection 40 mg       OBJECTIVE:   /84   Pulse 61   Temp (!) 96.7  F (35.9  C) (Temporal)   Resp 17   Ht 1.93 m (6' 4\")   Wt 101.3 kg (223 lb 6.4 oz)   SpO2 96%   BMI 27.19 kg/m      Physical Exam:    GENERAL: alert and no distress  EYES: Eyes grossly normal to inspection, PERRL and conjunctivae and sclerae normal  HENT: ear canals and TM's normal, nose and mouth without ulcers or lesions  NECK: no adenopathy, no asymmetry, masses, or scars and thyroid normal to palpation  RESP: lungs clear to auscultation - no rales, rhonchi or wheezes  CV: regular rate and rhythm, normal S1 S2, no S3 or S4, no murmur, click or rub  ABDOMEN: soft, nontender, no hepatosplenomegaly, no masses and bowel sounds normal  NEURO: No focal changes  PSYCH: mentation appears normal, affect normal/bright      ASSESSMENT/PLAN:   (Z00.00) Routine history and physical examination of adult  (primary encounter diagnosis)  Comment: Advised and discussed routine vaccinations as update.  Plan: CBC with platelets, Comprehensive metabolic         panel, Lipid Profile            (E05.00) Graves disease  Comment: Recheck thyroid function test to establish baseline  Plan: TSH WITH FREE T4 REFLEX            (B20) Human immunodeficiency virus (HIV) disease (H)  Comment: Noted as treated and stable.  Following with infectious disease.  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  Continue with supportive care and medical management  Plan:     (I10) Essential hypertension, benign  Comment: Currently stable on therapy medications refilled as ordered.  Follow-up blood pressure check 6 months  Plan: Comprehensive metabolic " panel            (Z13.6) CARDIOVASCULAR SCREENING; LDL GOAL LESS THAN 130  Comment: Labs ordered as fasting per routine.  Plan: Lipid Profile            (Z12.5) Screening PSA (prostate specific antigen)  Comment: Ordered as routine screening.  Plan: Prostate Specific Antigen Screen            (Z12.11) Colon cancer screening  Comment: Prior colonoscopy reviewed and recommend FIT testing  Plan: Fecal colorectal cancer screen FIT            (Z87.891) Personal history of tobacco use, presenting hazards to health  Comment: Recommended ongoing smoking cessation.  Low-dose CT scan recommended as annual screening.  Plan: CT Chest Lung Cancer Screen Low Dose Without            (M20.62) Acquired deformity of left toe  Comment: Referral placed for orthopedic podiatry assessment.  Plan: Orthopedic  Referral            Patient has been advised of split billing requirements and indicates understanding: Yes      COUNSELING:   Reviewed preventive health counseling, as reflected in patient instructions       Regular exercise       Healthy diet/nutrition    He reports that he has been smoking cigarettes. He started smoking about 48 years ago. He has a 25.00 pack-year smoking history. He has never used smokeless tobacco.  Nicotine/Tobacco Cessation Plan:   Information offered: Patient not interested at this time      Brooks Walsh MD  [facility]

## 2023-11-01 PROCEDURE — 82274 ASSAY TEST FOR BLOOD FECAL: CPT | Performed by: INTERNAL MEDICINE

## 2023-11-04 LAB — HEMOCCULT STL QL IA: NEGATIVE

## 2023-11-07 ENCOUNTER — MYC REFILL (OUTPATIENT)
Dept: INTERNAL MEDICINE | Facility: CLINIC | Age: 63
End: 2023-11-07
Payer: COMMERCIAL

## 2023-11-07 DIAGNOSIS — E05.00 GRAVES DISEASE: ICD-10-CM

## 2023-11-07 DIAGNOSIS — I10 ESSENTIAL HYPERTENSION, BENIGN: ICD-10-CM

## 2023-11-07 RX ORDER — ATENOLOL 100 MG/1
100 TABLET ORAL DAILY
Qty: 90 TABLET | Refills: 3 | Status: SHIPPED | OUTPATIENT
Start: 2023-11-07

## 2023-11-07 RX ORDER — LEVOTHYROXINE SODIUM 112 UG/1
TABLET ORAL
Qty: 90 TABLET | Refills: 3 | Status: SHIPPED | OUTPATIENT
Start: 2023-11-07

## 2023-11-07 NOTE — TELEPHONE ENCOUNTER
Prescription approved per [facility] Refill Protocol.  Sarah Yun, RN  [facility] Triage Nurse

## 2023-11-09 ENCOUNTER — ANCILLARY PROCEDURE (OUTPATIENT)
Dept: CT IMAGING | Facility: CLINIC | Age: 63
End: 2023-11-09
Attending: INTERNAL MEDICINE
Payer: COMMERCIAL

## 2023-11-09 DIAGNOSIS — Z87.891 PERSONAL HISTORY OF TOBACCO USE, PRESENTING HAZARDS TO HEALTH: ICD-10-CM

## 2023-11-09 PROCEDURE — 71271 CT THORAX LUNG CANCER SCR C-: CPT

## 2023-12-01 ENCOUNTER — OFFICE VISIT (OUTPATIENT)
Dept: PODIATRY | Facility: CLINIC | Age: 63
End: 2023-12-01
Payer: COMMERCIAL

## 2023-12-01 DIAGNOSIS — M21.619 BUNION: Primary | ICD-10-CM

## 2023-12-01 DIAGNOSIS — B35.1 ONYCHOMYCOSIS: ICD-10-CM

## 2023-12-01 DIAGNOSIS — B35.3 TINEA PEDIS OF LEFT FOOT: ICD-10-CM

## 2023-12-01 DIAGNOSIS — M20.42 HAMMER TOE OF LEFT FOOT: ICD-10-CM

## 2023-12-01 PROCEDURE — 99203 OFFICE O/P NEW LOW 30 MIN: CPT | Performed by: PODIATRIST

## 2023-12-01 RX ORDER — CICLOPIROX OLAMINE 7.7 MG/G
CREAM TOPICAL 2 TIMES DAILY
Qty: 90 G | Refills: 5 | Status: SHIPPED | OUTPATIENT
Start: 2023-12-01

## 2023-12-01 NOTE — NURSING NOTE
Nicolás Fermin's chief complaint for this visit includes:  Chief Complaint   Patient presents with    Consult     acquired deformity of L toe, having pain, noticed it over the summer     PCP: Brooks Walsh    Referring Provider:  Brooks Walsh MD  [facility] [address] [place] [id number]    There were no vitals taken for this visit.  Data Unavailable     Do you need any medication refills at today's visit? NO    No Known Allergies    Edwina Guajardo LPN

## 2023-12-01 NOTE — PROGRESS NOTES
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)    CARDIOVASCULAR SCREENING; LDL GOAL LESS THAN 130    Graves disease    Neuropathy    Hip pain, left    Tobacco abuse disorder     Past Surgical History:   Procedure Laterality Date    BIOPSY  6-2018    skin/pos    COLONOSCOPY      neg     Social History     Socioeconomic History    Marital status: Single     Spouse name: Not on file    Number of children: Not on file    Years of education: Not on file    Highest education level: Not on file   Occupational History    Not on file   Tobacco Use    Smoking status: Every Day     Packs/day: 0.50     Years: 50.00     Additional pack years: 0.00     Total pack years: 25.00     Types: Cigarettes     Start date: 4/13/1975    Smokeless tobacco: Never    Tobacco comments:     it sucks!   Substance and Sexual Activity    Alcohol use: Yes     Comment: 2-3/day    Drug use: Yes     Types: Marijuana     Comment: minimal use of CBD/THC for chronic pain relief    Sexual activity: Not Currently     Partners: Male     Birth control/protection: None   Other Topics Concern    Parent/sibling w/ CABG, MI or angioplasty before 65F 55M? No   Social History Narrative    Not on file     Social Determinants of Health     Financial Resource Strain: Low Risk  (10/27/2023)    Financial Resource Strain     Within the past 12 months, have you or your family members you live with been unable to get utilities (heat, electricity) when it was really needed?: No   Food Insecurity: High Risk (10/27/2023)    Food Insecurity     Within the past 12 months, did you worry that your food would run out before you got money to buy more?: Yes      Within the past 12 months, did the food you bought just not last and you didn t have money to get more?: No   Transportation Needs: Low Risk  (10/27/2023)    Transportation Needs     Within the past 12 months, has lack of transportation kept you from medical appointments, getting your medicines, non-medical meetings or appointments, work, or from getting things that you need?: No   Physical Activity: Not on file   Stress: Not on file   Social Connections: Not on file   Interpersonal Safety: Not on file   Housing Stability: Low Risk  (10/27/2023)    Housing Stability     Do you have housing? : Yes     Are you worried about losing your housing?: No     Family History   Problem Relation Age of Onset    Thyroid Disease Mother     Breast Cancer Mother     Skin Cancer Mother         face    Hypertension Father     Skin Cancer Father         behind ear    Diabetes Maternal Grandmother     Coronary Artery Disease Paternal Grandmother     Diabetes Maternal Grandfather      Creatinine   Date Value Ref Range Status   10/31/2023 1.03 0.67 - 1.17 mg/dL Final   04/09/2021 1.05 0.66 - 1.25 mg/dL Final     Lab Results   Component Value Date    ALBUMIN 4.5 10/31/2023    ALBUMIN 3.9 10/10/2022    ALBUMIN 3.9 07/16/2020         Subjective findings- 63-year-old presents from Brooks Walsh MD, I reviewed Brooks Walsh's 10/31/2023 note.  Presents for foot deformity.  He relates the big toe and second toe on the left foot hurts with shoe pressure on it at times, has tried wider shoes, uses over-the-counter orthotics, no injuries, has been present for about 9 months duration, relates that hammertoes been present for about 4 years duration.  Relates has seen dermatology and been treated for the onychomycosis twice with Lamisil and once with a itraconazole oral medications, relates finished the last course of Lamisil around April of this year.    Objective findings- DP and PT are 2 out of 4 left.  Has peripheral edema with venous stasis.   Has dorsally contracted left second toe that overlaps the hallux, has laterally deviated hallux with dorsomedial first MPJ prominence.  Has dry scaly skin of the forefoot.  Has dystrophic thickened discolored nails with subungual debris and dystrophy 1 through 5 on the left foot.  There is no erythema, no drainage, no odor, no calor, no pain on palpation, no pain on range of motion.    Assessment and plan- hallux valgus with bunion, hammertoes left foot.  Also has onychomycosis and tinea pedis present.  Diagnosis and treatment options discussed with the patient.  Advised patient on stretching and toe padding with either cotton or lambswool.  Prescription for extra-depth shoes and custom foot orthotics given and use discussed with the patient and is given the phone number address orthotics and prosthetics lab for these.  Prescription for Loprox cream given and use discussed with the patient.  Opted for no surgical referral today.  Return to clinic and see me as needed.          Low level of medical decision making.

## 2023-12-01 NOTE — LETTER
12/1/2023         RE: Nicolás Fermin  [address]  [facility] [address]        Dear Colleague,    Thank you for referring your patient, Nicolás Fermin, to the [facility]. Please see a copy of my visit note below.    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)     CARDIOVASCULAR SCREENING; LDL GOAL LESS THAN 130     Graves disease     Neuropathy     Hip pain, left     Tobacco abuse disorder     Past Surgical History:   Procedure Laterality Date     BIOPSY  6-2018    skin/pos     COLONOSCOPY      neg     Social History     Socioeconomic History     Marital status: Single     Spouse name: Not on file     Number of children: Not on file     Years of education: Not on file     Highest education level: Not on file   Occupational History     Not on file   Tobacco Use     Smoking status: Every Day     Packs/day: 0.50     Years: 50.00     Additional pack years: 0.00     Total pack years: 25.00     Types: Cigarettes     Start date: 4/13/1975     Smokeless tobacco: Never     Tobacco comments:     it sucks!   Substance and Sexual Activity     Alcohol use: Yes     Comment: 2-3/day     Drug use: Yes     Types: Marijuana     Comment: minimal use of CBD/THC for chronic pain relief     Sexual activity: Not Currently     Partners: Male     Birth control/protection: None   Other Topics Concern     Parent/sibling w/ CABG, MI or angioplasty before 65F 55M? No   Social History Narrative     Not on file     Social Determinants of Health     Financial Resource Strain: Low Risk  (10/27/2023)    Financial Resource Strain      Within the past 12 months, have you or your  family members you live with been unable to get utilities (heat, electricity) when it was really needed?: No   Food Insecurity: High Risk (10/27/2023)    Food Insecurity      Within the past 12 months, did you worry that your food would run out before you got money to buy more?: Yes      Within the past 12 months, did the food you bought just not last and you didn t have money to get more?: No   Transportation Needs: Low Risk  (10/27/2023)    Transportation Needs      Within the past 12 months, has lack of transportation kept you from medical appointments, getting your medicines, non-medical meetings or appointments, work, or from getting things that you need?: No   Physical Activity: Not on file   Stress: Not on file   Social Connections: Not on file   Interpersonal Safety: Not on file   Housing Stability: Low Risk  (10/27/2023)    Housing Stability      Do you have housing? : Yes      Are you worried about losing your housing?: No     Family History   Problem Relation Age of Onset     Thyroid Disease Mother      Breast Cancer Mother      Skin Cancer Mother         face     Hypertension Father      Skin Cancer Father         behind ear     Diabetes Maternal Grandmother      Coronary Artery Disease Paternal Grandmother      Diabetes Maternal Grandfather      Creatinine   Date Value Ref Range Status   10/31/2023 1.03 0.67 - 1.17 mg/dL Final   04/09/2021 1.05 0.66 - 1.25 mg/dL Final     Lab Results   Component Value Date    ALBUMIN 4.5 10/31/2023    ALBUMIN 3.9 10/10/2022    ALBUMIN 3.9 07/16/2020         Subjective findings- 63-year-old presents from Brooks Walsh MD, I reviewed Brooks Walsh's 10/31/2023 note.  Presents for foot deformity.  He relates the big toe and second toe on the left foot hurts with shoe pressure on it at times, has tried wider shoes, uses over-the-counter orthotics, no injuries, has been present for about 9 months duration, relates that hammertoes been present for about 4 years duration.   Chidi has seen dermatology and been treated for the onychomycosis twice with Lamisil and once with a itraconazole oral medications, chidi finished the last course of Lamisil around April of this year.    Objective findings- DP and PT are 2 out of 4 left.  Has peripheral edema with venous stasis.  Has dorsally contracted left second toe that overlaps the hallux, has laterally deviated hallux with dorsomedial first MPJ prominence.  Has dry scaly skin of the forefoot.  Has dystrophic thickened discolored nails with subungual debris and dystrophy 1 through 5 on the left foot.  There is no erythema, no drainage, no odor, no calor, no pain on palpation, no pain on range of motion.    Assessment and plan- hallux valgus with bunion, hammertoes left foot.  Also has onychomycosis and tinea pedis present.  Diagnosis and treatment options discussed with the patient.  Advised patient on stretching and toe padding with either cotton or lambswool.  Prescription for extra-depth shoes and custom foot orthotics given and use discussed with the patient and is given the phone number address orthotics and prosthetics lab for these.  Prescription for Loprox cream given and use discussed with the patient.  Opted for no surgical referral today.  Return to clinic and see me as needed.          Low level of medical decision making.      Again, thank you for allowing me to participate in the care of your patient.        Sincerely,        Timoteo Gross DPM

## 2023-12-08 ENCOUNTER — OFFICE VISIT (OUTPATIENT)
Dept: NEUROLOGY | Facility: CLINIC | Age: 63
End: 2023-12-08
Payer: COMMERCIAL

## 2023-12-08 VITALS — HEART RATE: 59 BPM | SYSTOLIC BLOOD PRESSURE: 144 MMHG | DIASTOLIC BLOOD PRESSURE: 77 MMHG | OXYGEN SATURATION: 99 %

## 2023-12-08 DIAGNOSIS — G57.12 MERALGIA PARESTHETICA OF LEFT SIDE: ICD-10-CM

## 2023-12-08 DIAGNOSIS — G60.8 PERIPHERAL SENSORY-MOTOR AXONAL POLYNEUROPATHY: Primary | ICD-10-CM

## 2023-12-08 PROCEDURE — 99214 OFFICE O/P EST MOD 30 MIN: CPT | Performed by: PSYCHIATRY & NEUROLOGY

## 2023-12-08 RX ORDER — GABAPENTIN 300 MG/1
600 CAPSULE ORAL 2 TIMES DAILY
Qty: 360 CAPSULE | Refills: 1 | Status: SHIPPED | OUTPATIENT
Start: 2023-12-08 | End: 2024-06-10

## 2023-12-08 RX ORDER — AMITRIPTYLINE HYDROCHLORIDE 50 MG/1
50 TABLET ORAL AT BEDTIME
Qty: 90 TABLET | Refills: 1 | Status: SHIPPED | OUTPATIENT
Start: 2023-12-08 | End: 2024-06-10

## 2023-12-08 RX ORDER — DULOXETIN HYDROCHLORIDE 60 MG/1
60 CAPSULE, DELAYED RELEASE ORAL DAILY
Qty: 90 CAPSULE | Refills: 1 | Status: SHIPPED | OUTPATIENT
Start: 2023-12-08 | End: 2024-06-10

## 2023-12-08 NOTE — LETTER
12/8/2023         RE: Nicolás Fermin  [address]  [facility] [address]        Dear Colleague,    Thank you for referring your patient, Nicolás Fermin, to the [facility]UR[facility]. Please see a copy of my visit note below.    ESTABLISHED PATIENT NEUROLOGY NOTE    DATE OF VISIT: 12/8/2023  CLINIC LOCATION: [facility]  MRN: [id number]  PATIENT NAME: Nicolás Fermin  YOB: 1960    REASON FOR VISIT:   Chief Complaint   Patient presents with     Follow Up     Neuropthy     SUBJECTIVE:                                                      HISTORY OF PRESENT ILLNESS: Patient is here to follow up regarding peripheral polyneuropathy and left meralgia paresthetica.  He was last seen on 7/21/2023. Please refer to my initial/other prior notes for further information.    Since the last visit, the patient reports that overall his symptoms are stable.  Neuropathic pain is controlled, but he still has flares of left meralgia paresthetica related pain.  He is on 50 mg of amitriptyline at bedtime, 60 mg of duloxetine daily, and 600 mg of gabapentin twice daily.  He denies any significant side effects or interval development of new focal neurological symptoms.  EXAM:                                                    Physical Exam:   Vitals: BP (!) 164/87 (BP Location: Left arm, Patient Position: Sitting, Cuff Size: Adult Regular)   Pulse 59   SpO2 99%     General: pt is in NAD, cooperative.  Skin: normal turgor, moist mucous membranes, no lesions/rashes noticed.  HEENT: ATNC, white sclera, normal conjunctiva.  Respiratory: Symmetric lung excursion, no accessory respiratory muscle use.  Abdomen: Non distended.  Neurological: awake, cooperative, follows commands, no exam changes compared to the previous visit.  ASSESSMENT AND PLAN:                                                    Assessment: 63 year old male patient presents for follow-up of peripheral  polyneuropathy and left-sided meralgia paresthetica.  He reports that his symptoms are stable on current therapy.  Previously we discussed option of lateral femoral cutaneous nerve block in addition to increasing dose of gabapentin and/or duloxetine.  The dose of amitriptyline was reduced in the past due to concerns regarding weight gain.    Diagnoses:    ICD-10-CM    1. 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 Meralgia paresthetica of left side  G57.12         Plan: At today's visit we thoroughly discussed current symptoms, available treatment options, and the plan.    We decided not to make any medication changes.  Amitriptyline, duloxetine, and gabapentin prescriptions were refilled.    Next follow-up appointment is in the next 6 months or earlier if needed.    Total Time: 21 minutes spent on the date of the encounter doing chart review, history and exam, documentation and further activities per the note.    Stone Nowak MD  [facility] Neurology  (Chart documentation was completed in part with Dragon voice-recognition software. Even though reviewed, some grammatical, spelling, and word errors may remain.)      Again, thank you for allowing me to participate in the care of your patient.        Sincerely,        Stone Nowak MD

## 2023-12-08 NOTE — PROGRESS NOTES
ESTABLISHED PATIENT NEUROLOGY NOTE    DATE OF VISIT: 12/8/2023  CLINIC LOCATION: [facility]  MRN: [id number]  PATIENT NAME: Nicolás Fermin  YOB: 1960    REASON FOR VISIT:   Chief Complaint   Patient presents with    Follow Up     Neuropthy     SUBJECTIVE:                                                      HISTORY OF PRESENT ILLNESS: Patient is here to follow up regarding peripheral polyneuropathy and left meralgia paresthetica.  He was last seen on 7/21/2023. Please refer to my initial/other prior notes for further information.    Since the last visit, the patient reports that overall his symptoms are stable.  Neuropathic pain is controlled, but he still has flares of left meralgia paresthetica related pain.  He is on 50 mg of amitriptyline at bedtime, 60 mg of duloxetine daily, and 600 mg of gabapentin twice daily.  He denies any significant side effects or interval development of new focal neurological symptoms.  EXAM:                                                    Physical Exam:   Vitals: BP (!) 164/87 (BP Location: Left arm, Patient Position: Sitting, Cuff Size: Adult Regular)   Pulse 59   SpO2 99%     General: pt is in NAD, cooperative.  Skin: normal turgor, moist mucous membranes, no lesions/rashes noticed.  HEENT: ATNC, white sclera, normal conjunctiva.  Respiratory: Symmetric lung excursion, no accessory respiratory muscle use.  Abdomen: Non distended.  Neurological: awake, cooperative, follows commands, no exam changes compared to the previous visit.  ASSESSMENT AND PLAN:                                                    Assessment: 63 year old male patient presents for follow-up of peripheral polyneuropathy and left-sided meralgia paresthetica.  He reports that his symptoms are stable on current therapy.  Previously we discussed option of lateral femoral cutaneous nerve block in addition to increasing dose of gabapentin and/or duloxetine.  The dose of  amitriptyline was reduced in the past due to concerns regarding weight gain.    Rainer to follow up with Primary Care provider regarding elevated blood pressure.     Diagnoses:    ICD-10-CM    1. 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 Meralgia paresthetica of left side  G57.12         Plan: At today's visit we thoroughly discussed current symptoms, available treatment options, and the plan.    We decided not to make any medication changes.  Amitriptyline, duloxetine, and gabapentin prescriptions were refilled.    Next follow-up appointment is in the next 6 months or earlier if needed.    Total Time: 21 minutes spent on the date of the encounter doing chart review, history and exam, documentation and further activities per the note.    Stone Nowak MD  [facility]  (Chart documentation was completed in part with Dragon voice-recognition software. Even though reviewed, some grammatical, spelling, and word errors may remain.)

## 2023-12-08 NOTE — PATIENT INSTRUCTIONS
AFTER VISIT SUMMARY (AVS):    At today's visit we thoroughly discussed current symptoms, available treatment options, and the plan.    We decided not to make any medication changes.  Amitriptyline, duloxetine, and gabapentin prescriptions were refilled.    Next follow-up appointment is in the next 6 months or earlier if needed.    Please do not hesitate to call me with any questions or concerns.    Thanks.

## 2023-12-08 NOTE — PROGRESS NOTES
ESTABLISHED PATIENT NEUROLOGY NOTE    DATE OF VISIT: 12/8/2023  CLINIC LOCATION: [facility]  MRN: [id number]  PATIENT NAME: Nicolás Fermin  YOB: 1960    REASON FOR VISIT: No chief complaint on file.    SUBJECTIVE:                                                      HISTORY OF PRESENT ILLNESS: Patient is here to follow up regarding peripheral polyneuropathy and left meralgia paresthetica.  He was last seen on 7/21/2023. Please refer to my initial/other prior notes for further information.    Since the last visit, the patient reports ***.  He is on 50 mg of amitriptyline at bedtime, 60 mg of duloxetine daily, and 600 mg of gabapentin twice daily.  He denies any significant side effects or interval development of new focal neurological symptoms.  EXAM:                                                    Physical Exam:   Vitals: There were no vitals taken for this visit.    General: pt is in NAD, cooperative.  Skin: normal turgor, moist mucous membranes, no lesions/rashes noticed.  HEENT: ATNC, white sclera, normal conjunctiva.  Respiratory: Symmetric lung excursion, no accessory respiratory muscle use.  Abdomen: Non distended.  Neurological: awake, cooperative, follows commands, no exam changes compared to the previous visit.  ASSESSMENT AND PLAN:                                                    Assessment: 63 year old male patient presents for follow-up of peripheral polyneuropathy and left-sided meralgia paresthetica.  He reports that his symptoms are *** on current therapy.  Previously we discussed option of lateral femoral cutaneous nerve block in addition to increasing dose of gabapentin and/or duloxetine.  The dose of amitriptyline was reduced in the past due to concerns regarding weight gain.    Diagnoses:    ICD-10-CM    1. 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 Meralgia paresthetica of left side  G57.12         Plan:  There are no Patient  Instructions on file for this visit.    Total Time: *** minutes spent on the date of the encounter doing chart review, history and exam, documentation and further activities per the note.    Stone Nowak MD  [facility]  (Chart documentation was completed in part with Dragon voice-recognition software. Even though reviewed, some grammatical, spelling, and word errors may remain.)

## 2023-12-11 ENCOUNTER — OFFICE VISIT (OUTPATIENT)
Dept: FAMILY MEDICINE | Facility: CLINIC | Age: 63
End: 2023-12-11
Payer: COMMERCIAL

## 2023-12-11 DIAGNOSIS — Z85.828 HISTORY OF NONMELANOMA SKIN CANCER: ICD-10-CM

## 2023-12-11 DIAGNOSIS — Z86.018 HISTORY OF DYSPLASTIC NEVUS: Primary | ICD-10-CM

## 2023-12-11 DIAGNOSIS — D49.2 NEOPLASM OF SKIN: ICD-10-CM

## 2023-12-11 PROCEDURE — 11102 TANGNTL BX SKIN SINGLE LES: CPT | Performed by: PHYSICIAN ASSISTANT

## 2023-12-11 PROCEDURE — 88305 TISSUE EXAM BY PATHOLOGIST: CPT | Performed by: DERMATOLOGY

## 2023-12-11 PROCEDURE — 99213 OFFICE O/P EST LOW 20 MIN: CPT | Mod: 25 | Performed by: PHYSICIAN ASSISTANT

## 2023-12-11 ASSESSMENT — PAIN SCALES - GENERAL: PAINLEVEL: NO PAIN (0)

## 2023-12-11 NOTE — PATIENT INSTRUCTIONS
Wound Care After a Biopsy    What is a skin biopsy?  A skin biopsy allows the doctor to examine a very small piece of tissue under the microscope to determine the diagnosis and the best treatment for the skin condition. A local anesthetic (numbing medicine) is injected with a very small needle into the skin area to be tested. A small piece of skin is taken from the area. Sometimes a suture (stitch) is used.     What are the risks of a skin biopsy?  I will experience scar, bleeding, swelling, pain, crusting and redness. I may experience incomplete removal or recurrence. Risks of this procedure are excessive bleeding, bruising, infection, nerve damage, numbness, thick (hypertrophic or keloidal) scar and non-diagnostic biopsy.    How should I care for my wound for the first 24 hours?  Keep the wound dry and covered for 24 hours  If it bleeds, hold direct pressure on the area for 15 minutes. If bleeding does not stop, call us or go to the emergency room  Avoid strenuous exercise the first 1-2 days or as your doctor instructs you    How should I care for the wound after 24 hours?  After 24 hours, remove the bandage  You may bathe or shower as normal  If you had a scalp biopsy, you can shampoo as usual and can use shower water to clean the biopsy site daily  Clean the wound once a day with gentle soap and water  Do not scrub, be gentle  Apply white petroleum/Vaseline after cleaning the wound with a cotton swab or a clean finger, and keep the site covered with a Bandaid /bandage. Bandages are not necessary with a scalp biopsy  If you are unable to cover the site with a Bandaid /bandage, re-apply ointment 2-3 times a day to keep the site moist. Moisture will help with healing  Avoid strenuous activity for first 1-2 days  Avoid lakes, rivers, pools, and oceans until the stitches are removed or the site is healed    How do I clean my wound?  Wash hands thoroughly with soap or use hand  before all wound care  Clean  the wound with gentle soap and water  Apply white petroleum/Vaseline  to wound after it is clean  Replace the Bandaid /bandage to keep the wound covered for the first few days or as instructed by your doctor  If you had a scalp biopsy, warm shower water to the area on a daily basis should suffice    What should I use to clean my wound?   Cotton-tipped applicators (Qtips )  White petroleum jelly (Vaseline ). Use a clean new container and use Q-tips to apply.  Bandaids  as needed  Gentle soap     How should I care for my wound long term?  Do not get your wound dirty  Keep up with wound care for one week or until the area is healed.  A small scab will form and fall off by itself when the area is completely healed. The area will be red and will become pink in color as it heals. Sun protection is very important for how your scar will turn out. Sunscreen with an SPF 30 or greater is recommended once the area is healed.  You should have some soreness but it should be mild and slowly go away over several days. Talk to your doctor about using tylenol for pain,    When should I call my doctor?  If you have increased:   Pain or swelling  Pus or drainage (clear or slightly yellow drainage is ok)  Temperature over 100F  Spreading redness or warmth around wound    When will I hear about my results?  The biopsy results can take 2 weeks to come back.  Your results will automatically release to [employer] before your provider has even reviewed them.  The clinic will call you with the results, send you a [employer] message, or have you schedule a follow-up clinic or phone time to discuss the results.  Contact our clinics if you do not hear from us in 2 weeks.    Who should I call with questions?  [facility]: [phone]  [facility]: [phone]  For urgent needs outside of business hours call the [facility] at [phone] and ask for the dermatology resident on call  Patient Education       Proper skin care from [facility]:    -Eliminate harsh soaps as they strip the natural oils from the skin, often resulting in dry itchy skin ( i.e. Dial, Zest, [place])  -Use mild soaps such as Cetaphil or Dove Sensitive Skin in the shower. You do not need to use soap on arms, legs, and trunk every time you shower unless visibly soiled.   -Avoid hot or cold showers.  -After showering, lightly dry off and apply moisturizing within 2-3 minutes. This will help trap moisture in the skin.   -Aggressive use of a moisturizer at least 1-2 times a day to the entire body (including -Vanicream, Cetaphil, Aquaphor or Cerave) and moisturize hands after every washing.  -We recommend using moisturizers that come in a tub that needs to be scooped out, not a pump. This has more of an oil base. It will hold moisture in your skin much better than a water base moisturizer. The above recommended are non-pore clogging.      Wear a sunscreen with at least SPF 30 on your face, ears, neck and V of the chest daily. Wear sunscreen on other areas of the body if those areas are exposed to the sun throughout the day. Sunscreens can contain physical and/or chemical blockers. Physical blockers are less likely to clog pores, these include zinc oxide and titanium dioxide. Reapply every two hour and after swimming.     Sunscreen examples: https://www.ewg.org/sunscreen/    UV radiation  UVA radiation remains constant throughout the day and throughout the year. It is a longer wavelength than UVB and therefore penetrates deeper into the skin leading to immediate and delayed tanning, photoaging, and skin cancer. 70-80% of UVA and UVB radiation occurs between the hours of 10am-2pm.  UVB radiation  UVB radiation causes the most harmful effects and is more significant during the summer months. However, snow and ice can reflect UVB radiation leading to skin damage during the winter months as well. UVB radiation is  responsible for tanning, burning, inflammation, delayed erythema (pinkness), pigmentation (brown spots), and skin cancer.     I recommend self monthly full body exams and yearly full body exams with a dermatology provider. If you develop a new or changing lesion please follow up for examination. Most skin cancers are pink and scaly or pink and pearly. However, we do see blue/brown/black skin cancers.  Consider the ABCDEs of melanoma when giving yourself your monthly full body exam ( don't forget the groin, buttocks, feet, toes, etc). A-asymmetry, B-borders, C-color, D-diameter, E-elevation or evolving. If you see any of these changes please follow up in clinic. If you cannot see your back I recommend purchasing a hand held mirror to use with a larger wall mirror.       Checking for Skin Cancer  You can find cancer early by checking your skin each month. There are 3 kinds of skin cancer. They are melanoma, basal cell carcinoma, and squamous cell carcinoma. Doing monthly skin checks is the best way to find new marks or skin changes. Follow the instructions below for checking your skin.   The ABCDEs of checking moles for melanoma   Check your moles or growths for signs of melanoma using ABCDE:   Asymmetry: the sides of the mole or growth don t match  Border: the edges are ragged, notched, or blurred  Color: the color within the mole or growth varies  Diameter: the mole or growth is larger than 6 mm (size of a pencil eraser)  Evolving: the size, shape, or color of the mole or growth is changing (evolving is not shown in the images below)    Checking for other types of skin cancer  Basal cell carcinoma or squamous cell carcinoma have symptoms such as:     A spot or mole that looks different from all other marks on your skin  Changes in how an area feels, such as itching, tenderness, or pain  Changes in the skin's surface, such as oozing, bleeding, or scaliness  A sore that does not heal  New swelling or redness beyond  the border of a mole    Who s at risk?  Anyone can get skin cancer. But you are at greater risk if you have:   Fair skin, light-colored hair, or light-colored eyes  Many moles or abnormal moles on your skin  A history of sunburns from sunlight or tanning beds  A family history of skin cancer  A history of exposure to radiation or chemicals  A weakened immune system  If you have had skin cancer in the past, you are at risk for recurring skin cancer.   How to check your skin  Do your monthly skin checkups in front of a full-length mirror. Check all parts of your body, including your:   Head (ears, face, neck, and scalp)  Torso (front, back, and sides)  Arms (tops, undersides, upper, and lower armpits)  Hands (palms, backs, and fingers, including under the nails)  Buttocks and genitals  Legs (front, back, and sides)  Feet (tops, soles, toes, including under the nails, and between toes)  If you have a lot of moles, take digital photos of them each month. Make sure to take photos both up close and from a distance. These can help you see if any moles change over time.   Most skin changes are not cancer. But if you see any changes in your skin, call your doctor right away. Only he or she can diagnose a problem. If you have skin cancer, seeing your doctor can be the first step toward getting the treatment that could save your life.   [employer] last reviewed this educational content on 4/1/2019 2000-2020 [employer]. [address] [place] [address], [place] [address]. All rights reserved. This information is not intended as a substitute for professional medical care. Always follow your healthcare professional's instructions.       When should I call my doctor?  If you are worsening or not improving, please, contact us or seek urgent care as noted below.     Who should I call with questions (adults)?  [facility] (adult and pediatric): [phone]  [facility]  [place] (adult): [phone]  [facility] ([place], [place], [place] and [place]) [phone]  For urgent needs outside of business hours call the [facility] at [phone] and ask for the dermatology resident on call to be paged  If this is a medical emergency and you are unable to reach an ER, Call 911      If you need a prescription refill, please contact your pharmacy. Refills are approved or denied by our Physicians during normal business hours, Monday through Fridays  Per office policy, refills will not be granted if you have not been seen within the past year (or sooner depending on your child's condition)             Wound Care After a Biopsy    What is a skin biopsy?  A skin biopsy allows the doctor to examine a very small piece of tissue under the microscope to determine the diagnosis and the best treatment for the skin condition. A local anesthetic (numbing medicine)  is injected with a very small needle into the skin area to be tested. A small piece of skin is taken from the area. Sometimes a suture (stitch) is used.     What are the risks of a skin biopsy?  I will experience scar, bleeding, swelling, pain, crusting and redness. I may experience incomplete removal or recurrence. Risks of this procedure are excessive bleeding, bruising, infection, nerve damage, numbness, thick (hypertrophic or keloidal) scar and non-diagnostic biopsy.    How should I care for my wound for the first 24 hours?  Keep the wound dry and covered for 24 hours  If it bleeds, hold direct pressure on the area for 15 minutes. If bleeding does not stop then go to the emergency room  Avoid strenuous exercise the first 1-2 days or as your doctor instructs you    How should I care for the wound after 24 hours?  After 24 hours, remove the bandage  You may bathe or shower as normal  If you had a scalp biopsy, you can shampoo as usual and can use shower water to clean the biopsy site  daily  Clean the wound twice a day with gentle soap and water  Do not scrub, be gentle  Apply white petroleum/Vaseline after cleaning the wound with a cotton swab or a clean finger, and keep the site covered with a Bandaid /bandage. Bandages are not necessary with a scalp biopsy  If you are unable to cover the site with a Bandaid /bandage, re-apply ointment 2-3 times a day to keep the site moist. Moisture will help with healing  Avoid strenuous activity for first 1-2 days  Avoid lakes, rivers, pools, and oceans until the stitches are removed or the site is healed    How do I clean my wound?  Wash hands thoroughly with soap or use hand  before all wound care  Clean the wound with gentle soap and water  Apply white petroleum/Vaseline  to wound after it is clean  Replace the Bandaid /bandage to keep the wound covered for the first few days or as instructed by your doctor  If you had a scalp biopsy, warm shower water to the area on a daily basis should suffice    What should I use to clean my wound?   Cotton-tipped applicators (Qtips )  White petroleum jelly (Vaseline ). Use a clean new container and use Q-tips to apply.  Bandaids   as needed  Gentle soap     How should I care for my wound long term?  Do not get your wound dirty  Keep up with wound care for one week or until the area is healed.  A small scab will form and fall off by itself when the area is completely healed. The area will be red and will become pink in color as it heals. Sun protection is very important for how your scar will turn out. Sunscreen with an SPF 30 or greater is recommended once the area is healed  You should have some soreness but it should be mild and slowly go away over several days. Talk to your doctor about using tylenol for pain,    When should I call my doctor?  If you have increased:   Pain or swelling  Pus or drainage (clear or slightly yellow drainage is ok)  Temperature over 100F  Spreading redness or warmth around  wound    When will I hear about my results?  The biopsy results can take 2-3 weeks to come back. The clinic will call you with the results, send you a [employer] message, or have you schedule a follow-up clinic or phone time to discuss the results. Contact our clinics if you do not hear from us in 3 weeks.     Who should I call with questions?  [facility]: [phone]   [facility]: [phone]  For urgent needs outside of business hours call the [facility] at [phone] and ask for the dermatology resident on call

## 2023-12-11 NOTE — LETTER
12/11/2023         RE: Nicolás Fermin  [address]  [facility] [address]        Dear Colleague,    Thank you for referring your patient, Nicolás Fermin, to the [facility] [place]. Please see a copy of my visit note below.    [facility] Dermatology Note  Encounter Date: Dec 11, 2023  Office Visit      Dermatology Problem List:  Last FBSC performed on 12/11/23    0. NUB, Right medial superior lower leg. S/p Biopsy performed on 12/11/23, pending results.  Photo 12/11/23  1. History of NMSC  - BCC, L NSW s/p MMS 12/5/2019  - SCCis, R posterior thigh s/p MMS 12/5/2019  - SCCis, L ventral proximal leg s/p MMS 11/11/2021  - SCCis, R medial thigh s/p exicision  8/17/2022  - SCCis, R gillette distal s/p MMS 8/25/2022  - SCCis, R distal lateral thigh s/p MMS 1/5/23 (Labeled in Vernell's note as R thigh inferior)   - BCC - R mid back inferior - s/p ED&C 7/24/23  2. History of atypical nevus  - Lentiginous junctional melanocytic nevus with mild atypia, right lateral arm s/p shave biopsy 6/11/2020   - Compound dysplastic nevus with moderate atypia, left lateral mid back, s/p shave biopsy 8/27/2021  3. 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 Psoriasis vs dermatitis   - Previous tx: fluocinonide 0.05% cream  ____________________________________________    Assessment & Plan:  # NUB, Right medial superior lower leg  - Shave Biopsy performed, see procedure note below.   - Photographed today.     # Hx of NMSC  - Sunscreen: Apply 20 minutes prior to going outdoors and reapply every two hours, when wet or sweating. We recommend using an SPF 30 or higher, and to use one that is water resistant.     - Advised to monitor for changing,  non-healing, bleeding, painful, changing, or otherwise symptomatic lesions  - Continue bi annual skin exams    # Benign findings: multiple benign nevi, lentigines, cherry angiomas, SKs  - edu on benign etiology  - Signs and Symptoms of non-melanoma skin cancer and ABCDEs of melanoma reviewed with patient. Patient encouraged to perform monthly self skin exams and educated on how to perform them. UV precautions reviewed with patient. Patient was asked about new or changing moles/lesions on body.   - Sunscreen: Apply 20 minutes prior to going outdoors and reapply every two hours, when wet or sweating. We recommend using an SPF 30 or higher, and to use one that is water resistant.     - RTC for changes     Procedures Performed:   - Shave biopsy procedure note, location(s): See above. After discussion of benefits and risks including but not limited to bleeding, infection, scar, incomplete removal, recurrence, and non-diagnostic biopsy, written consent and photographs were obtained. The area was cleaned with isopropyl alcohol. 0.5mL of 1% lidocaine with epinephrine was injected to obtain adequate anesthesia of lesion(s). Shave biopsy at site(s) performed. Hemostasis was achieved with aluminium chloride. Petrolatum ointment and a sterile dressing were applied. The patient tolerated the procedure and no complications were noted. The patient was provided with verbal and written post care instructions.      Follow-up: pending path results    Staff and scribe.     Scribe Disclosure:   I, TA BUCHANAN, am serving as a scribe; to document services personally performed by Sophia Degroot PA-C -based on data collection and the provider's statements to me.     Provider Disclosure:  I agree with above History, Review of Systems, Physical exam and Plan.  I have reviewed the content of the documentation and have edited it as needed. I have personally performed the services documented here and the documentation accurately represents  those services and the decisions I have made.      Electronically signed by:    All risks, benefits and alternatives were discussed with patient.  Patient is in agreement and understands the assessment and plan.  All questions were answered.    Sophia Degroot PA-C, MPAS  [facility]: Phone: [phone], Fax: [phone]  [facility]: Phone: [phone],  Fax: [phone]  [facility]: Phone: [phone], Fax: [phone]  ____________________________________________    CC: Skin Check (FBSE, c/o spot on right hip)      Reviewed patients past medical history and pertinent chart review prior to patient's visit today.     HPI:  Mr. Nicolás Fermin is a 63 year old male who presents today as a return patient for FBSE.     Today patient reports a spot of concern on his right hip. Cannot recall if he had a procedure there or not and wants to make sure it is healing correctly.    Patient is otherwise feeling well, without additional concerns.    Labs:  N/A    Physical Exam:  Vitals: There were no vitals taken for this visit.  SKIN: Full skin, which includes the head/face, both arms, chest, back, abdomen,both legs, genitalia and/or groin buttocks, digits and/or nails, was examined.   - Oviedo's skin type II, has <100 nevi  - There are dome shaped bright red papules on the trunk.   - Multiple regular brown pigmented macules and papules are identified on the trunk and extremities.   - Scattered brown macules on sun exposed areas.  - There are waxy stuck on tan to brown papules on the trunk.     - Mild edema on the lower legs  - Mild xerosis of the skin noted  - Well healed scars, NERD, including spots of concern on the R hip.   - Right medial superior lower leg there Is a 7 mm pink scaly papule   - No other lesions of concern on areas examined.               Medications:  Current Outpatient Medications    Medication     amitriptyline (ELAVIL) 50 MG tablet     amLODIPine (NORVASC) 5 MG tablet     aspirin - buffered (ASCRIPTIN) 325 MG TABS tablet     atenolol (TENORMIN) 100 MG tablet     DiphenhydrAMINE HCl (BENADRYL PO)     Dolutegravir Sodium (TIVICAY PO)     DULoxetine (CYMBALTA) 60 MG capsule     Emtricitabine-Tenofovir AF (DESCOVY PO)     fluocinonide (LIDEX) 0.05 % external cream     gabapentin (NEURONTIN) 300 MG capsule     levothyroxine (SYNTHROID/LEVOTHROID) 112 MCG tablet     lisinopril (ZESTRIL) 20 MG tablet     NAPROXEN PO     Omega-3 Fatty Acids (FISH OIL) 500 MG CAPS     psyllium (METAMUCIL) 58.6 % POWD     ciclopirox (LOPROX) 0.77 % cream     Current Facility-Administered Medications   Medication     methylPREDNISolone (DEPO-MEDROL) injection 40 mg      Past Medical/Surgical History:   Patient Active Problem List   Diagnosis     Essential hypertension, benign     Neuropathy due to human immunodeficiency virus infection (H)     CARDIOVASCULAR SCREENING; LDL GOAL LESS THAN 130     Graves disease     Neuropathy     Hip pain, left     Tobacco abuse disorder     Past Medical History:   Diagnosis Date     Asymptomatic human immunodeficiency virus (HIV) infection status (H)      Graves disease 2006     Hypertension      Peripheral neuropathy      Squamous cell carcinoma of skin, unspecified                         Again, thank you for allowing me to participate in the care of your patient.        Sincerely,        Sophia Degroot PA-C

## 2023-12-11 NOTE — PROGRESS NOTES
[facility] Dermatology Note  Encounter Date: Dec 11, 2023  Office Visit      Dermatology Problem List:  Last FBSC performed on 12/11/23    0. NUB, Right medial superior lower leg. S/p Biopsy performed on 12/11/23, pending results.  Photo 12/11/23  1. History of NMSC  - BCC, L NSW s/p MMS 12/5/2019  - SCCis, R posterior thigh s/p MMS 12/5/2019  - SCCis, L ventral proximal leg s/p MMS 11/11/2021  - SCCis, R medial thigh s/p exicision  8/17/2022  - SCCis, R gillette distal s/p MMS 8/25/2022  - SCCis, R distal lateral thigh s/p MMS 1/5/23 (Labeled in Vernell's note as R thigh inferior)   - BCC - R mid back inferior - s/p ED&C 7/24/23  2. History of atypical nevus  - Lentiginous junctional melanocytic nevus with mild atypia, right lateral arm s/p shave biopsy 6/11/2020   - Compound dysplastic nevus with moderate atypia, left lateral mid back, s/p shave biopsy 8/27/2021  3. 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 Psoriasis vs dermatitis   - Previous tx: fluocinonide 0.05% cream  ____________________________________________    Assessment & Plan:  # NUB, Right medial superior lower leg  - Shave Biopsy performed, see procedure note below.   - Photographed today.     # Hx of NMSC  - Sunscreen: Apply 20 minutes prior to going outdoors and reapply every two hours, when wet or sweating. We recommend using an SPF 30 or higher, and to use one that is water resistant.     - Advised to monitor for changing, non-healing, bleeding, painful, changing, or otherwise symptomatic lesions  - Continue bi annual skin exams    # Benign findings: multiple benign nevi, lentigines, cherry angiomas, SKs  - edu on benign etiology  - Signs and Symptoms of non-melanoma skin  cancer and ABCDEs of melanoma reviewed with patient. Patient encouraged to perform monthly self skin exams and educated on how to perform them. UV precautions reviewed with patient. Patient was asked about new or changing moles/lesions on body.   - Sunscreen: Apply 20 minutes prior to going outdoors and reapply every two hours, when wet or sweating. We recommend using an SPF 30 or higher, and to use one that is water resistant.     - RTC for changes     Procedures Performed:   - Shave biopsy procedure note, location(s): See above. After discussion of benefits and risks including but not limited to bleeding, infection, scar, incomplete removal, recurrence, and non-diagnostic biopsy, written consent and photographs were obtained. The area was cleaned with isopropyl alcohol. 0.5mL of 1% lidocaine with epinephrine was injected to obtain adequate anesthesia of lesion(s). Shave biopsy at site(s) performed. Hemostasis was achieved with aluminium chloride. Petrolatum ointment and a sterile dressing were applied. The patient tolerated the procedure and no complications were noted. The patient was provided with verbal and written post care instructions.      Follow-up: pending path results    Staff and scribe.     Scribe Disclosure:   I, TA BUCHANAN, am serving as a scribe; to document services personally performed by Sophia Degroot PA-C -based on data collection and the provider's statements to me.     Provider Disclosure:  I agree with above History, Review of Systems, Physical exam and Plan.  I have reviewed the content of the documentation and have edited it as needed. I have personally performed the services documented here and the documentation accurately represents those services and the decisions I have made.      Electronically signed by:    All risks, benefits and alternatives were discussed with patient.  Patient is in agreement and understands the assessment and plan.  All questions were answered.    Sophia  ESTEVAN Degroot, MPAS  [facility]: Phone: [phone], Fax: [phone]  [facility]: Phone: [phone],  Fax: [phone]  [facility] [place]: Phone: [phone], Fax: [phone]  ____________________________________________    CC: Skin Check (FBSE, c/o spot on right hip)      Reviewed patients past medical history and pertinent chart review prior to patient's visit today.     HPI:  Mr. Nicolás Fermin is a 63 year old male who presents today as a return patient for FBSE.     Today patient reports a spot of concern on his right hip. Cannot recall if he had a procedure there or not and wants to make sure it is healing correctly.    Patient is otherwise feeling well, without additional concerns.    Labs:  N/A    Physical Exam:  Vitals: There were no vitals taken for this visit.  SKIN: Full skin, which includes the head/face, both arms, chest, back, abdomen,both legs, genitalia and/or groin buttocks, digits and/or nails, was examined.   - Oviedo's skin type II, has <100 nevi  - There are dome shaped bright red papules on the trunk.   - Multiple regular brown pigmented macules and papules are identified on the trunk and extremities.   - Scattered brown macules on sun exposed areas.  - There are waxy stuck on tan to brown papules on the trunk.     - Mild edema on the lower legs  - Mild xerosis of the skin noted  - Well healed scars, NERD, including spots of concern on the R hip.   - Right medial superior lower leg there Is a 7 mm pink scaly papule   - No other lesions of concern on areas examined.               Medications:  Current Outpatient Medications   Medication    amitriptyline (ELAVIL) 50 MG tablet    amLODIPine (NORVASC) 5 MG tablet    aspirin - buffered (ASCRIPTIN) 325 MG TABS tablet    atenolol (TENORMIN) 100 MG tablet    DiphenhydrAMINE HCl (BENADRYL PO)    Dolutegravir Sodium (TIVICAY PO)     DULoxetine (CYMBALTA) 60 MG capsule    Emtricitabine-Tenofovir AF (DESCOVY PO)    fluocinonide (LIDEX) 0.05 % external cream    gabapentin (NEURONTIN) 300 MG capsule    levothyroxine (SYNTHROID/LEVOTHROID) 112 MCG tablet    lisinopril (ZESTRIL) 20 MG tablet    NAPROXEN PO    Omega-3 Fatty Acids (FISH OIL) 500 MG CAPS    psyllium (METAMUCIL) 58.6 % POWD    ciclopirox (LOPROX) 0.77 % cream     Current Facility-Administered Medications   Medication    methylPREDNISolone (DEPO-MEDROL) injection 40 mg      Past Medical/Surgical History:   Patient Active Problem List   Diagnosis    Essential hypertension, benign    Neuropathy due to human immunodeficiency virus infection (H)    CARDIOVASCULAR SCREENING; LDL GOAL LESS THAN 130    Graves disease    Neuropathy    Hip pain, left    Tobacco abuse disorder     Past Medical History:   Diagnosis Date    Asymptomatic human immunodeficiency virus (HIV) infection status (H)     Graves disease 2006    Hypertension     Peripheral neuropathy     Squamous cell carcinoma of skin, unspecified

## 2023-12-13 ENCOUNTER — TELEPHONE (OUTPATIENT)
Dept: FAMILY MEDICINE | Facility: CLINIC | Age: 63
End: 2023-12-13
Payer: COMMERCIAL

## 2023-12-13 DIAGNOSIS — D09.9 SQUAMOUS CELL CARCINOMA IN SITU (SCCIS): Primary | ICD-10-CM

## 2023-12-13 NOTE — TELEPHONE ENCOUNTER
----- Message from Sophia Degroot PA-C sent at 12/13/2023  2:08 PM CST -----  SCCIS on R medial superior lower leg - send for MMS - can see anyone    A(1). Right medial superior lower leg:  - Squamous cell carcinoma in situ -

## 2023-12-13 NOTE — LETTER
[address]  [address]  [phone]        12/14/2023       Nicolás Fermin  [address] [place] [address]      Dear Nicolás:    You are scheduled for Mohs Surgery on Thursday February 1, 2024 at 9 am.    Please check in at [facility], [address].     You don't need to arrive more than 5-10 minutes prior to your appointment time.     Be sure to eat a good breakfast and bathe and wash your hair prior to surgery.     If you are taking any anti-coagulants that are prescribed by your Doctor (such as Coumadin/Warfarin, Plavix, Aspirin, Ibuprofen), please continue taking them.     However, if you are taking anti-coagulants over the counter without a Doctor's order for a medical condition, please discontinue them 10 days prior to surgery.     Please wear loose comfortable clothing as it could possibly be 4-6 hours until your surgery is completed depending upon how many layers of tissue need to be removed.      Thank you,    Abundio Matt MD

## 2023-12-14 NOTE — TELEPHONE ENCOUNTER
S/w pt and went over Sophia's message below about biopsy results.  Explained the excision procedure and answered all questions.  Pt scheduled with Dr. Matt on Thursday 2/1/24 at 9 am.  Pt states understanding.    Mohs letter and information sent via my chart and mailed home.    Rose Mary LANCASTER RN  [facility] [place]  [phone]

## 2024-01-18 DIAGNOSIS — I10 ESSENTIAL HYPERTENSION, BENIGN: ICD-10-CM

## 2024-01-19 RX ORDER — AMLODIPINE BESYLATE 5 MG/1
5 TABLET ORAL DAILY
Qty: 90 TABLET | Refills: 0 | Status: SHIPPED | OUTPATIENT
Start: 2024-01-19 | End: 2024-04-26

## 2024-02-01 ENCOUNTER — OFFICE VISIT (OUTPATIENT)
Dept: DERMATOLOGY | Facility: CLINIC | Age: 64
End: 2024-02-01
Payer: COMMERCIAL

## 2024-02-01 DIAGNOSIS — L82.1 SEBORRHEIC KERATOSES: ICD-10-CM

## 2024-02-01 DIAGNOSIS — D23.9 DERMAL NEVUS: Primary | ICD-10-CM

## 2024-02-01 DIAGNOSIS — D04.71 SQUAMOUS CELL CARCINOMA IN SITU (SCCIS) OF SKIN OF RIGHT LOWER LEG: ICD-10-CM

## 2024-02-01 DIAGNOSIS — L81.4 LENTIGO: ICD-10-CM

## 2024-02-01 DIAGNOSIS — D18.01 ANGIOMA OF SKIN: ICD-10-CM

## 2024-02-01 PROCEDURE — 99213 OFFICE O/P EST LOW 20 MIN: CPT | Mod: 25 | Performed by: DERMATOLOGY

## 2024-02-01 PROCEDURE — 17313 MOHS 1 STAGE T/A/L: CPT | Performed by: DERMATOLOGY

## 2024-02-01 NOTE — LETTER
2/1/2024         RE: Nicolás Fermin  [address]  [facility] [address]        Dear Colleague,    Thank you for referring your patient, Nicolás Fermin, to the [facility]. Please see a copy of my visit note below.    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  Patient has no other skin complaints today.  Remainder of the HPI, Meds, PMH, Allergies, FH, and SH was reviewed in chart.      Past Medical History:   Diagnosis Date     Asymptomatic human immunodeficiency virus (HIV) infection status (H)      Graves disease 2006     Hypertension      Peripheral neuropathy      Squamous cell carcinoma of skin, unspecified        Past Surgical History:   Procedure Laterality Date     BIOPSY  6-2018    skin/pos     COLONOSCOPY      neg        Family History   Problem Relation Age of Onset     Thyroid Disease Mother      Breast Cancer Mother      Skin Cancer Mother         face     Hypertension Father      Skin Cancer Father         behind ear     Diabetes Maternal Grandmother      Coronary Artery Disease Paternal Grandmother      Diabetes Maternal Grandfather        Social History     Socioeconomic History     Marital status: Single     Spouse name: Not on file     Number of children: Not on file     Years of education: Not on file     Highest education level: Not on file   Occupational History     Not on file   Tobacco Use     Smoking status: Every Day     Packs/day: 0.50     Years: 50.00     Additional pack years: 0.00     Total pack years: 25.00     Types: Cigarettes     Start date: 4/13/1975     Smokeless tobacco: Never     Tobacco comments:     it sucks!   Substance and Sexual Activity     Alcohol use: Yes     Comment: 2-3/day     Drug use: Yes     Types: Marijuana     Comment:  minimal use of CBD/THC for chronic pain relief     Sexual activity: Not Currently     Partners: Male     Birth control/protection: None   Other Topics Concern     Parent/sibling w/ CABG, MI or angioplasty before 65F 55M? No   Social History Narrative     Not on file     Social Determinants of Health     Financial Resource Strain: Low Risk  (10/27/2023)    Financial Resource Strain      Within the past 12 months, have you or your family members you live with been unable to get utilities (heat, electricity) when it was really needed?: No   Food Insecurity: High Risk (10/27/2023)    Food Insecurity      Within the past 12 months, did you worry that your food would run out before you got money to buy more?: Yes      Within the past 12 months, did the food you bought just not last and you didn t have money to get more?: No   Transportation Needs: Low Risk  (10/27/2023)    Transportation Needs      Within the past 12 months, has lack of transportation kept you from medical appointments, getting your medicines, non-medical meetings or appointments, work, or from getting things that you need?: No   Physical Activity: Not on file   Stress: Not on file   Social Connections: Not on file   Interpersonal Safety: Not on file   Housing Stability: Low Risk  (10/27/2023)    Housing Stability      Do you have housing? : Yes      Are you worried about losing your housing?: No       Outpatient Encounter Medications as of 2/1/2024   Medication Sig Dispense Refill     amitriptyline (ELAVIL) 50 MG tablet Take 1 tablet (50 mg) by mouth at bedtime 90 tablet 1     amLODIPine (NORVASC) 5 MG tablet Take 1 tablet (5 mg) by mouth daily 90 tablet 0     aspirin - buffered (ASCRIPTIN) 325 MG TABS tablet Take 325 mg by mouth daily       atenolol (TENORMIN) 100 MG tablet Take 1 tablet (100 mg) by mouth daily 90 tablet 3     DiphenhydrAMINE HCl (BENADRYL PO) Take by mouth as needed       Dolutegravir Sodium (TIVICAY PO) Take 50 mg by mouth daily         DULoxetine (CYMBALTA) 60 MG capsule Take 1 capsule (60 mg) by mouth daily 90 capsule 1     Emtricitabine-Tenofovir AF (DESCOVY PO) Take by mouth daily       fluocinonide (LIDEX) 0.05 % external cream Apply 1/4g to aa on thighs bid x 3 weeks. Do not use on face or body folds. Should last 30 days. 60 g 0     gabapentin (NEURONTIN) 300 MG capsule Take 2 capsules (600 mg) by mouth 2 times daily 360 capsule 1     levothyroxine (SYNTHROID/LEVOTHROID) 112 MCG tablet TAKE 1 TABLET(112 MCG) BY MOUTH DAILY 90 tablet 3     lisinopril (ZESTRIL) 20 MG tablet Take 1 tablet (20 mg) by mouth 2 times daily 180 tablet 2     NAPROXEN PO        Omega-3 Fatty Acids (FISH OIL) 500 MG CAPS        psyllium (METAMUCIL) 58.6 % POWD Take by mouth daily       ciclopirox (LOPROX) 0.77 % cream Apply topically 2 times daily To feet and toenails. (Patient not taking: Reported on 12/8/2023) 90 g 5     Facility-Administered Encounter Medications as of 2/1/2024   Medication Dose Route Frequency Provider Last Rate Last Admin     methylPREDNISolone (DEPO-MEDROL) injection 40 mg  40 mg   Emmanuel Oro DO   40 mg at 01/26/22 1100             Review Of Systems  Skin: As above  Eyes: negative  Ears/Nose/Throat: negative  Respiratory: No shortness of breath, dyspnea on exertion, cough, or hemoptysis  Cardiovascular: negative  Gastrointestinal: negative  Genitourinary: negative  Musculoskeletal: negative  Neurologic: negative  Psychiatric: negative  Hematologic/Lymphatic/Immunologic: negative  Endocrine: negative      O:   NAD, WDWN, Alert & Oriented, Mood & Affect wnl, Vitals stable   General appearance joe ii   Vitals stable   Alert, oriented and in no acute distress   R shin 7mm ill-defined scaly papule    Stuck on papules and brown macules on trunk and ext    Red papules on trunk   Flesh colored papules on trunk          Eyes: Conjunctivae/lids:Normal     ENT: Lips, buccal mucosa, tongue: normal    MSK:Normal    Cardiovascular: peripheral edema  none    Pulm: Breathing Normal    Neuro/Psych: Orientation:Normal; Mood/Affect:Normal      A/P:  1. Seborrheic keratosis, lentigo, angioma, dermal nevus  2. 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  Previous clinic  notes and pertinent laboratory tests were reviewed prior to Nicolás Fermin's visit.  Signs and Symptoms of skin cancer discussed with patient.  Patient encouraged to perform monthly skin exams.  UV precautions reviewed with patient.  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  The risks and benefits as well as alternatives to therapy were discussed, in detail.  Specifically, the risks of infection, scarring, bleeding, prolonged wound healing, incomplete removal, allergy to anesthesia, nerve injury and recurrence were addressed.  Indication for Mohs was Size, Location, and Ill-defined margins. Prior to the procedure, the treatment site was clearly identified and, if available, confirmed with previous photos and confirmed by the patient   All components of the Universal Protocol/PAUSE rule were completed.  The Mohs surgeon operated in two distinct and integrated capacities as the surgeon and pathologist.      The area was prepped with Betasept.  A rim of normal appearing skin was marked circumferentially around the lesion.  The area was infiltrated with local anesthesia.  The tumor was first debulked to remove all clinically apparent tumor.  An incision following the standard Mohs approach was done and the specimen was oriented,mapped and placed in 1 block(s).  Each specimen was then chromacoded and processed in the Mohs laboratory using standard Mohs technique and submitted for frozen section histology.  Frozen  section analysis showed no residual tumor but CLEAR MARGINS.      The tumor was excised using standard Mohs technique in 1 stages(s).  CLEAR MARGINS OBTAINED and Final defect size was 1.4 x 1.1 cm.     We discussed the options for wound management in full with the patient including risks/benefits/ possible outcomes.      REPAIR SECOND INTENT: We discussed the options for wound management in full with the patient including risks/benefits/possible outcomes. Decision made to allow the wound to heal by second intention. Cautery was used for for hemostasis. EBL minimal; complications none; wound care routine.  The patient was discharged in good condition and will return in one month or prn for wound evaluation.        Again, thank you for allowing me to participate in the care of your patient.        Sincerely,        Abundio Matt MD

## 2024-02-01 NOTE — PATIENT INSTRUCTIONS
Open Wound Care     for ______________        No strenuous activity for 48 hours    Take Tylenol as needed for discomfort.                                                .         Do not drink alcoholic beverages for 48 hours.    Keep the pressure bandage in place for 24 hours. If the bandage becomes blood tinged or loose, reinforce it with gauze and tape.        (Refer to the reverse side of this page for management of bleeding).    Remove bandage in 24 hours and begin wound care as follows:     Clean area with tap water using a Q tip or gauze pad, (shower / bathe normally)  Dry wound with Q tip or gauze pad  Apply Aquaphor, Vaseline, Polysporin or Bacitracin Ointment with a Q tip  Do NOT use Neosporin Ointment *  Cover the wound with a band-aid or nonstick gauze pad and paper tape.  Repeat wound care once a day until wound is completely healed.    It is an old wives tale that a wound heals better when it is exposed to air and allowed to dry out. The wound will heal faster with a better cosmetic result if it is kept moist with ointment and covered with a bandage.  Do not let the wound dry out.      Supplies Needed:                Qtips or gauze pads                Polysporin or Bacitracin Ointment                Bandaids or nonstick gauze pads and paper tape    Wound care kits and brown paper tape are available for purchase at   the pharmacy.    BLEEDING:    Use tightly rolled up gauze or cloth to apply direct pressure over the bandage for 20   minutes.  Reapply pressure for an additional 20 minutes if necessary  Call the office or go to the nearest emergency room if pressure fails to stop the bleeding.  Use additional gauze and tape to maintain pressure once the bleeding has stopped.  Begin wound care 24 hours after surgery as directed.                  WOUND HEALING    One week after surgery a pink / red halo will form around the outside of the wound.   This is new skin.  The center of the wound will appear  yellowish white and produce some drainage.  The pink halo will slowly migrate in toward the center of the wound until the wound is covered with new shiny pink skin.  There will be no more drainage when the wound is completely healed.  It will take six months to one year for the redness to fade.  The scar may be itchy, tight and sensitive to extreme temperatures for a year after the surgery.  Massaging the area several times a day for several minutes after the wound is completely healed will help the scar soften and normalize faster. Begin massage only after healing is complete.      In case of emergency call: Dr Matt: [phone]    [facility]: [phone]    [facility]:[phone]

## 2024-02-01 NOTE — PROGRESS NOTES
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  Patient has no other skin complaints today.  Remainder of the HPI, Meds, PMH, Allergies, FH, and SH was reviewed in chart.      Past Medical History:   Diagnosis Date    Asymptomatic human immunodeficiency virus (HIV) infection status (H)     Graves disease 2006    Hypertension     Peripheral neuropathy     Squamous cell carcinoma of skin, unspecified        Past Surgical History:   Procedure Laterality Date    BIOPSY  6-2018    skin/pos    COLONOSCOPY      neg        Family History   Problem Relation Age of Onset    Thyroid Disease Mother     Breast Cancer Mother     Skin Cancer Mother         face    Hypertension Father     Skin Cancer Father         behind ear    Diabetes Maternal Grandmother     Coronary Artery Disease Paternal Grandmother     Diabetes Maternal Grandfather        Social History     Socioeconomic History    Marital status: Single     Spouse name: Not on file    Number of children: Not on file    Years of education: Not on file    Highest education level: Not on file   Occupational History    Not on file   Tobacco Use    Smoking status: Every Day     Packs/day: 0.50     Years: 50.00     Additional pack years: 0.00     Total pack years: 25.00     Types: Cigarettes     Start date: 4/13/1975    Smokeless tobacco: Never    Tobacco comments:     it sucks!   Substance and Sexual Activity    Alcohol use: Yes     Comment: 2-3/day    Drug use: Yes     Types: Marijuana     Comment: minimal use of CBD/THC for chronic pain relief    Sexual activity: Not Currently     Partners: Male     Birth control/protection: None   Other Topics Concern    Parent/sibling w/ CABG, MI or angioplasty before 65F 55M? No   Social History Narrative    Not on file     Social Determinants of Health     Financial Resource Strain: Low Risk  (10/27/2023)    Financial Resource Strain      Within the past 12 months, have you or your family members you live with been unable to get utilities (heat, electricity) when it was really needed?: No   Food Insecurity: High Risk (10/27/2023)    Food Insecurity     Within the past 12 months, did you worry that your food would run out before you got money to buy more?: Yes     Within the past 12 months, did the food you bought just not last and you didn t have money to get more?: No   Transportation Needs: Low Risk  (10/27/2023)    Transportation Needs     Within the past 12 months, has lack of transportation kept you from medical appointments, getting your medicines, non-medical meetings or appointments, work, or from getting things that you need?: No   Physical Activity: Not on file   Stress: Not on file   Social Connections: Not on file   Interpersonal Safety: Not on file   Housing Stability: Low Risk  (10/27/2023)    Housing Stability     Do you have housing? : Yes     Are you worried about losing your housing?: No       Outpatient Encounter Medications as of 2/1/2024   Medication Sig Dispense Refill    amitriptyline (ELAVIL) 50 MG tablet Take 1 tablet (50 mg) by mouth at bedtime 90 tablet 1    amLODIPine (NORVASC) 5 MG tablet Take 1 tablet (5 mg) by mouth daily 90 tablet 0    aspirin - buffered (ASCRIPTIN) 325 MG TABS tablet Take 325 mg by mouth daily      atenolol (TENORMIN) 100 MG tablet Take 1 tablet (100 mg) by mouth daily 90 tablet 3    DiphenhydrAMINE HCl (BENADRYL PO) Take by mouth as needed      Dolutegravir Sodium (TIVICAY PO) Take 50 mg by mouth daily       DULoxetine (CYMBALTA) 60 MG capsule Take 1 capsule (60 mg) by mouth daily 90 capsule 1    Emtricitabine-Tenofovir AF (DESCOVY PO) Take by mouth daily      fluocinonide (LIDEX) 0.05 % external cream Apply 1/4g to aa on thighs bid x 3 weeks. Do not use on face or body folds. Should last 30 days. 60 g 0    gabapentin (NEURONTIN) 300 MG capsule Take 2 capsules (600 mg) by mouth 2 times daily 360  capsule 1    levothyroxine (SYNTHROID/LEVOTHROID) 112 MCG tablet TAKE 1 TABLET(112 MCG) BY MOUTH DAILY 90 tablet 3    lisinopril (ZESTRIL) 20 MG tablet Take 1 tablet (20 mg) by mouth 2 times daily 180 tablet 2    NAPROXEN PO       Omega-3 Fatty Acids (FISH OIL) 500 MG CAPS       psyllium (METAMUCIL) 58.6 % POWD Take by mouth daily      ciclopirox (LOPROX) 0.77 % cream Apply topically 2 times daily To feet and toenails. (Patient not taking: Reported on 12/8/2023) 90 g 5     Facility-Administered Encounter Medications as of 2/1/2024   Medication Dose Route Frequency Provider Last Rate Last Admin    methylPREDNISolone (DEPO-MEDROL) injection 40 mg  40 mg   Emmanuel Oro DO   40 mg at 01/26/22 1100             Review Of Systems  Skin: As above  Eyes: negative  Ears/Nose/Throat: negative  Respiratory: No shortness of breath, dyspnea on exertion, cough, or hemoptysis  Cardiovascular: negative  Gastrointestinal: negative  Genitourinary: negative  Musculoskeletal: negative  Neurologic: negative  Psychiatric: negative  Hematologic/Lymphatic/Immunologic: negative  Endocrine: negative      O:   NAD, WDWN, Alert & Oriented, Mood & Affect wnl, Vitals stable   General appearance joe ii   Vitals stable   Alert, oriented and in no acute distress   R shin 7mm ill-defined scaly papule    Stuck on papules and brown macules on trunk and ext    Red papules on trunk   Flesh colored papules on trunk          Eyes: Conjunctivae/lids:Normal     ENT: Lips, buccal mucosa, tongue: normal    MSK:Normal    Cardiovascular: peripheral edema none    Pulm: Breathing Normal    Neuro/Psych: Orientation:Normal; Mood/Affect:Normal      A/P:  1. Seborrheic keratosis, lentigo, angioma, dermal nevus  2. 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  Previous clinic  notes and pertinent laboratory tests were reviewed prior to Nicolás Fermin's visit.  Signs and Symptoms of skin cancer discussed with  patient.  Patient encouraged to perform monthly skin exams.  UV precautions reviewed with patient.  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  The risks and benefits as well as alternatives to therapy were discussed, in detail.  Specifically, the risks of infection, scarring, bleeding, prolonged wound healing, incomplete removal, allergy to anesthesia, nerve injury and recurrence were addressed.  Indication for Mohs was Size, Location, and Ill-defined margins. Prior to the procedure, the treatment site was clearly identified and, if available, confirmed with previous photos and confirmed by the patient   All components of the Universal Protocol/PAUSE rule were completed.  The Mohs surgeon operated in two distinct and integrated capacities as the surgeon and pathologist.      The area was prepped with Betasept.  A rim of normal appearing skin was marked circumferentially around the lesion.  The area was infiltrated with local anesthesia.  The tumor was first debulked to remove all clinically apparent tumor.  An incision following the standard Mohs approach was done and the specimen was oriented,mapped and placed in 1 block(s).  Each specimen was then chromacoded and processed in the Mohs laboratory using standard Mohs technique and submitted for frozen section histology.  Frozen section analysis showed no residual tumor but CLEAR MARGINS.      The tumor was excised using standard Mohs technique in 1 stages(s).  CLEAR MARGINS OBTAINED and Final defect size was 1.4 x 1.1 cm.     We discussed the options for wound management in full with the patient including risks/benefits/ possible outcomes.      REPAIR SECOND INTENT: We discussed the options for wound management in full with the  patient including risks/benefits/possible outcomes. Decision made to allow the wound to heal by second intention. Cautery was used for for hemostasis. EBL minimal; complications none; wound care routine.  The patient was discharged in good condition and will return in one month or prn for wound evaluation.

## 2024-04-25 DIAGNOSIS — I10 ESSENTIAL HYPERTENSION, BENIGN: ICD-10-CM

## 2024-04-26 RX ORDER — AMLODIPINE BESYLATE 5 MG/1
5 TABLET ORAL DAILY
Qty: 90 TABLET | Refills: 0 | Status: SHIPPED | OUTPATIENT
Start: 2024-04-26 | End: 2024-07-25

## 2024-06-03 ENCOUNTER — OFFICE VISIT (OUTPATIENT)
Dept: FAMILY MEDICINE | Facility: CLINIC | Age: 64
End: 2024-06-03
Payer: MEDICARE

## 2024-06-03 DIAGNOSIS — D49.2 NEOPLASM OF SKIN: ICD-10-CM

## 2024-06-03 DIAGNOSIS — D18.01 CHERRY ANGIOMA: ICD-10-CM

## 2024-06-03 DIAGNOSIS — L81.4 LENTIGINES: ICD-10-CM

## 2024-06-03 DIAGNOSIS — Z85.828 HISTORY OF NONMELANOMA SKIN CANCER: ICD-10-CM

## 2024-06-03 DIAGNOSIS — D22.9 MULTIPLE BENIGN NEVI: Primary | ICD-10-CM

## 2024-06-03 DIAGNOSIS — Z87.898 HISTORY OF ATYPICAL NEVUS: ICD-10-CM

## 2024-06-03 DIAGNOSIS — L82.1 SEBORRHEIC KERATOSES: ICD-10-CM

## 2024-06-03 DIAGNOSIS — L57.0 AK (ACTINIC KERATOSIS): ICD-10-CM

## 2024-06-03 DIAGNOSIS — L84 CORN OR CALLUS: ICD-10-CM

## 2024-06-03 DIAGNOSIS — I87.2 ACUTE STASIS DERMATITIS: ICD-10-CM

## 2024-06-03 PROCEDURE — 11102 TANGNTL BX SKIN SINGLE LES: CPT | Performed by: PHYSICIAN ASSISTANT

## 2024-06-03 PROCEDURE — 99213 OFFICE O/P EST LOW 20 MIN: CPT | Mod: 25 | Performed by: PHYSICIAN ASSISTANT

## 2024-06-03 PROCEDURE — 17003 DESTRUCT PREMALG LES 2-14: CPT | Performed by: PHYSICIAN ASSISTANT

## 2024-06-03 PROCEDURE — 88305 TISSUE EXAM BY PATHOLOGIST: CPT | Mod: 26 | Performed by: PATHOLOGY

## 2024-06-03 PROCEDURE — 17000 DESTRUCT PREMALG LESION: CPT | Mod: XS | Performed by: PHYSICIAN ASSISTANT

## 2024-06-03 ASSESSMENT — PAIN SCALES - GENERAL: PAINLEVEL: NO PAIN (0)

## 2024-06-03 NOTE — PROGRESS NOTES
[facility] Dermatology Note  Encounter Date: Alejandro 3, 2024  Office Visit      Dermatology Problem List:  FBSE: 6/3/24    #NUB, L deltoid, S./p Biopsy performed on 6/3/24: pending results   #Hx of NMSC  - BCC, L NSW s/p MMS 12/5/2019  - SCCis, R posterior thigh s/p MMS 12/5/2019  - SCCis, L ventral proximal leg s/p MMS 11/11/2021  - SCCis, R medial thigh s/p exicision  8/17/2022  - SCCis, R gillette distal s/p MMS 8/25/2022  - SCCis, R distal lateral thigh s/p MMS 1/5/23 (Labeled in Vernell's note as R thigh inferior)   - BCC - R mid back inferior - s/p ED&C 7/24/23  - SCC, R shin, S/p Mohs on 2/1/24  2. History of atypical nevus  - Lentiginous junctional melanocytic nevus with mild atypia, right lateral arm s/p shave biopsy 6/11/2020   - Compound dysplastic nevus with moderate atypia, left lateral mid back, s/p shave biopsy 8/27/2021  3. 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 Psoriasis vs dermatitis   - Previous tx: fluocinonide 0.05% cream  5. Stasis dermatitis  - Tx: Compression stockings, 30-40mmgHg.   6. Corn, x 2 L plantar foot - S/p cryotherapy.     Social: , [employer]  ____________________________________________    Assessment & Plan:    #AK, R chest x 3, x 1 L upper arm   - Cryotherapy performed. See procedure note below.     #NUB, L deltoid, Ddx: NMSC vs other   - Shave biopsy performed, see procedure note below  - Photographed today     # Corn, x 2 L plantar foot.   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 We recommend using an SPF 30 or higher, and to use one that is water resistant.     - Advised to monitor for changing, non-healing, bleeding, painful, changing, or otherwise symptomatic lesions  - Continue annual skin exams    # Hx of NMSC  - Signs and Symptoms of non-melanoma skin cancer and ABCDEs of melanoma reviewed with patient. Patient encouraged to perform monthly self skin exams and educated on how to perform them. UV precautions reviewed with patient. Patient was asked about new or changing moles/lesions on body.   - Sunscreen: Apply 20 minutes prior to going outdoors and reapply every two hours, when wet or sweating. We recommend using an SPF 30 or higher, and to use one that is water resistant.     - Advised to monitor for changing, non-healing, bleeding, painful, changing, or otherwise symptomatic lesions  - Continue annual skin exams    # Benign findings: multiple benign nevi, lentigines, cherry angiomas, SKs  - edu on benign etiology  - Signs and Symptoms of non-melanoma skin cancer and ABCDEs of melanoma reviewed with patient. Patient encouraged to perform monthly self skin exams and educated on how to perform them. UV precautions reviewed with patient. Patient was asked about new or changing moles/lesions on body.   - Sunscreen: Apply 20 minutes prior to going outdoors and reapply every two hours, when wet or sweating. We recommend using an SPF 30 or higher, and to use one that is water resistant.     - RTC for changes      Procedures Performed:     Shave bx: After discussion of benefits and risks including but not limited to bleeding, infection, scar,  incomplete removal, recurrence, and non-diagnostic biopsy, written consent and photographs were obtained. The area was cleaned with isopropyl alcohol. 0.5 mL of 1% lidocaine with epinephrine was injected to obtain adequate anesthesia of the lesion on the L deltoid . A shave biopsy was performed. Hemostasis was achieved with aluminium chloride. Vaseline and a sterile dressing were applied. The patient tolerated the procedure and no complications were noted. The patient was provided with verbal and written post care instructions.     CRYOTHERAPY PROCEDURE NOTE: 4 lesions in the above locations were treated with liquid nitrogen utilizing a 5-10sec thaw time. Patient was advised that the treated areas will become red, swollen, may develop a blister and then should crust and peal off in the next 1-2 weeks. Post-procedure instructions were provided.    Follow-up: 1 year(s) in-person, or earlier for new or changing lesions    Staff and scribe:    Scribe Disclosure:   I, TA BUCHANAN, am serving as a scribe; to document services personally performed by Sophia Degroot PA-C -based on data collection and the provider's statements to me.     Provider Disclosure:  I agree with above History, Review of Systems, Physical exam and Plan.  I have reviewed the content of the documentation and have edited it as needed. I have personally performed the services documented here and the documentation accurately represents those services and the decisions I have made.      Electronically signed by:    All risks, benefits and alternatives were discussed with patient.  Patient is in agreement and understands the assessment and plan.  All questions were answered.    Sophia Degroot PA-C, [facility]  [facility]: Phone: [phone], Fax: [phone]  [facility]: Phone: [phone],  Fax: [phone]  [facility]: Phone: [phone],  Fax: [phone]  ____________________________________________    CC: Skin Check (fbsc)      Reviewed patients past medical history and pertinent chart review prior to patient's visit today.     HPI:  Mr. Nicolás Fermin is a 64 year old male who presents today as a return patient for FBSC. Last seen by Dr. Matt on 2/1/24 due to mohs surgery due to a SCC on her R shin.     Today patient did not report any spots of concern. Requests new rx for compression stockings due to his lower leg edema. Notes he typically wears them daily, but recently got a hole in one pair.     Has a hx of NMSC and DN.     Patient is otherwise feeling well, without additional concerns.    Labs:  N/A    Physical Exam:  Vitals: There were no vitals taken for this visit.  SKIN: Total skin excluding the undergarment areas was performed. The exam included the head/face, neck, both arms, chest, back, abdomen, both legs, digits and/or nails.    - [place]'s skin type II, has <100 nevi  - There are dome shaped bright red papules on the trunk.   - Multiple regular brown pigmented macules and papules are identified on the trunk and extremities.   - Scattered brown macules on sun exposed areas.  - There are waxy stuck on tan to brown papules on the trunk.  - There are well healed surgical scars without erythema, nodularity or telangiectasias on the [facility] sites, NERD.   - L deltoid there is a 7 mm pink and brown papule   - 1+ edema of bisi lower extremities, stasis changes noted  - There are  erythematous macules with overlying adherent scale on the: Chest x 3. L upper arm x 1   - No other lesions of concern on areas examined.       Medications:  Current Outpatient Medications   Medication Sig Dispense Refill    amitriptyline (ELAVIL) 50 MG tablet Take 1 tablet (50 mg) by mouth at bedtime 90 tablet 1    amLODIPine (NORVASC) 5 MG tablet Take 1 tablet (5 mg) by mouth daily 90 tablet 0    aspirin - buffered (ASCRIPTIN) 325 MG TABS tablet Take 325 mg  by mouth daily      atenolol (TENORMIN) 100 MG tablet Take 1 tablet (100 mg) by mouth daily 90 tablet 3    ciclopirox (LOPROX) 0.77 % cream Apply topically 2 times daily To feet and toenails. 90 g 5    DiphenhydrAMINE HCl (BENADRYL PO) Take by mouth as needed      Dolutegravir Sodium (TIVICAY PO) Take 50 mg by mouth daily       DULoxetine (CYMBALTA) 60 MG capsule Take 1 capsule (60 mg) by mouth daily 90 capsule 1    Emtricitabine-Tenofovir AF (DESCOVY PO) Take by mouth daily      fluocinonide (LIDEX) 0.05 % external cream Apply 1/4g to aa on thighs bid x 3 weeks. Do not use on face or body folds. Should last 30 days. 60 g 0    gabapentin (NEURONTIN) 300 MG capsule Take 2 capsules (600 mg) by mouth 2 times daily 360 capsule 1    levothyroxine (SYNTHROID/LEVOTHROID) 112 MCG tablet TAKE 1 TABLET(112 MCG) BY MOUTH DAILY 90 tablet 3    lisinopril (ZESTRIL) 20 MG tablet Take 1 tablet (20 mg) by mouth 2 times daily 180 tablet 2    NAPROXEN PO       Omega-3 Fatty Acids (FISH OIL) 500 MG CAPS       psyllium (METAMUCIL) 58.6 % POWD Take by mouth daily       Current Facility-Administered Medications   Medication Dose Route Frequency Provider Last Rate Last Admin    methylPREDNISolone (DEPO-MEDROL) injection 40 mg  40 mg   Emmanuel Oro DO   40 mg at 01/26/22 1100      Past Medical/Surgical History:   Patient Active Problem List   Diagnosis    Essential hypertension, benign    Neuropathy due to human immunodeficiency virus infection (H)    CARDIOVASCULAR SCREENING; LDL GOAL LESS THAN 130    Graves disease    Neuropathy    Hip pain, left    Tobacco abuse disorder     Past Medical History:   Diagnosis Date    Asymptomatic human immunodeficiency virus (HIV) infection status (H)     Graves disease 2006    Hypertension     Peripheral neuropathy     Squamous cell carcinoma of skin, unspecified

## 2024-06-03 NOTE — PATIENT INSTRUCTIONS
Patient Education       Proper skin care from [facility]:    -Eliminate harsh soaps as they strip the natural oils from the skin, often resulting in dry itchy skin ( i.e. Dial, Zest, Pashto Spring)  -Use mild soaps such as Cetaphil or Dove Sensitive Skin in the shower. You do not need to use soap on arms, legs, and trunk every time you shower unless visibly soiled.   -Avoid hot or cold showers.  -After showering, lightly dry off and apply moisturizing within 2-3 minutes. This will help trap moisture in the skin.   -Aggressive use of a moisturizer at least 1-2 times a day to the entire body (including -Vanicream, Cetaphil, Aquaphor or Cerave) and moisturize hands after every washing.  -We recommend using moisturizers that come in a tub that needs to be scooped out, not a pump. This has more of an oil base. It will hold moisture in your skin much better than a water base moisturizer. The above recommended are non-pore clogging.      Wear a sunscreen with at least SPF 30 on your face, ears, neck and V of the chest daily. Wear sunscreen on other areas of the body if those areas are exposed to the sun throughout the day. Sunscreens can contain physical and/or chemical blockers. Physical blockers are less likely to clog pores, these include zinc oxide and titanium dioxide. Reapply every two hour and after swimming.     Sunscreen examples: https://www.ewg.org/sunscreen/    UV radiation  UVA radiation remains constant throughout the day and throughout the year. It is a longer wavelength than UVB and therefore penetrates deeper into the skin leading to immediate and delayed tanning, photoaging, and skin cancer. 70-80% of UVA and UVB radiation occurs between the hours of 10am-2pm.  UVB radiation  UVB radiation causes the most harmful effects and is more significant during the summer months. However, snow and ice can reflect UVB radiation leading to skin damage during the winter months as well. UVB radiation is  responsible for tanning, burning, inflammation, delayed erythema (pinkness), pigmentation (brown spots), and skin cancer.     I recommend self monthly full body exams and yearly full body exams with a dermatology provider. If you develop a new or changing lesion please follow up for examination. Most skin cancers are pink and scaly or pink and pearly. However, we do see blue/brown/black skin cancers.  Consider the ABCDEs of melanoma when giving yourself your monthly full body exam ( don't forget the groin, buttocks, feet, toes, etc). A-asymmetry, B-borders, C-color, D-diameter, E-elevation or evolving. If you see any of these changes please follow up in clinic. If you cannot see your back I recommend purchasing a hand held mirror to use with a larger wall mirror.       Checking for Skin Cancer  You can find cancer early by checking your skin each month. There are 3 kinds of skin cancer. They are melanoma, basal cell carcinoma, and squamous cell carcinoma. Doing monthly skin checks is the best way to find new marks or skin changes. Follow the instructions below for checking your skin.   The ABCDEs of checking moles for melanoma   Check your moles or growths for signs of melanoma using ABCDE:   Asymmetry: the sides of the mole or growth don t match  Border: the edges are ragged, notched, or blurred  Color: the color within the mole or growth varies  Diameter: the mole or growth is larger than 6 mm (size of a pencil eraser)  Evolving: the size, shape, or color of the mole or growth is changing (evolving is not shown in the images below)    Checking for other types of skin cancer  Basal cell carcinoma or squamous cell carcinoma have symptoms such as:     A spot or mole that looks different from all other marks on your skin  Changes in how an area feels, such as itching, tenderness, or pain  Changes in the skin's surface, such as oozing, bleeding, or scaliness  A sore that does not heal  New swelling or redness beyond  the border of a mole    Who s at risk?  Anyone can get skin cancer. But you are at greater risk if you have:   Fair skin, light-colored hair, or light-colored eyes  Many moles or abnormal moles on your skin  A history of sunburns from sunlight or tanning beds  A family history of skin cancer  A history of exposure to radiation or chemicals  A weakened immune system  If you have had skin cancer in the past, you are at risk for recurring skin cancer.   How to check your skin  Do your monthly skin checkups in front of a full-length mirror. Check all parts of your body, including your:   Head (ears, face, neck, and scalp)  Torso (front, back, and sides)  Arms (tops, undersides, upper, and lower armpits)  Hands (palms, backs, and fingers, including under the nails)  Buttocks and genitals  Legs (front, back, and sides)  Feet (tops, soles, toes, including under the nails, and between toes)  If you have a lot of moles, take digital photos of them each month. Make sure to take photos both up close and from a distance. These can help you see if any moles change over time.   Most skin changes are not cancer. But if you see any changes in your skin, call your doctor right away. Only he or she can diagnose a problem. If you have skin cancer, seeing your doctor can be the first step toward getting the treatment that could save your life.   [employer] last reviewed this educational content on 4/1/2019 2000-2020 [employer]. [address] [place] [address], [place] [address]. All rights reserved. This information is not intended as a substitute for professional medical care. Always follow your healthcare professional's instructions.       When should I call my doctor?  If you are worsening or not improving, please, contact us or seek urgent care as noted below.     Who should I call with questions (adults)?  [facility] (adult and pediatric): [phone]  [facility]  [place] (adult): [phone]  [facility] ([place], [place], [place] and [place]) [phone]  For urgent needs outside of business hours call the [facility] at [phone] and ask for the dermatology resident on call to be paged  If this is a medical emergency and you are unable to reach an ER, Call 911      If you need a prescription refill, please contact your pharmacy. Refills are approved or denied by our Physicians during normal business hours, Monday through Fridays  Per office policy, refills will not be granted if you have not been seen within the past year (or sooner depending on your child's condition)

## 2024-06-03 NOTE — LETTER
6/3/2024         RE: Nicolás Fermin  [address]  [facility] [address]        Dear Colleague,    Thank you for referring your patient, Nicolás Fermin, to the [facility]. Please see a copy of my visit note below.    [facility] Dermatology Note  Encounter Date: Alejandro 3, 2024  Office Visit      Dermatology Problem List:  FBSE: 6/3/24    #NUB, L deltoid, S./p Biopsy performed on 6/3/24: pending results   #Hx of NMSC  - BCC, L NSW s/p MMS 12/5/2019  - SCCis, R posterior thigh s/p MMS 12/5/2019  - SCCis, L ventral proximal leg s/p MMS 11/11/2021  - SCCis, R medial thigh s/p exicision  8/17/2022  - SCCis, R gillette distal s/p MMS 8/25/2022  - SCCis, R distal lateral thigh s/p MMS 1/5/23 (Labeled in Vernell's note as R thigh inferior)   - BCC - R mid back inferior - s/p ED&C 7/24/23  - SCC, R shin, S/p Mohs on 2/1/24  2. History of atypical nevus  - Lentiginous junctional melanocytic nevus with mild atypia, right lateral arm s/p shave biopsy 6/11/2020   - Compound dysplastic nevus with moderate atypia, left lateral mid back, s/p shave biopsy 8/27/2021  3. 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 Psoriasis vs dermatitis   - Previous tx: fluocinonide 0.05% cream  5. Stasis dermatitis  - Tx: Compression stockings, 30-40mmgHg.   6. Corn, x 2 L plantar foot - S/p cryotherapy.     Social: , [employer]  ____________________________________________    Assessment & Plan:    #AK, R chest x 3, x 1 L upper arm   - Cryotherapy performed. See procedure note below.     #NUB, L deltoid, Ddx: NMSC vs other   - Shave biopsy performed, see procedure note below  - Photographed today     # Corn, x 2 L plantar foot.    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 We recommend using an SPF 30 or higher, and to use one that is water resistant.     - Advised to monitor for changing, non-healing, bleeding, painful, changing, or otherwise symptomatic lesions  - Continue annual skin exams    # Hx of NMSC  - Signs and Symptoms of non-melanoma skin cancer and ABCDEs of melanoma reviewed with patient. Patient encouraged to perform monthly self skin exams and educated on how to perform them. UV precautions reviewed with patient. Patient was asked about new or changing moles/lesions on body.   - Sunscreen: Apply 20 minutes prior to going outdoors and reapply every two hours, when wet or sweating. We recommend using an SPF 30 or higher, and to use one that is water resistant.     - Advised to monitor for changing, non-healing, bleeding, painful, changing, or otherwise symptomatic lesions  - Continue annual skin exams    # Benign findings: multiple benign nevi, lentigines, cherry angiomas, SKs  - edu on benign etiology  - Signs and Symptoms of non-melanoma skin cancer and ABCDEs of melanoma reviewed with patient. Patient encouraged to perform monthly self skin exams and educated on how to perform them. UV precautions reviewed with patient. Patient was asked about new or changing moles/lesions on body.   - Sunscreen: Apply 20 minutes prior to going outdoors and reapply every two hours, when wet  or sweating. We recommend using an SPF 30 or higher, and to use one that is water resistant.     - RTC for changes      Procedures Performed:     Shave bx: After discussion of benefits and risks including but not limited to bleeding, infection, scar, incomplete removal, recurrence, and non-diagnostic biopsy, written consent and photographs were obtained. The area was cleaned with isopropyl alcohol. 0.5 mL of 1% lidocaine with epinephrine was injected to obtain adequate anesthesia of the lesion on the L deltoid . A shave biopsy was performed. Hemostasis was achieved with aluminium chloride. Vaseline and a sterile dressing were applied. The patient tolerated the procedure and no complications were noted. The patient was provided with verbal and written post care instructions.     CRYOTHERAPY PROCEDURE NOTE: 4 lesions in the above locations were treated with liquid nitrogen utilizing a 5-10sec thaw time. Patient was advised that the treated areas will become red, swollen, may develop a blister and then should crust and peal off in the next 1-2 weeks. Post-procedure instructions were provided.    Follow-up: 1 year(s) in-person, or earlier for new or changing lesions    Staff and scribe:    Scribe Disclosure:   I, TA BUCHANAN, am serving as a scribe; to document services personally performed by Sophia Degroot PA-C -based on data collection and the provider's statements to me.     Provider Disclosure:  I agree with above History, Review of Systems, Physical exam and Plan.  I have reviewed the content of the documentation and have edited it as needed. I have personally performed the services documented here and the documentation accurately represents those services and the decisions I have made.      Electronically signed by:    All risks, benefits and alternatives were discussed with patient.  Patient is in agreement and understands the assessment and plan.  All questions were answered.    Sophia Degroot PA-C,  MPAS  [facility]: Phone: [phone], Fax: [phone]  [facility]: Phone: [phone],  Fax: [phone]  [facility]: Phone: [phone], Fax: [phone]  ____________________________________________    CC: Skin Check (fbsc)      Reviewed patients past medical history and pertinent chart review prior to patient's visit today.     HPI:  Mr. Nicolás Fermin is a 64 year old male who presents today as a return patient for FBSC. Last seen by Dr. Matt on 2/1/24 due to mohs surgery due to a SCC on her R shin.     Today patient did not report any spots of concern. Requests new rx for compression stockings due to his lower leg edema. Notes he typically wears them daily, but recently got a hole in one pair.     Has a hx of NMSC and DN.     Patient is otherwise feeling well, without additional concerns.    Labs:  N/A    Physical Exam:  Vitals: There were no vitals taken for this visit.  SKIN: Total skin excluding the undergarment areas was performed. The exam included the head/face, neck, both arms, chest, back, abdomen, both legs, digits and/or nails.    - [place]'s skin type II, has <100 nevi  - There are dome shaped bright red papules on the trunk.   - Multiple regular brown pigmented macules and papules are identified on the trunk and extremities.   - Scattered brown macules on sun exposed areas.  - There are waxy stuck on tan to brown papules on the trunk.  - There are well healed surgical scars without erythema, nodularity or telangiectasias on the [facility] sites, NERD.   - L deltoid there is a 7 mm pink and brown papule   - 1+ edema of bisi lower extremities, stasis changes noted  - There are  erythematous macules with overlying adherent scale on the: Chest x 3. L upper arm x 1   - No other lesions of concern on areas examined.       Medications:  Current Outpatient Medications   Medication Sig  Dispense Refill     amitriptyline (ELAVIL) 50 MG tablet Take 1 tablet (50 mg) by mouth at bedtime 90 tablet 1     amLODIPine (NORVASC) 5 MG tablet Take 1 tablet (5 mg) by mouth daily 90 tablet 0     aspirin - buffered (ASCRIPTIN) 325 MG TABS tablet Take 325 mg by mouth daily       atenolol (TENORMIN) 100 MG tablet Take 1 tablet (100 mg) by mouth daily 90 tablet 3     ciclopirox (LOPROX) 0.77 % cream Apply topically 2 times daily To feet and toenails. 90 g 5     DiphenhydrAMINE HCl (BENADRYL PO) Take by mouth as needed       Dolutegravir Sodium (TIVICAY PO) Take 50 mg by mouth daily        DULoxetine (CYMBALTA) 60 MG capsule Take 1 capsule (60 mg) by mouth daily 90 capsule 1     Emtricitabine-Tenofovir AF (DESCOVY PO) Take by mouth daily       fluocinonide (LIDEX) 0.05 % external cream Apply 1/4g to aa on thighs bid x 3 weeks. Do not use on face or body folds. Should last 30 days. 60 g 0     gabapentin (NEURONTIN) 300 MG capsule Take 2 capsules (600 mg) by mouth 2 times daily 360 capsule 1     levothyroxine (SYNTHROID/LEVOTHROID) 112 MCG tablet TAKE 1 TABLET(112 MCG) BY MOUTH DAILY 90 tablet 3     lisinopril (ZESTRIL) 20 MG tablet Take 1 tablet (20 mg) by mouth 2 times daily 180 tablet 2     NAPROXEN PO        Omega-3 Fatty Acids (FISH OIL) 500 MG CAPS        psyllium (METAMUCIL) 58.6 % POWD Take by mouth daily       Current Facility-Administered Medications   Medication Dose Route Frequency Provider Last Rate Last Admin     methylPREDNISolone (DEPO-MEDROL) injection 40 mg  40 mg   Emmanuel Oro DO   40 mg at 01/26/22 1100      Past Medical/Surgical History:   Patient Active Problem List   Diagnosis     Essential hypertension, benign     Neuropathy due to human immunodeficiency virus infection (H)     CARDIOVASCULAR SCREENING; LDL GOAL LESS THAN 130     Graves disease     Neuropathy     Hip pain, left     Tobacco abuse disorder     Past Medical History:   Diagnosis Date     Asymptomatic human immunodeficiency virus  (HIV) infection status (H)      Graves disease 2006     Hypertension      Peripheral neuropathy      Squamous cell carcinoma of skin, unspecified                         Again, thank you for allowing me to participate in the care of your patient.        Sincerely,        Sophia Degroot PA-C

## 2024-06-05 ENCOUNTER — TELEPHONE (OUTPATIENT)
Dept: FAMILY MEDICINE | Facility: CLINIC | Age: 64
End: 2024-06-05
Payer: COMMERCIAL

## 2024-06-05 DIAGNOSIS — D04.62: Primary | ICD-10-CM

## 2024-06-05 NOTE — TELEPHONE ENCOUNTER
----- Message from Sophia Degroot PA-C sent at 6/5/2024  3:05 PM CDT -----  SCCIS - can do ED&C vs excision - patient can decide - if ED&C can schedule with me, otherwise he can MD anyone for the [facility]    A(1). Skin, L deltoid, shave:  - Squamous cell carcinoma in situ

## 2024-06-06 NOTE — TELEPHONE ENCOUNTER
S/w pt and went over Sophia's message below about biopsy results.  Explained both ED&C and excision procedures and pt scheduled for ED&C on 7/1 with Sophia at 2 pm at [facility].    Rose Mary LANCASTER RN  [facility] [place]  [phone]

## 2024-06-07 NOTE — PROGRESS NOTES
ESTABLISHED PATIENT NEUROLOGY NOTE    DATE OF VISIT: 6/10/2024  CLINIC LOCATION: [facility]  MRN: [id number]  PATIENT NAME: Nicolás Fermin  YOB: 1960    REASON FOR VISIT: No chief complaint on file.    SUBJECTIVE:                                                      HISTORY OF PRESENT ILLNESS: Patient is here to follow up regarding peripheral polyneuropathy and left meralgia paresthetica.  The last visit was on 12/8/2023.  Please refer to my initial/other prior notes for further information.    Since the last visit, the patient reports ***.  He is on 50 mg of amitriptyline at bedtime, 60 mg of duloxetine daily, and 600 mg of gabapentin twice daily.  No significant side effects or interval development of new neurological symptoms.  EXAM:                                                    Physical Exam:   Vitals: There were no vitals taken for this visit.    General: pt is in NAD, cooperative.  Skin: normal turgor, moist mucous membranes, no lesions/rashes noticed.  HEENT: ATNC, white sclera, normal conjunctiva.  Respiratory: Symmetric lung excursion, no accessory respiratory muscle use.  Abdomen: Non distended.  Neurological: awake, cooperative, follows commands, face is symmetric, equally moves all extremities.  Achilles reflexes absent bilaterally, light touch/pinprick and vibration reduced in both feet, pinprick is also reduced in the territory of left femoral cutaneous nerve.  No dysmetria bilaterally.  ASSESSMENT AND PLAN:                                                    Assessment: 64 year old male patient presents for follow-up of peripheral polyneuropathy and left meralgia paresthetica.  He reports ***.  Previously we discussed option of lateral femoral cutaneous nerve block as well as increasing gabapentin or duloxetine.  To day, ***.    Diagnoses:    ICD-10-CM    1. 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 Meralgia paresthetica of left side   G57.12         Plan:  There are no Patient Instructions on file for this visit.    Total Time: *** minutes spent on the date of the encounter doing chart review, history and exam, documentation and further activities per the note.    Stone Nowak MD  [facility] Neurology  (Chart documentation was completed in part with Dragon voice-recognition software. Even though reviewed, some grammatical, spelling, and word errors may remain.)

## 2024-06-10 ENCOUNTER — OFFICE VISIT (OUTPATIENT)
Dept: NEUROLOGY | Facility: CLINIC | Age: 64
End: 2024-06-10
Payer: MEDICARE

## 2024-06-10 VITALS — HEART RATE: 60 BPM | OXYGEN SATURATION: 98 % | DIASTOLIC BLOOD PRESSURE: 82 MMHG | SYSTOLIC BLOOD PRESSURE: 151 MMHG

## 2024-06-10 DIAGNOSIS — M25.552 HIP PAIN, LEFT: ICD-10-CM

## 2024-06-10 DIAGNOSIS — G57.12 MERALGIA PARESTHETICA OF LEFT SIDE: ICD-10-CM

## 2024-06-10 DIAGNOSIS — M54.16 LUMBAR RADICULOPATHY: ICD-10-CM

## 2024-06-10 DIAGNOSIS — G60.8 PERIPHERAL SENSORY-MOTOR AXONAL POLYNEUROPATHY: Primary | ICD-10-CM

## 2024-06-10 PROCEDURE — 99215 OFFICE O/P EST HI 40 MIN: CPT | Performed by: PSYCHIATRY & NEUROLOGY

## 2024-06-10 PROCEDURE — G2211 COMPLEX E/M VISIT ADD ON: HCPCS | Performed by: PSYCHIATRY & NEUROLOGY

## 2024-06-10 RX ORDER — AMITRIPTYLINE HYDROCHLORIDE 50 MG/1
50 TABLET ORAL AT BEDTIME
Qty: 90 TABLET | Refills: 1 | Status: SHIPPED | OUTPATIENT
Start: 2024-06-10

## 2024-06-10 RX ORDER — DULOXETIN HYDROCHLORIDE 60 MG/1
60 CAPSULE, DELAYED RELEASE ORAL DAILY
Qty: 90 CAPSULE | Refills: 1 | Status: SHIPPED | OUTPATIENT
Start: 2024-06-10

## 2024-06-10 RX ORDER — GABAPENTIN 300 MG/1
600 CAPSULE ORAL 2 TIMES DAILY
Qty: 360 CAPSULE | Refills: 1 | Status: SHIPPED | OUTPATIENT
Start: 2024-06-10

## 2024-06-10 NOTE — NURSING NOTE
"Nicolás Fermin is a 64 year old male who presents for:  Chief Complaint   Patient presents with    Follow Up     Follow up Neuropathy  Having hip pain  4500 steps a day         Initial Vitals:  BP (!) 161/88 (BP Location: Left arm, Patient Position: Supine, Cuff Size: Adult Regular)   Pulse 60   SpO2 98%  Estimated body mass index is 27.19 kg/m  as calculated from the following:    Height as of 10/31/23: 1.93 m (6' 4\").    Weight as of 10/31/23: 101.3 kg (223 lb 6.4 oz).. There is no height or weight on file to calculate BSA. BP completed using cuff size: regular    Shalini Mobley MA   "

## 2024-06-10 NOTE — LETTER
6/10/2024      Nicolás Fermin  [address]  [facility] [address]      Dear Colleague,    Thank you for referring your patient, Nicolás Fermin, to the [facility] NEUROLOGY CLINICS [facility]. Please see a copy of my visit note below.    ESTABLISHED PATIENT NEUROLOGY NOTE    DATE OF VISIT: 6/10/2024  CLINIC LOCATION: [facility]  MRN: [id number]  PATIENT NAME: Nicolás Fermin  YOB: 1960    REASON FOR VISIT:   Chief Complaint   Patient presents with     Follow Up     Follow up Neuropathy  Having hip pain  4500 steps a day      SUBJECTIVE:                                                      HISTORY OF PRESENT ILLNESS: Patient is here to follow up regarding peripheral polyneuropathy and left meralgia paresthetica.  The last visit was on 12/8/2023.  Please refer to my initial/other prior notes for further information.    Since the last visit, the patient reports worsening of his left lower back/hip pain.  He is on 50 mg of amitriptyline at bedtime, 60 mg of duloxetine daily, and 600 mg of gabapentin twice daily.  No significant side effects or interval development of new neurological symptoms.      We reviewed his previous lumbar spine MRI from September 2021 that demonstrated multilevel degenerative changes, including neuroforaminal stenosis bilaterally at L3-4, L4-5, and on the left at L5-S1.  EXAM:                                                    Physical Exam:   Vitals: BP (!) 161/88 (BP Location: Left arm, Patient Position: Supine, Cuff Size: Adult Regular)   Pulse 60   SpO2 98%     General: pt is in NAD, cooperative.  Skin: normal turgor, moist mucous membranes, no lesions/rashes noticed.  HEENT: ATNC, white sclera, normal conjunctiva.  Respiratory: Symmetric lung excursion, no accessory respiratory muscle use.  Abdomen: Non distended.  Neurological: awake, cooperative, follows commands, face is symmetric, equally moves all extremities.  Achilles reflexes absent  bilaterally, light touch/pinprick and vibration reduced in both feet, pinprick is also reduced in the territory of left femoral cutaneous nerve.  No dysmetria bilaterally.  ASSESSMENT AND PLAN:                                                    Assessment: 64 year old male patient presents for follow-up of peripheral polyneuropathy and left meralgia paresthetica.  He reports worsening of his left hip/low back pain.      Previously we discussed option of lateral femoral cutaneous nerve block as well as increasing gabapentin or duloxetine.  Dose of amitriptyline was reduced due to concerns regarding weight gain.      Today, we reviewed these options again, but it appears that his current reported worsening is not due to meralgia paresthetica or peripheral polyneuropathy, but likely related to lumbar radiculopathy, based on his previous lumbar spine MRI.      Given the worsening of the pain, despite of being on gabapentin and amitriptyline together with Cymbalta, I would be inclined to repeat his lumbar spine MRI to evaluate for interval worsening.  We discussed that based on results, we might consider course of physical therapy, adjustment of his medications, epidural steroid injection, or a referral to neurosurgeon.    Otherwise, we will not make any medication changes for his chronic illnesses because they are stable.  I refilled his prescriptions for duloxetine gabapentin, and amitriptyline.    Rainer to follow up with Primary Care provider regarding elevated blood pressure.     Diagnoses:    ICD-10-CM    1. Peripheral sensory-motor axonal polyneuropathy  G60.8 amitriptyline (ELAVIL) 50 MG tablet     DULoxetine (CYMBALTA) 60 MG capsule     gabapentin (NEURONTIN) 300 MG capsule      2. Meralgia paresthetica of left side  G57.12       3. Hip pain, left  M25.552 MR Lumbar Spine w/o Contrast      4. Lumbar radiculopathy  M54.16 MR Lumbar Spine w/o Contrast        Plan: At today's visit we thoroughly discussed current  symptoms, available treatment options, and the plan.    We decided to repeat lumbar spine MRI without making medication adjustments.  We discussed treatment options that will be based on MRI results.  I refilled prescriptions for duloxetine, gabapentin, and amitriptyline.    The longitudinal plan of care for the diagnosis(es)/condition(s) as documented were addressed during this visit. Due to the added complexity in care, I will continue to support Rainer in the subsequent management and with ongoing continuity of care.    Next follow-up appointment is in the next 2-4 weeks or earlier if needed.    Total Time: 43 minutes spent on the date of the encounter doing chart review, history and exam, documentation and further activities per the note.    Stone Nowak MD  [facility] Neurology  (Chart documentation was completed in part with Dragon voice-recognition software. Even though reviewed, some grammatical, spelling, and word errors may remain.)      Again, thank you for allowing me to participate in the care of your patient.        Sincerely,        Stone Nowak MD

## 2024-06-10 NOTE — PROGRESS NOTES
ESTABLISHED PATIENT NEUROLOGY NOTE    DATE OF VISIT: 6/10/2024  CLINIC LOCATION: [facility]  MRN: [id number]  PATIENT NAME: Nicolás Fermin  YOB: 1960    REASON FOR VISIT:   Chief Complaint   Patient presents with    Follow Up     Follow up Neuropathy  Having hip pain  4500 steps a day      SUBJECTIVE:                                                      HISTORY OF PRESENT ILLNESS: Patient is here to follow up regarding peripheral polyneuropathy and left meralgia paresthetica.  The last visit was on 12/8/2023.  Please refer to my initial/other prior notes for further information.    Since the last visit, the patient reports worsening of his left lower back/hip pain.  He is on 50 mg of amitriptyline at bedtime, 60 mg of duloxetine daily, and 600 mg of gabapentin twice daily.  No significant side effects or interval development of new neurological symptoms.      We reviewed his previous lumbar spine MRI from September 2021 that demonstrated multilevel degenerative changes, including neuroforaminal stenosis bilaterally at L3-4, L4-5, and on the left at L5-S1.  EXAM:                                                    Physical Exam:   Vitals: BP (!) 161/88 (BP Location: Left arm, Patient Position: Supine, Cuff Size: Adult Regular)   Pulse 60   SpO2 98%     General: pt is in NAD, cooperative.  Skin: normal turgor, moist mucous membranes, no lesions/rashes noticed.  HEENT: ATNC, white sclera, normal conjunctiva.  Respiratory: Symmetric lung excursion, no accessory respiratory muscle use.  Abdomen: Non distended.  Neurological: awake, cooperative, follows commands, face is symmetric, equally moves all extremities.  Achilles reflexes absent bilaterally, light touch/pinprick and vibration reduced in both feet, pinprick is also reduced in the territory of left femoral cutaneous nerve.  No dysmetria bilaterally.  ASSESSMENT AND PLAN:                                                     Assessment: 64 year old male patient presents for follow-up of peripheral polyneuropathy and left meralgia paresthetica.  He reports worsening of his left hip/low back pain.      Previously we discussed option of lateral femoral cutaneous nerve block as well as increasing gabapentin or duloxetine.  Dose of amitriptyline was reduced due to concerns regarding weight gain.      Today, we reviewed these options again, but it appears that his current reported worsening is not due to meralgia paresthetica or peripheral polyneuropathy, but likely related to lumbar radiculopathy, based on his previous lumbar spine MRI.      Given the worsening of the pain, despite of being on gabapentin and amitriptyline together with Cymbalta, I would be inclined to repeat his lumbar spine MRI to evaluate for interval worsening.  We discussed that based on results, we might consider course of physical therapy, adjustment of his medications, epidural steroid injection, or a referral to neurosurgeon.    Otherwise, we will not make any medication changes for his chronic illnesses because they are stable.  I refilled his prescriptions for duloxetine gabapentin, and amitriptyline.    Rainer to follow up with Primary Care provider regarding elevated blood pressure.     Diagnoses:    ICD-10-CM    1. Peripheral sensory-motor axonal polyneuropathy  G60.8 amitriptyline (ELAVIL) 50 MG tablet     DULoxetine (CYMBALTA) 60 MG capsule     gabapentin (NEURONTIN) 300 MG capsule      2. Meralgia paresthetica of left side  G57.12       3. Hip pain, left  M25.552 MR Lumbar Spine w/o Contrast      4. Lumbar radiculopathy  M54.16 MR Lumbar Spine w/o Contrast        Plan: At today's visit we thoroughly discussed current symptoms, available treatment options, and the plan.    We decided to repeat lumbar spine MRI without making medication adjustments.  We discussed treatment options that will be based on MRI results.  I refilled prescriptions for duloxetine,  gabapentin, and amitriptyline.    The longitudinal plan of care for the diagnosis(es)/condition(s) as documented were addressed during this visit. Due to the added complexity in care, I will continue to support Rainer in the subsequent management and with ongoing continuity of care.    Next follow-up appointment is in the next 2-4 weeks or earlier if needed.    Total Time: 43 minutes spent on the date of the encounter doing chart review, history and exam, documentation and further activities per the note.    Stone Nowak MD  [facility] Neurology  (Chart documentation was completed in part with Dragon voice-recognition software. Even though reviewed, some grammatical, spelling, and word errors may remain.)

## 2024-06-21 DIAGNOSIS — I10 ESSENTIAL HYPERTENSION, BENIGN: ICD-10-CM

## 2024-06-21 RX ORDER — LISINOPRIL 20 MG/1
20 TABLET ORAL 2 TIMES DAILY
Qty: 180 TABLET | Refills: 0 | Status: SHIPPED | OUTPATIENT
Start: 2024-06-21 | End: 2024-09-19

## 2024-07-01 ENCOUNTER — OFFICE VISIT (OUTPATIENT)
Dept: FAMILY MEDICINE | Facility: CLINIC | Age: 64
End: 2024-07-01
Payer: COMMERCIAL

## 2024-07-01 DIAGNOSIS — D04.62: ICD-10-CM

## 2024-07-01 PROCEDURE — 17262 DSTRJ MAL LES T/A/L 1.1-2.0: CPT | Performed by: PHYSICIAN ASSISTANT

## 2024-07-01 ASSESSMENT — PAIN SCALES - GENERAL: PAINLEVEL: NO PAIN (0)

## 2024-07-01 NOTE — PATIENT INSTRUCTIONS
Wound Care:  Electrodesiccation and Curettage     I will experience scar, altered skin color, bleeding, swelling, pain, crusting and redness. I may experience altered sensation. Risks are excessive bleeding, infection, muscle weakness, thick (hypertrophic or keloidal) scar, and recurrence,. A second procedure may be recommended to obtain the best cosmetic or functional result.    What is electrodesiccation and curettage ?  Scraping off tissue (curettage)  Destroy tissue using electric current or cautery (electrodessication)    How do I perform wound care?  Keep dressing in place for two days. You may shower with the dressing in place(do not get wet)  After 2 days, wash hands and remove dressing. Clean wound with cotton-swab soaked in hydrogen peroxide to remove drainage and crust  Put on a thick layer of Vaseline on the wound using a cotton-swab   Cover the wound with a Band-AidTM to protect from dust and tight clothing  If wound is draining before two days, change your dressing as described above sooner  During wound care, do not allow the area to dry out or form a scab    What do I need?  Hydrogen peroxide   Cotton-swabs   Vaseline or petroleum jelly   Band-AidsTM or dressing supplies as needed     When should I call the doctor?  [facility]: [phone]  [facility]: [phone]  For urgent needs outside of business hours call the [facility] at [phone] and ask for the dermatology resident on call

## 2024-07-01 NOTE — LETTER
7/1/2024      Nicolás Fermin  [address]  [facility] [address]      Dear Colleague,    Thank you for referring your patient, Nicolás eFrmin, to the [facility]. Please see a copy of my visit note below.    Dermatology Procedure Note: Electrodesiccation and Curettage    PREOPERATIVE DIAGNOSIS: SCCIS    POSTOPERATIVE DIAGNOSIS: same    LOCATION: L deltoid     SIZE: original size 7 mm     Treatment options including electrodessiccation and curettage (ED and C), excision and topicals were reviewed.  The expected cure rates, healing times and anticipated scars of each option were discussed and the patient elects to proceed with ED and C.     The risks and benefits of the procedure were described to the patient.  These include but are not limited to bleeding, infection, scar, incomplete removal, and recurrence. Written informed consent was obtained. Time-out was performed. The above site was cleansed with and injected with 1mL 1% lidocaine with epinephrine. Once anesthesia was obtained, the site was prepped with Alcohol. The lesion was curetted with  in 3 directions with a 5 mm margin and this was followed by electrodessication.  This process was repeated three times. The defect measured 2cm final size. Vaseline and a bandage were applied to the wound. The patient tolerated the procedure well and was given post care instructions.    Clinical Follow-up: PRN, within 6 months for skin check.    Sophia Degroot PA-C staffed the patient.     Staff Involved:    Scribe Disclosure:   I, TA BUCHANAN, am serving as a scribe; to document services personally performed by Sophia Degroot PA-C -based on data collection and the provider's statements to me.     Provider Disclosure:  I agree with above History, Review of Systems, Physical exam and Plan.  I have reviewed the content of the documentation and have edited it as needed. I have personally performed the services documented here and the  documentation accurately represents those services and the decisions I have made.      Electronically signed by:      Again, thank you for allowing me to participate in the care of your patient.        Sincerely,        Sophia Degroot PA-C

## 2024-07-01 NOTE — PROGRESS NOTES
Dermatology Procedure Note: Electrodesiccation and Curettage    PREOPERATIVE DIAGNOSIS: SCCIS    POSTOPERATIVE DIAGNOSIS: same    LOCATION: L deltoid     SIZE: original size 7 mm     Treatment options including electrodessiccation and curettage (ED and C), excision and topicals were reviewed.  The expected cure rates, healing times and anticipated scars of each option were discussed and the patient elects to proceed with ED and C.     The risks and benefits of the procedure were described to the patient.  These include but are not limited to bleeding, infection, scar, incomplete removal, and recurrence. Written informed consent was obtained. Time-out was performed. The above site was cleansed with and injected with 1mL 1% lidocaine with epinephrine. Once anesthesia was obtained, the site was prepped with Alcohol. The lesion was curetted with  in 3 directions with a 5 mm margin and this was followed by electrodessication.  This process was repeated three times. The defect measured 2cm final size. Vaseline and a bandage were applied to the wound. The patient tolerated the procedure well and was given post care instructions.    Clinical Follow-up: PRN, within 6 months for skin check.    Sophia Degroot PA-C staffed the patient.     Staff Involved:    Scribe Disclosure:   I, TA BUCHANAN, am serving as a scribe; to document services personally performed by Sophia Degroot PA-C -based on data collection and the provider's statements to me.     Provider Disclosure:  I agree with above History, Review of Systems, Physical exam and Plan.  I have reviewed the content of the documentation and have edited it as needed. I have personally performed the services documented here and the documentation accurately represents those services and the decisions I have made.      Electronically signed by:

## 2024-07-08 ENCOUNTER — ANCILLARY PROCEDURE (OUTPATIENT)
Dept: MRI IMAGING | Facility: CLINIC | Age: 64
End: 2024-07-08
Attending: PSYCHIATRY & NEUROLOGY
Payer: COMMERCIAL

## 2024-07-08 DIAGNOSIS — M54.16 LUMBAR RADICULOPATHY: ICD-10-CM

## 2024-07-08 DIAGNOSIS — M25.552 HIP PAIN, LEFT: ICD-10-CM

## 2024-07-08 PROCEDURE — G1010 CDSM STANSON: HCPCS

## 2024-07-25 DIAGNOSIS — I10 ESSENTIAL HYPERTENSION, BENIGN: ICD-10-CM

## 2024-07-25 RX ORDER — AMLODIPINE BESYLATE 5 MG/1
5 TABLET ORAL DAILY
Qty: 90 TABLET | Refills: 0 | Status: SHIPPED | OUTPATIENT
Start: 2024-07-25

## 2024-09-11 ENCOUNTER — TELEPHONE (OUTPATIENT)
Dept: INTERNAL MEDICINE | Facility: CLINIC | Age: 64
End: 2024-09-11
Payer: COMMERCIAL

## 2024-09-11 NOTE — TELEPHONE ENCOUNTER
Pt calling in requesting to see when neurology appt was. Pt states he remembers doing online check in but was never notified of the date of the appt.     Pt was informed appt was 7/26/24. Pt was very apologetic that he missed appointment and is look to reschedule with Stone Nowak.    Please call pt and assist with setting up appt.    ARI WallaceN, RN   [facility]

## 2024-09-18 DIAGNOSIS — I10 ESSENTIAL HYPERTENSION, BENIGN: ICD-10-CM

## 2024-09-19 ENCOUNTER — OFFICE VISIT (OUTPATIENT)
Dept: NEUROLOGY | Facility: CLINIC | Age: 64
End: 2024-09-19
Payer: COMMERCIAL

## 2024-09-19 VITALS — OXYGEN SATURATION: 97 % | HEART RATE: 58 BPM | SYSTOLIC BLOOD PRESSURE: 134 MMHG | DIASTOLIC BLOOD PRESSURE: 81 MMHG

## 2024-09-19 DIAGNOSIS — G60.8 PERIPHERAL SENSORY-MOTOR AXONAL POLYNEUROPATHY: ICD-10-CM

## 2024-09-19 DIAGNOSIS — G57.12 MERALGIA PARESTHETICA OF LEFT SIDE: ICD-10-CM

## 2024-09-19 DIAGNOSIS — M48.061 SPINAL STENOSIS OF LUMBAR REGION WITHOUT NEUROGENIC CLAUDICATION: ICD-10-CM

## 2024-09-19 DIAGNOSIS — M54.16 LUMBAR RADICULOPATHY: Primary | ICD-10-CM

## 2024-09-19 PROCEDURE — 99214 OFFICE O/P EST MOD 30 MIN: CPT | Performed by: PSYCHIATRY & NEUROLOGY

## 2024-09-19 RX ORDER — LISINOPRIL 20 MG/1
20 TABLET ORAL 2 TIMES DAILY
Qty: 180 TABLET | Refills: 0 | Status: SHIPPED | OUTPATIENT
Start: 2024-09-19

## 2024-09-19 NOTE — PROGRESS NOTES
ESTABLISHED PATIENT NEUROLOGY NOTE    DATE OF VISIT: 8/19/2024  CLINIC LOCATION: [facility]  MRN: [id number]  PATIENT NAME: Nicolás Fermin  YOB: 1960    REASON FOR VISIT:   Chief Complaint   Patient presents with    Follow Up     Neuropathy      SUBJECTIVE:                                                      HISTORY OF PRESENT ILLNESS: Patient, who I follow for peripheral polyneuropathy and left meralgia paresthetica, is here to follow up regarding suspected lumbar radiculopathy after completion of recommended imaging.  The last visit was on 6/10/2024.  At that time I ordered lumbar spine MRI without contrast.  Please refer to my initial/other prior notes for further information.    Since the last visit, the patient reports that symptoms are the same.  He denies interval development of new focal neurological symptoms.    Lumbar spine MRI from 7/8/2024 demonstrated multilevel degenerative changes of the lumbar spine with mild progression from September 2021 study, including progressive loss of disc height and progressive low-grade degenerative retrolisthesis, most prominent at L2-3.  Spinal canal stenosis remains quite severe at L2-3 and at L3-4 levels.  Images were personally reviewed and independently interpreted.  EXAM:                                                    Physical Exam:   Vitals: /81 (BP Location: Right arm, Patient Position: Sitting, Cuff Size: Adult Large)   Pulse 58   SpO2 97%     General: pt is in NAD, cooperative.  Skin: normal turgor, moist mucous membranes, no lesions/rashes noticed.  HEENT: ATNC, white sclera, normal conjunctiva.  Respiratory: Symmetric lung excursion, no accessory respiratory muscle use.  Abdomen: Non distended.  Neurological: awake, cooperative, follows commands, no exam changes compared to the previous visits..  ASSESSMENT AND PLAN:                                                    Assessment: 64 year old male patient, who I  follow for peripheral polyneuropathy and left meralgia paresthetica, presents for follow-up of lumbar radiculopathy/lumbar spinal stenosis after completion of lumbar spine MRI.  We reviewed images and results, as summarized above.  We discussed that available treatment options include repeat physical therapy, epidural steroid injection, adjustment of his medications, and consideration for neurosurgery consultation.  We decided to retry course of physical therapy and epidural steroid injection prior to neurosurgery referral, which will be considered if necessary at the next visit.    For his peripheral polyneuropathy and meralgia paresthetica of the left side, he is on Cymbalta, gabapentin, and amitriptyline with enough refills until the next visit.  No additional recommendations.    Diagnoses:    ICD-10-CM    1. Lumbar radiculopathy  M54.16       2. Spinal stenosis of lumbar region without neurogenic claudication  M48.061       3. 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 Meralgia paresthetica of left side  G57.12         Plan: At today's visit we thoroughly discussed current symptoms, evaluation results, available treatment options, and the plan, which includes:  Orders Placed This Encounter   Procedures    Pain Management  Referral    Physical Therapy  Referral     No new medications.  He should have enough refills of Cymbalta (duloxetine), gabapentin, and amitriptyline until next visit, but I asked him to reach out if he runs out sooner.    Next follow-up appointment is in the next 3 months or earlier if needed.    Total Time: 26 minutes spent on the date of the encounter doing chart review, history and exam, documentation and further activities per the note.    Stone Nowak MD  [facility] Neurology  (Chart documentation was completed in part with Dragon voice-recognition software. Even though reviewed, some grammatical, spelling, and word errors may  remain.)

## 2024-09-19 NOTE — PROGRESS NOTES
"Nicolás Fermin is a 64 year old male who presents for:  Chief Complaint   Patient presents with    Follow Up     Neuropathy         Initial Vitals:  /81 (BP Location: Right arm, Patient Position: Sitting, Cuff Size: Adult Large)   Pulse 58   SpO2 97%  Estimated body mass index is 27.19 kg/m  as calculated from the following:    Height as of 10/31/23: 1.93 m (6' 4\").    Weight as of 10/31/23: 101.3 kg (223 lb 6.4 oz).. There is no height or weight on file to calculate BSA. BP completed using cuff size: arabella Bonilla   "

## 2024-09-19 NOTE — LETTER
9/19/2024      Nicolás Fermin  [address]  [place] [address]      Dear Colleague,    Thank you for referring your patient, Nicolás Fermin, to the [facility] NEUROLOGY [facility]INICS [facility]. Please see a copy of my visit note below.    ESTABLISHED PATIENT NEUROLOGY NOTE    DATE OF VISIT: 8/19/2024  CLINIC LOCATION: [facility]  MRN: [id number]  PATIENT NAME: Nicolás Fermin  YOB: 1960    REASON FOR VISIT:   Chief Complaint   Patient presents with     Follow Up     Neuropathy      SUBJECTIVE:                                                      HISTORY OF PRESENT ILLNESS: Patient, who I follow for peripheral polyneuropathy and left meralgia paresthetica, is here to follow up regarding suspected lumbar radiculopathy after completion of recommended imaging.  The last visit was on 6/10/2024.  At that time I ordered lumbar spine MRI without contrast.  Please refer to my initial/other prior notes for further information.    Since the last visit, the patient reports that symptoms are the same.  He denies interval development of new focal neurological symptoms.    Lumbar spine MRI from 7/8/2024 demonstrated multilevel degenerative changes of the lumbar spine with mild progression from September 2021 study, including progressive loss of disc height and progressive low-grade degenerative retrolisthesis, most prominent at L2-3.  Spinal canal stenosis remains quite severe at L2-3 and at L3-4 levels.  Images were personally reviewed and independently interpreted.  EXAM:                                                    Physical Exam:   Vitals: /81 (BP Location: Right arm, Patient Position: Sitting, Cuff Size: Adult Large)   Pulse 58   SpO2 97%     General: pt is in NAD, cooperative.  Skin: normal turgor, moist mucous membranes, no lesions/rashes noticed.  HEENT: ATNC, white sclera, normal conjunctiva.  Respiratory: Symmetric lung excursion, no accessory respiratory muscle  use.  Abdomen: Non distended.  Neurological: awake, cooperative, follows commands, no exam changes compared to the previous visits..  ASSESSMENT AND PLAN:                                                    Assessment: 64 year old male patient, who I follow for peripheral polyneuropathy and left meralgia paresthetica, presents for follow-up of lumbar radiculopathy/lumbar spinal stenosis after completion of lumbar spine MRI.  We reviewed images and results, as summarized above.  We discussed that available treatment options include repeat physical therapy, epidural steroid injection, adjustment of his medications, and consideration for neurosurgery consultation.  We decided to retry course of physical therapy and epidural steroid injection prior to neurosurgery referral, which will be considered if necessary at the next visit.    For his peripheral polyneuropathy and meralgia paresthetica of the left side, he is on Cymbalta, gabapentin, and amitriptyline with enough refills until the next visit.  No additional recommendations.    Diagnoses:    ICD-10-CM    1. Lumbar radiculopathy  M54.16       2. Spinal stenosis of lumbar region without neurogenic claudication  M48.061       3. 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 Meralgia paresthetica of left side  G57.12         Plan: At today's visit we thoroughly discussed current symptoms, evaluation results, available treatment options, and the plan, which includes:  Orders Placed This Encounter   Procedures     Pain Management  Referral     Physical Therapy  Referral     No new medications.  He should have enough refills of Cymbalta (duloxetine), gabapentin, and amitriptyline until next visit, but I asked him to reach out if he runs out sooner.    Next follow-up appointment is in the next 3 months or earlier if needed.    Total Time: 26 minutes spent on the date of the encounter doing chart review, history and exam, documentation and  further activities per the note.    Stone Nowak MD  [facility] Neurology  (Chart documentation was completed in part with Dragon voice-recognition software. Even though reviewed, some grammatical, spelling, and word errors may remain.)      Again, thank you for allowing me to participate in the care of your patient.        Sincerely,        Stone Nowak MD

## 2024-09-19 NOTE — PATIENT INSTRUCTIONS
AFTER VISIT SUMMARY (AVS):    At today's visit we thoroughly discussed current symptoms, evaluation results, available treatment options, and the plan, which includes:  Orders Placed This Encounter   Procedures    Pain Management  Referral    Physical Therapy  Referral     No new medications.  You should have enough refills of Cymbalta (duloxetine), gabapentin, and amitriptyline until next visit, but please reach out if you run out sooner.    Next follow-up appointment is in the next 3 months or earlier if needed.    Please do not hesitate to call me with any questions or concerns.    Thanks.

## 2024-09-27 ENCOUNTER — TELEPHONE (OUTPATIENT)
Dept: PHYSICAL MEDICINE AND REHAB | Facility: CLINIC | Age: 64
End: 2024-09-27

## 2024-09-27 ENCOUNTER — THERAPY VISIT (OUTPATIENT)
Dept: PHYSICAL THERAPY | Facility: CLINIC | Age: 64
End: 2024-09-27
Attending: PSYCHIATRY & NEUROLOGY
Payer: COMMERCIAL

## 2024-09-27 DIAGNOSIS — M48.061 SPINAL STENOSIS OF LUMBAR REGION WITHOUT NEUROGENIC CLAUDICATION: ICD-10-CM

## 2024-09-27 DIAGNOSIS — M54.16 LUMBAR RADICULOPATHY: ICD-10-CM

## 2024-09-27 DIAGNOSIS — M54.17 RADICULAR PAIN OF LUMBOSACRAL REGION: Primary | ICD-10-CM

## 2024-09-27 PROCEDURE — 97110 THERAPEUTIC EXERCISES: CPT | Mod: GP | Performed by: PHYSICAL THERAPIST

## 2024-09-27 PROCEDURE — 97161 PT EVAL LOW COMPLEX 20 MIN: CPT | Mod: GP | Performed by: PHYSICAL THERAPIST

## 2024-09-27 NOTE — PROGRESS NOTES
PHYSICAL THERAPY EVALUATION  Type of Visit: Evaluation  Patient reports a long history of LB and L hip pain and has MD orders for PT dated 09/19/2024.  He reports L lateral hip pain that extends to the knee at times.   He has been using a SEC for ambulation due to balance issues.  He reports several falls with 2 in the last month, last one being last week.            Fall Risk Screen:  Fall screen completed by: PT  Have you fallen 2 or more times in the past year?: Yes  Have you fallen and had an injury in the past year?: Yes  Timed Up and Go score (seconds): 21.9 sec  Is patient a fall risk?: No; Yes  Fall screen comments: will discuss possible referral to neuro PT for balance training    Subjective       Presenting condition or subjective complaint: Lower back/Left hip pain  Date of onset:      Relevant medical history: Heart problems; Hepatitis; High blood pressure; Non-healing wounds; Overweight; Smoking; Thyroid problems   Dates & types of surgery:    Past Surgical History:   Procedure Laterality Date    BIOPSY  6-2018    skin/pos    COLONOSCOPY      neg         Prior diagnostic imaging/testing results: MRI     Prior therapy history for the same diagnosis, illness or injury: Yes March 2021 or so.  Physical therapy at this office.  Actually made the pain worse    Prior Level of Function  Transfers: Independent  Ambulation: Independent  ADL: Independent  IADL:     Living Environment  Social support: With family members   Type of home: House   Stairs to enter the home: Yes 3 Is there a railing: No     Ramp: No   Stairs inside the home: Yes 14 Is there a railing: No     Help at home: None; Self Cares (home health aide/personal care attendant, family, etc)  Equipment owned: Straight Cane     Employment: Yes   Hobbies/Interests: Gardening(!), as much walking as possible, I'm pretty laid back :-)    Patient goals for therapy: Walk upright - remove the cane from my daily activities    Pain  assessment: Location: L LB, L hip, occasionally L thigh/Ratin/10     Objective   LUMBAR SPINE EVALUATION  PAIN: Pain Level at Rest: 2/10  Pain Level with Use: 6/10  Pain Location: L LB, L hip, intermittent L thigh to knee  Pain Quality: Aching  Pain Frequency: constant  Pain is Worst: no difference time of day  Pain is Exacerbated By: rising from sitting, standing 5-6 hours at work  Pain is Relieved By: sitting  Pain Progression: Worsened  INTEGUMENTARY (edema, incisions):   POSTURE: Standing Posture: stands in slight R shift, appears to have L convex scoliosis thoracic  GAIT:   Weightbearing Status:   Assistive Device(s):   Gait Deviations: uses SEC, decreased step-lengths  BALANCE/PROPRIOCEPTION:   WEIGHTBEARING ALIGNMENT:   NON-WEIGHTBEARING ALIGNMENT:    ROM: Lumbar AROM:  flexion mod limitation, increases L LBP; extension mod to génesis limitation, increase L LBP; L SG mod loss, increases L LBP; R SG min loss, increases L LBP.   PELVIC/SI SCREEN:   STRENGTH:   MYOTOMES: normal myotomes B  DTR S:   CORD SIGNS:   DERMATOMES: normal  NEURAL TENSION:   FLEXIBILITY:   LUMBAR/HIP Special Tests:    PELVIS/SI SPECIAL TESTS:   FUNCTIONAL TESTS:   PALPATION:   SPINAL SEGMENTAL CONCLUSIONS:   Repeated motions  RFIL--NE during, slightly better after--decreased L hip pain  RFISit--NE during, slightly better after--decreased L hip pain  Has done REIL in past and thinks it made him worse.    Assessment & Plan   CLINICAL IMPRESSIONS  Medical Diagnosis: Lumbar radiculopathy  Spinal stenosis of lumbar region without neurogenic claudication    Treatment Diagnosis: L LBP w/radiculopathy to knee   Impression/Assessment: Patient is a 64 year old male with L LB and hip, sometimes L thigh complaints.  The following significant findings have been identified: Pain, Decreased ROM/flexibility, Decreased activity tolerance, and Impaired posture. These impairments interfere with their ability to perform self care tasks, work tasks, and  household chores as compared to previous level of function.     Clinical Decision Making (Complexity):  Clinical Presentation: Stable/Uncomplicated  Clinical Presentation Rationale: based on medical and personal factors listed in PT evaluation  Clinical Decision Making (Complexity): Low complexity    PLAN OF CARE  Treatment Interventions:  Interventions: Manual Therapy, Neuromuscular Re-education, Therapeutic Activity, Therapeutic Exercise, Self-Care/Home Management    Long Term Goals     PT Goal 1  Goal Identifier: standing  Goal Description: Patient will report L LB and hip pain 2/10 or less after 6-hour work day standing  Rationale:  (for work activities)  Goal Progress: currently has 6/10 pain L LB and hip after 6-hour workday  Target Date: 11/22/24      Frequency of Treatment: 1x/week  Duration of Treatment: 8 weeks    Recommended Referrals to Other Professionals: neuro PT for balance  Education Assessment:   Learner/Method: Patient;Listening;Reading;Demonstration;Pictures/Video;No Barriers to Learning    Risks and benefits of evaluation/treatment have been explained.   Patient/Family/caregiver agrees with Plan of Care.     Evaluation Time:     PT Eval, Low Complexity Minutes (43851): 22       Signing Clinician: Smitha Ruiz, PT        [facility]                                                                                   OUTPATIENT PHYSICAL THERAPY      PLAN OF TREATMENT FOR OUTPATIENT REHABILITATION   Patient's Last Name, First Name, Nicolás Pinedo YOB: 1960   Provider's Name   [facility]   Medical Record No.  [id number]     Onset Date:    Start of Care Date: 09/27/24     Medical Diagnosis:  Lumbar radiculopathy  Spinal stenosis of lumbar region without neurogenic claudication      PT Treatment Diagnosis:  L LBP w/radiculopathy to knee Plan of Treatment  Frequency/Duration: 1x/week/ 8 weeks    Certification date from  09/27/24 to 11/22/24         See note for plan of treatment details and functional goals     Smitha Ruiz, PT                         I CERTIFY THE NEED FOR THESE SERVICES FURNISHED UNDER        THIS PLAN OF TREATMENT AND WHILE UNDER MY CARE     (Physician attestation of this document indicates review and certification of the therapy plan).              Referring Provider:  Stone Nowak    Initial Assessment  See Epic Evaluation- Start of Care Date: 09/27/24

## 2024-10-02 ENCOUNTER — THERAPY VISIT (OUTPATIENT)
Dept: PHYSICAL THERAPY | Facility: CLINIC | Age: 64
End: 2024-10-02
Attending: PSYCHIATRY & NEUROLOGY
Payer: COMMERCIAL

## 2024-10-02 DIAGNOSIS — M54.16 LUMBAR RADICULOPATHY: ICD-10-CM

## 2024-10-02 DIAGNOSIS — M48.061 SPINAL STENOSIS OF LUMBAR REGION WITHOUT NEUROGENIC CLAUDICATION: ICD-10-CM

## 2024-10-02 DIAGNOSIS — M25.552 HIP PAIN, LEFT: Primary | ICD-10-CM

## 2024-10-02 PROCEDURE — 97110 THERAPEUTIC EXERCISES: CPT | Mod: GP | Performed by: PHYSICAL THERAPIST

## 2024-10-09 ENCOUNTER — THERAPY VISIT (OUTPATIENT)
Dept: PHYSICAL THERAPY | Facility: CLINIC | Age: 64
End: 2024-10-09
Attending: PSYCHIATRY & NEUROLOGY
Payer: COMMERCIAL

## 2024-10-09 DIAGNOSIS — M25.552 HIP PAIN, LEFT: Primary | ICD-10-CM

## 2024-10-09 DIAGNOSIS — M54.16 LUMBAR RADICULOPATHY: ICD-10-CM

## 2024-10-09 DIAGNOSIS — M48.061 SPINAL STENOSIS OF LUMBAR REGION WITHOUT NEUROGENIC CLAUDICATION: ICD-10-CM

## 2024-10-09 PROCEDURE — 97110 THERAPEUTIC EXERCISES: CPT | Mod: GP | Performed by: PHYSICAL THERAPIST

## 2024-10-17 ENCOUNTER — THERAPY VISIT (OUTPATIENT)
Dept: PHYSICAL THERAPY | Facility: CLINIC | Age: 64
End: 2024-10-17
Payer: COMMERCIAL

## 2024-10-17 DIAGNOSIS — M48.061 SPINAL STENOSIS OF LUMBAR REGION WITHOUT NEUROGENIC CLAUDICATION: ICD-10-CM

## 2024-10-17 DIAGNOSIS — M54.16 LUMBAR RADICULOPATHY: ICD-10-CM

## 2024-10-17 DIAGNOSIS — M25.552 HIP PAIN, LEFT: Primary | ICD-10-CM

## 2024-10-17 PROCEDURE — 97110 THERAPEUTIC EXERCISES: CPT | Mod: GP | Performed by: PHYSICAL THERAPIST

## 2024-10-22 ENCOUNTER — TELEPHONE (OUTPATIENT)
Dept: PHYSICAL MEDICINE AND REHAB | Facility: CLINIC | Age: 64
End: 2024-10-22
Payer: COMMERCIAL

## 2024-10-22 NOTE — TELEPHONE ENCOUNTER
L4-5 Interlaminar epidural steroid injection scheduled with Dr. Jalloh on 10/28/24 at the [facility]      Called patient to review his upcoming procedure, the need to hold Aspirin containing products for 6 days prior to the injection, so will need to hold until after the injection, hold Naproxen for 4 days prior to injection, hold Ibuprofen for 1 day prior to injection. Reviewed that the ASC nurse will send information out via My Chart typically 1-3 days before the procedure to review important information that he will need to know for the day of the procedure. Reviewed that he cannot have an illness/infection at time of the procedure, that it would need to be rescheduled if that were to occur.      Patient verbalized understanding and had no further questions or concerns at this time, requested the information sent to him via My Chart as he was driving to work at the time so was unable to write information down, advised him to reach back out if he has any follow up questions or concerns.    Follow up My Chart sent with information for patient to have prior to his procedure in addition to the message he will receive from the [facility] pre-procedure nurse.     ARI CliffordN RN  RN Care Coordinator for Physical Medicine and Rehabilitation  [facility] [address] [address]  Office: [phone]  Fax: [phone]

## 2024-10-23 ENCOUNTER — THERAPY VISIT (OUTPATIENT)
Dept: PHYSICAL THERAPY | Facility: CLINIC | Age: 64
End: 2024-10-23
Payer: COMMERCIAL

## 2024-10-23 DIAGNOSIS — I10 ESSENTIAL HYPERTENSION, BENIGN: ICD-10-CM

## 2024-10-23 DIAGNOSIS — M48.061 SPINAL STENOSIS OF LUMBAR REGION WITHOUT NEUROGENIC CLAUDICATION: ICD-10-CM

## 2024-10-23 DIAGNOSIS — M54.16 LUMBAR RADICULOPATHY: ICD-10-CM

## 2024-10-23 DIAGNOSIS — M25.552 HIP PAIN, LEFT: Primary | ICD-10-CM

## 2024-10-23 PROCEDURE — 97110 THERAPEUTIC EXERCISES: CPT | Mod: GP | Performed by: PHYSICAL THERAPIST

## 2024-10-24 RX ORDER — AMLODIPINE BESYLATE 5 MG/1
5 TABLET ORAL DAILY
Qty: 90 TABLET | Refills: 0 | Status: SHIPPED | OUTPATIENT
Start: 2024-10-24

## 2024-10-28 ENCOUNTER — ANCILLARY PROCEDURE (OUTPATIENT)
Dept: RADIOLOGY | Facility: AMBULATORY SURGERY CENTER | Age: 64
End: 2024-10-28
Attending: PHYSICAL MEDICINE & REHABILITATION
Payer: COMMERCIAL

## 2024-10-28 ENCOUNTER — HOSPITAL ENCOUNTER (OUTPATIENT)
Facility: AMBULATORY SURGERY CENTER | Age: 64
Discharge: HOME | End: 2024-10-28
Attending: PHYSICAL MEDICINE & REHABILITATION | Admitting: PHYSICAL MEDICINE & REHABILITATION
Payer: COMMERCIAL

## 2024-10-28 VITALS
DIASTOLIC BLOOD PRESSURE: 105 MMHG | TEMPERATURE: 96.9 F | HEIGHT: 76 IN | SYSTOLIC BLOOD PRESSURE: 168 MMHG | BODY MASS INDEX: 27.16 KG/M2 | OXYGEN SATURATION: 98 % | HEART RATE: 56 BPM | RESPIRATION RATE: 16 BRPM | WEIGHT: 223 LBS

## 2024-10-28 DIAGNOSIS — R52 PAIN: ICD-10-CM

## 2024-10-28 PROCEDURE — 62323 NJX INTERLAMINAR LMBR/SAC: CPT

## 2024-10-28 RX ORDER — IOPAMIDOL 408 MG/ML
INJECTION, SOLUTION INTRATHECAL PRN
Status: DISCONTINUED | OUTPATIENT
Start: 2024-10-28 | End: 2024-10-28 | Stop reason: HOSPADM

## 2024-10-28 RX ORDER — LIDOCAINE HYDROCHLORIDE 10 MG/ML
INJECTION, SOLUTION EPIDURAL; INFILTRATION; INTRACAUDAL; PERINEURAL PRN
Status: DISCONTINUED | OUTPATIENT
Start: 2024-10-28 | End: 2024-10-28 | Stop reason: HOSPADM

## 2024-10-28 RX ORDER — DEXAMETHASONE SODIUM PHOSPHATE 10 MG/ML
INJECTION, SOLUTION INTRAMUSCULAR; INTRAVENOUS PRN
Status: DISCONTINUED | OUTPATIENT
Start: 2024-10-28 | End: 2024-10-28 | Stop reason: HOSPADM

## 2024-10-28 NOTE — DISCHARGE INSTRUCTIONS
Home Care Instructions after an Epidural Steroid Pain Injection    A lumbar epidural steroid injection delivers steroid medication directly into the area that may be causing your lower back pain and/or leg pain. A cervical or thoracic epidural steroid injection delivers steroids into the epidural space surrounding spinal nerve roots to help relieve pain in the upper spine/neck.    Activity  -Rest today  -Do not work today  -Resume normal activity tomorrow  -DO NOT shower for 24 hours  -DO NOT remove bandaid for 24 hours    Pain  -You may experience soreness at the injection site for one or two days  -You may use an ice pack for 20 minutes every 2 hours for the first 24 hours  -You may use a heating pad after the first 24 hours  -You may use Tylenol (acetaminophen) every 4 hours or other pain medicines as     directed by your physician    You may experience numbness radiating into your legs or arms (depending on the procedure location). This numbness may last several hours. Until sensation returns to normal; please use caution in walking, climbing stairs, and stepping out of your vehicle, etc.      Common side effects of steroids:  Not everyone will experience corticosteroid side effects. If side effects are experienced, they will gradually subside in the 7-10 day period following an injection. Most common side effects include:  -Flushed face and/or chest  -Feeling of warmth, particularly in the face but could be an overall feeling of warmth  -Increased blood sugar in diabetic patients  -Menstrual irregularities my occur. If taking hormone-based birth control an alternate method of birth control is recommended  -Sleep disturbances and/or mood swings are possible  -Leg cramps    Please contact us if you have:  -Severe pain  -Fever more than 101.5 degrees Fahrenheit  -Signs of infection at the injection site (redness, swelling, or drainage)    FOR PAIN CENTER PATIENTS:  If you have questions, please contact the Pain  Clinic at [phone] Option #1 between the hours of 7:00 am and 3:00 pm Monday through Friday. After office hours you can contact the on call provider by dialing [phone]. If you need immediate attention, we recommend that you go to a hospital emergency room or dial [phone].      FOR PM&R PATIENTS:  For patients seen by the PM and R service, please call [phone]. (Monday through Friday 8a-5p.  After business hours and weekends call [phone] and ask for the PM and R doctor on call). If you need to fax a pain diary to PM&R the fax number is [phone]. If you are unable to fax, uploading to [employer] is encouraged, then send to provider. If you have procedure scheduling questions please call [phone] Option #2.

## 2024-10-28 NOTE — OP NOTE
PROCEDURE NOTE: Lumbar Interlaminar Epidural Steroid Injection    PROCEDURE DATE: 10/28/2024    PATIENT NAME: Nicolás Fermin  YOB: 1960    ATTENDING PHYSICIAN: Anil Jalloh MD    PREOPERATIVE DIAGNOSIS:   Lumbar radiculopathy   Lumbar radicular pain  POSTOPERATIVE DIAGNOSIS: Same    PROCEDURE PERFORMED: Interlaminar Epidural Steroid Injection at the L4-5 level, with a left paramedian approach under fluoroscopic guidance       FLUOROSCOPY WAS USED.     INDICATIONS FOR PROCEDURE:   This is a 64 year old male with a clinical picture consistent with the above-mentioned diagnosis, resulting in radicular pain to the left>>right lower extremity.    PROCEDURE AND FINDINGS:   The patient was greeted in the pre procedure holding area. The risk, benefits and alternatives to the procedure were again reviewed with the patient and written informed consent was placed in the chart. Prior to the procedure a time out was completed, verifying correct patient, procedure, site, positioning, and implants and/or special equipment.    An IV line was not placed. The patient was taken to the procedure room and positioned prone on the fluoroscopy table. Routine monitors were applied including blood pressure cuff, and pulse oximetry. Then a  film was taken to identify the correct level. The skin was prepped and draped in the usual sterile fashion. The overlying skin and subcutaneous tissue was anesthetized using a 27-guage 1-1/4 inch needle with 1% preservative-free lidocaine for a total volume of  3mls. Then an 20G: 3.5-inch Tuohy needle was advanced under fluorosocpic guidance using AP and lateral views into the L4-5 interlaminar space. A loss of resistance syringe was attached and loss of resistance to saline was achieved.     After negative aspiration, 1mls of Isovue-M contrast was injected under AP view with live fluoroscopy and confirmed adequate spread along the nerve root and in the epidural space. There was  no evidence of intravascular uptake or intrathecal spread on imaging. A lateral view was also taken confirming adequate epidural spread.     At this point, after negative aspiration, a total 4mL volume of treatment injectate, consisting of 1mL Dexamethasone (10mg/mL) and 3mL of preservative free normal saline (PFNS) was injected easily.  The needle was then flushed with 0.5ml of PFNS and removed. The needle insertion site was dressed appropriately.     The patient was taken to the recovery room where they were monitored for a brief period of time. He tolerated the procedure well and were discharged home in stable condition with post procedural instructions.     Follow-up will be in clinic with referring neurologist, Dr. Nowak     COMPLICATIONS: None    COMMENTS: None

## 2024-11-14 DIAGNOSIS — I10 ESSENTIAL HYPERTENSION, BENIGN: ICD-10-CM

## 2024-11-15 RX ORDER — LISINOPRIL 20 MG/1
20 TABLET ORAL 2 TIMES DAILY
Qty: 180 TABLET | Refills: 0 | Status: SHIPPED | OUTPATIENT
Start: 2024-11-15

## 2024-11-18 ENCOUNTER — THERAPY VISIT (OUTPATIENT)
Dept: PHYSICAL THERAPY | Facility: CLINIC | Age: 64
End: 2024-11-18
Payer: COMMERCIAL

## 2024-11-18 DIAGNOSIS — M25.552 HIP PAIN, LEFT: Primary | ICD-10-CM

## 2024-11-18 DIAGNOSIS — M54.16 LUMBAR RADICULOPATHY: ICD-10-CM

## 2024-11-18 DIAGNOSIS — M48.061 SPINAL STENOSIS OF LUMBAR REGION WITHOUT NEUROGENIC CLAUDICATION: ICD-10-CM

## 2024-11-18 PROCEDURE — 97110 THERAPEUTIC EXERCISES: CPT | Mod: GP | Performed by: PHYSICAL THERAPIST

## 2024-11-18 NOTE — PROGRESS NOTES
[facility]                                                                                   OUTPATIENT PHYSICAL THERAPY    PLAN OF TREATMENT FOR OUTPATIENT REHABILITATION   Patient's Last Name, First Name, Nicolás Pinedo YOB: 1960   Provider's Name   [facility]   Medical Record No.  [id number]     Onset Date: 09/19/24  Start of Care Date: 09/27/24     Medical Diagnosis:  Lumbar radiculopathy  Spinal stenosis of lumbar region without neurogenic claudication      PT Treatment Diagnosis:  L LBP w/radiculopathy to knee Plan of Treatment  Frequency/Duration: 1x/week/ 6 weeks    Certification date from 11/23/24 to 01/04/25         See note for plan of treatment details and functional goals     Smitha Ruiz PT                         I CERTIFY THE NEED FOR THESE SERVICES FURNISHED UNDER        THIS PLAN OF TREATMENT AND WHILE UNDER MY CARE     (Physician attestation of this document indicates review and certification of the therapy plan).              Referring Provider:  Stone Nowak    Initial Assessment  See Epic Evaluation- Start of Care Date: 09/27/24            PLAN  Continue therapy per current plan of care.    Beginning/End Dates of Progress Note Reporting Period:  (P) 09/27/24 to 11/18/2024    Referring Provider:  Stone Nowak     11/18/24 0500   Appointment Info   Signing clinician's name / credentials Smitha Ruiz PT   Total/Authorized Visits 12 (PT estimate updated on PN 11/18/2024)   Visits Used 6   Medical Diagnosis Lumbar radiculopathy  Spinal stenosis of lumbar region without neurogenic claudication   PT Tx Diagnosis L LBP w/radiculopathy to knee   Progress Note/Certification   Start of Care Date 09/27/24   Onset of illness/injury or Date of Surgery 09/19/24   Therapy Frequency 1x/week   Predicted Duration 6 weeks   Certification date from 11/23/24   Certification date to 01/04/25  "  Progress Note Due Date 11/18/24   Progress Note Completed Date 09/27/24   PT Goal 1   Goal Identifier standing   Goal Description Patient will report L LB and hip pain 2/10 or less after 6-hour work day standing   Rationale   (for work activities)   Goal Progress Can be 6/10 now.  Continue.   Target Date 11/22/24   Subjective Report   Subjective Report Patient had an injection about 3 weeks ago with minimal relief.  He has been using his SEC for ambulation and tries to get 3500 steps/day.  He is not sure why he doesn't walk more--sometimes fatigue, sometimes boredom.  Standing for a full workday is still painful for his LB and L hip--6/10 so maybe a little better since the injection.   Objective Measures   Objective Measures Objective Measure 1   Objective Measure 1   Objective Measure Lumbar AROM:   Details flexion mod loss, \"stiff\" and increase L hip pain; extension mod loss, \"stiff\"   Treatment Interventions (PT)   Interventions Therapeutic Procedure/Exercise;Therapeutic Activity   Therapeutic Procedure/Exercise   Therapeutic Procedures: strength, endurance, ROM, flexibility minutes (09334) 32   Therapeutic Procedures Ther Proc 2;Ther Proc 3;Ther Proc 4;Ther Proc 5;Ther Proc 6;Ther Proc 7;Ther Proc 8;Ther Proc 9;Ther Proc 10   Ther Proc 1 RFIL   Ther Proc 1 - Details x10 reps   Ther Proc 2 RFISit   Ther Proc 2 - Details HEP   Ther Proc 3 SKC   Ther Proc 3 - Details x10 reps B--decreases L hip pain   Ther Proc 4 SL flex/rotation   Ther Proc 4 - Details B x10\" each 10 5 reps--continue--feels better than trunk rotation   Ther Proc 5 bridge   Ther Proc 5 - Details #1 w/DF --HEP--review   Ther Proc 6 SL clams   Ther Proc 6 - Details B AG x20 reps--VC, MC for position and form, not rolling hips back   Ther Proc 7 supine HS stretch   Ther Proc 7 - Details 10\"x10 reps B--VC, MC for position and form   Ther Proc 8 standing hip abduction   Ther Proc 8 - Details likes better than sidestep--B AG x10 reps, 2 sets--VC, VSC " for form, toes fwd   Ther Proc 9 NuStep   Ther Proc 9 - Details seat 14, arms 13, level 3, 10 min   Ther Proc 10 d/c sidestep; add hip ext in standing   Ther Proc 10 - Details AG x10 reps B, 2 sets   Skilled Intervention VC, MC, VSC for form   Patient Response/Progress NE   Education   Learner/Method Patient;Listening;Reading;Demonstration;Pictures/Video;No Barriers to Learning   Plan   Plan for next session continue mobility, core and hip strength   Total Session Time   Timed Code Treatment Minutes 32   Total Treatment Time (sum of timed and untimed services) 32

## 2024-12-06 NOTE — PROGRESS NOTES
ESTABLISHED PATIENT NEUROLOGY NOTE    DATE OF VISIT: 12/9/2024  CLINIC LOCATION: [facility]  MRN: [id number]  PATIENT NAME: Nicolás Fermin  YOB: 1960    REASON FOR VISIT: No chief complaint on file.    SUBJECTIVE:                                                      HISTORY OF PRESENT ILLNESS: Patient is here to follow up regarding lumbar radiculopathy/lumbar spinal stenosis, peripheral polyneuropathy and left meralgia paresthetica.  He was last seen on 9/19/2024.  At that time physical therapy and epidural steroid injections were ordered.  Please refer to my initial/other prior notes for further information.    Since the last visit, the patient reports ***. ***  EXAM:                                                    Physical Exam:   Vitals: There were no vitals taken for this visit.    General: pt is in NAD, cooperative.  Skin: normal turgor, moist mucous membranes, no lesions/rashes noticed.  HEENT: ATNC, white sclera, normal conjunctiva.  Respiratory: Symmetric lung excursion, no accessory respiratory muscle use.  Abdomen: Non distended.  Neurological: awake, cooperative, follows commands, face is symmetric, equally moves all extremities, Achilles reflexes absent bilaterally, light touch/pinprick and vibration reduced in both feet, pinprick is also reduced in territory left femoral cutaneous nerve.  No dysmetria bilaterally.  Gait was not checked.  ASSESSMENT AND PLAN:                                                    Assessment: 64 year old male patient presents for follow-up of lumbar radiculopathy/lumbar spinal stenosis, peripheral polyneuropathy, and left meralgia paresthetica.  He reports *** after physical therapy and epidural steroid injection.  Previously we discussed neurosurgery referral if these interventions are not effective.  Today we reviewed it again and decided ***.    For peripheral polyneuropathy and left side, he is on combination of Cymbalta,  gabapentin, and amitriptyline.  Symptoms are currently well-controlled.  I refilled his prescriptions.    Diagnoses:    ICD-10-CM    1. Lumbar radiculopathy  M54.16       2. Spinal stenosis of lumbar region without neurogenic claudication  M48.061       3. 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 Meralgia paresthetica of left side  G57.12         Plan:  There are no Patient Instructions on file for this visit.    Total Time: *** minutes spent on the date of the encounter doing chart review, history and exam, documentation and further activities per the note.    Stone Nowak MD  [facility] Neurology  (Chart documentation was completed in part with Dragon voice-recognition software. Even though reviewed, some grammatical, spelling, and word errors may remain.)

## 2024-12-09 ENCOUNTER — OFFICE VISIT (OUTPATIENT)
Dept: NEUROLOGY | Facility: CLINIC | Age: 64
End: 2024-12-09
Payer: COMMERCIAL

## 2024-12-09 VITALS — HEART RATE: 58 BPM | SYSTOLIC BLOOD PRESSURE: 134 MMHG | DIASTOLIC BLOOD PRESSURE: 75 MMHG | OXYGEN SATURATION: 98 %

## 2024-12-09 DIAGNOSIS — I10 ESSENTIAL HYPERTENSION, BENIGN: ICD-10-CM

## 2024-12-09 DIAGNOSIS — M48.061 SPINAL STENOSIS OF LUMBAR REGION WITHOUT NEUROGENIC CLAUDICATION: ICD-10-CM

## 2024-12-09 DIAGNOSIS — M54.16 LUMBAR RADICULOPATHY: Primary | ICD-10-CM

## 2024-12-09 DIAGNOSIS — G57.12 MERALGIA PARESTHETICA OF LEFT SIDE: ICD-10-CM

## 2024-12-09 DIAGNOSIS — G60.8 PERIPHERAL SENSORY-MOTOR AXONAL POLYNEUROPATHY: ICD-10-CM

## 2024-12-09 PROCEDURE — 99214 OFFICE O/P EST MOD 30 MIN: CPT | Performed by: PSYCHIATRY & NEUROLOGY

## 2024-12-09 PROCEDURE — G2211 COMPLEX E/M VISIT ADD ON: HCPCS | Performed by: PSYCHIATRY & NEUROLOGY

## 2024-12-09 RX ORDER — ATENOLOL 100 MG/1
100 TABLET ORAL DAILY
Qty: 90 TABLET | Refills: 0 | Status: SHIPPED | OUTPATIENT
Start: 2024-12-09

## 2024-12-09 RX ORDER — AMITRIPTYLINE HYDROCHLORIDE 50 MG/1
50 TABLET ORAL AT BEDTIME
Qty: 90 TABLET | Refills: 1 | Status: SHIPPED | OUTPATIENT
Start: 2024-12-09

## 2024-12-09 RX ORDER — GABAPENTIN 300 MG/1
600 CAPSULE ORAL 2 TIMES DAILY
Qty: 360 CAPSULE | Refills: 1 | Status: SHIPPED | OUTPATIENT
Start: 2024-12-09

## 2024-12-09 RX ORDER — DULOXETIN HYDROCHLORIDE 60 MG/1
60 CAPSULE, DELAYED RELEASE ORAL DAILY
Qty: 90 CAPSULE | Refills: 1 | Status: SHIPPED | OUTPATIENT
Start: 2024-12-09

## 2024-12-09 NOTE — LETTER
12/9/2024      Nicolás Fermin  [address]  [facility] [address]      Dear Colleague,    Thank you for referring your patient, Nicolás Fermin, to the [facility]. Please see a copy of my visit note below.    ESTABLISHED PATIENT NEUROLOGY NOTE    DATE OF VISIT: 12/9/2024  CLINIC LOCATION: [facility]  MRN: [id number]  PATIENT NAME: Nicolás Fermin  YOB: 1960    REASON FOR VISIT:   Chief Complaint   Patient presents with     Follow Up     Neuropathy/Meralgia Paresthetic Left Side      SUBJECTIVE:                                                      HISTORY OF PRESENT ILLNESS: Patient is here to follow up regarding lumbar radiculopathy/lumbar spinal stenosis, peripheral polyneuropathy and left meralgia paresthetica.  He was last seen on 9/19/2024.  At that time physical therapy and epidural steroid injections were ordered.  Please refer to my initial/other prior notes for further information.    Since the last visit, the patient reports that his symptoms are overall stable.  Physical therapy is somewhat helpful.  Epidural steroid injection was not helpful.  He denies interval development of neurological symptoms.  For pain control, he is on combination of Cymbalta, gabapentin, and amitriptyline without significant side effects.  EXAM:                                                    Physical Exam:   Vitals: BP (!) 143/92 (BP Location: Left arm, Patient Position: Sitting, Cuff Size: Adult Large)   Pulse 57   SpO2 97%     General: pt is in NAD, cooperative.  Skin: normal turgor, moist mucous membranes, no lesions/rashes noticed.  HEENT: ATNC, white sclera, normal conjunctiva.  Respiratory: Symmetric lung excursion, no accessory respiratory muscle use.  Abdomen: Non distended.  Neurological: awake, cooperative, follows commands, face is symmetric, equally moves all extremities, Achilles reflexes absent bilaterally, light touch/pinprick and  vibration reduced in both feet, pinprick is also reduced in territory left femoral cutaneous nerve.  No dysmetria bilaterally.  Gait was not checked.  ASSESSMENT AND PLAN:                                                    Assessment: 64 year old male patient presents for follow-up of lumbar radiculopathy/lumbar spinal stenosis, peripheral polyneuropathy, and left meralgia paresthetica.      Regarding his lumbar spinal stenosis/lumbar radiculopathy, he reports mild improvement after physical therapy, though epidural steroid injection was not helpful.  Previously we discussed neurosurgery referral if these interventions are not effective.  Today, we reviewed it again and decided to pursue it.  The referral was placed.    For peripheral polyneuropathy and left side, he is on combination of Cymbalta, gabapentin, and amitriptyline.  Symptoms are currently well-controlled.  I refilled his prescriptions.    Rainer to follow up with Primary Care provider regarding elevated blood pressure.    Diagnoses:    ICD-10-CM    1. Lumbar radiculopathy  M54.16       2. Spinal stenosis of lumbar region without neurogenic claudication  M48.061       3. 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 Meralgia paresthetica of left side  G57.12         Plan: At today's visit we thoroughly discussed current symptoms, diagnosis, available treatment options, and the plan.    We decided to pursue neurosurgery referral to see if there is any surgical treatment options for lumbar spine issues.    I refilled Cymbalta, gabapentin, and amitriptyline prescriptions.    Next follow-up appointment is in the next 6 months or earlier if needed.    Total Time: 27 minutes spent on the date of the encounter doing chart review, history and exam, documentation and further activities per the note.    Stone Nowak MD  [facility] Neurology  (Chart documentation was completed in part with Dragon voice-recognition software. Even  "though reviewed, some grammatical, spelling, and word errors may remain.)    Nicolás Fermin is a 64 year old male who presents for:  Chief Complaint   Patient presents with     Follow Up     Neuropathy/Meralgia Paresthetic Left Side         Initial Vitals:  BP (!) 143/92 (BP Location: Left arm, Patient Position: Sitting, Cuff Size: Adult Large)   Pulse 57   SpO2 97%  Estimated body mass index is 27.14 kg/m  as calculated from the following:    Height as of 10/28/24: 1.93 m (6' 4\").    Weight as of 10/28/24: 101.2 kg (223 lb).. There is no height or weight on file to calculate BSA. BP completed using cuff size: large    [place] Bonilla       Again, thank you for allowing me to participate in the care of your patient.        Sincerely,        Stone Nowak MD  "

## 2024-12-09 NOTE — PROGRESS NOTES
ESTABLISHED PATIENT NEUROLOGY NOTE    DATE OF VISIT: 12/9/2024  CLINIC LOCATION: [facility]  MRN: [id number]  PATIENT NAME: Nicolás Fermin  YOB: 1960    REASON FOR VISIT:   Chief Complaint   Patient presents with    Follow Up     Neuropathy/Meralgia Paresthetic Left Side      SUBJECTIVE:                                                      HISTORY OF PRESENT ILLNESS: Patient is here to follow up regarding lumbar radiculopathy/lumbar spinal stenosis, peripheral polyneuropathy and left meralgia paresthetica.  He was last seen on 9/19/2024.  At that time physical therapy and epidural steroid injections were ordered.  Please refer to my initial/other prior notes for further information.    Since the last visit, the patient reports that his symptoms are overall stable.  Physical therapy is somewhat helpful.  Epidural steroid injection was not helpful.  He denies interval development of neurological symptoms.  For pain control, he is on combination of Cymbalta, gabapentin, and amitriptyline without significant side effects.  EXAM:                                                    Physical Exam:   Vitals: BP (!) 143/92 (BP Location: Left arm, Patient Position: Sitting, Cuff Size: Adult Large)   Pulse 57   SpO2 97%     General: pt is in NAD, cooperative.  Skin: normal turgor, moist mucous membranes, no lesions/rashes noticed.  HEENT: ATNC, white sclera, normal conjunctiva.  Respiratory: Symmetric lung excursion, no accessory respiratory muscle use.  Abdomen: Non distended.  Neurological: awake, cooperative, follows commands, face is symmetric, equally moves all extremities, Achilles reflexes absent bilaterally, light touch/pinprick and vibration reduced in both feet, pinprick is also reduced in territory left femoral cutaneous nerve.  No dysmetria bilaterally.  Gait was not checked.  ASSESSMENT AND PLAN:                                                    Assessment: 64 year old male  patient presents for follow-up of lumbar radiculopathy/lumbar spinal stenosis, peripheral polyneuropathy, and left meralgia paresthetica.      Regarding his lumbar spinal stenosis/lumbar radiculopathy, he reports mild improvement after physical therapy, though epidural steroid injection was not helpful.  Previously we discussed neurosurgery referral if these interventions are not effective.  Today, we reviewed it again and decided to pursue it.  The referral was placed.    For peripheral polyneuropathy and left side, he is on combination of Cymbalta, gabapentin, and amitriptyline.  Symptoms are currently well-controlled.  I refilled his prescriptions.    Rainer to follow up with Primary Care provider regarding elevated blood pressure.    Diagnoses:    ICD-10-CM    1. Lumbar radiculopathy  M54.16       2. Spinal stenosis of lumbar region without neurogenic claudication  M48.061       3. 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 Meralgia paresthetica of left side  G57.12         Plan: At today's visit we thoroughly discussed current symptoms, diagnosis, available treatment options, and the plan.    We decided to pursue neurosurgery referral to see if there is any surgical treatment options for lumbar spine issues.    I refilled Cymbalta, gabapentin, and amitriptyline prescriptions.    Next follow-up appointment is in the next 6 months or earlier if needed.    Total Time: 27 minutes spent on the date of the encounter doing chart review, history and exam, documentation and further activities per the note.    Stone Nowak MD  [facility] Neurology  (Chart documentation was completed in part with Dragon voice-recognition software. Even though reviewed, some grammatical, spelling, and word errors may remain.)

## 2024-12-09 NOTE — PROGRESS NOTES
"Nicolás Fermin is a 64 year old male who presents for:  Chief Complaint   Patient presents with    Follow Up     Neuropathy/Meralgia Paresthetic Left Side         Initial Vitals:  BP (!) 143/92 (BP Location: Left arm, Patient Position: Sitting, Cuff Size: Adult Large)   Pulse 57   SpO2 97%  Estimated body mass index is 27.14 kg/m  as calculated from the following:    Height as of 10/28/24: 1.93 m (6' 4\").    Weight as of 10/28/24: 101.2 kg (223 lb).. There is no height or weight on file to calculate BSA. BP completed using cuff size: [place]   "

## 2025-01-01 SDOH — HEALTH STABILITY: PHYSICAL HEALTH: ON AVERAGE, HOW MANY DAYS PER WEEK DO YOU ENGAGE IN MODERATE TO STRENUOUS EXERCISE (LIKE A BRISK WALK)?: 3 DAYS

## 2025-01-01 SDOH — HEALTH STABILITY: PHYSICAL HEALTH: ON AVERAGE, HOW MANY MINUTES DO YOU ENGAGE IN EXERCISE AT THIS LEVEL?: 20 MIN

## 2025-01-01 ASSESSMENT — SOCIAL DETERMINANTS OF HEALTH (SDOH): HOW OFTEN DO YOU GET TOGETHER WITH FRIENDS OR RELATIVES?: MORE THAN THREE TIMES A WEEK

## 2025-01-02 ENCOUNTER — OFFICE VISIT (OUTPATIENT)
Dept: INTERNAL MEDICINE | Facility: CLINIC | Age: 65
End: 2025-01-02
Payer: COMMERCIAL

## 2025-01-02 VITALS
SYSTOLIC BLOOD PRESSURE: 164 MMHG | TEMPERATURE: 97.1 F | DIASTOLIC BLOOD PRESSURE: 92 MMHG | HEIGHT: 76 IN | HEART RATE: 57 BPM | OXYGEN SATURATION: 98 % | WEIGHT: 227.8 LBS | BODY MASS INDEX: 27.74 KG/M2

## 2025-01-02 DIAGNOSIS — Z00.00 ENCOUNTER FOR SUBSEQUENT ANNUAL WELLNESS VISIT (AWV) IN MEDICARE PATIENT: Primary | ICD-10-CM

## 2025-01-02 DIAGNOSIS — B20 HUMAN IMMUNODEFICIENCY VIRUS (HIV) DISEASE (H): ICD-10-CM

## 2025-01-02 DIAGNOSIS — G63 NEUROPATHY DUE TO HUMAN IMMUNODEFICIENCY VIRUS INFECTION (H): ICD-10-CM

## 2025-01-02 DIAGNOSIS — B20 NEUROPATHY DUE TO HUMAN IMMUNODEFICIENCY VIRUS INFECTION (H): ICD-10-CM

## 2025-01-02 DIAGNOSIS — I10 ESSENTIAL HYPERTENSION, BENIGN: ICD-10-CM

## 2025-01-02 DIAGNOSIS — E05.00 GRAVES DISEASE: ICD-10-CM

## 2025-01-02 DIAGNOSIS — Z13.6 CARDIOVASCULAR SCREENING; LDL GOAL LESS THAN 130: ICD-10-CM

## 2025-01-02 DIAGNOSIS — Z12.5 SCREENING PSA (PROSTATE SPECIFIC ANTIGEN): ICD-10-CM

## 2025-01-02 DIAGNOSIS — Z87.891 PERSONAL HISTORY OF TOBACCO USE, PRESENTING HAZARDS TO HEALTH: ICD-10-CM

## 2025-01-02 DIAGNOSIS — Z12.11 COLON CANCER SCREENING: ICD-10-CM

## 2025-01-02 DIAGNOSIS — Z13.6 ENCOUNTER FOR ABDOMINAL AORTIC ANEURYSM (AAA) SCREENING: ICD-10-CM

## 2025-01-02 LAB
ALBUMIN SERPL BCG-MCNC: 4.4 G/DL (ref 3.5–5.2)
ALP SERPL-CCNC: 86 U/L (ref 40–150)
ALT SERPL W P-5'-P-CCNC: 17 U/L (ref 0–70)
ANION GAP SERPL CALCULATED.3IONS-SCNC: 10 MMOL/L (ref 7–15)
AST SERPL W P-5'-P-CCNC: 27 U/L (ref 0–45)
BILIRUB SERPL-MCNC: 0.7 MG/DL
BUN SERPL-MCNC: 11.3 MG/DL (ref 8–23)
CALCIUM SERPL-MCNC: 9.3 MG/DL (ref 8.8–10.4)
CHLORIDE SERPL-SCNC: 100 MMOL/L (ref 98–107)
CHOLEST SERPL-MCNC: 218 MG/DL
CREAT SERPL-MCNC: 1.03 MG/DL (ref 0.67–1.17)
EGFRCR SERPLBLD CKD-EPI 2021: 81 ML/MIN/1.73M2
ERYTHROCYTE [DISTWIDTH] IN BLOOD BY AUTOMATED COUNT: 12.2 % (ref 10–15)
FASTING STATUS PATIENT QL REPORTED: YES
FASTING STATUS PATIENT QL REPORTED: YES
GLUCOSE SERPL-MCNC: 95 MG/DL (ref 70–99)
HCO3 SERPL-SCNC: 25 MMOL/L (ref 22–29)
HCT VFR BLD AUTO: 41.7 % (ref 40–53)
HDLC SERPL-MCNC: 61 MG/DL
HGB BLD-MCNC: 14.5 G/DL (ref 13.3–17.7)
LDLC SERPL CALC-MCNC: 134 MG/DL
MCH RBC QN AUTO: 32.7 PG (ref 26.5–33)
MCHC RBC AUTO-ENTMCNC: 34.8 G/DL (ref 31.5–36.5)
MCV RBC AUTO: 94 FL (ref 78–100)
NONHDLC SERPL-MCNC: 157 MG/DL
PLATELET # BLD AUTO: 218 10E3/UL (ref 150–450)
POTASSIUM SERPL-SCNC: 4.2 MMOL/L (ref 3.4–5.3)
PROT SERPL-MCNC: 7.2 G/DL (ref 6.4–8.3)
PSA SERPL DL<=0.01 NG/ML-MCNC: 0.48 NG/ML (ref 0–4.5)
RBC # BLD AUTO: 4.44 10E6/UL (ref 4.4–5.9)
SODIUM SERPL-SCNC: 135 MMOL/L (ref 135–145)
TRIGL SERPL-MCNC: 116 MG/DL
TSH SERPL DL<=0.005 MIU/L-ACNC: 1.27 UIU/ML (ref 0.3–4.2)
WBC # BLD AUTO: 7.5 10E3/UL (ref 4–11)

## 2025-01-02 RX ORDER — AMLODIPINE BESYLATE 10 MG/1
10 TABLET ORAL DAILY
Qty: 90 TABLET | Refills: 0 | Status: SHIPPED | OUTPATIENT
Start: 2025-01-02

## 2025-01-02 NOTE — PATIENT INSTRUCTIONS
Patient Education   Preventive Care Advice   This is general advice given by our system to help you stay healthy. However, your care team may have specific advice just for you. Please talk to your care team about your preventive care needs.  Nutrition  Eat 5 or more servings of fruits and vegetables each day.  Try wheat bread, brown rice and whole grain pasta (instead of white bread, rice, and pasta).  Get enough calcium and vitamin D. Check the label on foods and aim for 100% of the RDA (recommended daily allowance).  Lifestyle  Exercise at least 150 minutes each week  (30 minutes a day, 5 days a week).  Do muscle strengthening activities 2 days a week. These help control your weight and prevent disease.  No smoking.  Wear sunscreen to prevent skin cancer.  Have a dental exam and cleaning every 6 months.  Yearly exams  See your health care team every year to talk about:  Any changes in your health.  Any medicines your care team has prescribed.  Preventive care, family planning, and ways to prevent chronic diseases.  Shots (vaccines)   HPV shots (up to age 26), if you've never had them before.  Hepatitis B shots (up to age 59), if you've never had them before.  COVID-19 shot: Get this shot when it's due.  Flu shot: Get a flu shot every year.  Tetanus shot: Get a tetanus shot every 10 years.  Pneumococcal, hepatitis A, and RSV shots: Ask your care team if you need these based on your risk.  Shingles shot (for age 50 and up)  General health tests  Diabetes screening:  Starting at age 35, Get screened for diabetes at least every 3 years.  If you are younger than age 35, ask your care team if you should be screened for diabetes.  Cholesterol test: At age 39, start having a cholesterol test every 5 years, or more often if advised.  Bone density scan (DEXA): At age 50, ask your care team if you should have this scan for osteoporosis (brittle bones).  Hepatitis C: Get tested at least once in your life.  STIs (sexually  transmitted infections)  Before age 24: Ask your care team if you should be screened for STIs.  After age 24: Get screened for STIs if you're at risk. You are at risk for STIs (including HIV) if:  You are sexually active with more than one person.  You don't use condoms every time.  You or a partner was diagnosed with a sexually transmitted infection.  If you are at risk for HIV, ask about PrEP medicine to prevent HIV.  Get tested for HIV at least once in your life, whether you are at risk for HIV or not.  Cancer screening tests  Cervical cancer screening: If you have a cervix, begin getting regular cervical cancer screening tests starting at age 21.  Breast cancer scan (mammogram): If you've ever had breasts, begin having regular mammograms starting at age 40. This is a scan to check for breast cancer.  Colon cancer screening: It is important to start screening for colon cancer at age 45.  Have a colonoscopy test every 10 years (or more often if you're at risk) Or, ask your provider about stool tests like a FIT test every year or Cologuard test every 3 years.  To learn more about your testing options, visit:   .  For help making a decision, visit:   https://bit.ly/qh72785.  Prostate cancer screening test: If you have a prostate, ask your care team if a prostate cancer screening test (PSA) at age 55 is right for you.  Lung cancer screening: If you are a current or former smoker ages 50 to 80, ask your care team if ongoing lung cancer screenings are right for you.  For informational purposes only. Not to replace the advice of your health care provider. Copyright   2023 [facility]. All rights reserved. Clinically reviewed by the [facility]. Spl[employer] Machine [id number] - REV 01/24.  Learning About Activities of Daily Living  What are activities of daily living?     Activities of daily living (ADLs) are the basic self-care tasks you do every day. These include eating, bathing, dressing,  and moving around.  As you age, and if you have health problems, you may find that it's harder to do some of these tasks. If so, your doctor can suggest ideas that may help.  To measure what kind of help you may need, your doctor will ask how well you are able to do ADLs. Let your doctor know if there are any tasks that you are having trouble doing. This is an important first step to getting help. And when you have the help you need, you can stay as independent as possible.  How will a doctor assess your ADLs?  Asking about ADLs is part of a routine health checkup your doctor will likely do as you age. Your health check might be done in a doctor's office, in your home, or at a hospital. The goal is to find out if you are having any problems that could make it hard to care for yourself or that make it unsafe for you to be on your own.  To measure your ADLs, your doctor will ask how hard it is for you to do routine tasks. Your doctor may also want to know if you have changed the way you do a task because of a health problem. Your doctor may watch how you:  Walk back and forth.  Keep your balance while you stand or walk.  Move from sitting to standing or from a bed to a chair.  Button or unbutton a shirt or sweater.  Remove and put on your shoes.  It's common to feel a little worried or anxious if you find you can't do all the things you used to be able to do. Talking with your doctor about ADLs is a way to make sure you're as safe as possible and able to care for yourself as well as you can. You may want to bring a caregiver, friend, or family member to your checkup. They can help you talk to your doctor.  Follow-up care is a key part of your treatment and safety. Be sure to make and go to all appointments, and call your doctor if you are having problems. It's also a good idea to know your test results and keep a list of the medicines you take.  Current as of: October 24, 2023  Content Version: 14.3    2024 [facility]  [employer].   Care instructions adapted under license by your healthcare professional. If you have questions about a medical condition or this instruction, always ask your healthcare professional. [facility] [employer] disclaims any warranty or liability for your use of this information.    Preventing Falls: Care Instructions  Injuries and health problems such as trouble walking or poor eyesight can increase your risk of falling. So can some medicines. But there are things you can do to help prevent falls. You can exercise to get stronger. You can also arrange your home to make it safer.    Talk to your doctor about the medicines you take. Ask if any of them increase the risk of falls and whether they can be changed or stopped.   Try to exercise regularly. It can help improve your strength and balance. This can help lower your risk of falling.         Practice fall safety and prevention.   Wear low-heeled shoes that fit well and give your feet good support. Talk to your doctor if you have foot problems that make this hard.  Carry a cellphone or wear a medical alert device that you can use to call for help.  Use stepladders instead of chairs to reach high objects. Don't climb if you're at risk for falls. Ask for help, if needed.  Wear the correct eyeglasses, if you need them.        Make your home safer.   Remove rugs, cords, clutter, and furniture from walkways.  Keep your house well lit. Use night-lights in hallways and bathrooms.  Install and use sturdy handrails on stairways.  Wear nonskid footwear, even inside. Don't walk barefoot or in socks without shoes.        Be safe outside.   Use handrails, curb cuts, and ramps whenever possible.  Keep your hands free by using a shoulder bag or backpack.  Try to walk in well-lit areas. Watch out for uneven ground, changes in pavement, and debris.  Be careful in the winter. Walk on the grass or gravel when sidewalks are slippery. Use de-icer on steps and walkways.  "Add non-slip devices to shoes.    Put grab bars and nonskid mats in your shower or tub and near the toilet. Try to use a shower chair or bath bench when bathing.   Get into a tub or shower by putting in your weaker leg first. Get out with your strong side first. Have a phone or medical alert device in the bathroom with you.   Where can you learn more?  Go to https://www.[employer].Ã[employer]berResearch/patiented  Enter G117 in the search box to learn more about \"Preventing Falls: Care Instructions.\"  Current as of: July 31, 2024  Content Version: 14.3    2024 [employer].   Care instructions adapted under license by your healthcare professional. If you have questions about a medical condition or this instruction, always ask your healthcare professional. [employer] disclaims any warranty or liability for your use of this information.    Learning About Sleeping Well  What does sleeping well mean?     Sleeping well means getting enough sleep to feel good and stay healthy. How much sleep is enough varies among people.  The number of hours you sleep and how you feel when you wake up are both important. If you do not feel refreshed, you probably need more sleep. Another sign of not getting enough sleep is feeling tired during the day.  Experts recommend that adults get at least 7 or more hours of sleep per day. Children and older adults need more sleep.  Why is getting enough sleep important?  Getting enough quality sleep is a basic part of good health. When your sleep suffers, your physical health, mood, and your thoughts can suffer too. You may find yourself feeling more grumpy or stressed. Not getting enough sleep also can lead to serious problems, including injury, accidents, anxiety, and depression.  What might cause poor sleeping?  Many things can cause sleep problems, including:  Changes to your sleep schedule.  Stress. Stress can be caused by fear about a single event, such as giving a speech. Or you may have " "ongoing stress, such as worry about work or school.  Depression, anxiety, and other mental or emotional conditions.  Changes in your sleep habits or surroundings. This includes changes that happen where you sleep, such as noise, light, or sleeping in a different bed. It also includes changes in your sleep pattern, such as having jet lag or working a late shift.  Health problems, such as pain, breathing problems, and restless legs syndrome.  Lack of regular exercise.  Using alcohol, nicotine, or caffeine before bed.  How can you help yourself?  Here are some tips that may help you sleep more soundly and wake up feeling more refreshed.  Your sleeping area   Use your bedroom only for sleeping and sex. A bit of light reading may help you fall asleep. But if it doesn't, do your reading elsewhere in the house. Try not to use your TV, computer, smartphone, or tablet while you are in bed.  Be sure your bed is big enough to stretch out comfortably, especially if you have a sleep partner.  Keep your bedroom quiet, dark, and cool. Use curtains, blinds, or a sleep mask to block out light. To block out noise, use earplugs, soothing music, or a \"white noise\" machine.  Your evening and bedtime routine   Create a relaxing bedtime routine. You might want to take a warm shower or bath, or listen to soothing music.  Go to bed at the same time every night. And get up at the same time every morning, even if you feel tired.  What to avoid   Limit caffeine (coffee, tea, caffeinated sodas) during the day, and don't have any for at least 6 hours before bedtime.  Avoid drinking alcohol before bedtime. Alcohol can cause you to wake up more often during the night.  Try not to smoke or use tobacco, especially in the evening. Nicotine can keep you awake.  Limit naps during the day, especially close to bedtime.  Avoid lying in bed awake for too long. If you can't fall asleep or if you wake up in the middle of the night and can't get back to sleep " "within about 20 minutes, get out of bed and go to another room until you feel sleepy.  Avoid taking medicine right before bed that may keep you awake or make you feel hyper or energized. Your doctor can tell you if your medicine may do this and if you can take it earlier in the day.  If you can't sleep   Imagine yourself in a peaceful, pleasant scene. Focus on the details and feelings of being in a place that is relaxing.  Get up and do a quiet or boring activity until you feel sleepy.  Avoid drinking any liquids before going to bed to help prevent waking up often to use the bathroom.  Where can you learn more?  Go to https://www.[employer].net/patiented  Enter J942 in the search box to learn more about \"Learning About Sleeping Well.\"  Current as of: July 31, 2024  Content Version: 14.3    2024 [email or web address]   Care instructions adapted under license by your healthcare professional. If you have questions about a medical condition or this instruction, always ask your healthcare professional. [email or web address] disclaims any warranty or liability for your use of this information.       "

## 2025-01-02 NOTE — PROGRESS NOTES
Preventive Care Visit  [facility]  Brooks Walsh MD, Internal Medicine  Jan 2, 2025      Assessment & Plan     Encounter for subsequent annual wellness visit (AWV) in Medicare patient  Advised patient to consider updating routine vaccinations including Pneumovax.  He will do this through his pharmacy.  - Comprehensive metabolic panel; Future  - CBC with platelets; Future  - Lipid Profile; Future    Essential hypertension, benign  Poorly controlled.  Increase amlodipine to 10 mg daily.  Follow-up with me in 4 weeks with blood pressure measurements checks.  - Comprehensive metabolic panel; Future  - amLODIPine (NORVASC) 10 MG tablet; Take 1 tablet (10 mg) by mouth daily.  - OFFICE/OUTPT VISIT,EST,LEVL IV    Human immunodeficiency virus (HIV) disease (H)  Stable on suppressive therapy.  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  Continue with medical manage    CARDIOVASCULAR SCREENING; LDL GOAL LESS THAN 130  Ordered as fasting per routine and discussed the benefit of additional statin therapy.  - Lipid Profile; Future    Screening PSA (prostate specific antigen)  Ordered as routine screening.  - Prostate Specific Antigen Screen; Future    Colon cancer screening  Discussed patient's prior colonoscopy.  He states he was told he is due in 10 years rather than 5.  Suggest annual FIT testing.  - Fecal colorectal cancer screen FIT; Future    Personal history of tobacco use, presenting hazards to health  Repeat routine low-dose screening CT scan based on prior risk factors.  - CT Chest Lung Cancer Screen Low Dose Without; Future    Graves disease  Check thyroid function test.  - TSH with free T4 reflex; Future    Encounter for abdominal aortic aneurysm (AAA) screening  Recommended updated AAA screening  -  Aorta Medicare AAA Screening; Future    Patient has been advised of split  "billing requirements and indicates understanding: Yes        Nicotine/Tobacco Cessation  He reports that he has been smoking cigarettes. He started smoking about 49 years ago. He has a 25.3 pack-year smoking history. He has never been exposed to tobacco smoke. He has never used smokeless tobacco.  Nicotine/Tobacco Cessation Plan  Information offered: Patient not interested at this time      BMI  Estimated body mass index is 27.73 kg/m  as calculated from the following:    Height as of this encounter: 1.93 m (6' 4\").    Weight as of this encounter: 103.3 kg (227 lb 12.8 oz).   Weight management plan: Discussed healthy diet and exercise guidelines    Counseling  Appropriate preventive services were addressed with this patient via screening, questionnaire, or discussion as appropriate for fall prevention, nutrition, physical activity, Tobacco-use cessation, social engagement, weight loss and cognition.  Checklist reviewing preventive services available has been given to the patient.  Reviewed patient's diet, addressing concerns and/or questions.   He is at risk for lack of exercise and has been provided with information to increase physical activity for the benefit of his well-being.   Discussed possible causes of fatigue. Patient reported safety concerns were addressed today.    Work on weight loss  Regular exercise    Shantanu Spear is a 64 year old, presenting for the following:  Wellness Visit       HPI    Health Care Directive  Patient does not have a Health Care Directive: none      1/1/2025   General Health   How would you rate your overall physical health? Good   Feel stress (tense, anxious, or unable to sleep) Only a little   (!) STRESS CONCERN      1/1/2025   Nutrition   Diet: Regular (no restrictions)         1/1/2025   Exercise   Days per week of moderate/strenous exercise 3 days   Average minutes spent exercising at this level 20 min         1/1/2025   Social Factors   Frequency of gathering with " friends or relatives More than three times a week   Worry food won't last until get money to buy more No   Food not last or not have enough money for food? No   Do you have housing? (Housing is defined as stable permanent housing and does not include staying ouside in a car, in a tent, in an abandoned building, in an overnight shelter, or couch-surfing.) Yes   Are you worried about losing your housing? No   Lack of transportation? No   Unable to get utilities (heat,electricity)? No         1/2/2025   Fall Risk   Gait Speed Test (Document in seconds) 6.13   Gait Speed Test Interpretation Greater than 5.01 seconds - ABNORMAL          1/1/2025   Activities of Daily Living- Home Safety   Needs help with the following daily activites None of the above   Safety concerns in the home No grab bars in the bathroom    No handrails on the stairs       Multiple values from one day are sorted in reverse-chronological order         1/1/2025   Dental   Dentist two times every year? Yes         1/1/2025   Hearing Screening   Hearing concerns? None of the above         1/1/2025   Driving Risk Screening   Patient/family members have concerns about driving No         1/1/2025   General Alertness/Fatigue Screening   Have you been more tired than usual lately? (!) YES         1/1/2025   Urinary Incontinence Screening   Bothered by leaking urine in past 6 months No         1/1/2025   TB Screening   Were you born outside of the [place]? No         Today's PHQ-2 Score:       1/1/2025     8:36 PM   PHQ-2 ( 1999 Pf[employer])   Q1: Little interest or pleasure in doing things 0   Q2: Feeling down, depressed or hopeless 0   PHQ-2 Score 0    Q1: Little interest or pleasure in doing things Not at all   Q2: Feeling down, depressed or hopeless Not at all   PHQ-2 Score 0       Patient-reported           1/1/2025   Substance Use   If I could quit smoking, I would Completely agree   I want to quit somking, worry about health affects Completely agree   Willing to  make a plan to quit smoking Somewhat agree   Willing to cut down before quitting Completely agree   Alcohol more than 3/day or more than 7/wk No   Do you have a current opioid prescription? No   How severe/bad is pain from 1 to 10? 6/10   Do you use any other substances recreationally? No     Social History     Tobacco Use    Smoking status: Every Day     Current packs/day: 0.50     Average packs/day: 0.5 packs/day for 50.6 years (25.3 ttl pk-yrs)     Types: Cigarettes     Start date: 4/13/1975     Passive exposure: Never    Smokeless tobacco: Never    Tobacco comments:     it sucks!   Vaping Use    Vaping status: Never Used   Substance Use Topics    Alcohol use: Yes     Comment: 2-3/day    Drug use: Yes     Types: Marijuana     Comment: minimal use of CBD/THC for chronic pain relief       Last PSA:   PSA   Date Value Ref Range Status   07/16/2020 0.39 0 - 4 ug/L Final     Comment:     Assay Method:  Chemiluminescence using Siemens Vista analyzer     Prostate Specific Antigen Screen   Date Value Ref Range Status   10/31/2023 0.39 0.00 - 4.50 ng/mL Final   10/10/2022 0.32 0.00 - 4.00 ug/L Final     ASCVD Risk   The 10-year ASCVD risk score (Edi DK, et al., 2019) is: 25.4%    Values used to calculate the score:      Age: 64 years      Sex: Male      Is Non- : No      Diabetic: No      Tobacco smoker: Yes      Systolic Blood Pressure: 164 mmHg      Is BP treated: Yes      HDL Cholesterol: 72 mg/dL      Total Cholesterol: 211 mg/dL      Reviewed and updated as needed this visit by Provider                    Lab work is in process  Current providers sharing in care for this patient include:  Patient Care Team:  Brooks Walsh MD as PCP - General (Internal Medicine)  Brooks Walsh MD as Assigned PCP  Chelsea Pires APRN CNP as Nurse Practitioner (Dermatology)  Sophia Degroot PAVincentC as Physician Assistant (Dermatology)  Stone Nowak MD as Assigned  Neuroscience Provider  Sophia Degroot PA-C as Assigned Surgical Provider  Timoteo Gross DPM as Assigned Musculoskeletal Provider    The following health maintenance items are reviewed in Epic and correct as of today:  Health Maintenance   Topic Date Due    MENINGITIS IMMUNIZATION (1 - Risk 2-dose series) Never done    Pneumococcal Vaccine: 50+ Years (1 of 2 - PCV) Never done    HEPATITIS A IMMUNIZATION (1 of 2 - Risk 2-dose series) Never done    HEPATITIS B IMMUNIZATION (1 of 3 - Risk 3-dose series) Never done    ANNUAL REVIEW OF HM ORDERS  10/10/2023    AORTIC ANEURYSM SCREENING (SYSTEM ASSIGNED)  Never done    NICOTINE/TOBACCO CESSATION COUNSELING Q 1 YR  10/31/2024    BMP  10/31/2024    TSH W/FREE T4 REFLEX  10/31/2024    LUNG CANCER SCREENING  11/09/2024    COVID-19 Vaccine (8 - 2024-25 season) 11/14/2024    MEDICARE ANNUAL WELLNESS VISIT  01/02/2026    FALL RISK ASSESSMENT  01/02/2026    GLUCOSE  10/31/2026    DTAP/TDAP/TD IMMUNIZATION (2 - Td or Tdap) 08/29/2028    COLORECTAL CANCER SCREENING  10/17/2028    LIPID  10/31/2028    ADVANCE CARE PLANNING  10/31/2028    HEPATITIS C SCREENING  Completed    PHQ-2 (once per calendar year)  Completed    INFLUENZA VACCINE  Completed    ZOSTER IMMUNIZATION  Completed    RSV VACCINE  Completed    HPV IMMUNIZATION  Aged Out    RSV MONOCLONAL ANTIBODY  Aged Out         Review of Systems  CONSTITUTIONAL: NEGATIVE for fever, chills, change in weight  EYES: NEGATIVE for vision changes or irritation  ENT/MOUTH: NEGATIVE for ear, mouth and throat problems  RESP: NEGATIVE for significant cough or SOB  CV: NEGATIVE for chest pain, palpitations or peripheral edema  GI: NEGATIVE for nausea, abdominal pain, heartburn, or change in bowel habits  : NEGATIVE for frequency, dysuria, or hematuria  MUSCULOSKELETAL: NEGATIVE for significant arthralgias or myalgia  NEURO: NEGATIVE for weakness, dizziness or paresthesias  ENDOCRINE: NEGATIVE for temperature intolerance, skin/hair  "changes  HEME: NEGATIVE for bleeding problems  PSYCHIATRIC: NEGATIVE for changes in mood or affect     Objective      Exam  BP (!) 164/92   Pulse 57   Temp 97.1  F (36.2  C) (Temporal)   Ht 1.93 m (6' 4\")   Wt 103.3 kg (227 lb 12.8 oz)   SpO2 98%   BMI 27.73 kg/m     Estimated body mass index is 27.73 kg/m  as calculated from the following:    Height as of this encounter: 1.93 m (6' 4\").    Weight as of this encounter: 103.3 kg (227 lb 12.8 oz).    Physical Exam:    GENERAL: alert and no distress  EYES: Eyes grossly normal to inspection, PERRL and conjunctivae and sclerae normal  HENT: ear canals and TM's normal, nose and mouth without ulcers or lesions  NECK: no adenopathy, no asymmetry, masses, or scars  RESP: lungs clear to auscultation - no rales, rhonchi or wheezes  CV: regular rate and rhythm, normal S1 S2, no S3 or S4, no murmur, click or rub, no peripheral edema  ABDOMEN: soft, nontender, no hepatosplenomegaly, no masses and bowel sounds normal  ABDOMEN:  obese  MS: no gross musculoskeletal defects noted  NEURO: No focal changes  PSYCH: mentation appears normal, affect normal/bright        1/2/2025   Mini Cog   Clock Draw Score 2 Normal   3 Item Recall 2 objects recalled   Mini Cog Total Score 4         Vision Screen  Patient wears corrective lenses (select all that apply): (!) NOT worn during vision screen  Vision Screen Results: Pass      Signed Electronically by: Brooks Walsh MD    "

## 2025-01-07 LAB — HEMOCCULT STL QL IA: NEGATIVE

## 2025-01-13 ENCOUNTER — OFFICE VISIT (OUTPATIENT)
Dept: DERMATOLOGY | Facility: CLINIC | Age: 65
End: 2025-01-13
Payer: COMMERCIAL

## 2025-01-13 DIAGNOSIS — D49.2 NEOPLASM OF SKIN: ICD-10-CM

## 2025-01-13 DIAGNOSIS — D22.9 MULTIPLE BENIGN NEVI: Primary | ICD-10-CM

## 2025-01-13 DIAGNOSIS — L57.0 AK (ACTINIC KERATOSIS): ICD-10-CM

## 2025-01-13 DIAGNOSIS — L81.4 LENTIGINES: ICD-10-CM

## 2025-01-13 DIAGNOSIS — D18.01 CHERRY ANGIOMA: ICD-10-CM

## 2025-01-13 DIAGNOSIS — L82.1 SEBORRHEIC KERATOSES: ICD-10-CM

## 2025-01-13 DIAGNOSIS — Z85.828 HISTORY OF NONMELANOMA SKIN CANCER: ICD-10-CM

## 2025-01-13 NOTE — PROGRESS NOTES
[facility] Dermatology Note  Encounter Date: Jan 13, 2025  Office Visit      Dermatology Problem List:  FBSE: 1/13/25    # NUB - Mid perispinal back, S/p Biopsy performed on 1/13/24, pending results   # Hx of NMSC  - SCCIS, L deltoid S/p Excision 7/1/24  - SCC, R gillette, S/p Mohs on 2/1/24  - BCC, R mid back inferior - s/p ED&C 7/24/23  - SCCis, R distal lateral thigh s/p MMS 1/5/23 (Labeled in Vernell's note as R thigh inferior)   - SCCis, R gillette distal s/p MMS 8/25/2022  - SCCis, R medial thigh s/p exicision  8/17/2022  - SCCis, L ventral proximal leg s/p MMS 11/11/2021  - SCCis, R posterior thigh s/p MMS 12/5/2019  - BCC, L NSW s/p MMS 12/5/2019  2. History of DN  - Compound dysplastic nevus with moderate atypia, left lateral mid back, s/p shave biopsy 8/27/2021  - Lentiginous junctional melanocytic nevus with mild atypia, right lateral arm s/p shave biopsy 6/11/2020   3. 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 Psoriasis vs dermatitis   - Previous tx: fluocinonide 0.05% cream  5. Stasis dermatitis  - Tx: Compression stockings, 30-40 mmHg.   6. Corn, x 2 L plantar foot - S/p cryotherapy.     Social: , [employer]  ____________________________________________    Assessment & Plan:    # AK x 2   - edu on increased risk for skin cancer.   - Cryotherapy performed today, see procedure note below.    # Neoplasm of unspecified behavior of the skin (D49.2) on the Mid perispinal back. The differential diagnosis includes DN vs MM vs other. .   - Shave biopsy performed today, see procedure note below.  - Photographed today   - reviewed photos from 2022 of his back (having bx of another lesion at that time) and the mole was not  present then, so it is new within the past 2 years.    # Hx of DN  - Sunscreen: Apply 20 minutes prior to going outdoors and reapply every two hours, when wet or sweating. We recommend using an SPF 30 or higher, and to use one that is water resistant.     - Advised to monitor for changing, non-healing, bleeding, painful, changing, or otherwise symptomatic lesions  - Continue annual skin exams    # Hx of NMSC  - Signs and Symptoms of non-melanoma skin cancer and ABCDEs of melanoma reviewed with patient. Patient encouraged to perform monthly self skin exams and educated on how to perform them. UV precautions reviewed with patient. Patient was asked about new or changing moles/lesions on body.   - Sunscreen: Apply 20 minutes prior to going outdoors and reapply every two hours, when wet or sweating. We recommend using an SPF 30 or higher, and to use one that is water resistant.     - Advised to monitor for changing, non-healing, bleeding, painful, changing, or otherwise symptomatic lesions  - Continue annual skin exams    # Benign findings: multiple benign nevi, lentigines, cherry angiomas, SKs  - edu on benign etiology  - Signs and Symptoms of non-melanoma skin cancer and ABCDEs of melanoma reviewed with patient. Patient encouraged to perform monthly self skin exams and educated on how to perform them. UV precautions reviewed with patient. Patient was asked about new or changing moles/lesions on body.   - Sunscreen: Apply 20 minutes prior to going outdoors and reapply every two hours, when wet or sweating. We recommend using an SPF 30 or higher, and to use one that is water resistant.     - RTC for changes    Procedures Performed:   CRYOTHERAPY PROCEDURE NOTE: 2 lesions in the above locations were treated with liquid nitrogen utilizing a 5-10sec thaw time. Patient was advised that the treated areas will become red, swollen, may develop a blister and then should crust and peal off in the next 1-2 weeks. Post-procedure  instructions were provided.    - Shave biopsy procedure note, location(s): see above. After discussion of benefits and risks including but not limited to bleeding, infection, scar, incomplete removal, recurrence, and non-diagnostic biopsy, written consent and photographs were obtained. The area was cleaned with isopropyl alcohol. 0.5mL of 1% lidocaine with epinephrine was injected to obtain adequate anesthesia of lesion(s). Shave biopsy at site(s) performed. Hemostasis was achieved with aluminium chloride. Petrolatum ointment and a sterile dressing were applied. The patient tolerated the procedure and no complications were noted. The patient was provided with verbal and written post care instructions.       Follow-up: 1 year(s) in-person, or earlier for new or changing lesions    Staff and scribe:    Scribe Disclosure:   I, TA BUCHANAN, am serving as a scribe; to document services personally performed by Sophia Degroot PA-C -based on data collection and the provider's statements to me.     Provider Disclosure:  I agree with above History, Review of Systems, Physical exam and Plan.  I have reviewed the content of the documentation and have edited it as needed. I have personally performed the services documented here and the documentation accurately represents those services and the decisions I have made.      Electronically signed by:    All risks, benefits and alternatives were discussed with patient.  Patient is in agreement and understands the assessment and plan.  All questions were answered.    Sophia Degroot PA-C, [facility]  [facility] [place]: Phone: [phone], Fax: [phone]  [facility]: Phone: [phone],  Fax: [phone]  [facility]: Phone: [phone], Fax: [phone]  ____________________________________________    CC: Skin Check (FBSC/Concerns: Right cheek lesion. /Inside Left lower lip  lesion. )      Reviewed patients past medical history and pertinent chart review prior to patient's visit today.     HPI:  Mr. Nicolás Fermin is a 64 year old male who presents today as a return patient for [facility].    Today patient reported a spot of concern on his R cheek.     Has a hx of NMSC and DN.     Patient is otherwise feeling well, without additional concerns.    Labs:  N/A    Physical Exam:  Vitals: There were no vitals taken for this visit.  SKIN: Total skin excluding the undergarment areas was performed. The exam included the head/face, neck, both arms, chest, back, abdomen, both legs, digits and/or nails.    - [place]'s skin type II, has <100 nevi  - There are dome shaped bright red papules on the trunk.   - Multiple regular brown pigmented macules and papules are identified on the trunk and extremities.   - Scattered brown macules on sun exposed areas.  - There are waxy stuck on tan to brown papules on the trunk.  - There are well healed surgical scars without erythema, nodularity or telangiectasias on the [facility] sites, NERD.   - There is/are erythematous macules with overlying adherent scale on the: x 1 R cheek, x 1 nasal dorsum,   - Mid perispinal back there is a 3 mm brown macule with an irregular shape that is new since 2022.   - No other lesions of concern on areas examined.         Medications:  Current Outpatient Medications   Medication Sig Dispense Refill    amitriptyline (ELAVIL) 50 MG tablet Take 1 tablet (50 mg) by mouth at bedtime. 90 tablet 1    amLODIPine (NORVASC) 10 MG tablet Take 1 tablet (10 mg) by mouth daily. 90 tablet 0    aspirin - buffered (ASCRIPTIN) 325 MG TABS tablet Take 325 mg by mouth daily      atenolol (TENORMIN) 100 MG tablet Take 1 tablet (100 mg) by mouth daily 90 tablet 0    ciclopirox (LOPROX) 0.77 % cream Apply topically 2 times daily To feet and toenails. 90 g 5    DiphenhydrAMINE HCl (BENADRYL PO) Take by mouth as needed      Dolutegravir Sodium (TIVICAY PO)  Take 50 mg by mouth daily       DULoxetine (CYMBALTA) 60 MG capsule Take 1 capsule (60 mg) by mouth daily. 90 capsule 1    Emtricitabine-Tenofovir AF (DESCOVY PO) Take by mouth daily      gabapentin (NEURONTIN) 300 MG capsule Take 2 capsules (600 mg) by mouth 2 times daily. 360 capsule 1    levothyroxine (SYNTHROID/LEVOTHROID) 112 MCG tablet TAKE 1 TABLET(112 MCG) BY MOUTH DAILY 90 tablet 0    lisinopril (ZESTRIL) 20 MG tablet Take 1 tablet (20 mg) by mouth 2 times daily 180 tablet 0    NAPROXEN PO       Omega-3 Fatty Acids (FISH OIL) 500 MG CAPS       psyllium (METAMUCIL) 58.6 % POWD Take by mouth daily      fluocinonide (LIDEX) 0.05 % external cream Apply 1/4g to aa on thighs bid x 3 weeks. Do not use on face or body folds. Should last 30 days. (Patient not taking: Reported on 1/13/2025) 60 g 0     Current Facility-Administered Medications   Medication Dose Route Frequency Provider Last Rate Last Admin    methylPREDNISolone (DEPO-MEDROL) injection 40 mg  40 mg   Emmanuel Oro DO   40 mg at 01/26/22 1100      Past Medical/Surgical History:   Patient Active Problem List   Diagnosis    Essential hypertension, benign    Neuropathy due to human immunodeficiency virus infection (H)    CARDIOVASCULAR SCREENING; LDL GOAL LESS THAN 130    Graves disease    Neuropathy    Hip pain, left    Tobacco abuse disorder    Radicular pain of lumbosacral region    Spinal stenosis of lumbar region without neurogenic claudication    Lumbar radiculopathy     Past Medical History:   Diagnosis Date    Asymptomatic human immunodeficiency virus (HIV) infection status (H)     Graves disease 2006    Hypertension     Peripheral neuropathy     Squamous cell carcinoma of skin, unspecified

## 2025-01-13 NOTE — PATIENT INSTRUCTIONS
Cryotherapy    What is it?  Use of a very cold liquid, such as liquid nitrogen, to freeze and destroy abnormal skin cells that need to be removed    What should I expect?  Tenderness and redness  A small blister that might grow and fill with dark purple blood. There may be crusting.  More than one treatment may be needed if the lesions do not go away.    How do I care for the treated area?  Gently wash the area with your hands when bathing.  Use a thin layer of Vaseline to help with healing. You may use a Band-Aid.   The area should heal within 7-10 days and may leave behind a pink or lighter color.   Do not use an antibiotic or Neosporin ointment.   You may take acetaminophen (Tylenol) for pain.     Call your Doctor if you have:  Severe pain  Signs of infection (warmth, redness, cloudy yellow drainage, and or a bad smell)  Questions or concerns    Who should I call with questions?      [facility]: [phone]      [facility]: [phone]      For urgent needs outside of business hours call the [facility] at [phone]        and ask for the dermatology resident on call      Wound Care After a Biopsy    What is a skin biopsy?  A skin biopsy allows the doctor to examine a very small piece of tissue under the microscope to determine the diagnosis and the best treatment for the skin condition. A local anesthetic (numbing medicine)  is injected with a very small needle into the skin area to be tested. A small piece of skin is taken from the area. Sometimes a suture (stitch) is used.     What are the risks of a skin biopsy?  I will experience scar, bleeding, swelling, pain, crusting and redness. I may experience incomplete removal or recurrence. Risks of this procedure are excessive bleeding, bruising, infection, nerve damage, numbness, thick (hypertrophic or keloidal) scar and non-diagnostic biopsy.    How should I care for my wound for the  first 24 hours?  Keep the wound dry and covered for 24 hours  If it bleeds, hold direct pressure on the area for 15 minutes. If bleeding does not stop then go to the emergency room  Avoid strenuous exercise the first 1-2 days or as your doctor instructs you    How should I care for the wound after 24 hours?  After 24 hours, remove the bandage  You may bathe or shower as normal  If you had a scalp biopsy, you can shampoo as usual and can use shower water to clean the biopsy site daily  Clean the wound twice a day with gentle soap and water  Do not scrub, be gentle  Apply white petroleum/Vaseline after cleaning the wound with a cotton swab or a clean finger, and keep the site covered with a Bandaid /bandage. Bandages are not necessary with a scalp biopsy  If you are unable to cover the site with a Bandaid /bandage, re-apply ointment 2-3 times a day to keep the site moist. Moisture will help with healing  Avoid strenuous activity for first 1-2 days  Avoid lakes, rivers, pools, and oceans until the stitches are removed or the site is healed    How do I clean my wound?  Wash hands thoroughly with soap or use hand  before all wound care  Clean the wound with gentle soap and water  Apply white petroleum/Vaseline  to wound after it is clean  Replace the Bandaid /bandage to keep the wound covered for the first few days or as instructed by your doctor  If you had a scalp biopsy, warm shower water to the area on a daily basis should suffice    What should I use to clean my wound?   Cotton-tipped applicators (Qtips )  White petroleum jelly (Vaseline ). Use a clean new container and use Q-tips to apply.  Bandaids   as needed  Gentle soap     How should I care for my wound long term?  Do not get your wound dirty  Keep up with wound care for one week or until the area is healed.  A small scab will form and fall off by itself when the area is completely healed. The area will be red and will become pink in color as it  heals. Sun protection is very important for how your scar will turn out. Sunscreen with an SPF 30 or greater is recommended once the area is healed.  If you have stitches, stitches need to be removed in 10 days. You may return to our clinic for this or you may have it done locally at your doctor s office.  You should have some soreness but it should be mild and slowly go away over several days. Talk to your doctor about using tylenol for pain,    When should I call my doctor?  If you have increased:   Pain or swelling  Pus or drainage (clear or slightly yellow drainage is ok)  Temperature over 100F  Spreading redness or warmth around wound    When will I hear about my results?  The biopsy results can take 2-3 weeks to come back. The clinic will call you with the results, send you a [employer] message, or have you schedule a follow-up clinic or phone time to discuss the results. Contact our clinics if you do not hear from us in 3 weeks.     Who should I call with questions?  [facility]: [phone]   [facility]: [phone]  For urgent needs outside of business hours call the [facility] at [phone] and ask for the dermatology resident on call      Proper skin care from [facility]:    -Eliminate harsh soaps as they strip the natural oils from the skin, often resulting in dry itchy skin ( i.e. Dial, Zest, [place])  -Use mild soaps such as Cetaphil or Dove Sensitive Skin in the shower. You do not need to use soap on arms, legs, and trunk every time you shower unless visibly soiled.   -Avoid hot or cold showers.  -After showering, lightly dry off and apply moisturizing within 2-3 minutes. This will help trap moisture in the skin.   -Aggressive use of a moisturizer at least 1-2 times a day to the entire body (including -Vanicream, Cetaphil, Aquaphor or Cerave) and moisturize hands after every washing.  -We recommend using moisturizers  that come in a tub that needs to be scooped out, not a pump. This has more of an oil base. It will hold moisture in your skin much better than a water base moisturizer. The above recommended are non-pore clogging.      Wear a sunscreen with at least SPF 30 on your face, ears, neck and V of the chest daily. Wear sunscreen on other areas of the body if those areas are exposed to the sun throughout the day. Sunscreens can contain physical and/or chemical blockers. Physical blockers are less likely to clog pores, these include zinc oxide and titanium dioxide. Reapply every two hour and after swimming.     Sunscreen examples: https://www.ewg.org/sunscreen/    UV radiation  UVA radiation remains constant throughout the day and throughout the year. It is a longer wavelength than UVB and therefore penetrates deeper into the skin leading to immediate and delayed tanning, photoaging, and skin cancer. 70-80% of UVA and UVB radiation occurs between the hours of 10am-2pm.  UVB radiation  UVB radiation causes the most harmful effects and is more significant during the summer months. However, snow and ice can reflect UVB radiation leading to skin damage during the winter months as well. UVB radiation is responsible for tanning, burning, inflammation, delayed erythema (pinkness), pigmentation (brown spots), and skin cancer.     I recommend self monthly full body exams and yearly full body exams with a dermatology provider. If you develop a new or changing lesion please follow up for examination. Most skin cancers are pink and scaly or pink and pearly. However, we do see blue/brown/black skin cancers.  Consider the ABCDEs of melanoma when giving yourself your monthly full body exam ( don't forget the groin, buttocks, feet, toes, etc). A-asymmetry, B-borders, C-color, D-diameter, E-elevation or evolving. If you see any of these changes please follow up in clinic. If you cannot see your back I recommend purchasing a hand held mirror  to use with a larger wall mirror.       Checking for Skin Cancer  You can find cancer early by checking your skin each month. There are 3 kinds of skin cancer. They are melanoma, basal cell carcinoma, and squamous cell carcinoma. Doing monthly skin checks is the best way to find new marks or skin changes. Follow the instructions below for checking your skin.   The ABCDEs of checking moles for melanoma   Check your moles or growths for signs of melanoma using ABCDE:   Asymmetry: the sides of the mole or growth don t match  Border: the edges are ragged, notched, or blurred  Color: the color within the mole or growth varies  Diameter: the mole or growth is larger than 6 mm (size of a pencil eraser)  Evolving: the size, shape, or color of the mole or growth is changing (evolving is not shown in the images below)    Checking for other types of skin cancer  Basal cell carcinoma or squamous cell carcinoma have symptoms such as:     A spot or mole that looks different from all other marks on your skin  Changes in how an area feels, such as itching, tenderness, or pain  Changes in the skin's surface, such as oozing, bleeding, or scaliness  A sore that does not heal  New swelling or redness beyond the border of a mole    Who s at risk?  Anyone can get skin cancer. But you are at greater risk if you have:   Fair skin, light-colored hair, or light-colored eyes  Many moles or abnormal moles on your skin  A history of sunburns from sunlight or tanning beds  A family history of skin cancer  A history of exposure to radiation or chemicals  A weakened immune system  If you have had skin cancer in the past, you are at risk for recurring skin cancer.   How to check your skin  Do your monthly skin checkups in front of a full-length mirror. Check all parts of your body, including your:   Head (ears, face, neck, and scalp)  Torso (front, back, and sides)  Arms (tops, undersides, upper, and lower armpits)  Hands (palms, backs, and  fingers, including under the nails)  Buttocks and genitals  Legs (front, back, and sides)  Feet (tops, soles, toes, including under the nails, and between toes)  If you have a lot of moles, take digital photos of them each month. Make sure to take photos both up close and from a distance. These can help you see if any moles change over time.   Most skin changes are not cancer. But if you see any changes in your skin, call your doctor right away. Only he or she can diagnose a problem. If you have skin cancer, seeing your doctor can be the first step toward getting the treatment that could save your life.   [employer] last reviewed this educational content on 4/1/2019 2000-2020 [employer]. [address] [place] [address], [place] [address]. All rights reserved. This information is not intended as a substitute for professional medical care. Always follow your healthcare professional's instructions.       When should I call my doctor?  If you are worsening or not improving, please, contact us or seek urgent care as noted below.     Who should I call with questions (adults)?    [facility] [phone]  For urgent needs outside of business hours call the [facility] at [phone] and ask for the dermatology resident on call to be paged  If this is a medical emergency and you are unable to reach an ER, Call [phone]      If you need a prescription refill, please contact your pharmacy. Refills are approved or denied by our Physicians during normal business hours, Monday through Fridays  Per office policy, refills will not be granted if you have not been seen within the past year (or sooner depending on your child's condition)

## 2025-01-20 DIAGNOSIS — E05.00 GRAVES DISEASE: ICD-10-CM

## 2025-01-21 RX ORDER — LEVOTHYROXINE SODIUM 112 UG/1
TABLET ORAL
Qty: 90 TABLET | Refills: 0 | Status: SHIPPED | OUTPATIENT
Start: 2025-01-21

## 2025-01-27 ENCOUNTER — ANCILLARY PROCEDURE (OUTPATIENT)
Dept: ULTRASOUND IMAGING | Facility: CLINIC | Age: 65
End: 2025-01-27
Attending: INTERNAL MEDICINE
Payer: COMMERCIAL

## 2025-01-27 ENCOUNTER — ANCILLARY PROCEDURE (OUTPATIENT)
Dept: CT IMAGING | Facility: CLINIC | Age: 65
End: 2025-01-27
Attending: INTERNAL MEDICINE
Payer: COMMERCIAL

## 2025-01-27 DIAGNOSIS — Z87.891 PERSONAL HISTORY OF TOBACCO USE, PRESENTING HAZARDS TO HEALTH: ICD-10-CM

## 2025-01-27 DIAGNOSIS — Z13.6 ENCOUNTER FOR ABDOMINAL AORTIC ANEURYSM (AAA) SCREENING: ICD-10-CM

## 2025-01-27 PROCEDURE — 76706 US ABDL AORTA SCREEN AAA: CPT | Mod: GC | Performed by: RADIOLOGY

## 2025-01-27 PROCEDURE — 71271 CT THORAX LUNG CANCER SCR C-: CPT | Mod: GC | Performed by: RADIOLOGY

## 2025-01-29 DIAGNOSIS — M54.16 LUMBAR RADICULOPATHY: Primary | ICD-10-CM

## 2025-01-29 RX ORDER — DIAZEPAM 5 MG/1
5-10 TABLET ORAL
OUTPATIENT
Start: 2025-01-29

## 2025-01-30 ENCOUNTER — TELEPHONE (OUTPATIENT)
Dept: ANESTHESIOLOGY | Facility: CLINIC | Age: 65
End: 2025-01-30
Payer: COMMERCIAL

## 2025-01-30 PROBLEM — M54.16 LUMBAR RADICULOPATHY: Status: ACTIVE | Noted: 2024-10-02

## 2025-01-30 NOTE — TELEPHONE ENCOUNTER
Called patient to schedule procedure with Dr. Matute    Date of Procedure: 2/13/25    Arrival time given: Yes: Arrival Time 11am       Procedure Location: [facility] Procedure [facility]     Verified Location with Patient:  Yes  Address provided to the patient     Pre-op H&P Required:  No: Local anesthesia        Post-Op/Follow Up Appt:  Not Indicated in Request      Informed patient they will need a  to drive them home:  Yes    Patients : Spouse      Patient is aware that pre-op RN from the procedure center will call 2-3 days prior to scheduled procedure to confirm arrival time and review any instructions:  Yes       Additional Comments: N/A        Raina Gonzalez MA on 1/30/2025 at 12:04 PM      P: [phone]*

## 2025-02-11 PROBLEM — C44.92 SQUAMOUS CELL SKIN CANCER: Status: ACTIVE | Noted: 2017-06-01

## 2025-02-11 PROBLEM — B20 NEUROPATHY DUE TO HIV (H): Status: ACTIVE | Noted: 2018-08-29

## 2025-02-11 PROBLEM — G63 NEUROPATHY DUE TO HIV (H): Status: ACTIVE | Noted: 2018-08-29

## 2025-02-11 PROBLEM — E05.00 GRAVES' DISEASE: Status: ACTIVE | Noted: 2018-08-29

## 2025-02-11 PROBLEM — I89.0 LYMPHEDEMA, NOT ELSEWHERE CLASSIFIED: Status: ACTIVE | Noted: 2018-04-10

## 2025-02-13 ENCOUNTER — ANCILLARY PROCEDURE (OUTPATIENT)
Dept: RADIOLOGY | Facility: AMBULATORY SURGERY CENTER | Age: 65
End: 2025-02-13
Attending: ANESTHESIOLOGY
Payer: COMMERCIAL

## 2025-02-13 DIAGNOSIS — M54.16 LUMBAR RADICULOPATHY: ICD-10-CM

## 2025-03-12 DIAGNOSIS — I10 ESSENTIAL HYPERTENSION, BENIGN: ICD-10-CM

## 2025-03-13 RX ORDER — AMLODIPINE BESYLATE 10 MG/1
10 TABLET ORAL DAILY
Qty: 90 TABLET | Refills: 0 | OUTPATIENT
Start: 2025-03-13

## 2025-03-13 RX ORDER — ATENOLOL 100 MG/1
100 TABLET ORAL DAILY
Qty: 90 TABLET | Refills: 0 | Status: SHIPPED | OUTPATIENT
Start: 2025-03-13

## 2025-03-13 RX ORDER — LISINOPRIL 20 MG/1
20 TABLET ORAL 2 TIMES DAILY
Qty: 180 TABLET | Refills: 2 | Status: SHIPPED | OUTPATIENT
Start: 2025-03-13

## 2025-03-20 PROBLEM — M54.16 LUMBAR RADICULOPATHY: Status: RESOLVED | Noted: 2024-10-02 | Resolved: 2025-03-20

## 2025-03-20 PROBLEM — M25.552 HIP PAIN, LEFT: Status: RESOLVED | Noted: 2021-03-16 | Resolved: 2025-03-20

## 2025-03-20 PROBLEM — M48.061 SPINAL STENOSIS OF LUMBAR REGION WITHOUT NEUROGENIC CLAUDICATION: Status: RESOLVED | Noted: 2024-10-02 | Resolved: 2025-03-20

## 2025-04-27 DIAGNOSIS — E05.00 GRAVES DISEASE: ICD-10-CM

## 2025-04-28 RX ORDER — LEVOTHYROXINE SODIUM 112 UG/1
112 TABLET ORAL
Qty: 90 TABLET | Refills: 0 | Status: SHIPPED | OUTPATIENT
Start: 2025-04-28

## 2025-04-28 NOTE — TELEPHONE ENCOUNTER
Prescription approved per [facility] Refill Protocol.  Corrine Gonzalez RN  [facility]

## 2025-05-15 ENCOUNTER — OFFICE VISIT (OUTPATIENT)
Dept: NEUROLOGY | Facility: CLINIC | Age: 65
End: 2025-05-15
Payer: COMMERCIAL

## 2025-05-15 VITALS — SYSTOLIC BLOOD PRESSURE: 124 MMHG | HEART RATE: 64 BPM | OXYGEN SATURATION: 97 % | DIASTOLIC BLOOD PRESSURE: 73 MMHG

## 2025-05-15 DIAGNOSIS — M54.16 LUMBAR RADICULOPATHY: Primary | ICD-10-CM

## 2025-05-15 DIAGNOSIS — M48.061 SPINAL STENOSIS OF LUMBAR REGION WITHOUT NEUROGENIC CLAUDICATION: ICD-10-CM

## 2025-05-15 DIAGNOSIS — G57.12 MERALGIA PARESTHETICA OF LEFT SIDE: ICD-10-CM

## 2025-05-15 DIAGNOSIS — G60.8 PERIPHERAL SENSORY-MOTOR AXONAL POLYNEUROPATHY: ICD-10-CM

## 2025-05-15 RX ORDER — DULOXETIN HYDROCHLORIDE 60 MG/1
60 CAPSULE, DELAYED RELEASE ORAL DAILY
Qty: 90 CAPSULE | Refills: 1 | Status: SHIPPED | OUTPATIENT
Start: 2025-05-15

## 2025-05-15 RX ORDER — GABAPENTIN 300 MG/1
600 CAPSULE ORAL 2 TIMES DAILY
Qty: 360 CAPSULE | Refills: 1 | Status: SHIPPED | OUTPATIENT
Start: 2025-05-15

## 2025-05-15 RX ORDER — AMITRIPTYLINE HYDROCHLORIDE 50 MG/1
50 TABLET ORAL AT BEDTIME
Qty: 90 TABLET | Refills: 1 | Status: SHIPPED | OUTPATIENT
Start: 2025-05-15

## 2025-05-15 NOTE — PATIENT INSTRUCTIONS
AFTER VISIT SUMMARY (AVS):    At today's visit we thoroughly discussed current symptoms, evaluation results, diagnosis, available treatment options, and the plan.    We decided to repeat epidural steroid injection in the same spine level.  The referral was placed.    I also refilled your prescriptions.    Next follow-up appointment is in the next 6 months or earlier if needed.    Please do not hesitate to call me with any questions or concerns.    Thanks.

## 2025-05-15 NOTE — PROGRESS NOTES
ESTABLISHED PATIENT NEUROLOGY NOTE    DATE OF VISIT: 5/15/2025  CLINIC LOCATION: [facility]  MRN: [id number]  PATIENT NAME: Nicolás Fermin  YOB: 1960    REASON FOR VISIT:   Chief Complaint   Patient presents with    NEUROPATHY     Still having constant numbness and tingling in both feet, mentioned back pain did mostly subside after previous injection in jan but has since returned     SUBJECTIVE:                                                      HISTORY OF PRESENT ILLNESS: Patient is here to follow up regarding lumbar radiculopathy/lumbar spinal stenosis, peripheral polyneuropathy, and left meralgia paresthetica.  The last visit was on 12/9/2024.  Please refer to my initial/other prior notes for further information.    Since the last visit, the patient reports that his neuropathy symptoms are stable, but back pain returned after injection.  For pain control, he is on combination of Cymbalta, gabapentin, and amitriptyline without significant side effects.  He denies new neurological symptoms.    He was seen in neurosurgery clinic in January 2025 and expressed desire to continue conservative management.  Was referred for repeat left L3-4 epidural steroid injection and encouraged to continue physical therapy.  Follow-up with Dr. Rodriguez was recommended if symptoms worsen.  Epidural steroid injection was done in February 2025 and was helpful for 3-4 weeks.  EXAM:                                                    Physical Exam:   Vitals: BP (!) 150/81   Pulse 64   SpO2 97%     General: pt is in NAD, cooperative.  Skin: normal turgor, moist mucous membranes, no lesions/rashes noticed.  HEENT: ATNC, white sclera, normal conjunctiva.  Respiratory: Symmetric lung excursion, no accessory respiratory muscle use.  Abdomen: Non distended.  Neurological: awake, cooperative, follows commands, no exam changes compared to previous visits.  ASSESSMENT AND PLAN:                                                     Assessment: 65 year old male patient presents for follow-up of lumbar radiculopathy/lumbar spinal stenosis, peripheral polyneuropathy, and left meralgia paresthetica.  He reports that symptoms are stable, but low back pain flared up again.    Regarding lumbar spinal stenosis/lumbar radiculopathy, he was seen neurosurgery and elected to continue conservative therapy.  He was advised to follow-up with Dr. Rodriguez if these measures are not effective.  Today, he reports that last epidural steroid injection worked, but now the effect wore off.  He is open to repeat it.  The new referral was placed.    For peripheral polyneuropathy and left-sided meralgia paresthetica, he is on combination of Cymbalta and gabapentin, and amitriptyline.  He feels that his symptoms are controlled.  I refilled his prescriptions.    Rainer to follow up with Primary Care provider regarding elevated blood pressure.    Diagnoses:    ICD-10-CM    1. Lumbar radiculopathy  M54.16       2. Spinal stenosis of lumbar region without neurogenic claudication  M48.061       3. 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 Meralgia paresthetica of left side  G57.12         Plan: At today's visit we thoroughly discussed current symptoms, evaluation results, diagnosis, available treatment options, and the plan.    We decided to repeat epidural steroid injection in the same spine level.  The referral was placed.    I also refilled his prescriptions.    Next follow-up appointment is in the next 6 months or earlier if needed.    Total Time: 17 minutes spent on the date of the encounter doing chart review, history and exam, documentation and further activities per the note.    Stone Nowak MD  [facility]  (Chart documentation was completed in part with Dragon voice-recognition software. Even though reviewed, some grammatical, spelling, and word errors may remain.)

## 2025-05-15 NOTE — LETTER
5/15/2025      Nicolás Fermin  [address]  [facility] [address]      Dear Colleague,    Thank you for referring your patient, Nicolás Fermin, to the [facility]. Please see a copy of my visit note below.    ESTABLISHED PATIENT NEUROLOGY NOTE    DATE OF VISIT: 5/15/2025  CLINIC LOCATION: [facility]  MRN: [id number]  PATIENT NAME: Nicolás Fermin  YOB: 1960    REASON FOR VISIT:   Chief Complaint   Patient presents with     NEUROPATHY     Still having constant numbness and tingling in both feet, mentioned back pain did mostly subside after previous injection in jan but has since returned     SUBJECTIVE:                                                      HISTORY OF PRESENT ILLNESS: Patient is here to follow up regarding lumbar radiculopathy/lumbar spinal stenosis, peripheral polyneuropathy, and left meralgia paresthetica.  The last visit was on 12/9/2024.  Please refer to my initial/other prior notes for further information.    Since the last visit, the patient reports that his neuropathy symptoms are stable, but back pain returned after injection.  For pain control, he is on combination of Cymbalta, gabapentin, and amitriptyline without significant side effects.  He denies new neurological symptoms.    He was seen in neurosurgery clinic in January 2025 and expressed desire to continue conservative management.  Was referred for repeat left L3-4 epidural steroid injection and encouraged to continue physical therapy.  Follow-up with Dr. Rodriguez was recommended if symptoms worsen.  Epidural steroid injection was done in February 2025 and was helpful for 3-4 weeks.  EXAM:                                                    Physical Exam:   Vitals: BP (!) 150/81   Pulse 64   SpO2 97%     General: pt is in NAD, cooperative.  Skin: normal turgor, moist mucous membranes, no lesions/rashes noticed.  HEENT: ATNC, white sclera, normal  conjunctiva.  Respiratory: Symmetric lung excursion, no accessory respiratory muscle use.  Abdomen: Non distended.  Neurological: awake, cooperative, follows commands, no exam changes compared to previous visits.  ASSESSMENT AND PLAN:                                                    Assessment: 65 year old male patient presents for follow-up of lumbar radiculopathy/lumbar spinal stenosis, peripheral polyneuropathy, and left meralgia paresthetica.  He reports that symptoms are stable, but low back pain flared up again.    Regarding lumbar spinal stenosis/lumbar radiculopathy, he was seen neurosurgery and elected to continue conservative therapy.  He was advised to follow-up with Dr. Rodriguez if these measures are not effective.  Today, he reports that last epidural steroid injection worked, but now the effect wore off.  He is open to repeat it.  The new referral was placed.    For peripheral polyneuropathy and left-sided meralgia paresthetica, he is on combination of Cymbalta and gabapentin, and amitriptyline.  He feels that his symptoms are controlled.  I refilled his prescriptions.    Rainer to follow up with Primary Care provider regarding elevated blood pressure.    Diagnoses:    ICD-10-CM    1. Lumbar radiculopathy  M54.16       2. Spinal stenosis of lumbar region without neurogenic claudication  M48.061       3. 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 Meralgia paresthetica of left side  G57.12         Plan: At today's visit we thoroughly discussed current symptoms, evaluation results, diagnosis, available treatment options, and the plan.    We decided to repeat epidural steroid injection in the same spine level.  The referral was placed.    I also refilled his prescriptions.    Next follow-up appointment is in the next 6 months or earlier if needed.    Total Time: 17 minutes spent on the date of the encounter doing chart review, history and exam, documentation and further activities per the  note.    Stone Nowak MD  [facility]  (Chart documentation was completed in part with Dragon voice-recognition software. Even though reviewed, some grammatical, spelling, and word errors may remain.)    Again, thank you for allowing me to participate in the care of your patient.        Sincerely,        Stone Nowak MD    Electronically signed

## 2025-06-14 DIAGNOSIS — I10 ESSENTIAL HYPERTENSION, BENIGN: ICD-10-CM

## 2025-06-16 RX ORDER — ATENOLOL 100 MG/1
100 TABLET ORAL DAILY
Qty: 90 TABLET | Refills: 1 | Status: SHIPPED | OUTPATIENT
Start: 2025-06-16

## 2025-07-01 NOTE — TELEPHONE ENCOUNTER
Called patient to schedule procedure with Dr. Jalloh    Date of Procedure: 10/28/24    Arrival time given: Yes: Arrival Time 10:30am       Procedure Location: [facility] and Surgery and Procedure Center [facility]     Verified Location with Patient:  Yes  Address provided to the patient     Pre-op H&P Required:  No: Local anesthesia        Post-Op/Follow Up Appt:  Yes:  follow up ~4wks  w/ neurology clinic Dr Blackburn        Informed patient they will need a  to drive them home:  Yes    Patients : Spouse     Patient is aware that pre-op RN from the procedure center will call 2-3 days prior to scheduled procedure to confirm arrival time and review any instructions:  Yes       Additional Comments: N/A        Raina Gonzalez MA on 9/27/2024 at 2:04 PM      P: [phone]*   
WFL

## 2025-07-10 NOTE — PATIENT INSTRUCTIONS
Wound Care After a Biopsy    What is a skin biopsy?  A skin biopsy allows the doctor to examine a very small piece of tissue under the microscope to determine the diagnosis and the best treatment for the skin condition. A local anesthetic (numbing medicine) is injected with a very small needle into the skin area to be tested. A small piece of skin is taken from the area. Sometimes a suture (stitch) is used.     What are the risks of a skin biopsy?  I will experience scar, bleeding, swelling, pain, crusting and redness. I may experience incomplete removal or recurrence. Risks of this procedure are excessive bleeding, bruising, infection, nerve damage, numbness, thick (hypertrophic or keloidal) scar and non-diagnostic biopsy.    How should I care for my wound for the first 24 hours?  Keep the wound dry and covered for 24 hours  If it bleeds, hold direct pressure on the area for 15 minutes. If bleeding does not stop, call us or go to the emergency room  Avoid strenuous exercise the first 1-2 days or as your doctor instructs you    How should I care for the wound after 24 hours?  After 24 hours, remove the bandage  You may bathe or shower as normal  If you had a scalp biopsy, you can shampoo as usual and can use shower water to clean the biopsy site daily  Clean the wound once a day with gentle soap and water  Do not scrub, be gentle  Apply white petroleum/Vaseline after cleaning the wound with a cotton swab or a clean finger, and keep the site covered with a Bandaid /bandage. Bandages are not necessary with a scalp biopsy  If you are unable to cover the site with a Bandaid /bandage, re-apply ointment 2-3 times a day to keep the site moist. Moisture will help with healing  Avoid strenuous activity for first 1-2 days  Avoid lakes, rivers, pools, and oceans until the stitches are removed or the site is healed    How do I clean my wound?  Wash hands thoroughly with soap or use hand  before all wound care  Clean  the wound with gentle soap and water  Apply white petroleum/Vaseline  to wound after it is clean  Replace the Bandaid /bandage to keep the wound covered for the first few days or as instructed by your doctor  If you had a scalp biopsy, warm shower water to the area on a daily basis should suffice    What should I use to clean my wound?   Cotton-tipped applicators (Qtips )  White petroleum jelly (Vaseline ). Use a clean new container and use Q-tips to apply.  Bandaids  as needed  Gentle soap     How should I care for my wound long term?  Do not get your wound dirty  Keep up with wound care for one week or until the area is healed.  A small scab will form and fall off by itself when the area is completely healed. The area will be red and will become pink in color as it heals. Sun protection is very important for how your scar will turn out. Sunscreen with an SPF 30 or greater is recommended once the area is healed.  You should have some soreness but it should be mild and slowly go away over several days. Talk to your doctor about using tylenol for pain,    When should I call my doctor?  If you have increased:   Pain or swelling  Pus or drainage (clear or slightly yellow drainage is ok)  Temperature over 100F  Spreading redness or warmth around wound    When will I hear about my results?  The biopsy results can take 2 weeks to come back.  Your results will automatically release to [facility] before your provider has even reviewed them.  The clinic will call you with the results, send you a [facility] message, or have you schedule a follow-up clinic or phone time to discuss the results.  Contact our clinics if you do not hear from us in 2 weeks.    Who should I call with questions?  [facility]: [phone]  [facility]: [phone]  For urgent needs outside of business hours call the [facility] at [phone] and ask for the dermatology resident on call      Patient Education       Proper skin care from [facility]:    -Eliminate harsh soaps as they strip the natural oils from the skin, often resulting in dry itchy skin ( i.e. Dial, Zest, [place])  -Use mild soaps such as Cetaphil or Dove Sensitive Skin in the shower. You do not need to use soap on arms, legs, and trunk every time you shower unless visibly soiled.   -Avoid hot or cold showers.  -After showering, lightly dry off and apply moisturizing within 2-3 minutes. This will help trap moisture in the skin.   -Aggressive use of a moisturizer at least 1-2 times a day to the entire body (including -Vanicream, Cetaphil, Aquaphor or Cerave) and moisturize hands after every washing.  -We recommend using moisturizers that come in a tub that needs to be scooped out, not a pump. This has more of an oil base. It will hold moisture in your skin much better than a water base moisturizer. The above recommended are non-pore clogging.      Wear a sunscreen with at least SPF 30 on your face, ears, neck and V of the chest daily. Wear sunscreen on other areas of the body if those areas are exposed to the sun throughout the day. Sunscreens can contain physical and/or chemical blockers. Physical blockers are less likely to clog pores, these include zinc oxide and titanium dioxide. Reapply every two hour and after swimming.     Sunscreen examples: https://www.ewg.org/sunscreen/    UV radiation  UVA radiation remains constant throughout the day and throughout the year. It is a longer wavelength than UVB and therefore penetrates deeper into the skin leading to immediate and delayed tanning, photoaging, and skin cancer. 70-80% of UVA and UVB radiation occurs between the hours of 10am-2pm.  UVB radiation  UVB radiation causes the most harmful effects and is more significant during the summer months. However, snow and ice can reflect UVB radiation leading to skin damage during the winter months as well. UVB radiation is  responsible for tanning, burning, inflammation, delayed erythema (pinkness), pigmentation (brown spots), and skin cancer.     I recommend self monthly full body exams and yearly full body exams with a dermatology provider. If you develop a new or changing lesion please follow up for examination. Most skin cancers are pink and scaly or pink and pearly. However, we do see blue/brown/black skin cancers.  Consider the ABCDEs of melanoma when giving yourself your monthly full body exam ( don't forget the groin, buttocks, feet, toes, etc). A-asymmetry, B-borders, C-color, D-diameter, E-elevation or evolving. If you see any of these changes please follow up in clinic. If you cannot see your back I recommend purchasing a hand held mirror to use with a larger wall mirror.       Checking for Skin Cancer  You can find cancer early by checking your skin each month. There are 3 kinds of skin cancer. They are melanoma, basal cell carcinoma, and squamous cell carcinoma. Doing monthly skin checks is the best way to find new marks or skin changes. Follow the instructions below for checking your skin.   The ABCDEs of checking moles for melanoma   Check your moles or growths for signs of melanoma using ABCDE:   Asymmetry: the sides of the mole or growth don t match  Border: the edges are ragged, notched, or blurred  Color: the color within the mole or growth varies  Diameter: the mole or growth is larger than 6 mm (size of a pencil eraser)  Evolving: the size, shape, or color of the mole or growth is changing (evolving is not shown in the images below)    Checking for other types of skin cancer  Basal cell carcinoma or squamous cell carcinoma have symptoms such as:     A spot or mole that looks different from all other marks on your skin  Changes in how an area feels, such as itching, tenderness, or pain  Changes in the skin's surface, such as oozing, bleeding, or scaliness  A sore that does not heal  New swelling or redness beyond  the border of a mole    Who s at risk?  Anyone can get skin cancer. But you are at greater risk if you have:   Fair skin, light-colored hair, or light-colored eyes  Many moles or abnormal moles on your skin  A history of sunburns from sunlight or tanning beds  A family history of skin cancer  A history of exposure to radiation or chemicals  A weakened immune system  If you have had skin cancer in the past, you are at risk for recurring skin cancer.   How to check your skin  Do your monthly skin checkups in front of a full-length mirror. Check all parts of your body, including your:   Head (ears, face, neck, and scalp)  Torso (front, back, and sides)  Arms (tops, undersides, upper, and lower armpits)  Hands (palms, backs, and fingers, including under the nails)  Buttocks and genitals  Legs (front, back, and sides)  Feet (tops, soles, toes, including under the nails, and between toes)  If you have a lot of moles, take digital photos of them each month. Make sure to take photos both up close and from a distance. These can help you see if any moles change over time.   Most skin changes are not cancer. But if you see any changes in your skin, call your doctor right away. Only he or she can diagnose a problem. If you have skin cancer, seeing your doctor can be the first step toward getting the treatment that could save your life.   [employer] last reviewed this educational content on 4/1/2019 2000-2020 [employer]. [address] [place] [address], [place] [address]. All rights reserved. This information is not intended as a substitute for professional medical care. Always follow your healthcare professional's instructions.

## 2025-07-10 NOTE — PROGRESS NOTES
[facility] Dermatology Notes  Encounter Date: Jul 14, 2025  Office Visit      Dermatology Problem List:  FBSE: 7/14/25  0. NUB, L lower medial leg, s/p shave bx 7/14/25, NMSC vs other  - Bacterial culture obtained 7/14/2025  # Hx of NMSC  - SCCIS, L deltoid S/p Excision 7/1/24  - SCC, R gillette, S/p Mohs on 2/1/24  - BCC, R mid back inferior - s/p ED&C 7/24/23  - SCCis, R distal lateral thigh s/p MMS 1/5/23 (Labeled in Vernell's note as R thigh inferior)   - SCCis, R gillette distal s/p MMS 8/25/2022  - SCCis, R medial thigh s/p exicision  8/17/2022  - SCCis, L ventral proximal leg s/p MMS 11/11/2021  - SCCis, R posterior thigh s/p MMS 12/5/2019  - BCC, L NSW s/p MMS 12/5/2019  2. History of DN  - Lentiginous junctional melanocytic nevus with mild to moderate atypia, Mid paraspinal back, s/p bx 1/13/25  - Compound dysplastic nevus with moderate atypia, left lateral mid back, s/p shave biopsy 8/27/2021  - Lentiginous junctional melanocytic nevus with mild atypia, right lateral arm s/p shave biopsy 6/11/2020   3. 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 Psoriasis vs dermatitis   - Previous tx: fluocinonide 0.05% cream  5. Stasis dermatitis  - Tx: Compression stockings, 30-40 mmHg.   6. Corn, x 2 L plantar foot - S/p cryotherapy.      Social: , [employer] [place]  ____________________________________________    Assessment & Plan:  # Neoplasm of unspecified behavior of the skin (D49.2) on the L lower medial leg. The differential diagnosis includes NMSC vs other. . Lesion present over 6 months and not healing. Purulent odor eminating from site.   - Shave biopsy performed today. See procedure section.  - Aerobic bacterial culture performed  today.     # Asteatotic dermatitis -chronic  - Reviewed emollients, has fluocinonide 0.05% cream at home.  Patient to use twice daily with flares    # Hx of DN  - Sunscreen: Apply 20 minutes prior to going outdoors and reapply every two hours, when wet or sweating. We recommend using an SPF 30 or higher, and to use one that is water resistant.     - Advised to monitor for changing, non-healing, bleeding, painful, changing, or otherwise symptomatic lesions  - Continue annual skin exams    #Abrasions of forearms following fall at his brother's house  - Appear to be healing well no evidence of infection  - Continue to moisturize regularly     # Hx of NMSC  - Signs and Symptoms of non-melanoma skin cancer and ABCDEs of melanoma reviewed with patient. Patient encouraged to perform monthly self skin exams and educated on how to perform them. UV precautions reviewed with patient. Patient was asked about new or changing moles/lesions on body.   - Sunscreen: Apply 20 minutes prior to going outdoors and reapply every two hours, when wet or sweating. We recommend using an SPF 30 or higher, and to use one that is water resistant.     - Advised to monitor for changing, non-healing, bleeding, painful, changing, or otherwise symptomatic lesions  - Continue bi annual skin exams     # Benign findings: multiple benign nevi, lentigines, cherry angiomas, SKs  - edu on benign etiology  - Signs and Symptoms of non-melanoma skin cancer and ABCDEs of melanoma reviewed with patient. Patient encouraged to perform monthly self skin exams and educated on how to perform them. UV precautions reviewed with patient. Patient was asked about new or changing moles/lesions on body.   - Sunscreen: Apply 20 minutes prior to going outdoors and reapply every two hours, when wet or sweating. We recommend using an SPF 30 or higher, and to use one that is water resistant.     - RTC for changes    Procedures Performed:   - Shave biopsy procedure note,  location(s): L medial lower leg. After discussion of benefits and risks including but not limited to bleeding, infection, scar, incomplete removal, recurrence, and non-diagnostic biopsy, written consent and photographs were obtained. The area was cleaned with isopropyl alcohol. 0.5mL of 1% lidocaine with epinephrine was injected to obtain adequate anesthesia of lesion(s). Shave biopsy at site(s) performed. Hemostasis was achieved with aluminium chloride. Petrolatum ointment and a sterile dressing were applied. The patient tolerated the procedure and no complications were noted. The patient was provided with verbal and written post care instructions.        Follow-up: 6 month(s) in-person pending path results or earlier for new or changing lesions    Staff and scribe:   Scribe Disclosure:   I, Vivian Pride, am serving as a scribe to document services personally performed by Sophia Degroot PA-C based on data collection and the provider's statements to me.     All risks, benefits and alternatives were discussed with patient.  Patient is in agreement and understands the assessment and plan.  All questions were answered.    Sophia Degroot PA-C, [facility] [place]  [facility] [place]: Phone: [phone], Fax: [phone]  [facility]: Phone: [phone],  Fax: [phone]  [facility]: Phone: [phone], Fax: [phone]  ____________________________________________    CC: Skin Check (6 month [facility])      Reviewed patients past medical history and pertinent chart review prior to patient's visit today.     HPI:  Mr. Nicolás Fermin is a 65 year old male who presents today as a return patient for skin check.    Today, patient reports he is here for skin check. Patient reports recent fall, resulting in abrasions on the forearm and back pain. Reports lesion on the L lower medial leg that has been present over 6 months but is not  healing.     Hx of NMSC and DN    Patient is otherwise feeling well, without additional concerns.    Labs:  Last Derm Path 1/13/25  Final Diagnosis   Mid perispinal back:  - Lentiginous junctional melanocytic nevus with mild to moderate atypia        Physical Exam:  Vitals: There were no vitals taken for this visit.  SKIN: Full skin, which includes the head/face, both arms, chest, back, abdomen,both legs, genitalia and/or groin buttocks, digits and/or nails, was examined.   - [place]'s skin type II, <100 nevi  - On the L lower medial leg, there is a 3x3cm pink scaly patch, non healing, purulent odor.   - There are dome shaped bright red papules on the trunk.   - Multiple regular brown pigmented macules and papules are identified on the trunk and extremities.   - Scattered brown macules on sun exposed areas.  - There are waxy stuck on tan to brown papules on the trunk.   - Healing abrasions on the forearm.   -There are well healed surgical scars without erythema, nodularity or telangiectasias on the site of prior NMSCs and DN.  - Dry flaky skin on the lower legs and forearms.   - No other lesions of concern on areas examined.           Medications:  Current Outpatient Medications   Medication Sig Dispense Refill    acetaminophen (TYLENOL) 325 MG tablet Take 325-650 mg by mouth every 6 hours as needed for mild pain.      amitriptyline (ELAVIL) 50 MG tablet Take 1 tablet (50 mg) by mouth at bedtime. 90 tablet 1    amLODIPine (NORVASC) 10 MG tablet Take 1 tablet (10 mg) by mouth daily. 90 tablet 2    aspirin - buffered (ASCRIPTIN) 325 MG TABS tablet Take 325 mg by mouth daily      atenolol (TENORMIN) 100 MG tablet Take 1 tablet (100 mg) by mouth daily 90 tablet 1    ciclopirox (LOPROX) 0.77 % cream Apply topically 2 times daily To feet and toenails. 90 g 5    DiphenhydrAMINE HCl (BENADRYL PO) Take by mouth as needed      Dolutegravir Sodium (TIVICAY PO) Take 50 mg by mouth daily       DULoxetine (CYMBALTA) 60 MG  capsule Take 1 capsule (60 mg) by mouth daily. 90 capsule 1    Emtricitabine-Tenofovir AF (DESCOVY PO) Take by mouth daily      fluocinonide (LIDEX) 0.05 % external cream Apply 1/4g to aa on thighs bid x 3 weeks. Do not use on face or body folds. Should last 30 days. 60 g 0    gabapentin (NEURONTIN) 300 MG capsule Take 2 capsules (600 mg) by mouth 2 times daily. 360 capsule 1    levothyroxine (SYNTHROID/LEVOTHROID) 112 MCG tablet TAKE 1 TABLET(112 MCG) BY MOUTH DAILY 90 tablet 0    lisinopril (ZESTRIL) 20 MG tablet Take 1 tablet (20 mg) by mouth 2 times daily 180 tablet 2    NAPROXEN PO       Omega-3 Fatty Acids (FISH OIL) 500 MG CAPS       psyllium (METAMUCIL) 58.6 % POWD Take by mouth daily       No current facility-administered medications for this visit.      Past Medical/Surgical History:   Patient Active Problem List   Diagnosis    Essential (primary) hypertension    Neuropathy due to HIV (H)    CARDIOVASCULAR SCREENING; LDL GOAL LESS THAN 130    Graves' disease    Neuropathy    Tobacco abuse disorder    Radicular pain of lumbosacral region    Human immunodeficiency virus (HIV) disease (H)    Hypothyroidism    Lymphedema, not elsewhere classified    Squamous cell skin cancer    Syphilis     Past Medical History:   Diagnosis Date    Asymptomatic human immunodeficiency virus (HIV) infection status (H)     Graves disease 2006    Hypertension     Peripheral neuropathy     Squamous cell carcinoma of skin, unspecified

## 2025-07-14 ENCOUNTER — OFFICE VISIT (OUTPATIENT)
Dept: DERMATOLOGY | Facility: CLINIC | Age: 65
End: 2025-07-14
Payer: COMMERCIAL

## 2025-07-14 DIAGNOSIS — L82.1 SEBORRHEIC KERATOSES: ICD-10-CM

## 2025-07-14 DIAGNOSIS — T14.8XXA ABRASION: ICD-10-CM

## 2025-07-14 DIAGNOSIS — Z12.83 SKIN CANCER SCREENING: ICD-10-CM

## 2025-07-14 DIAGNOSIS — D22.9 MULTIPLE BENIGN NEVI: Primary | ICD-10-CM

## 2025-07-14 DIAGNOSIS — D49.2 NEOPLASM OF UNSPECIFIED BEHAVIOR OF BONE, SOFT TISSUE, AND SKIN: ICD-10-CM

## 2025-07-14 DIAGNOSIS — L81.4 LENTIGINES: ICD-10-CM

## 2025-07-14 DIAGNOSIS — Z87.898 HISTORY OF ATYPICAL NEVUS: ICD-10-CM

## 2025-07-14 DIAGNOSIS — L30.8 ASTEATOTIC DERMATITIS: ICD-10-CM

## 2025-07-14 DIAGNOSIS — Z85.828 HISTORY OF NONMELANOMA SKIN CANCER: ICD-10-CM

## 2025-07-14 DIAGNOSIS — D18.01 CHERRY ANGIOMA: ICD-10-CM

## 2025-07-14 NOTE — LETTER
7/14/2025      Nicolás Fermin  [address] [facility] [place] [address]      Dear Colleague,    Thank you for referring your patient, Nicolás Fermin, to the [facility] [place]. Please see a copy of my visit note below.    [facility] Dermatology Notes  Encounter Date: Jul 14, 2025  Office Visit      Dermatology Problem List:  FBSE: 7/14/25  0. NUB, L lower medial leg, s/p shave bx 7/14/25, NMSC vs other  - Bacterial culture obtained 7/14/2025  # Hx of NMSC  - SCCIS, L deltoid S/p Excision 7/1/24  - SCC, R gillette, S/p Mohs on 2/1/24  - BCC, R mid back inferior - s/p ED&C 7/24/23  - SCCis, R distal lateral thigh s/p MMS 1/5/23 (Labeled in Vernell's note as R thigh inferior)   - SCCis, R gillette distal s/p MMS 8/25/2022  - SCCis, R medial thigh s/p exicision  8/17/2022  - SCCis, L ventral proximal leg s/p MMS 11/11/2021  - SCCis, R posterior thigh s/p MMS 12/5/2019  - BCC, L NSW s/p MMS 12/5/2019  2. History of DN  - Lentiginous junctional melanocytic nevus with mild to moderate atypia, Mid paraspinal back, s/p bx 1/13/25  - Compound dysplastic nevus with moderate atypia, left lateral mid back, s/p shave biopsy 8/27/2021  - Lentiginous junctional melanocytic nevus with mild atypia, right lateral arm s/p shave biopsy 6/11/2020   3. 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 Psoriasis vs dermatitis   - Previous tx: fluocinonide 0.05% cream  5. Stasis dermatitis  - Tx: Compression stockings, 30-40 mmHg.   6. Corn, x 2 L plantar foot - S/p cryotherapy.      Social: , [employer]- [employer]  ____________________________________________    Assessment & Plan:  # Neoplasm of unspecified behavior of the skin (D49.2) on the L lower  medial leg. The differential diagnosis includes NMSC vs other. . Lesion present over 6 months and not healing. Purulent odor eminating from site.   - Shave biopsy performed today. See procedure section.  - Aerobic bacterial culture performed today.     # Asteatotic dermatitis -chronic  - Reviewed emollients, has fluocinonide 0.05% cream at home.  Patient to use twice daily with flares    # Hx of DN  - Sunscreen: Apply 20 minutes prior to going outdoors and reapply every two hours, when wet or sweating. We recommend using an SPF 30 or higher, and to use one that is water resistant.     - Advised to monitor for changing, non-healing, bleeding, painful, changing, or otherwise symptomatic lesions  - Continue annual skin exams    #Abrasions of forearms following fall at his brother's house  - Appear to be healing well no evidence of infection  - Continue to moisturize regularly     # Hx of NMSC  - Signs and Symptoms of non-melanoma skin cancer and ABCDEs of melanoma reviewed with patient. Patient encouraged to perform monthly self skin exams and educated on how to perform them. UV precautions reviewed with patient. Patient was asked about new or changing moles/lesions on body.   - Sunscreen: Apply 20 minutes prior to going outdoors and reapply every two hours, when wet or sweating. We recommend using an SPF 30 or higher, and to use one that is water resistant.     - Advised to monitor for changing, non-healing, bleeding, painful, changing, or otherwise symptomatic lesions  - Continue bi annual skin exams     # Benign findings: multiple benign nevi, lentigines, cherry angiomas, SKs  - edu on benign etiology  - Signs and Symptoms of non-melanoma skin cancer and ABCDEs of melanoma reviewed with patient. Patient encouraged to perform monthly self skin exams and educated on how to perform them. UV precautions reviewed with patient. Patient was asked about new or changing moles/lesions on body.   - Sunscreen: Apply 20 minutes  prior to going outdoors and reapply every two hours, when wet or sweating. We recommend using an SPF 30 or higher, and to use one that is water resistant.     - RTC for changes    Procedures Performed:   - Shave biopsy procedure note, location(s): L medial lower leg. After discussion of benefits and risks including but not limited to bleeding, infection, scar, incomplete removal, recurrence, and non-diagnostic biopsy, written consent and photographs were obtained. The area was cleaned with isopropyl alcohol. 0.5mL of 1% lidocaine with epinephrine was injected to obtain adequate anesthesia of lesion(s). Shave biopsy at site(s) performed. Hemostasis was achieved with aluminium chloride. Petrolatum ointment and a sterile dressing were applied. The patient tolerated the procedure and no complications were noted. The patient was provided with verbal and written post care instructions.        Follow-up: 6 month(s) in-person pending path results or earlier for new or changing lesions    Staff and scribe:   Scribe Disclosure:   IVivian, am serving as a scribe to document services personally performed by Sophia Degroot PA-C based on data collection and the provider's statements to me.     All risks, benefits and alternatives were discussed with patient.  Patient is in agreement and understands the assessment and plan.  All questions were answered.    Sophia Degroot PA-C, MPAS  [facility]: Phone: [phone], Fax: [phone]  [facility]: Phone: [phone],  Fax: [phone]  [facility]: Phone: [phone], Fax: [phone]  ____________________________________________    CC: Skin Check (6 month FBSC)      Reviewed patients past medical history and pertinent chart review prior to patient's visit today.     HPI:  Mr. Nicolás Fermin is a 65 year old male who presents today as a return patient for  skin check.    Today, patient reports he is here for skin check. Patient reports recent fall, resulting in abrasions on the forearm and back pain. Reports lesion on the L lower medial leg that has been present over 6 months but is not healing.     Hx of NMSC and DN    Patient is otherwise feeling well, without additional concerns.    Labs:  Last Derm Path 1/13/25  Final Diagnosis   Mid perispinal back:  - Lentiginous junctional melanocytic nevus with mild to moderate atypia        Physical Exam:  Vitals: There were no vitals taken for this visit.  SKIN: Full skin, which includes the head/face, both arms, chest, back, abdomen,both legs, genitalia and/or groin buttocks, digits and/or nails, was examined.   - Oviedo's skin type II, <100 nevi  - On the L lower medial leg, there is a 3x3cm pink scaly patch, non healing, purulent odor.   - There are dome shaped bright red papules on the trunk.   - Multiple regular brown pigmented macules and papules are identified on the trunk and extremities.   - Scattered brown macules on sun exposed areas.  - There are waxy stuck on tan to brown papules on the trunk.   - Healing abrasions on the forearm.   -There are well healed surgical scars without erythema, nodularity or telangiectasias on the site of prior NMSCs and DN.  - Dry flaky skin on the lower legs and forearms.   - No other lesions of concern on areas examined.           Medications:  Current Outpatient Medications   Medication Sig Dispense Refill     acetaminophen (TYLENOL) 325 MG tablet Take 325-650 mg by mouth every 6 hours as needed for mild pain.       amitriptyline (ELAVIL) 50 MG tablet Take 1 tablet (50 mg) by mouth at bedtime. 90 tablet 1     amLODIPine (NORVASC) 10 MG tablet Take 1 tablet (10 mg) by mouth daily. 90 tablet 2     aspirin - buffered (ASCRIPTIN) 325 MG TABS tablet Take 325 mg by mouth daily       atenolol (TENORMIN) 100 MG tablet Take 1 tablet (100 mg) by mouth daily 90 tablet 1     ciclopirox  (LOPROX) 0.77 % cream Apply topically 2 times daily To feet and toenails. 90 g 5     DiphenhydrAMINE HCl (BENADRYL PO) Take by mouth as needed       Dolutegravir Sodium (TIVICAY PO) Take 50 mg by mouth daily        DULoxetine (CYMBALTA) 60 MG capsule Take 1 capsule (60 mg) by mouth daily. 90 capsule 1     Emtricitabine-Tenofovir AF (DESCOVY PO) Take by mouth daily       fluocinonide (LIDEX) 0.05 % external cream Apply 1/4g to aa on thighs bid x 3 weeks. Do not use on face or body folds. Should last 30 days. 60 g 0     gabapentin (NEURONTIN) 300 MG capsule Take 2 capsules (600 mg) by mouth 2 times daily. 360 capsule 1     levothyroxine (SYNTHROID/LEVOTHROID) 112 MCG tablet TAKE 1 TABLET(112 MCG) BY MOUTH DAILY 90 tablet 0     lisinopril (ZESTRIL) 20 MG tablet Take 1 tablet (20 mg) by mouth 2 times daily 180 tablet 2     NAPROXEN PO        Omega-3 Fatty Acids (FISH OIL) 500 MG CAPS        psyllium (METAMUCIL) 58.6 % POWD Take by mouth daily       No current facility-administered medications for this visit.      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)     CARDIOVASCULAR SCREENING; LDL GOAL LESS THAN 130     Graves' disease     Neuropathy     Tobacco abuse disorder     Radicular pain of lumbosacral region     Human immunodeficiency virus (HIV) disease (H)     Hypothyroidism     Lymphedema, not elsewhere classified     Squamous cell skin cancer     Syphilis     Past Medical History:   Diagnosis Date     Asymptomatic human immunodeficiency virus (HIV) infection status (H)      Graves disease 2006     Hypertension      Peripheral neuropathy      Squamous cell carcinoma of skin, unspecified                         Again, thank you for allowing me to participate in the care of your patient.        Sincerely,        Sophia Degroot PA-C    Electronically signed

## 2025-07-16 ENCOUNTER — RADIOLOGY INJECTION OFFICE VISIT (OUTPATIENT)
Dept: PALLIATIVE MEDICINE | Facility: CLINIC | Age: 65
End: 2025-07-16
Payer: COMMERCIAL

## 2025-07-16 VITALS
OXYGEN SATURATION: 98 % | RESPIRATION RATE: 16 BRPM | HEART RATE: 61 BPM | DIASTOLIC BLOOD PRESSURE: 83 MMHG | SYSTOLIC BLOOD PRESSURE: 157 MMHG

## 2025-07-16 DIAGNOSIS — M54.16 LUMBAR RADICULOPATHY: Primary | ICD-10-CM

## 2025-07-16 LAB
BACTERIA SKIN AEROBE CULT: ABNORMAL
GRAM STAIN RESULT: ABNORMAL
PATH REPORT.COMMENTS IMP SPEC: ABNORMAL
PATH REPORT.COMMENTS IMP SPEC: ABNORMAL
PATH REPORT.COMMENTS IMP SPEC: YES
PATH REPORT.FINAL DX SPEC: ABNORMAL
PATH REPORT.GROSS SPEC: ABNORMAL
PATH REPORT.MICROSCOPIC SPEC OTHER STN: ABNORMAL
PATH REPORT.RELEVANT HX SPEC: ABNORMAL

## 2025-07-16 PROCEDURE — 62323 NJX INTERLAMINAR LMBR/SAC: CPT | Performed by: ANESTHESIOLOGY

## 2025-07-16 RX ORDER — TRIAMCINOLONE ACETONIDE 40 MG/ML
40 INJECTION, SUSPENSION INTRA-ARTICULAR; INTRAMUSCULAR ONCE
Status: COMPLETED | OUTPATIENT
Start: 2025-07-16 | End: 2025-07-16

## 2025-07-16 RX ADMIN — TRIAMCINOLONE ACETONIDE 40 MG: 40 INJECTION, SUSPENSION INTRA-ARTICULAR; INTRAMUSCULAR at 11:23

## 2025-07-16 ASSESSMENT — PAIN SCALES - GENERAL
PAINLEVEL_OUTOF10: SEVERE PAIN (8)
PAINLEVEL_OUTOF10: SEVERE PAIN (7)

## 2025-07-16 NOTE — PROGRESS NOTES
[facility] - Procedure Note    Date of Visit: 7/16/2025    Procedure performed: Lumbar (left approach) L2-3 interlaminar epidural steroid injection  Diagnosis: Lumbar spondylosis; Lumbar radiculitis/radiculopathy  : Kavitha Campbell MD  Anesthesia: none    Indications: Nicolás Fermin is a 65 year old male who is seen at the request of Dr. Nowak for a lumbar epidural steroid injection. The patient describes low back pain with radiation to the left leg. The patient has been exhibiting symptoms consistent with lumbar intraspinal inflammation and radiculopathy. Symptoms have been persistent, disabling, and intermittently severe. The patient reports minimal improvement with conservative treatment, including PT and medications.    This is a repeat injection.  Previous L2-3 ILESI done by Dr. Matute on 2/13/2025 provided good pain relief for a period of 1-2 months.      MRI LUMBAR SPINE was done on 7/8/2024 which showed:   FINDINGS: Nomenclature is based on 5 lumbar type vertebral bodies. Satisfactory lumbar vertebral body height. Increased degree of degenerative retrolistheses in lumbar spine from 9/30/2021 with stepwise retrolistheses L1-2, L2-3, L3-4 and L4-5 slightly   increased from previous examination, most notably at L2-3 where there is 6.5 mm retrolisthesis present. Interspace narrowing throughout all lumbar levels. Modic type I endplate signal changes are noted about L1-2 and L2-3 interspace levels, which can   serve as independent sources of pain generation. No additional marrow or ligamentous edema. Lower lumbar spine facet joint spondylosis. Satisfactory sacral alignment. No presacral mass or fluid collections are noted. Nothing for sacral   insufficiency/stress fracture. Scattered benign vertebral body hemangiomas throughout the lumbar spine and sacrum as before. Leftward convexity lumbar curve apex L2. Normal distal spinal cord and cauda equina with conus medullaris  at L1. Nerve roots of   the cauda equina are satisfactory without nodularity or thickening. Redemonstration of prominence of epidural fat signal throughout the mid and lower lumbar levels, most prominent L5-S1 contributes to some effacement of the thecal sac. There is no   retroperitoneal solid mass or adenopathy. Normal caliber aorta. Symmetric psoas appearance. Satisfactory renal contours. No pelvic mass, adenopathy or free fluid.     T12-L1: Normal disc height and signal. No herniation. Normal facets. No spinal canal or neural foraminal stenosis.      L1-L2: Marked loss of disc height. Degenerative retrolisthesis with generalized disc bulge. There is some prominence of dorsal epidural fat. No effective spinal stenosis. No disc herniation. Uncovering of disc material inferiorly. Mild left foraminal   narrowing with no right foraminal narrowing. No significant change compared to prior MRI.     L2-L3: Marked loss of disc height. Low-grade retrolisthesis increased in degree from previous MR. Prominence of dorsal epidural fat. There is effective severe canal stenosis present at this level, as before, with stable moderate foraminal narrowing, left   more than right, and mild to moderate narrowing of the L3 lateral recesses, right more than left. Facet joints show minimal increased joint space fluid as before.      L3-L4: Moderate loss of disc height. Generalized disc bulge with degenerative retrolisthesis. Uncovering of disc material inferiorly with broad-based central and left paracentral disc protrusion. Prominence of epidural fat dorsally with effective severe   canal compromise. Mild right and moderate left L4 lateral recess stenosis. Moderate to severe foraminal narrowing, left more than right, as before, with stable mild degenerative facet joint arthropathy.     L4-L5: Mild loss of disc height with generalized disc bulge. Superimposed broad-based central disc protrusion. Prominence of dorsal epidural fat. Mild to  moderate canal narrowing. Moderate narrowing left L5 lateral recess. Moderate foraminal stenosis,   left more than right, with stable mild facet joint arthropathy bilaterally.     L5-S1: Moderate loss of disc height. Generalized disc bulge. No disc herniation. Prominence of dorsal epidural fat less pronounced than previous MR with diminished effacement of the thecal sac compared to previous MR examination. There is no effective   spinal canal narrowing at this level. Mild left foraminal narrowing without right foraminal narrowing. Mild to moderate facet joint arthropathy.                                                                    IMPRESSION:  1.  Multilevel degenerative changes in the lumbar spine, as above, most marked upper and mid lumbar levels have slightly progressed from 9/30/2021 including progressive loss of disc height and progressive low-grade degenerative retrolistheses, most   prominent L2-3. Modic type I endplate signal changes are noted about L1-2 and L2-3 which can serve as independent sources of pain generation. No compression fractures with satisfactory lumbar vertebral body height. Leftward lumbar curve.     2.  Prominence of epidural fat redemonstrated particularly at mid and lower lumbar levels with diminished effacement of the thecal sac relative to the 9/30/2021 examination at the L4-5 and L5-S1 levels.     3.  There are persistent areas of spinal canal compromise, neural foraminal stenosis and/or lateral recess compromise listed above, some moderate to severe in degree. The degree of spinal stenosis remains severe, as before, at L2-3 and L3-4.    Allergies:    No Known Allergies     Vitals:  BP (!) 159/82   Pulse 62   SpO2 99%     Review of Systems: The patient denies recent fever, chills, illness, use of antibiotics or anticoagulants. All other 10-point review of systems negative.     Procedure: The procedure and risks were explained, and informed written consent was obtained from  the patient. Risks include but are not limited to: infection, bleeding, increased pain, and damage to soft tissue, nerve, muscle, and vasculature structures. After getting informed consent, patient was brought into the procedure suite and was placed in a prone position on the procedure table. A Pause for the Cause was performed. Patient was prepped and draped in sterile fashion.     The L2-3 interspace was identified with use of fluoroscopy in AP view. A 25-gauge, 1.5 inch needle was used to anesthetize the skin and subcutaneous tissue entry site with a total of 2 ml of 1% lidocaine. Under fluoroscopic visualization, a 20-gauge, 3.5 inch Tuohy epidural needle was slowly advanced towards the epidural space a few millimeters left of midline. The latter part of the needle advancement was guided with fluoroscopy in the lateral view. The epidural space was identified using loss of resistance technique. After negative aspiration for heme and cerebrospinal fluid, a total of 1 mL of non-ionic contrast was injected to confirm needle placement with 0 mL of contrast wasted. Epidurogram confirmed spread within the posterior epidural space. 1 ml of 40mg/ml of triamcinolone, 2 ml of 1% lidocaine, and 1 ml of preservative free saline was injected. The needle was removed.  Images were saved to PACS.    The patient tolerated the procedure well, and there was no evidence of procedural complications. No new sensory or motor deficits were noted following the procedure. The patient was stable and able to ambulate on discharge home. Post-procedure instructions were provided.     Pre-procedure pain score: 8/10 in the back, 8/10 in the leg  Post-procedure pain score: 7/10 in the back, 7/10 in the leg    Assessment/Plan: Nicolás Fermin is a 65 year old male s/p lumbar interlaminar epidural steroid injection today for lumbar spondylosis and radiculitis/radiculopathy.     1. Following today's procedure, the patient was advised to contact  the Pain Management Center for any of the following:   Fever, chills, or night sweats   New onset of pain, numbness, or weakness   Any questions/concerns regarding the procedure  If unable to contact the Pain Center, the patient was instructed to go to a local Emergency Room for any complications.   2. Follow-up with the referring provider in 2 weeks for post-procedure evaluation.    MORRO CLEMONS MD   Pain Management

## 2025-07-16 NOTE — NURSING NOTE
Pre-procedure Intake  If YES to any questions or NO to having a   Please complete laminated checklist and leave on the computer keyboard for Provider, verbally inform provider if able.    For SCS Trial, RFA's or any sedation procedure:  Have you been fasting? NA  If yes, for how long?     Are you taking any any blood thinners such as Coumadin, Warfarin, Jantoven, Pradaxa Xarelto, Eliquis, Edoxaban, Enoxaparin, Lovenox, Heparin, Arixtra, Fondaparinux, or Fragmin? OR Antiplatelet medication such as Plavix, Brilinta, or Effient?   No   If yes, when did you take your last dose?     Do you take aspirin?  No  If cervical procedure, have you held aspirin for 6 days?   NA    Is the Pt taking any GLP-1 Antagonist (hold needed for sedation patients only)  (semaglutide (Ozempic, Wegovy), dulaglutide (Trulicity), exenatide ER (Bydureon), tirzepatide (Mounjaro), Liraglutide (Saxenda, Victoza), semaglutide (Rybelsus)     NA  If yes, when did you take your last dose?     Do you have any allergies to contrast dye, iodine, steroid and/or numbing medications?  NO    Are you currently taking antibiotics or have an active infection?  NO    Have you had a fever/elevated temperature within the past week? NO    Are you currently taking oral steroids? NO    Do you have a ? Yes    Are you pregnant or breastfeeding?  Not Applicable    Have you received any vaccinations in the last week? NO    Vitals: B/P 159/82    Notify provider and RNs if systolic BP >170, diastolic BP >100, P >100 or O2 sats < 90%      Gaby Loaiza MA  [facility] [place]

## 2025-07-16 NOTE — PATIENT INSTRUCTIONS
[facility]   Procedure Discharge Instructions    Today you saw:    Dr. Kavitha Campbell,       You had an:  Lumbar Epidural steroid injection        If you were holding your blood thinning medication, please restart taking it: N/A  Be cautious when walking. Numbness and/or weakness in the lower extremities may occur for up to 6-8 hours after the procedure due to effect of the local anesthetic  Do not drive for 6 hours. The effect of the local anesthetic could slow your reflexes.   You may resume your regular activities after 24 hours  Avoid strenuous activity for the first 24 hours  You may shower, however avoid swimming, tub baths or hot tubs for 24 hours following your procedure  You may have a mild to moderate increase in pain for several days following the injection.  It may take up to 14 days for the steroid medication to start working although you may feel the effect as early as a few days after the procedure.     You may use ice packs for 10-15 minutes, 3 to 4 times a day at the injection site for comfort  Do not use heat to painful areas for 6 to 8 hours. This will give the local anesthetic time to wear off and prevent you from accidentally burning your skin.   Unless you have been directed to avoid the use of anti-inflammatory medications (NSAIDS), you may use medications such as ibuprofen, Aleve or Tylenol for pain control if needed.   Possible side effects of steroids that you may experience include flushing, elevated blood pressure, increased appetite, mild headaches and restlessness.  All of these symptoms will get better with time.  If you experience any of the following, call the Pain Clinic during work hours (Mon-Friday 8-4:30 pm) at [phone] or the Provider Line after hours at [phone]:  -Fever over 100 degree F  -Swelling, bleeding, redness, drainage, warmth at the injection site  -Progressive weakness or numbness in your legs   -Loss of bowel or bladder  function  -Unusual headache that is not relieved by Tylenol or other pain reliever  -Unusual new onset of pain that is not improving

## 2025-07-16 NOTE — NURSING NOTE
Discharge Information    IV Discontiued Time:  NA    Amount of Fluid Infused:  NA    Discharge Criteria = When patient returns to baseline or as per MD order    Consciousness:  Pt is fully awake    Circulation:  BP +/- 20% of pre-procedure level    Respiration:  Patient is able to breathe deeply    O2 Sat:  Patient is able to maintain O2 Sat >92% on room air    Activity:  Moves 4 extremities on command    Ambulation:  Patient is able to stand and walk or stand and pivot into wheelchair    Dressing:  Clean/dry or No Dressing    Notes:   Discharge instructions and AVS given to patient    Patient meets criteria for discharge?  YES    Admitted to PCU?  No    Responsible adult present to accompany patient home?  Yes    Signature/Title:    See Negron RN  RN Care Coordinator  [facility] [place]

## 2025-07-17 ENCOUNTER — MYC MEDICAL ADVICE (OUTPATIENT)
Dept: INTERNAL MEDICINE | Facility: CLINIC | Age: 65
End: 2025-07-17
Payer: COMMERCIAL

## 2025-07-18 ENCOUNTER — RESULTS FOLLOW-UP (OUTPATIENT)
Dept: DERMATOLOGY | Facility: CLINIC | Age: 65
End: 2025-07-18
Payer: COMMERCIAL

## 2025-07-18 DIAGNOSIS — B95.8 STAPH SKIN INFECTION: Primary | ICD-10-CM

## 2025-07-18 DIAGNOSIS — L08.9 STAPH SKIN INFECTION: Primary | ICD-10-CM

## 2025-07-18 DIAGNOSIS — C44.719 BASAL CELL CARCINOMA (BCC) OF LEFT LOWER LEG: ICD-10-CM

## 2025-07-18 RX ORDER — CLINDAMYCIN PHOSPHATE 10 MG/G
GEL TOPICAL 2 TIMES DAILY
Qty: 60 G | Refills: 0 | Status: ON HOLD | OUTPATIENT
Start: 2025-07-18

## 2025-07-21 NOTE — TELEPHONE ENCOUNTER
S/w pt and went over Sophia's message below about biopsy results.  Explained and answered all questions about mohs procedure.  Scheduled with Dr. Matt on Thursday August 7th at 9 am at .      Mohs letter and information sent via my chart and mailed home.    Rose Mary LANCASTER RN  [facility]  [phone]

## 2025-07-22 ENCOUNTER — NURSE TRIAGE (OUTPATIENT)
Dept: INTERNAL MEDICINE | Facility: CLINIC | Age: 65
End: 2025-07-22
Payer: COMMERCIAL

## 2025-07-22 NOTE — TELEPHONE ENCOUNTER
"S-(situation): Patient unable to sleep. For two nights.     B-(background): He is concerned that he is going to have sleep issues.     A-(assessment): Two cups of coffee in am , no there caffeine products. He does not think he is anxious.     R-(recommendations): Patient wanted a video call to discuss the sleep and other concerns.      Additional Information   Negative: Difficulty breathing   Negative: Depression is suspected   Negative: Traumatic Brain Injury (TBI) is suspected   Negative: Suspected alcohol withdrawal or unhealthy use of alcohol (drinking too much)   Negative: Suspected substance use (drug use), addiction, or withdrawal   Negative: [1] Pain is causing insomnia AND [2] pain is not a chronic symptom (recurrent or ongoing AND present > 4 weeks)   Negative: [1] Insomnia persists > 1 week AND [2] no improvement after using Care Advice   Negative: Insomnia interferes with work or school   Negative: [1] Pain is causing insomnia AND [2] pain is a chronic symptom (recurrent or ongoing AND present > 4 weeks)   Negative: Requesting medication for sleep (\"sleeping pill\")   Negative: Awakened  by jerking leg movements   Negative: [1] Restless legs or unpleasant feeling in legs AND [2] causes insomnia   Negative: Loud snoring is an ongoing problem  (> 2 weeks)   Negative: Excessive daytime sleepiness is an ongoing problem  (> 2 weeks)   Negative: Insomnia is an ongoing problem (> 2 weeks)   Negative: Difficulty falling to sleep or staying asleep   Negative: Jet lag, questions about   Negative: Over-the-counter sleeping pills, questions about   Negative: Melatonin, questions about    Answer Assessment - Initial Assessment Questions  1. DESCRIPTION: \"Tell me about your sleeping problem.\"       Difficulty sleeping.  2. ONSET: \"How long have you been having trouble sleeping?\" (e.g., days, weeks, months)      He is going back to work .   3. RECURRENT: \"Have you had sleeping problems before?\"  If Yes, ask: \"What " "happened that time?\" \"What helped your sleeping problem go away in the past?\"       Yes  4. STRESS: \"Is there anything in your life that is making you feel stressed or tense?\"      No  5. PAIN: \"Do you have any pain that is keeping you awake?\" (e.g., back pain, headache, abdomen pain)      No  6. CAFFEINE ABUSE: \"Do you drink caffeinated beverages, and how much each day?\" (e.g., coffee, tea, hill)      Yes  7. ALCOHOL USE OR SUBSTANCE USE (DRUG USE): \"Do you drink alcohol or use any illegal drugs?\"      No  8. OTHER SYMPTOMS: \"Do you have any other symptoms?\"  (e.g., difficulty breathing)      No    Protocols used: Insomnia-ASAD-JOS Morris RN  -[facility]     "

## 2025-07-23 ENCOUNTER — OFFICE VISIT (OUTPATIENT)
Dept: URGENT CARE | Facility: URGENT CARE | Age: 65
End: 2025-07-23
Payer: COMMERCIAL

## 2025-07-23 ENCOUNTER — HOSPITAL ENCOUNTER (INPATIENT)
Facility: CLINIC | Age: 65
End: 2025-07-23
Attending: PHYSICIAN ASSISTANT | Admitting: INTERNAL MEDICINE
Payer: COMMERCIAL

## 2025-07-23 ENCOUNTER — APPOINTMENT (OUTPATIENT)
Dept: CT IMAGING | Facility: CLINIC | Age: 65
DRG: 397 | End: 2025-07-23
Attending: PHYSICIAN ASSISTANT
Payer: COMMERCIAL

## 2025-07-23 VITALS
DIASTOLIC BLOOD PRESSURE: 79 MMHG | SYSTOLIC BLOOD PRESSURE: 154 MMHG | WEIGHT: 225 LBS | BODY MASS INDEX: 28.89 KG/M2 | OXYGEN SATURATION: 97 % | HEART RATE: 61 BPM | RESPIRATION RATE: 16 BRPM | TEMPERATURE: 96.9 F

## 2025-07-23 DIAGNOSIS — K38.0: ICD-10-CM

## 2025-07-23 DIAGNOSIS — K35.32 ACUTE PERFORATED APPENDICITIS: ICD-10-CM

## 2025-07-23 DIAGNOSIS — B20 HUMAN IMMUNODEFICIENCY VIRUS (HIV) DISEASE (H): ICD-10-CM

## 2025-07-23 DIAGNOSIS — B37.89 CANDIDA RASH OF GROIN: ICD-10-CM

## 2025-07-23 DIAGNOSIS — Z72.0 TOBACCO ABUSE DISORDER: ICD-10-CM

## 2025-07-23 DIAGNOSIS — K35.32 PERFORATED APPENDIX: ICD-10-CM

## 2025-07-23 DIAGNOSIS — I10 ESSENTIAL (PRIMARY) HYPERTENSION: ICD-10-CM

## 2025-07-23 DIAGNOSIS — R10.31 ABDOMINAL PAIN, RIGHT LOWER QUADRANT: Primary | ICD-10-CM

## 2025-07-23 DIAGNOSIS — R93.5 ABNORMAL CT OF THE ABDOMEN: Primary | ICD-10-CM

## 2025-07-23 DIAGNOSIS — K52.9 ENTERITIS: ICD-10-CM

## 2025-07-23 LAB
ALBUMIN SERPL BCG-MCNC: 4 G/DL (ref 3.5–5.2)
ALBUMIN UR-MCNC: NEGATIVE MG/DL
ALP SERPL-CCNC: 144 U/L (ref 40–150)
ALT SERPL W P-5'-P-CCNC: 11 U/L (ref 0–70)
ANION GAP SERPL CALCULATED.3IONS-SCNC: 13 MMOL/L (ref 7–15)
APPEARANCE UR: CLEAR
AST SERPL W P-5'-P-CCNC: 15 U/L (ref 0–45)
BASOPHILS # BLD AUTO: 0 10E3/UL (ref 0–0.2)
BASOPHILS NFR BLD AUTO: 0 %
BILIRUB SERPL-MCNC: 1 MG/DL
BILIRUB UR QL STRIP: NEGATIVE
BUN SERPL-MCNC: 22.4 MG/DL (ref 8–23)
CALCIUM SERPL-MCNC: 9.6 MG/DL (ref 8.8–10.4)
CHLORIDE SERPL-SCNC: 93 MMOL/L (ref 98–107)
COLOR UR AUTO: NORMAL
CREAT BLD-MCNC: 1.4 MG/DL (ref 0.7–1.2)
CREAT SERPL-MCNC: 1.29 MG/DL (ref 0.67–1.17)
EGFRCR SERPLBLD CKD-EPI 2021: 55 ML/MIN/1.73M2
EGFRCR SERPLBLD CKD-EPI 2021: 61 ML/MIN/1.73M2
EOSINOPHIL # BLD AUTO: 0.1 10E3/UL (ref 0–0.7)
EOSINOPHIL NFR BLD AUTO: 1 %
ERYTHROCYTE [DISTWIDTH] IN BLOOD BY AUTOMATED COUNT: 13.2 % (ref 10–15)
GLUCOSE SERPL-MCNC: 80 MG/DL (ref 70–99)
GLUCOSE UR STRIP-MCNC: NEGATIVE MG/DL
HCO3 SERPL-SCNC: 24 MMOL/L (ref 22–29)
HCT VFR BLD AUTO: 42.5 % (ref 40–53)
HGB BLD-MCNC: 14.9 G/DL (ref 13.3–17.7)
HGB UR QL STRIP: NEGATIVE
HOLD SPECIMEN: NORMAL
IMM GRANULOCYTES # BLD: 0.1 10E3/UL
IMM GRANULOCYTES NFR BLD: 0 %
KETONES UR STRIP-MCNC: NEGATIVE MG/DL
LEUKOCYTE ESTERASE UR QL STRIP: NEGATIVE
LIPASE SERPL-CCNC: 14 U/L (ref 13–60)
LYMPHOCYTES # BLD AUTO: 1.9 10E3/UL (ref 0.8–5.3)
LYMPHOCYTES NFR BLD AUTO: 11 %
MCH RBC QN AUTO: 33.5 PG (ref 26.5–33)
MCHC RBC AUTO-ENTMCNC: 35.1 G/DL (ref 31.5–36.5)
MCV RBC AUTO: 96 FL (ref 78–100)
MONOCYTES # BLD AUTO: 1 10E3/UL (ref 0–1.3)
MONOCYTES NFR BLD AUTO: 6 %
NEUTROPHILS # BLD AUTO: 13.6 10E3/UL (ref 1.6–8.3)
NEUTROPHILS NFR BLD AUTO: 82 %
NITRATE UR QL: NEGATIVE
NRBC # BLD AUTO: 0 10E3/UL
NRBC BLD AUTO-RTO: 0 /100
PH UR STRIP: 6.5 [PH] (ref 5–7)
PLATELET # BLD AUTO: 278 10E3/UL (ref 150–450)
POTASSIUM SERPL-SCNC: 4.4 MMOL/L (ref 3.4–5.3)
PROT SERPL-MCNC: 7.6 G/DL (ref 6.4–8.3)
RBC # BLD AUTO: 4.45 10E6/UL (ref 4.4–5.9)
SODIUM SERPL-SCNC: 130 MMOL/L (ref 135–145)
SP GR UR STRIP: 1.03 (ref 1–1.03)
UROBILINOGEN UR STRIP-MCNC: NORMAL MG/DL
WBC # BLD AUTO: 16.7 10E3/UL (ref 4–11)

## 2025-07-23 PROCEDURE — 36415 COLL VENOUS BLD VENIPUNCTURE: CPT | Performed by: EMERGENCY MEDICINE

## 2025-07-23 PROCEDURE — 96375 TX/PRO/DX INJ NEW DRUG ADDON: CPT

## 2025-07-23 PROCEDURE — 80053 COMPREHEN METABOLIC PANEL: CPT | Performed by: STUDENT IN AN ORGANIZED HEALTH CARE EDUCATION/TRAINING PROGRAM

## 2025-07-23 PROCEDURE — 74177 CT ABD & PELVIS W/CONTRAST: CPT

## 2025-07-23 PROCEDURE — 258N000003 HC RX IP 258 OP 636: Performed by: PHYSICIAN ASSISTANT

## 2025-07-23 PROCEDURE — 36415 COLL VENOUS BLD VENIPUNCTURE: CPT | Performed by: STUDENT IN AN ORGANIZED HEALTH CARE EDUCATION/TRAINING PROGRAM

## 2025-07-23 PROCEDURE — 250N000009 HC RX 250: Performed by: PHYSICIAN ASSISTANT

## 2025-07-23 PROCEDURE — 96361 HYDRATE IV INFUSION ADD-ON: CPT

## 2025-07-23 PROCEDURE — 96374 THER/PROPH/DIAG INJ IV PUSH: CPT | Mod: 59

## 2025-07-23 PROCEDURE — 120N000001 HC R&B MED SURG/OB

## 2025-07-23 PROCEDURE — 96376 TX/PRO/DX INJ SAME DRUG ADON: CPT

## 2025-07-23 PROCEDURE — 81003 URINALYSIS AUTO W/O SCOPE: CPT | Performed by: PHYSICIAN ASSISTANT

## 2025-07-23 PROCEDURE — 86140 C-REACTIVE PROTEIN: CPT | Performed by: INTERNAL MEDICINE

## 2025-07-23 PROCEDURE — 85018 HEMOGLOBIN: CPT | Performed by: STUDENT IN AN ORGANIZED HEALTH CARE EDUCATION/TRAINING PROGRAM

## 2025-07-23 PROCEDURE — 99223 1ST HOSP IP/OBS HIGH 75: CPT | Mod: AI | Performed by: INTERNAL MEDICINE

## 2025-07-23 PROCEDURE — 250N000011 HC RX IP 250 OP 636: Performed by: PHYSICIAN ASSISTANT

## 2025-07-23 PROCEDURE — 84295 ASSAY OF SERUM SODIUM: CPT | Performed by: STUDENT IN AN ORGANIZED HEALTH CARE EDUCATION/TRAINING PROGRAM

## 2025-07-23 PROCEDURE — 99285 EMERGENCY DEPT VISIT HI MDM: CPT | Mod: 25

## 2025-07-23 PROCEDURE — 83690 ASSAY OF LIPASE: CPT | Performed by: STUDENT IN AN ORGANIZED HEALTH CARE EDUCATION/TRAINING PROGRAM

## 2025-07-23 PROCEDURE — 85004 AUTOMATED DIFF WBC COUNT: CPT | Performed by: STUDENT IN AN ORGANIZED HEALTH CARE EDUCATION/TRAINING PROGRAM

## 2025-07-23 PROCEDURE — 82565 ASSAY OF CREATININE: CPT

## 2025-07-23 RX ORDER — HYDROMORPHONE HYDROCHLORIDE 1 MG/ML
0.5 INJECTION, SOLUTION INTRAMUSCULAR; INTRAVENOUS; SUBCUTANEOUS ONCE
Refills: 0 | Status: COMPLETED | OUTPATIENT
Start: 2025-07-23 | End: 2025-07-23

## 2025-07-23 RX ORDER — ONDANSETRON 2 MG/ML
4 INJECTION INTRAMUSCULAR; INTRAVENOUS ONCE
Status: COMPLETED | OUTPATIENT
Start: 2025-07-23 | End: 2025-07-23

## 2025-07-23 RX ORDER — IOPAMIDOL 755 MG/ML
113 INJECTION, SOLUTION INTRAVASCULAR ONCE
Status: COMPLETED | OUTPATIENT
Start: 2025-07-23 | End: 2025-07-23

## 2025-07-23 RX ORDER — PIPERACILLIN SODIUM, TAZOBACTAM SODIUM 4; .5 G/20ML; G/20ML
4.5 INJECTION, POWDER, LYOPHILIZED, FOR SOLUTION INTRAVENOUS ONCE
Status: COMPLETED | OUTPATIENT
Start: 2025-07-23 | End: 2025-07-23

## 2025-07-23 RX ORDER — NAPROXEN 250 MG/1
250 TABLET ORAL 2 TIMES DAILY PRN
Status: ON HOLD | COMMUNITY

## 2025-07-23 RX ADMIN — HYDROMORPHONE HYDROCHLORIDE 0.5 MG: 1 INJECTION, SOLUTION INTRAMUSCULAR; INTRAVENOUS; SUBCUTANEOUS at 18:41

## 2025-07-23 RX ADMIN — ONDANSETRON 4 MG: 2 INJECTION, SOLUTION INTRAMUSCULAR; INTRAVENOUS at 18:41

## 2025-07-23 RX ADMIN — IOPAMIDOL 113 ML: 755 INJECTION, SOLUTION INTRAVENOUS at 20:28

## 2025-07-23 RX ADMIN — SODIUM CHLORIDE 72 ML: 9 INJECTION, SOLUTION INTRAVENOUS at 20:28

## 2025-07-23 RX ADMIN — HYDROMORPHONE HYDROCHLORIDE 0.5 MG: 1 INJECTION, SOLUTION INTRAMUSCULAR; INTRAVENOUS; SUBCUTANEOUS at 21:57

## 2025-07-23 RX ADMIN — SODIUM CHLORIDE 1000 ML: 0.9 INJECTION, SOLUTION INTRAVENOUS at 18:34

## 2025-07-23 RX ADMIN — PIPERACILLIN AND TAZOBACTAM 4.5 G: 4; .5 INJECTION, POWDER, FOR SOLUTION INTRAVENOUS at 21:59

## 2025-07-23 ASSESSMENT — COLUMBIA-SUICIDE SEVERITY RATING SCALE - C-SSRS
1. IN THE PAST MONTH, HAVE YOU WISHED YOU WERE DEAD OR WISHED YOU COULD GO TO SLEEP AND NOT WAKE UP?: NO
2. HAVE YOU ACTUALLY HAD ANY THOUGHTS OF KILLING YOURSELF IN THE PAST MONTH?: NO
6. HAVE YOU EVER DONE ANYTHING, STARTED TO DO ANYTHING, OR PREPARED TO DO ANYTHING TO END YOUR LIFE?: NO

## 2025-07-23 ASSESSMENT — ACTIVITIES OF DAILY LIVING (ADL)
ADLS_ACUITY_SCORE: 41

## 2025-07-23 NOTE — ED PROVIDER NOTES
Emergency Department Note      History of Present Illness     Chief Complaint   Abdominal Pain    HPI   Nicolás Fermin is a 65 year old male with a history of hypertension and HIV presenting with abdominal pain. The patient reports for the past 2 days (7/21/25) he has been experiencing pain in his lower right abdomen. He last ate yesterday (7/22/25) at 2100 and had a chicken drumstick before experiencing nausea. Rainer endorses loss of appetite. He denies diarrhea, dysuria or vomiting. No fever.  No history of Chrohns or UC.  He is not on blood thinners.     Independent Historian   None. Male  present at bedside.    Review of External Notes   I reviewed Dr. Walsh's family medicine note from 1/2/2025 where the patient was seen for physical.  I note history of asymptomatic HIV.  Normal cell counts.  I note had fecal occult blood screening which was negative.    Past Medical History     Medical History and Problem List   Asymptomatic human immunodeficiency virus (HIV) infection status   Graves disease  Hypertension  Peripheral neuropathy  Squamous cell carcinoma of skin, unspecified    Medications   amitriptyline   amlodipine   aspirin 325mg  atenolol  dolutegravir  duloxetine   gabapentin   levothyroxine   lisinopril   naproxen    Surgical History   Colonoscopy  Inject Epidural Lumbar  Skin Biopsy    Physical Exam     Patient Vitals for the past 24 hrs:   BP Temp Temp src Pulse Resp SpO2   07/23/25 1930 139/82 -- -- 72 -- 97 %   07/23/25 1629 139/73 98.6  F (37  C) Oral 63 16 96 %     Physical Exam  General: Awake, alert, non-toxic.  Head:  Scalp is NC/AT  Eyes:  Conjunctiva normal appearing and track normally  ENT:  The external nose and ears are normal.   Neck:  Normal range of motion without rigidity.  CV:  Regular rate and rhythm    No pathologic murmur, rubs, or gallops.  Resp:  Breath sounds are clear bilaterally    Non-labored, no retractions or accessory muscle use  Abdomen: Abdomen is soft, no  distension, moderate diffuse ttp greatest RLQ ttp.  No rebound or guarding, no masses. No CVA tenderness.  MS:  No lower extremity edema/swelling. No midline cervical, thoracic, or lumbar tenderness.  Extremities without joint swelling or redness.  Skin:  Warm and dry, No rash or lesions noted.  Neuro:  Alert and oriented to person, place, time, situation.  GCS 15 Moves all extremities normal.  No facial asymmetry. Speech not slurred. Gait steady unassisted without ataxia.  Psych:  Awake. Alert. Normal affect. Appropriate interactions.      Diagnostics     Lab Results   Labs Ordered and Resulted from Time of ED Arrival to Time of ED Departure   COMPREHENSIVE METABOLIC PANEL (LIMITED OCCURRENCES) - Abnormal       Result Value    Sodium 130 (*)     Potassium 4.4      Carbon Dioxide (CO2) 24      Anion Gap 13      Urea Nitrogen 22.4      Creatinine 1.29 (*)     GFR Estimate 61      Calcium 9.6      Chloride 93 (*)     Glucose 80      Alkaline Phosphatase 144      AST 15      ALT 11      Protein Total 7.6      Albumin 4.0      Bilirubin Total 1.0     CBC WITH PLATELETS AND DIFFERENTIAL - Abnormal    WBC Count 16.7 (*)     RBC Count 4.45      Hemoglobin 14.9      Hematocrit 42.5      MCV 96      MCH 33.5 (*)     MCHC 35.1      RDW 13.2      Platelet Count 278      % Neutrophils 82      % Lymphocytes 11      % Monocytes 6      % Eosinophils 1      % Basophils 0      % Immature Granulocytes 0      NRBCs per 100 WBC 0      Absolute Neutrophils 13.6 (*)     Absolute Lymphocytes 1.9      Absolute Monocytes 1.0      Absolute Eosinophils 0.1      Absolute Basophils 0.0      Absolute Immature Granulocytes 0.1      Absolute NRBCs 0.0     ISTAT CREATININE POCT - Abnormal    Creatinine POCT 1.4 (*)     GFR, ESTIMATED POCT 55 (*)    LIPASE - Normal    Lipase 14     UA MACROSCOPIC WITH REFLEX TO MICRO AND CULTURE - Normal    Color Urine Light Yellow      Appearance Urine Clear      Glucose Urine Negative      Bilirubin Urine Negative       Ketones Urine Negative      Specific Gravity Urine 1.026      Blood Urine Negative      pH Urine 6.5      Protein Albumin Urine Negative      Urobilinogen Urine Normal      Nitrite Urine Negative      Leukocyte Esterase Urine Negative         Imaging   CT Abdomen Pelvis w Contrast   Final Result   IMPRESSION:    1.  Mildly dilated mid small bowel with a thick-walled transition inflamed appearing small bowel transition segment within the right lower quadrant. There are several, adjacent loops of distal small bowel which also demonstrate inflammatory changes    within the attending the terminal ileum. . Findings may be secondary to small bowel inflammation/infection, such as seen with Crohn's disease.   2.  Hyperenhancing, mildly dilated appendix which may be secondarily involved by the small bowel inflammatory process. The small bowel inflammatory changes do not appear to be secondary to acute appendicitis, as the inflammatory changes are more    prominent within the small bowel.             Independent Interpretation   None    ED Course      Medications Administered   Medications   piperacillin-tazobactam (ZOSYN) 4.5 g vial to attach to  mL bag (4.5 g Intravenous $New Bag 7/23/25 2159)   ondansetron (ZOFRAN) injection 4 mg (4 mg Intravenous $Given 7/23/25 1841)   HYDROmorphone (PF) (DILAUDID) injection 0.5 mg (0.5 mg Intravenous $Given 7/23/25 1841)   sodium chloride 0.9% BOLUS 1,000 mL (0 mLs Intravenous Stopped 7/23/25 1918)   sodium chloride 0.9 % bag for CT scan flush (72 mLs Intravenous $Given 7/23/25 2028)   iopamidol (ISOVUE-370) solution 113 mL (113 mLs Intravenous $Given 7/23/25 2028)   HYDROmorphone (PF) (DILAUDID) injection 0.5 mg (0.5 mg Intravenous $Given 7/23/25 2157)       Procedures   Procedures     Discussion of Management   Admitting Hospitalist, Dr. Lares  Surgery, Dr. Li    ED Course   ED Course as of 07/23/25 2117 Wed Jul 23, 2025 1819 I obtained the history and  examined the patient as noted above.       2101 I spoke with Dr Li from General Surgery regarding the patient's presentation and plan of care.        2117 I rechecked and updated the patient.     I discussed with Dr. Lares hospitalist who agrees to accept the patient for admission    Additional Documentation  None    Medical Decision Making / Diagnosis     CMS Diagnoses: None    MIPS   None               MDM   Nicolás Fermin is a 65 year old male who presents with right lower quadrant abdominal pain.  Broad differential considered.  Initial concern highest for acute appendicitis.  Contrast CT scan demonstrates findings of small bowel inflammation/enteritis and appendix also inflamed though felt less likely acute appendicitis per radiology.  Evidence of possible ileus associated as well.  No perforation or abscess.  Patient without clear enteritis symptoms and no prior history of IBD.  Does have a history of HIV.  Afebrile vitally stable but does have leukocytosis.  Discussed with general surgery at this time they recommend admission on IV antibiotics to the hospitalist service they will consult on the patient in the morning GI consult okay for clears for now no definite surgery planned.  They are available if the patient decompensates.  Discussed with hospitalist who agrees to accept for admission.  Stable at this time.    Disposition   The patient was admitted to the hospital.     Diagnosis     ICD-10-CM    1. Abnormal CT of the abdomen  R93.5       2. Enteritis  K52.9       3. Hyperplasia, appendix  K38.0            Discharge Medications   New Prescriptions    No medications on file         Scribe Disclosure:  I, America More, am serving as a scribe at 6:41 PM on 7/23/2025 to document services personally performed by Adrian Whitt, PAUL based on my observations and the provider's statements to me.        Adrian Whitt PA-C  07/23/25 4330

## 2025-07-23 NOTE — ED TRIAGE NOTES
Patient reports some RLQ abdominal pain over the last several days but worse today. Some intermittent nausea as well.  Pain is constant but has periods of severe pain.  Denies abdominal surgeries.

## 2025-07-23 NOTE — PROGRESS NOTES
Assessment & Plan       ICD-10-CM    1. Abdominal pain, right lower quadrant  R10.31            MDM: Patient has had right lower abdominal quadrant pain for 2 days.  He states he has not really been able to eat and drink much.  He states he has not had his appendix previously removed.  He denies any fevers.  Due to his sharp right lower quadrant pain recommended he go directly to the emergency department now for further evaluation and treatment.    Patient Instructions   Go directly to the Emergency Department    Patient and his friend agreed to the treatment plan and verbalized understanding.     Shantanu Spear is a 65 year old male who presents to clinic today for the following health issues:  Chief Complaint   Patient presents with    Abdominal Pain     2 days, right lower abdomen-painful having bm, tender to touch, nausea, slight diarrhea     HPI  Patient has had abdominal pain for 2 days.  He states it is sharp in nature and he has not really been able to eat or drink since it started.  He denies any known fevers.  He denies any abdominal wall bulges.  He denies having his appendix removed previously.        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)  2018-08: CARDIOVASCULAR SCREENING; LDL GOAL LESS THAN 130  2018-08: Graves' disease  2018-08: Neuropathy  2018-04: Lymphedema, not elsewhere classified  2017-06: Squamous cell skin cancer  2016-06: Human immunodeficiency virus (HIV) disease (H)  2014-04: Hypothyroidism  2014-04: Syphilis  2014-04: Essential (primary) hypertension      Past Medical History:   Diagnosis Date    Asymptomatic human immunodeficiency virus (HIV) infection status (H)     Graves disease 2006    Hypertension     Peripheral neuropathy      Squamous cell carcinoma of skin, unspecified          Social History     Tobacco Use    Smoking status: Every Day     Current packs/day: 0.50     Average packs/day: 0.5 packs/day for 51.2 years (25.6 ttl pk-yrs)     Types: Cigarettes     Start date: 4/13/1975     Passive exposure: Never    Smokeless tobacco: Never    Tobacco comments:     it sucks!   Substance Use Topics    Alcohol use: Yes     Comment: 2-3/day           Objective    BP (!) 154/79 (BP Location: Right arm, Patient Position: Sitting, Cuff Size: Adult Regular)   Pulse 61   Temp 96.9  F (36.1  C) (Tympanic)   Resp 16   Wt 102.1 kg (225 lb)   SpO2 97%   BMI 28.89 kg/m    Physical Exam - deferred        Lucita Bryan NP

## 2025-07-23 NOTE — LETTER
[facility] [address] [facility]  [address] [place] [address]  Phone: [phone]    August 4, 2025        Nicolás Fermin  [address] [place] [facility] [address]          To whom it may concern:    RE: Nicolás Fermin was hospitalized at [facility] from 7/23/2025 through 8/4/2025. He is advised rest. He can resume work with light duty from 8/12/2025.     Please contact me for questions or concerns.      Sincerely,    Brandee Shelby MD on 8/4/2025 at 3:55 PM

## 2025-07-24 LAB
ANION GAP SERPL CALCULATED.3IONS-SCNC: 11 MMOL/L (ref 7–15)
BUN SERPL-MCNC: 19.3 MG/DL (ref 8–23)
CALCIUM SERPL-MCNC: 9.2 MG/DL (ref 8.8–10.4)
CD3 CELLS # BLD: 1085 CELLS/UL (ref 603–2990)
CD3 CELLS NFR BLD: 79 % (ref 49–84)
CD3+CD4+ CELLS # BLD: 477 CELLS/UL (ref 441–2156)
CD3+CD4+ CELLS NFR BLD: 35 % (ref 28–63)
CD3+CD4+ CELLS/CD3+CD8+ CLL BLD: 0.8 % (ref 1.4–2.6)
CD3+CD8+ CELLS # BLD: 593 CELLS/UL (ref 125–1312)
CD3+CD8+ CELLS NFR BLD: 43 % (ref 10–40)
CHLORIDE SERPL-SCNC: 96 MMOL/L (ref 98–107)
CREAT SERPL-MCNC: 1.18 MG/DL (ref 0.67–1.17)
CRP SERPL-MCNC: 347.87 MG/L
CRP SERPL-MCNC: 417.7 MG/L
EGFRCR SERPLBLD CKD-EPI 2021: 68 ML/MIN/1.73M2
ERYTHROCYTE [DISTWIDTH] IN BLOOD BY AUTOMATED COUNT: 13.2 % (ref 10–15)
ERYTHROCYTE [SEDIMENTATION RATE] IN BLOOD BY WESTERGREN METHOD: 56 MM/HR (ref 0–20)
GLUCOSE SERPL-MCNC: 81 MG/DL (ref 70–99)
HCO3 SERPL-SCNC: 22 MMOL/L (ref 22–29)
HCT VFR BLD AUTO: 39.2 % (ref 40–53)
HGB BLD-MCNC: 13.4 G/DL (ref 13.3–17.7)
MCH RBC QN AUTO: 33.5 PG (ref 26.5–33)
MCHC RBC AUTO-ENTMCNC: 34.2 G/DL (ref 31.5–36.5)
MCV RBC AUTO: 98 FL (ref 78–100)
PLATELET # BLD AUTO: 235 10E3/UL (ref 150–450)
POTASSIUM SERPL-SCNC: 4.9 MMOL/L (ref 3.4–5.3)
RBC # BLD AUTO: 4 10E6/UL (ref 4.4–5.9)
SODIUM SERPL-SCNC: 129 MMOL/L (ref 135–145)
T CELL COMMENT: ABNORMAL
WBC # BLD AUTO: 13.2 10E3/UL (ref 4–11)

## 2025-07-24 PROCEDURE — 250N000011 HC RX IP 250 OP 636: Performed by: PHYSICIAN ASSISTANT

## 2025-07-24 PROCEDURE — 36415 COLL VENOUS BLD VENIPUNCTURE: CPT | Performed by: INTERNAL MEDICINE

## 2025-07-24 PROCEDURE — 250N000013 HC RX MED GY IP 250 OP 250 PS 637: Performed by: INTERNAL MEDICINE

## 2025-07-24 PROCEDURE — 80048 BASIC METABOLIC PNL TOTAL CA: CPT | Performed by: INTERNAL MEDICINE

## 2025-07-24 PROCEDURE — 99232 SBSQ HOSP IP/OBS MODERATE 35: CPT | Performed by: INTERNAL MEDICINE

## 2025-07-24 PROCEDURE — 250N000011 HC RX IP 250 OP 636: Performed by: INTERNAL MEDICINE

## 2025-07-24 PROCEDURE — 85027 COMPLETE CBC AUTOMATED: CPT | Performed by: INTERNAL MEDICINE

## 2025-07-24 PROCEDURE — 86359 T CELLS TOTAL COUNT: CPT | Performed by: INTERNAL MEDICINE

## 2025-07-24 PROCEDURE — 86140 C-REACTIVE PROTEIN: CPT | Performed by: INTERNAL MEDICINE

## 2025-07-24 PROCEDURE — 120N000001 HC R&B MED SURG/OB

## 2025-07-24 PROCEDURE — 85652 RBC SED RATE AUTOMATED: CPT | Performed by: INTERNAL MEDICINE

## 2025-07-24 PROCEDURE — 99253 IP/OBS CNSLTJ NEW/EST LOW 45: CPT | Performed by: STUDENT IN AN ORGANIZED HEALTH CARE EDUCATION/TRAINING PROGRAM

## 2025-07-24 PROCEDURE — 258N000003 HC RX IP 258 OP 636: Performed by: INTERNAL MEDICINE

## 2025-07-24 RX ORDER — ACETAMINOPHEN 650 MG/1
650 SUPPOSITORY RECTAL EVERY 4 HOURS PRN
Status: DISCONTINUED | OUTPATIENT
Start: 2025-07-24 | End: 2025-07-26

## 2025-07-24 RX ORDER — ACETAMINOPHEN 325 MG/1
650 TABLET ORAL EVERY 4 HOURS PRN
Status: DISCONTINUED | OUTPATIENT
Start: 2025-07-24 | End: 2025-07-26

## 2025-07-24 RX ORDER — DULOXETIN HYDROCHLORIDE 60 MG/1
60 CAPSULE, DELAYED RELEASE ORAL DAILY
Status: DISPENSED | OUTPATIENT
Start: 2025-07-24

## 2025-07-24 RX ORDER — POLYETHYLENE GLYCOL 3350 17 G/17G
17 POWDER, FOR SOLUTION ORAL 2 TIMES DAILY PRN
Status: DISPENSED | OUTPATIENT
Start: 2025-07-24

## 2025-07-24 RX ORDER — ONDANSETRON 2 MG/ML
4 INJECTION INTRAMUSCULAR; INTRAVENOUS EVERY 6 HOURS PRN
Status: ACTIVE | OUTPATIENT
Start: 2025-07-24

## 2025-07-24 RX ORDER — SODIUM CHLORIDE, SODIUM LACTATE, POTASSIUM CHLORIDE, CALCIUM CHLORIDE 600; 310; 30; 20 MG/100ML; MG/100ML; MG/100ML; MG/100ML
INJECTION, SOLUTION INTRAVENOUS CONTINUOUS
Status: DISCONTINUED | OUTPATIENT
Start: 2025-07-24 | End: 2025-07-28

## 2025-07-24 RX ORDER — NALOXONE HYDROCHLORIDE 0.4 MG/ML
0.2 INJECTION, SOLUTION INTRAMUSCULAR; INTRAVENOUS; SUBCUTANEOUS
Status: ACTIVE | OUTPATIENT
Start: 2025-07-24

## 2025-07-24 RX ORDER — AMOXICILLIN 250 MG
2 CAPSULE ORAL 2 TIMES DAILY PRN
Status: DISCONTINUED | OUTPATIENT
Start: 2025-07-24 | End: 2025-07-25

## 2025-07-24 RX ORDER — ONDANSETRON 4 MG/1
4 TABLET, ORALLY DISINTEGRATING ORAL EVERY 6 HOURS PRN
Status: DISPENSED | OUTPATIENT
Start: 2025-07-24

## 2025-07-24 RX ORDER — PROCHLORPERAZINE MALEATE 5 MG/1
5 TABLET ORAL EVERY 6 HOURS PRN
Status: ACTIVE | OUTPATIENT
Start: 2025-07-24

## 2025-07-24 RX ORDER — METOCLOPRAMIDE HYDROCHLORIDE 5 MG/ML
5 INJECTION INTRAMUSCULAR; INTRAVENOUS EVERY 6 HOURS PRN
Status: DISCONTINUED | OUTPATIENT
Start: 2025-07-24 | End: 2025-07-24

## 2025-07-24 RX ORDER — AMOXICILLIN 250 MG
1 CAPSULE ORAL 2 TIMES DAILY PRN
Status: DISCONTINUED | OUTPATIENT
Start: 2025-07-24 | End: 2025-07-25

## 2025-07-24 RX ORDER — LISINOPRIL 20 MG/1
20 TABLET ORAL 2 TIMES DAILY
Status: DISCONTINUED | OUTPATIENT
Start: 2025-07-24 | End: 2025-07-29

## 2025-07-24 RX ORDER — LEVOTHYROXINE SODIUM 112 UG/1
112 TABLET ORAL
Status: DISPENSED | OUTPATIENT
Start: 2025-07-24

## 2025-07-24 RX ORDER — HYDROMORPHONE HCL IN WATER/PF 6 MG/30 ML
0.2 PATIENT CONTROLLED ANALGESIA SYRINGE INTRAVENOUS
Refills: 0 | Status: DISPENSED | OUTPATIENT
Start: 2025-07-24

## 2025-07-24 RX ORDER — PIPERACILLIN SODIUM, TAZOBACTAM SODIUM 4; .5 G/20ML; G/20ML
4.5 INJECTION, POWDER, LYOPHILIZED, FOR SOLUTION INTRAVENOUS EVERY 6 HOURS
Status: DISCONTINUED | OUTPATIENT
Start: 2025-07-24 | End: 2025-07-24

## 2025-07-24 RX ORDER — HYDROMORPHONE HCL IN WATER/PF 6 MG/30 ML
0.4 PATIENT CONTROLLED ANALGESIA SYRINGE INTRAVENOUS
Refills: 0 | Status: ACTIVE | OUTPATIENT
Start: 2025-07-24

## 2025-07-24 RX ORDER — NALOXONE HYDROCHLORIDE 0.4 MG/ML
0.4 INJECTION, SOLUTION INTRAMUSCULAR; INTRAVENOUS; SUBCUTANEOUS
Status: ACTIVE | OUTPATIENT
Start: 2025-07-24

## 2025-07-24 RX ORDER — GABAPENTIN 300 MG/1
600 CAPSULE ORAL 2 TIMES DAILY
Status: DISPENSED | OUTPATIENT
Start: 2025-07-24

## 2025-07-24 RX ORDER — ATENOLOL 50 MG/1
100 TABLET ORAL DAILY
Status: DISCONTINUED | OUTPATIENT
Start: 2025-07-24 | End: 2025-07-24

## 2025-07-24 RX ORDER — BISACODYL 10 MG
10 SUPPOSITORY, RECTAL RECTAL DAILY PRN
Status: ACTIVE | OUTPATIENT
Start: 2025-07-24

## 2025-07-24 RX ORDER — OXYCODONE HYDROCHLORIDE 5 MG/1
5 TABLET ORAL EVERY 4 HOURS PRN
Refills: 0 | Status: DISPENSED | OUTPATIENT
Start: 2025-07-24

## 2025-07-24 RX ORDER — LIDOCAINE 40 MG/G
CREAM TOPICAL
Status: ACTIVE | OUTPATIENT
Start: 2025-07-24

## 2025-07-24 RX ORDER — AMLODIPINE BESYLATE 10 MG/1
10 TABLET ORAL DAILY
Status: DISPENSED | OUTPATIENT
Start: 2025-07-24

## 2025-07-24 RX ORDER — ATENOLOL 50 MG/1
50 TABLET ORAL DAILY
Status: DISPENSED | OUTPATIENT
Start: 2025-07-25

## 2025-07-24 RX ORDER — PIPERACILLIN SODIUM, TAZOBACTAM SODIUM 3; .375 G/15ML; G/15ML
3.38 INJECTION, POWDER, LYOPHILIZED, FOR SOLUTION INTRAVENOUS EVERY 6 HOURS
Status: COMPLETED | OUTPATIENT
Start: 2025-07-24 | End: 2025-07-30

## 2025-07-24 RX ADMIN — GABAPENTIN 600 MG: 300 CAPSULE ORAL at 21:37

## 2025-07-24 RX ADMIN — OXYCODONE HYDROCHLORIDE 5 MG: 5 TABLET ORAL at 21:37

## 2025-07-24 RX ADMIN — PIPERACILLIN AND TAZOBACTAM 4.5 G: 4; .5 INJECTION, POWDER, FOR SOLUTION INTRAVENOUS at 04:17

## 2025-07-24 RX ADMIN — GABAPENTIN 600 MG: 300 CAPSULE ORAL at 01:33

## 2025-07-24 RX ADMIN — SODIUM CHLORIDE, SODIUM LACTATE, POTASSIUM CHLORIDE, AND CALCIUM CHLORIDE: .6; .31; .03; .02 INJECTION, SOLUTION INTRAVENOUS at 01:34

## 2025-07-24 RX ADMIN — GABAPENTIN 600 MG: 300 CAPSULE ORAL at 08:16

## 2025-07-24 RX ADMIN — SODIUM CHLORIDE, SODIUM LACTATE, POTASSIUM CHLORIDE, AND CALCIUM CHLORIDE: .6; .31; .03; .02 INJECTION, SOLUTION INTRAVENOUS at 22:29

## 2025-07-24 RX ADMIN — DULOXETINE HYDROCHLORIDE 60 MG: 60 CAPSULE, DELAYED RELEASE PELLETS ORAL at 08:16

## 2025-07-24 RX ADMIN — AMLODIPINE BESYLATE 10 MG: 10 TABLET ORAL at 08:21

## 2025-07-24 RX ADMIN — AMITRIPTYLINE HYDROCHLORIDE 50 MG: 25 TABLET, FILM COATED ORAL at 21:37

## 2025-07-24 RX ADMIN — LEVOTHYROXINE SODIUM 112 MCG: 0.11 TABLET ORAL at 07:05

## 2025-07-24 RX ADMIN — AMITRIPTYLINE HYDROCHLORIDE 50 MG: 25 TABLET, FILM COATED ORAL at 01:33

## 2025-07-24 RX ADMIN — EMTRICITABINE AND TENOFOVIR ALAFENAMIDE 1 TABLET: 200; 25 TABLET ORAL at 02:21

## 2025-07-24 RX ADMIN — PIPERACILLIN AND TAZOBACTAM 3.38 G: 3; .375 INJECTION, POWDER, FOR SOLUTION INTRAVENOUS at 17:29

## 2025-07-24 RX ADMIN — EMTRICITABINE AND TENOFOVIR ALAFENAMIDE 1 TABLET: 200; 25 TABLET ORAL at 23:19

## 2025-07-24 RX ADMIN — DOLUTEGRAVIR SODIUM 50 MG: 50 TABLET, FILM COATED ORAL at 02:20

## 2025-07-24 RX ADMIN — SODIUM CHLORIDE, SODIUM LACTATE, POTASSIUM CHLORIDE, AND CALCIUM CHLORIDE: .6; .31; .03; .02 INJECTION, SOLUTION INTRAVENOUS at 10:48

## 2025-07-24 RX ADMIN — DOLUTEGRAVIR SODIUM 50 MG: 50 TABLET, FILM COATED ORAL at 21:37

## 2025-07-24 RX ADMIN — PIPERACILLIN AND TAZOBACTAM 3.38 G: 3; .375 INJECTION, POWDER, FOR SOLUTION INTRAVENOUS at 21:39

## 2025-07-24 RX ADMIN — PIPERACILLIN AND TAZOBACTAM 3.38 G: 3; .375 INJECTION, POWDER, FOR SOLUTION INTRAVENOUS at 10:06

## 2025-07-24 ASSESSMENT — ACTIVITIES OF DAILY LIVING (ADL)
ADLS_ACUITY_SCORE: 33
TOILETING_ISSUES: NO
ADLS_ACUITY_SCORE: 33
ADLS_ACUITY_SCORE: 34
DIFFICULTY_EATING/SWALLOWING: NO
FALL_HISTORY_WITHIN_LAST_SIX_MONTHS: YES
ADLS_ACUITY_SCORE: 34
DOING_ERRANDS_INDEPENDENTLY_DIFFICULTY: NO
ADLS_ACUITY_SCORE: 33
ADLS_ACUITY_SCORE: 23
ADLS_ACUITY_SCORE: 33
DIFFICULTY_COMMUNICATING: NO
ADLS_ACUITY_SCORE: 33
ADLS_ACUITY_SCORE: 34
ADLS_ACUITY_SCORE: 26
ADLS_ACUITY_SCORE: 33
ADLS_ACUITY_SCORE: 34
WEAR_GLASSES_OR_BLIND: YES
CONCENTRATING,_REMEMBERING_OR_MAKING_DECISIONS_DIFFICULTY: YES
NUMBER_OF_TIMES_PATIENT_HAS_FALLEN_WITHIN_LAST_SIX_MONTHS: 7
DRESSING/BATHING_DIFFICULTY: NO
ADLS_ACUITY_SCORE: 34
CHANGE_IN_FUNCTIONAL_STATUS_SINCE_ONSET_OF_CURRENT_ILLNESS/INJURY: NO
ADLS_ACUITY_SCORE: 33
ADLS_ACUITY_SCORE: 34
ADLS_ACUITY_SCORE: 34
HEARING_DIFFICULTY_OR_DEAF: NO
ADLS_ACUITY_SCORE: 33
ADLS_ACUITY_SCORE: 34
ADLS_ACUITY_SCORE: 33
EQUIPMENT_CURRENTLY_USED_AT_HOME: CANE, STRAIGHT
ADLS_ACUITY_SCORE: 34
WALKING_OR_CLIMBING_STAIRS: AMBULATION DIFFICULTY, REQUIRES EQUIPMENT
ADLS_ACUITY_SCORE: 34
ADLS_ACUITY_SCORE: 33
ADLS_ACUITY_SCORE: 34
WALKING_OR_CLIMBING_STAIRS_DIFFICULTY: YES

## 2025-07-24 NOTE — ED NOTES
[facility]  ED Nurse Handoff Report    ED Chief complaint: Abdominal Pain      ED Diagnosis:   Final diagnoses:   Abnormal CT of the abdomen   Enteritis   Hyperplasia, appendix       Code Status: To be addressed by admitting team    Allergies: No Known Allergies    Patient Story: Patient reports some RLQ abdominal pain over the last several days but worse today. Some intermittent nausea as well.  Pain is constant but has periods of severe pain.  Denies abdominal surgeries.       Focused Assessment:   Alert , orientated x 4. VSS on Room air. PIV to RAC x 1. + lower abdominal pain, dilaudid given with good releif. Abx started. NPO, may have with ice chips. Able to use the bathroom Independently with a cane.     Abnormal Labs Resulted from Time of ED Arrival to Time of ED Departure   COMPREHENSIVE METABOLIC PANEL (LIMITED OCCURRENCES) - Abnormal       Result Value    Sodium 130 (*)     Potassium 4.4      Carbon Dioxide (CO2) 24      Anion Gap 13      Urea Nitrogen 22.4      Creatinine 1.29 (*)     GFR Estimate 61      Calcium 9.6      Chloride 93 (*)     Glucose 80      Alkaline Phosphatase 144      AST 15      ALT 11      Protein Total 7.6      Albumin 4.0      Bilirubin Total 1.0     CBC WITH PLATELETS AND DIFFERENTIAL - Abnormal    WBC Count 16.7 (*)     RBC Count 4.45      Hemoglobin 14.9      Hematocrit 42.5      MCV 96      MCH 33.5 (*)     MCHC 35.1      RDW 13.2      Platelet Count 278      % Neutrophils 82      % Lymphocytes 11      % Monocytes 6      % Eosinophils 1      % Basophils 0      % Immature Granulocytes 0      NRBCs per 100 WBC 0      Absolute Neutrophils 13.6 (*)     Absolute Lymphocytes 1.9      Absolute Monocytes 1.0      Absolute Eosinophils 0.1      Absolute Basophils 0.0      Absolute Immature Granulocytes 0.1      Absolute NRBCs 0.0     ISTAT CREATININE POCT - Abnormal    Creatinine POCT 1.4 (*)     GFR, ESTIMATED POCT 55 (*)        CT Abdomen Pelvis w Contrast   Final Result    IMPRESSION:    1.  Mildly dilated mid small bowel with a thick-walled transition inflamed appearing small bowel transition segment within the right lower quadrant. There are several, adjacent loops of distal small bowel which also demonstrate inflammatory changes    within the attending the terminal ileum. . Findings may be secondary to small bowel inflammation/infection, such as seen with Crohn's disease.   2.  Hyperenhancing, mildly dilated appendix which may be secondarily involved by the small bowel inflammatory process. The small bowel inflammatory changes do not appear to be secondary to acute appendicitis, as the inflammatory changes are more    prominent within the small bowel.             Treatments and/or interventions provided:   Medications   ondansetron (ZOFRAN) injection 4 mg (4 mg Intravenous $Given 7/23/25 1841)   HYDROmorphone (PF) (DILAUDID) injection 0.5 mg (0.5 mg Intravenous $Given 7/23/25 1841)   sodium chloride 0.9% BOLUS 1,000 mL (0 mLs Intravenous Stopped 7/23/25 1918)   sodium chloride 0.9 % bag for CT scan flush (72 mLs Intravenous $Given 7/23/25 2028)   iopamidol (ISOVUE-370) solution 113 mL (113 mLs Intravenous $Given 7/23/25 2028)   piperacillin-tazobactam (ZOSYN) 4.5 g vial to attach to  mL bag (0 g Intravenous Stopped 7/23/25 2230)   HYDROmorphone (PF) (DILAUDID) injection 0.5 mg (0.5 mg Intravenous $Given 7/23/25 2157)       Patient's response to treatments and/or interventions: Improving    To be done/followed up on inpatient unit:  Follow Plan of Care    Does this patient have any cognitive concerns?: A&Ox4    Activity level - Baseline/Home:  Independent and Cane  Activity Level - Current:   Independent and Cane    Patient's Preferred language: English   Needed?: No    Isolation: None  Infection: Not Applicable  Sepsis treatment initiated: No  Patient tested for COVID 19 prior to admission: NO  Bariatric?: No    Vital Signs:   Vitals:    07/23/25 1629 07/23/25  1930 07/23/25 2351   BP: 139/73 139/82 109/52   Pulse: 63 72    Resp: 16     Temp: 98.6  F (37  C)  97.9  F (36.6  C)   TempSrc: Oral  Oral   SpO2: 96% 97% 94%       Cardiac Rhythm:     Family Comments: None at Bedside  OBS brochure/video discussed/provided to patient/family: N/A    For the majority of the shift this patient's behavior was Green.   Behavioral interventions performed were None.    ED NURSE PHONE NUMBER: *[phone]

## 2025-07-24 NOTE — PHARMACY-ADMISSION MEDICATION HISTORY
Pharmacist Admission Medication History    Admission medication history is complete. The information provided in this note is only as accurate as the sources available at the time of the update.    Information Source(s): Patient and CareEverywhere/SureScripts via in-person    Pertinent Information: None    Changes made to PTA medication list:  Added:   None  Deleted:   Aspirin-Buffered  Fluocinonide  Changed:    Naproxen, Fish oil, Psyllium updated directions    Allergies reviewed with patient and updates made in EHR: yes    Medication History Completed By: Daisy Marie [facility] 7/23/2025 10:22 PM    PTA Med List   Medication Sig Last Dose/Taking    acetaminophen (TYLENOL) 325 MG tablet Take 325-650 mg by mouth every 6 hours as needed for mild pain. Taking As Needed    amitriptyline (ELAVIL) 50 MG tablet Take 1 tablet (50 mg) by mouth at bedtime. 7/22/2025 Evening    amLODIPine (NORVASC) 10 MG tablet Take 1 tablet (10 mg) by mouth daily. 7/23/2025 Morning    atenolol (TENORMIN) 100 MG tablet Take 1 tablet (100 mg) by mouth daily 7/23/2025 Morning    ciclopirox (LOPROX) 0.77 % cream Apply topically 2 times daily To feet and toenails. Taking    clindamycin (CLEOCIN-T) 1 % external gel Apply topically 2 times daily. 7/23/2025 Morning    DiphenhydrAMINE HCl (BENADRYL PO) Take 25 mg by mouth nightly as needed. Taking As Needed    Dolutegravir Sodium (TIVICAY PO) Take 50 mg by mouth at bedtime. 7/22/2025 Evening    emtricitabine-tenofovir AF (DESCOVY) 200-25 MG per tablet Take 1 tablet by mouth at bedtime. 7/22/2025 Evening    gabapentin (NEURONTIN) 300 MG capsule Take 2 capsules (600 mg) by mouth 2 times daily. 7/23/2025 Morning    levothyroxine (SYNTHROID/LEVOTHROID) 112 MCG tablet TAKE 1 TABLET(112 MCG) BY MOUTH DAILY 7/23/2025 Morning    lisinopril (ZESTRIL) 20 MG tablet Take 1 tablet (20 mg) by mouth 2 times daily 7/23/2025 Morning    naproxen (NAPROSYN) 250 MG tablet Take 250 mg by mouth 2 times daily as needed for  moderate pain. Taking As Needed    Omega-3 Fatty Acids (FISH OIL) 500 MG CAPS Take 500 mg by mouth at bedtime. 7/22/2025 Evening    psyllium (METAMUCIL) 58.6 % POWD Take by mouth daily as needed. 7/22/2025 Evening

## 2025-07-24 NOTE — PLAN OF CARE
Goal Outcome Evaluation:      Plan of Care Reviewed With: patient    Summary: Presented with RLQ abdominal pain, nausea and diarrhea, abnormal CT of abdomen; concerns for inflammatory bowel disease.     DATE & TIME: 7/24/25 [id number]    Cognitive Concerns/ Orientation : A/O x4, forgetful at times   BEHAVIOR & AGGRESSION TOOL COLOR: Green  CIWA SCORE: NA   ABNL VS/O2: Kayden with HR 50s, other VSS on RA  MOBILITY: SBA assist with a cane, Independent at baseline with a cane, unsteady gait at times, history of multiple falls per patient report  PAIN MANAGMENT: c/o abd pain/discomfort 6-7/10 today, declined intervention. Denies nausea  DIET: advanced to full liquid diet, tolerating.   BOWEL/BLADDER: continent of B&B  ABNL LAB/BG: Na 129, Cr 1.18, WBC 13.2, .87  DRAIN/DEVICES: R PIV infusing LR at 100 ml/hr  TELEMETRY RHYTHM: NA  SKIN: abrasion on L lower inner leg, scattered scabs, bruises and dry/peeling skin  TESTS/PROCEDURES: Possible diagnostic laparoscopy, possible appendectomy  tomorrow if no improvement.   D/C DAY/GOALS/PLACE: [facility]  OTHER IMPORTANT INFO: surgery note says possible procedure tomororw if pain does not improve. GI note says possible discharge tomorrow if pt is tolerating diet. On IV Zosyn q6h. IVF LR infusing 100 mL/hr.

## 2025-07-24 NOTE — H&P
[facility]    History and Physical - Hospitalist Service       Date of Admission:  7/23/2025    Assessment & Plan   Nicolás Fermin is a 65 year old male with past medical history significant for HIV who presents with RLQ pain. Admitted on 7/23/2025.     Abdominal pain   Possible inflammatory bowel disease  *Presents with RLQ pain, nausea, one episode of diarrhea  *CT abd/pelvis with mildly dilated and inflamed mid small bowel within the right lower quadrant with adjacent loops of distal small bowel appearing inflamed within the terminal ileum, hyperenhancing mildly dilated appendix suspected to be due to small bowel inflammatory process, less likely acute appendicitis  *Case and imaging discussed with general surgery in ED, concern for possible IBD, recommended GI consult, continue abx and keep NPO  *No personal or family hx of IBD  - General surgery consulted  - [facility] consulted   - Piperacillin-tazobactam IV   - NPO  - IVF with LR  - Add on CRP, ESR in AM  - CBC in AM  - Monitor fever curve     HIV with peripheral neuropathy   *Followed by [facility]. Last HIV load detectable 3/2024, CD4 >1000.   *Reports compliance with HAART  - CD4 count added on   - Continue prior to admission HAART  - Continue prior to admission gabapentin, duloxetine     Chronic kidney disease, stage 2  Baseline creatinine 1-1.1. Creatinine mildly elevated to 1.29 on admission in setting of poor intake since symptom onset.    - IVF  - BMP in AM   - Avoid nephrotoxins  - Hold PTA lisinopril   - Monitor BMP     Hyponatremia  Sodium 130. Likely hypovolemic from recent poor intake.  - IVF  - BMP in AM     Hypertension  - Continue prior to admission amlodipine, atenolol  - Hold prior to admission lisinopril as above    Hypothyroidism  Hx of Graves disease  - Continue prior to admission levothyroxine    Tobacco use disorder  - Encouraged cessation  - Declines nicotine replacement        Diet: NPO  DVT Prophylaxis:  Pneumatic Compression Devices pending evaluation for any procedures, if IBD ultimately suspected and no procedures planned start chemical prophylaxis  Ferguson Catheter: Not present  Code Status: Full code     Disposition Plan   Admit to inpatient.      Medically Ready for Discharge: Anticipated in 2-4 Days       Entered: Tom Lares MD 07/23/2025, 10:00 PM     The patient's care was discussed with the ED provider, patient         Clinically Significant Risk Factors Present on Admission         # Hyponatremia: Lowest Na = 130 mmol/L in last 2 days, will monitor as appropriate  # Hypochloremia: Lowest Cl = 93 mmol/L in last 2 days, will monitor as appropriate        # Drug Induced Platelet Defect: home medication list includes an antiplatelet medication   # Hypertension: Noted on problem list                           Tom Lares MD  [facility]    ______________________________________________________________________    Chief Complaint   RLQ pain    History of Present Illness   Nicolás Fermin is a 65 year old male who presents with the above chief complaint.    History is obtained from the patient, discussion with ED provider and review of medical record. The patient reports on 7/21 he developed sharp pain in his right lower quadrant.  He reports since onset, his pain has never completely subsided.  He reports one episode of diarrhea following onset of his pain, since then has had a soft stool.  Denies any blood in his stools.  He denies any fevers or chills.  He reports an episode of nausea after eating 7/22, denies any vomiting.  He denies any personal or family history of inflammatory bowel disease.      In the Emergency Department, the patient was seen by Adrian Whitt PA-C, with whom I discussed the patient's presenting symptoms and emergency department course.  Initial vital signs were a temperature of 98.6F, HR 63, /73, RR 16, SpO2 96% on room air. Pertinent work-up as  noted in A&P. Hospitalists were contacted for admission to the hospital.     Past Medical History    I have reviewed this patient's medical history and updated it with pertinent information if needed.   Past Medical History:   Diagnosis Date    Asymptomatic human immunodeficiency virus (HIV) infection status (H)     Graves disease 2006    Hypertension     Peripheral neuropathy     Squamous cell carcinoma of skin, unspecified        Past Surgical History   I have reviewed this patient's surgical history and updated it with pertinent information if needed.  Past Surgical History:   Procedure Laterality Date    BIOPSY  6-2018    skin/pos    COLONOSCOPY      neg    INJECT EPIDURAL LUMBAR N/A 10/28/2024    Procedure: L4-5 interlaminar epidural steroid injection;  Surgeon: Anil Jalloh MD;  Location: [facility] OR    INJECT EPIDURAL LUMBAR N/A 2/13/2025    Procedure: INJECTION, SPINE, LUMBAR, EPIDURAL (L2-L3);  Surgeon: Nato Matute MD;  Location: [facility] OR         Social History   I have reviewed this patient's social history and updated it with pertinent information if needed.  Social History     Tobacco Use    Smoking status: Every Day     Current packs/day: 0.50     Average packs/day: 0.5 packs/day for 51.2 years (25.6 ttl pk-yrs)     Types: Cigarettes     Start date: 4/13/1975     Passive exposure: Never    Smokeless tobacco: Never    Tobacco comments:     it sucks!   Vaping Use    Vaping status: Never Used   Substance Use Topics    Alcohol use: Yes     Comment: 2-3/day    Drug use: Yes     Types: Marijuana     Comment: minimal use of CBD/THC for chronic pain relief        Family History   I have reviewed this patient's family history and updated it with pertinent information if needed.   Family History   Problem Relation Age of Onset    Thyroid Disease Mother     Breast Cancer Mother     Skin Cancer Mother         face    Hypertension Father     Skin Cancer Father         behind ear    Diabetes Maternal  Grandmother     Diabetes Maternal Grandfather     Coronary Artery Disease Paternal Grandmother     Inflammatory Bowel Disease No family hx of         Prior to Admission Medications    Prior to Admission Medications   Prescriptions Last Dose Informant Patient Reported? Taking?   DULoxetine (CYMBALTA) 60 MG capsule  Self No No   Sig: Take 1 capsule (60 mg) by mouth daily.   DiphenhydrAMINE HCl (BENADRYL PO)  Self Yes Yes   Sig: Take 25 mg by mouth nightly as needed.   Dolutegravir Sodium (TIVICAY PO) 7/22/2025 Evening Self Yes Yes   Sig: Take 50 mg by mouth at bedtime.   Omega-3 Fatty Acids (FISH OIL) 500 MG CAPS 7/22/2025 Evening Self Yes Yes   Sig: Take 500 mg by mouth at bedtime.   acetaminophen (TYLENOL) 325 MG tablet  Self Yes Yes   Sig: Take 325-650 mg by mouth every 6 hours as needed for mild pain.   amLODIPine (NORVASC) 10 MG tablet 7/23/2025 Morning Self No Yes   Sig: Take 1 tablet (10 mg) by mouth daily.   amitriptyline (ELAVIL) 50 MG tablet 7/22/2025 Evening Self No Yes   Sig: Take 1 tablet (50 mg) by mouth at bedtime.   atenolol (TENORMIN) 100 MG tablet 7/23/2025 Morning Self No Yes   Sig: Take 1 tablet (100 mg) by mouth daily   ciclopirox (LOPROX) 0.77 % cream  Self No Yes   Sig: Apply topically 2 times daily To feet and toenails.   clindamycin (CLEOCIN-T) 1 % external gel 7/23/2025 Morning Self No Yes   Sig: Apply topically 2 times daily.   emtricitabine-tenofovir AF (DESCOVY) 200-25 MG per tablet 7/22/2025 Evening  Yes Yes   Sig: Take 1 tablet by mouth at bedtime.   gabapentin (NEURONTIN) 300 MG capsule 7/23/2025 Morning Self No Yes   Sig: Take 2 capsules (600 mg) by mouth 2 times daily.   levothyroxine (SYNTHROID/LEVOTHROID) 112 MCG tablet 7/23/2025 Morning Self No Yes   Sig: TAKE 1 TABLET(112 MCG) BY MOUTH DAILY   lisinopril (ZESTRIL) 20 MG tablet 7/23/2025 Morning Self No Yes   Sig: Take 1 tablet (20 mg) by mouth 2 times daily   naproxen (NAPROSYN) 250 MG tablet  Self Yes Yes   Sig: Take 250 mg by  mouth 2 times daily as needed for moderate pain.   psyllium (METAMUCIL) 58.6 % POWD 7/22/2025 Evening Self Yes Yes   Sig: Take by mouth daily as needed.      Facility-Administered Medications: None     Allergies   No Known Allergies    Physical Exam   Vital Signs: Temp: 98.6  F (37  C) Temp src: Oral BP: 139/82 Pulse: 72   Resp: 16 SpO2: 97 %      Weight: 0 lbs 0 oz    Constitutional: Well-appearing, NAD  Respiratory: Clear to auscultation bilaterally, good air movement, normal effort   Cardiovascular: RRR, no m/r/g. No peripheral edema.    GI: Soft, tender to palpation in RLQ without rebound tenderness or guarding.    Skin: Warm, dry   Neurologic: Alert. Responding to questions appropriately. Following commands.    Psychiatric: Normal affect, appropriate      Data   Data reviewed today: I reviewed all medications, new labs and imaging results over the last 24 hours. I personally reviewed no images or EKG's today.    Recent Labs   Lab 07/23/25 2007 07/23/25 2006 07/23/25  1630   WBC  --   --  16.7*   HGB  --   --  14.9   MCV  --   --  96   PLT  --   --  278   *  --   --    POTASSIUM 4.4  --   --    CHLORIDE 93*  --   --    CO2 24  --   --    BUN 22.4  --   --    CR 1.29* 1.4*  --    ANIONGAP 13  --   --    PORTIA 9.6  --   --    GLC 80  --   --    ALBUMIN 4.0  --   --    PROTTOTAL 7.6  --   --    BILITOTAL 1.0  --   --    ALKPHOS 144  --   --    ALT 11  --   --    AST 15  --   --    LIPASE 14  --   --        Recent Results (from the past 24 hours)   CT Abdomen Pelvis w Contrast    Narrative    EXAM: CT ABDOMEN PELVIS W CONTRAST  LOCATION: [facility]  DATE: 7/23/2025    INDICATION: RLQ pain  COMPARISON: None.  TECHNIQUE: CT scan of the abdomen and pelvis was performed following injection of IV contrast. Multiplanar reformats were obtained. Dose reduction techniques were used.  CONTRAST: 113 mL Isovue 370    FINDINGS:   LOWER CHEST: Subsegmental atelectasis within the posterior aspect of  the bilateral lung bases.    HEPATOBILIARY: Normal.    PANCREAS: Normal.    SPLEEN: Normal.    ADRENAL GLANDS: Normal    KIDNEYS/BLADDER: Bilateral kidneys are within normal limits. No ureteral dilatation. Moderately distended bladder..    BOWEL: Stomach and proximal small bowel are within normal limits. Mildly dilated mid small bowel. Thick-walled segment of normal caliber small bowel at the transition area (images 111-142). No acute angulation to suggest adhesions. Normal caliber small   bowel distal to this. There are several loops of distal small bowel which demonstrate inflammatory changes within the right lower quadrant, adjacent to the transition segment, including the terminal ileum. Appendiceal wall hyperenhancement with mild   dilatation in the region of the space measuring 14 mm. The more distal appendix is within normal limits in caliber. Normal caliber colon. Mild diverticulosis of the distal descending colon. No evidence for acute diverticulitis.    LYMPH NODES: No lymphadenopathy.    VASCULATURE: Moderate calcified atherosclerotic changes. No abdominal aortic aneurysm.    PELVIC ORGANS: Normal.    MUSCULOSKELETAL: Multilevel degenerative changes throughout the lumbar spine. Degenerative changes, lower lumbar facet joints.      Impression    IMPRESSION:   1.  Mildly dilated mid small bowel with a thick-walled transition inflamed appearing small bowel transition segment within the right lower quadrant. There are several, adjacent loops of distal small bowel which also demonstrate inflammatory changes   within the attending the terminal ileum. . Findings may be secondary to small bowel inflammation/infection, such as seen with Crohn's disease.  2.  Hyperenhancing, mildly dilated appendix which may be secondarily involved by the small bowel inflammatory process. The small bowel inflammatory changes do not appear to be secondary to acute appendicitis, as the inflammatory changes are more   prominent within  the small bowel.

## 2025-07-24 NOTE — PLAN OF CARE
Goal Outcome Evaluation:       DATE & TIME: 7/24/25 night shift    Cognitive Concerns/ Orientation : A/O x4, forgetful at times   BEHAVIOR & AGGRESSION TOOL COLOR: Green  CIWA SCORE: NA   ABNL VS/O2: VSS on RA  MOBILITY: SBA assist with a cane, Independent at baseline with a cane, history of multiple fall, per patient report  PAIN MANAGMENT: endorsed abdominal discomfort, but declined intervention at this time  DIET: NPO for consults  BOWEL/BLADDER: continent of B&B  ABNL LAB/BG: Na 130, Cr 1.29, WBC 16.7  DRAIN/DEVICES: R PIV infusing LR at 100 ml/hr  TELEMETRY RHYTHM: NA  SKIN: abrasion on L lower inner leg, scattered scabs, bruises and dry/peeling skin  TESTS/PROCEDURES: Non scheduled  D/C DAY/GOALS/PLACE: TBD  OTHER IMPORTANT INFO: GI, surgery consulted  Started on IV ABX

## 2025-07-24 NOTE — PROGRESS NOTES
Admission    Patient arrives to room 614 via cart from ED.  Care plan note: will follow    Inpatient nursing criteria listed below were met:    Did you put disposition on whiteboard and in sticky note: Yes  Full skin assessment done (add LDA if skin issue present). Initials of 2nd RN :Yes, JOEL  Isolation education started/completed NA  Patient allergies verified with patient: NA  Fall Risk? (Care plan updated, Education given and documented) Yes  Primary Care Plan initiated: Yes  Home medications documented in belongings flowsheet: NA  Patient belongings documented in belongings flowsheet: Yes  Reminder note (belongings/ medications) placed in discharge instructions:NA  Admission profile/ required documentation complete: Yes  If patient is a 72 hour hold/Commitment are belongings removed from room and locked up? NA

## 2025-07-24 NOTE — PROGRESS NOTES
Surgery Note    Chart reviewed. I spoke with Adrian Whitt PA-C in the ER regarding Rainer. I reviewed his chart, labs and imaging. Pt presents with abdominal pain since the weekend. Some mild nausea. No emesis. WBC elevated 16. CT abdomen concerning for inflammation of terminal ileum and distal small bowel with reactive changes involving appendix concerning for possible inflammatory bowel disease. No evidence of perforation, free air, abscess. Pt tender but not peritoneal per report.     I would recommend admisison to the hospitalist service, IV abx, GI consult ([facility] consulted) and surgery will follow closely along.   - NPO, IVF  - ok for ice chips, sips with meds  - continue rohan Li MD  [facility]  [phone]

## 2025-07-24 NOTE — PROGRESS NOTES
[facility]    Medicine Progress Note - Hospitalist Service    Date of Admission:  7/23/2025    Assessment & Plan     ranjit Fermin is a 65 year old male with history of HIV, who presented on 7/23/2025 with RLQ pain and 1 episode of diarrhea.      CT abd/pelvis showed - mildly dilated and inflamed mid small bowel in RLQ with adjacent loops of distal small bowel appearing inflamed within the terminal ileum, hyperenhancing mildly dilated appendix suspected to be due to small bowel inflammatory process, less likely acute appendicitis     Acute enteritis with possible partial small bowel obstruction -most likely infectious versus NSAID induced.  Less likely IBD (Crohn's)   Abdominal pain due to above  -Considering patient had acute onset of symptoms with nausea, diarrhea, pain, infection is most likely.  Less likely to be due to Crohn's disease.  CRP improving with antibiotics 417 -> 347  -[facility] following.  Continue antibiotics   -Start on clear liquid diet and advance diet as tolerated   -Outpatient colonoscopy with ileal evaluation in 1 month   -continue IV fluids  -monitor labs      HIV with peripheral neuropathy   *Followed by [facility]. Last HIV load detectable 3/2024, CD4 >1000.   *Reports compliance with HAART  - CD4 count pending    - Continue prior to admission HAART  - Continue prior to admission gabapentin, duloxetine      Chronic kidney disease, stage 2  Baseline creatinine 1-1.1. Creatinine mildly elevated to 1.29 on admission in setting of poor intake since symptom onset.  Improved to 1.18 with IV fluids  - Continue IVF.  Monitor labs.  Hold lisinopril.     Mild chronic hyponatremia  Sodium 130 on admission.  Remains stable.  Records show longstanding history of mild hyponatremia  -Continue IV fluids and monitor sodium.      Essential hypertension  - Continue prior to admission amlodipine  - Due to mild bradycardia, decrease atenolol to 50 mg daily   - Hold prior to admission  "lisinopril as above     Hypothyroidism  Hx of Graves disease  - Continue prior to admission levothyroxine     Tobacco use disorder  - Encouraged cessation  - Declined nicotine replacement         Diet: Advance Diet as Tolerated: Clear Liquid Diet; Full Liquid Diet    DVT Prophylaxis: Pneumatic Compression Devices  Ferguson Catheter: Not present  Lines: None     Cardiac Monitoring: None  Code Status: Full Code      Clinically Significant Risk Factors Present on Admission         # Hyponatremia: Lowest Na = 129 mmol/L in last 2 days, will monitor as appropriate  # Hypochloremia: Lowest Cl = 93 mmol/L in last 2 days, will monitor as appropriate          # Hypertension: Noted on problem list           # Overweight: Estimated body mass index is 28.94 kg/m  as calculated from the following:    Height as of this encounter: 1.88 m (6' 2\").    Weight as of this encounter: 102.2 kg (225 lb 6.4 oz).              Social Drivers of Health    Tobacco Use: High Risk (7/23/2025)    Patient History     Smoking Tobacco Use: Every Day     Smokeless Tobacco Use: Never     Passive Exposure: Never   Physical Activity: Insufficiently Active (1/1/2025)    Exercise Vital Sign     Days of Exercise per Week: 3 days     Minutes of Exercise per Session: 20 min   Social Connections: Unknown (1/1/2025)    Social Connection and Isolation Panel [NHANES]     Frequency of Social Gatherings with Friends and Family: More than three times a week          Disposition Plan         Medically Ready for Discharge: Anticipated Tomorrow             Brandee Shelby MD  Hospitalist Service  [facility]  Securely message with [employer] (more info)  Text page via [employer] Paging/Directory   ______________________________________________________________________    Interval History     Patient reports he feels somewhat better than yesterday.  Feels hungry.  Reports he is not passing gas.  Has been afebrile.  Vital signs stable  RN reported heart rate " in 50s today    Physical Exam   Vital Signs: Temp: 98  F (36.7  C) Temp src: Oral BP: 122/65 Pulse: 57   Resp: 18 SpO2: 92 % O2 Device: None (Room air)    Weight: 225 lbs 6.4 oz    Constitutional - awake and alert, resting in bed, in no acute distress  GI - abdomen is soft, mild tenderness in LLQ  Integumentary - skin is warm and dry, no rashes or ulcers  Neurological - awake, alert and oriented x3.  Moving all 4 extremities, normal speech, no focal deficits    Medical Decision Making       45 MINUTES SPENT BY ME on the date of service doing chart review, history, exam, documentation & further activities per the note.      Data     I have personally reviewed the following data over the past 24 hrs:    13.2 (H)  \   13.4   / 235     129 (L) 96 (L) 19.3 /  81   4.9 22 1.18 (H) \     ALT: 11 AST: 15 AP: 144 TBILI: 1.0   ALB: 4.0 TOT PROTEIN: 7.6 LIPASE: 14     Procal: N/A CRP: 347.87 (H) Lactic Acid: N/A         Imaging results reviewed over the past 24 hrs:   Recent Results (from the past 24 hours)   CT Abdomen Pelvis w Contrast    Narrative    EXAM: CT ABDOMEN PELVIS W CONTRAST  LOCATION: [facility]  DATE: 7/23/2025    INDICATION: RLQ pain  COMPARISON: None.  TECHNIQUE: CT scan of the abdomen and pelvis was performed following injection of IV contrast. Multiplanar reformats were obtained. Dose reduction techniques were used.  CONTRAST: 113 mL Isovue 370    FINDINGS:   LOWER CHEST: Subsegmental atelectasis within the posterior aspect of the bilateral lung bases.    HEPATOBILIARY: Normal.    PANCREAS: Normal.    SPLEEN: Normal.    ADRENAL GLANDS: Normal    KIDNEYS/BLADDER: Bilateral kidneys are within normal limits. No ureteral dilatation. Moderately distended bladder..    BOWEL: Stomach and proximal small bowel are within normal limits. Mildly dilated mid small bowel. Thick-walled segment of normal caliber small bowel at the transition area (images 111-142). No acute angulation to suggest  adhesions. Normal caliber small   bowel distal to this. There are several loops of distal small bowel which demonstrate inflammatory changes within the right lower quadrant, adjacent to the transition segment, including the terminal ileum. Appendiceal wall hyperenhancement with mild   dilatation in the region of the space measuring 14 mm. The more distal appendix is within normal limits in caliber. Normal caliber colon. Mild diverticulosis of the distal descending colon. No evidence for acute diverticulitis.    LYMPH NODES: No lymphadenopathy.    VASCULATURE: Moderate calcified atherosclerotic changes. No abdominal aortic aneurysm.    PELVIC ORGANS: Normal.    MUSCULOSKELETAL: Multilevel degenerative changes throughout the lumbar spine. Degenerative changes, lower lumbar facet joints.      Impression    IMPRESSION:   1.  Mildly dilated mid small bowel with a thick-walled transition inflamed appearing small bowel transition segment within the right lower quadrant. There are several, adjacent loops of distal small bowel which also demonstrate inflammatory changes   within the attending the terminal ileum. . Findings may be secondary to small bowel inflammation/infection, such as seen with Crohn's disease.  2.  Hyperenhancing, mildly dilated appendix which may be secondarily involved by the small bowel inflammatory process. The small bowel inflammatory changes do not appear to be secondary to acute appendicitis, as the inflammatory changes are more   prominent within the small bowel.

## 2025-07-24 NOTE — CONSULTS
[facility] General Surgery Consultation    Nicolás Fermin MRN# [id number]   YOB: 1960 Age: 65 year old      Date of Admission:  7/23/2025  Date of Consult: 7/24/2025  Consult:     Tom Lares MD              Assessment and Plan:   Nicolás Fermin is a 65 year old male with HIV on HAART who is admitted for acute right lower quadrant pain.    Patient is quite tender in the right lower quadrant.  No acute abdomen at this point.  He does have a leukocytosis (16.7-->13.2), elevated CRP. CT shows inflamed terminal ileum but also an inflamed and dilated appendix.  GI was consulted and does not believe this is IBD and is more of an enteritis picture.  Given his acuity of pain, his exam, the finding of the appendix on CT and leukocytosis I recommend that we proceed to surgery to look at his appendix as it may be 2 processes happening at the same time where he is having an acute appendicitis.  He fortunately is currently nontoxic appearing with stable vital signs and is on IV antibiotics.  If his pain continues and does not improve I recommend we proceed with surgery.    Imaging and pathophysiology was reviewed and discussed with the patient.  The surgery, its pre-, larry-, postoperative course and its associated risk of benefits were discussed with the patient.  Patient voiced understanding and was given ample opportunity to ask questions.  Patient is agreeable with the plan.    PLAN:  --Tentatively plan for diagnostic laparoscopy, possible appendectomy for tomorrow morning if patient's pain does not improve.  -- Okay for clears diet and advance to full's this evening.  N.p.o. at midnight  -- Continue with IV antibiotics  -- Appreciate hospitalist and GI assistance in management and recommendations.  -- Okay for DVT prophylaxis   -- We will continue to follow along    Marco Delong MD            Chief Complaint:     Chief Complaint   Patient presents with    Abdominal Pain          "   History of Present Illness:   Nicolás Fermin is a 65 year old male who was seen in consultation at the request of Tom Lares MD who presented with acute right lower quadrant abdominal pain for the past 2 days.  He reports that this came on suddenly and has worsened over time.  He has had 1 episode of diarrhea since the onset of his pain and has been having soft stools.  He also reports some nausea but denies any vomiting.  He also denies any fevers.    Today at bedside he continues to have pain in his right lower quadrant.  It has not particularly improved since admission.  He has never had pain like this before.  He denies any prior abdominal surgeries.  He denies any family history of IBD.          Physical Exam:   Blood pressure 122/65, pulse 57, temperature 98  F (36.7  C), temperature source Oral, resp. rate 18, height 1.88 m (6' 2\"), weight 102.2 kg (225 lb 6.4 oz), SpO2 92%.  225 lbs 6.4 oz  General: Vital signs reviewed, in no apparent distress  Eyes: Anicteric  HENT: Normocephalic, atraumatic, trachea midline   Respiratory: Breathing nonlabored  Cardiovascular: Regular rate and rhythm  GI: Abdomen soft, nondistended, focally tender to palpation of the right lower quadrant.  +Rovsing sign.  No peritonitis.  Musculoskeletal: No gross deformities  Neurologic: Grossly nonfocal exam  Psychiatric: Normal mood, affect and insight  Integumentary: Warm and dry         Past Medical History:     Past Medical History:   Diagnosis Date    Asymptomatic human immunodeficiency virus (HIV) infection status (H)     Graves disease 2006    Hypertension     Peripheral neuropathy     Squamous cell carcinoma of skin, unspecified             Past Surgical History:     Past Surgical History:   Procedure Laterality Date    BIOPSY  6-2018    skin/pos    COLONOSCOPY      neg    INJECT EPIDURAL LUMBAR N/A 10/28/2024    Procedure: L4-5 interlaminar epidural steroid injection;  Surgeon: Anil Jalloh MD;  " Location: [facility] OR    INJECT EPIDURAL LUMBAR N/A 2/13/2025    Procedure: INJECTION, SPINE, LUMBAR, EPIDURAL (L2-L3);  Surgeon: Nato Matute MD;  Location: [facility] OR            Current Medications:         Current Facility-Administered Medications   Medication Dose Route Frequency Provider Last Rate Last Admin    amitriptyline (ELAVIL) tablet 50 mg  50 mg Oral At Bedtime Tom Lares MD   50 mg at 07/24/25 0133    amLODIPine (NORVASC) tablet 10 mg  10 mg Oral Daily Tom Lares MD   10 mg at 07/24/25 0821    [START ON 7/25/2025] atenolol (TENORMIN) tablet 50 mg  50 mg Oral Daily Brandee Shelby MD        dolutegravir (TIVICAY) tablet 50 mg  50 mg Oral At Bedtime Tom Lares MD   50 mg at 07/24/25 0220    DULoxetine (CYMBALTA) DR capsule 60 mg  60 mg Oral Daily Tom Lares MD   60 mg at 07/24/25 0816    emtricitabine-tenofovir AF (DESCOVY) 200-25 MG per tablet 1 tablet  1 tablet Oral At Bedtime Tom Lares MD   1 tablet at 07/24/25 0221    gabapentin (NEURONTIN) capsule 600 mg  600 mg Oral BID Tom Lares MD   600 mg at 07/24/25 0816    levothyroxine (SYNTHROID/LEVOTHROID) tablet 112 mcg  112 mcg Oral QAM AC Tom Lares MD   112 mcg at 07/24/25 0705    [Held by provider] lisinopril (ZESTRIL) tablet 20 mg  20 mg Oral BID Tom Lares MD        piperacillin-tazobactam (ZOSYN) 3.375 g vial to attach to  mL bag  3.375 g Intravenous Q6H Adrian Whitt PA-C   3.375 g at 07/24/25 1006    sodium chloride (PF) 0.9% PF flush 3 mL  3 mL Intracatheter Q8H Tom Lares MD   3 mL at 07/24/25 0134       Current Facility-Administered Medications   Medication Dose Route Frequency Provider Last Rate Last Admin    acetaminophen (TYLENOL) tablet 650 mg  650 mg Oral Q4H PRN Tom Lares MD        Or    acetaminophen (TYLENOL) Suppository 650 mg  650 mg Rectal Q4H PRN Tom Lares MD        benzocaine-menthol  (CHLORASEPTIC) 6-10 MG lozenge 1 lozenge  1 lozenge Buccal Q1H PRN Tom Lares MD        bisacodyl (DULCOLAX) suppository 10 mg  10 mg Rectal Daily PRN Tom Lares MD        HYDROmorphone (DILAUDID) injection 0.2 mg  0.2 mg Intravenous Q2H PRN Tom Lares MD        HYDROmorphone (DILAUDID) injection 0.4 mg  0.4 mg Intravenous Q2H PRN Tom Lares MD        lidocaine (LMX4) cream   Topical Q1H PRN Tom Lares MD        lidocaine 1 % 0.1-1 mL  0.1-1 mL Other Q1H PRN Tom Lares MD        melatonin tablet 1 mg  1 mg Oral At Bedtime PRN Tom Lares MD        naloxone (NARCAN) injection 0.2 mg  0.2 mg Intravenous Q2 Min PRN Adrian Whitt PA-C        Or    naloxone (NARCAN) injection 0.4 mg  0.4 mg Intravenous Q2 Min PRN Adrian Whitt PA-C        Or    naloxone (NARCAN) injection 0.2 mg  0.2 mg Intramuscular Q2 Min PRN Adrian Whitt PA-C        Or    naloxone (NARCAN) injection 0.4 mg  0.4 mg Intramuscular Q2 Min PRN Adrian Whitt PA-C        ondansetron (ZOFRAN ODT) ODT tab 4 mg  4 mg Oral Q6H PRN Tom Lares MD        Or    ondansetron (ZOFRAN) injection 4 mg  4 mg Intravenous Q6H PRN Tom Lares MD        oxyCODONE (ROXICODONE) tablet 5 mg  5 mg Oral Q4H PRN Tom Lares MD        oxyCODONE IR (ROXICODONE) half-tab 2.5 mg  2.5 mg Oral Q4H PRN Tom Lares MD        polyethylene glycol (MIRALAX) Packet 17 g  17 g Oral BID PRN Tom Lares MD        prochlorperazine (COMPAZINE) injection 5 mg  5 mg Intravenous Q6H PRTom Melendez MD        Or    prochlorperazine (COMPAZINE) tablet 5 mg  5 mg Oral Q6H PRN Tom Lares MD        senna-docusate (SENOKOT-S/PERICOLACE) 8.6-50 MG per tablet 1 tablet  1 tablet Oral BID PRN Tom Lares MD        Or    senna-docusate (SENOKOT-S/PERICOLACE) 8.6-50 MG per tablet 2 tablet  2 tablet Oral  BID PRN Tom Lares MD        sodium chloride (PF) 0.9% PF flush 3 mL  3 mL Intracatheter q1 min prn Tom Lares MD                Home Medications:     Prior to Admission medications   Medication Sig Last Dose Taking? Auth Provider Long Term End Date   acetaminophen (TYLENOL) 325 MG tablet Take 325-650 mg by mouth every 6 hours as needed for mild pain.  Yes Reported, Patient     amitriptyline (ELAVIL) 50 MG tablet Take 1 tablet (50 mg) by mouth at bedtime. 7/22/2025 Evening Yes Stone Nowak MD Yes    amLODIPine (NORVASC) 10 MG tablet Take 1 tablet (10 mg) by mouth daily. 7/23/2025 Morning Yes Brooks Walsh MD Yes    atenolol (TENORMIN) 100 MG tablet Take 1 tablet (100 mg) by mouth daily 7/23/2025 Morning Yes Brooks Walsh MD Yes    ciclopirox (LOPROX) 0.77 % cream Apply topically 2 times daily To feet and toenails.  Yes Timoteo Gross DPM     clindamycin (CLEOCIN-T) 1 % external gel Apply topically 2 times daily. 7/23/2025 Morning Yes Sophia Degroot, ESTEVAN     DiphenhydrAMINE HCl (BENADRYL PO) Take 25 mg by mouth nightly as needed.  Yes Reported, Patient     Dolutegravir Sodium (TIVICAY PO) Take 50 mg by mouth at bedtime. 7/22/2025 Evening Yes Reported, Patient     emtricitabine-tenofovir AF (DESCOVY) 200-25 MG per tablet Take 1 tablet by mouth at bedtime. 7/22/2025 Evening Yes Unknown, Entered By History     gabapentin (NEURONTIN) 300 MG capsule Take 2 capsules (600 mg) by mouth 2 times daily. 7/23/2025 Morning Yes Stone Nowak MD Yes    levothyroxine (SYNTHROID/LEVOTHROID) 112 MCG tablet TAKE 1 TABLET(112 MCG) BY MOUTH DAILY 7/23/2025 Morning Yes Brooks Walsh MD Yes    lisinopril (ZESTRIL) 20 MG tablet Take 1 tablet (20 mg) by mouth 2 times daily 7/23/2025 Morning Yes Brooks Walsh MD Yes    naproxen (NAPROSYN) 250 MG tablet Take 250 mg by mouth 2 times daily as needed for moderate pain.  Yes Unknown, Entered By History     Omega-3  Fatty Acids (FISH OIL) 500 MG CAPS Take 500 mg by mouth at bedtime. 7/22/2025 Evening Yes Reported, Patient     psyllium (METAMUCIL) 58.6 % POWD Take by mouth daily as needed. 7/22/2025 Evening Yes Reported, Patient     DULoxetine (CYMBALTA) 60 MG capsule Take 1 capsule (60 mg) by mouth daily.   Stone Nowak MD Yes             Allergies:   No Known Allergies         Family History:     Family History   Problem Relation Age of Onset    Thyroid Disease Mother     Breast Cancer Mother     Skin Cancer Mother         face    Hypertension Father     Skin Cancer Father         behind ear    Diabetes Maternal Grandmother     Diabetes Maternal Grandfather     Coronary Artery Disease Paternal Grandmother     Inflammatory Bowel Disease No family hx of            Social History:   Nicolás Fermin  reports that he has been smoking cigarettes. He started smoking about 50 years ago. He has a 25.6 pack-year smoking history. He has never been exposed to tobacco smoke. He has never used smokeless tobacco. He reports current alcohol use. He reports current drug use. Drug: Marijuana.          Review of Systems:   Constitutional: No fevers or chills  Eyes: No blurred or double vision  HENT: Denies headaches, No rhinorrhea, No sore throat  Respiratory: No cough or shortness of breath  Cardiovascular: Denies chest pain or palpitations  Gastrointestinal: No abdominal pain or nausea/vomiting  Genitourinary: No hematuria or dysuria  Musculoskeletal: Denies arthralgias or myalgias  Neurologic: No numbness or tingling  Integumentary: No skin rashes         Labs/Imaging   All new lab and imaging data was reviewed.   Recent Labs   Lab 07/24/25  0655 07/23/25  1630   WBC 13.2* 16.7*   HGB 13.4 14.9   HCT 39.2* 42.5   MCV 98 96    278     Recent Labs   Lab 07/24/25  0655 07/23/25 2007 07/23/25 2006   * 130*  --    POTASSIUM 4.9 4.4  --    CHLORIDE 96* 93*  --    CO2 22 24  --    ANIONGAP 11 13  --    GLC 81 80  " --    BUN 19.3 22.4  --    CR 1.18* 1.29* 1.4*   GFRESTIMATED 68 61 55*   PORTIA 9.2 9.6  --    PROTTOTAL  --  7.6  --    ALBUMIN  --  4.0  --    BILITOTAL  --  1.0  --    ALKPHOS  --  144  --    AST  --  15  --    ALT  --  11  --      No results for input(s): \"LACT\" in the last 168 hours.    I have personally reviewed the imaging studies-   CT abd  IMPRESSION:   1.  Mildly dilated mid small bowel with a thick-walled transition inflamed appearing small bowel transition segment within the right lower quadrant. There are several, adjacent loops of distal small bowel which also demonstrate inflammatory changes   within the attending the terminal ileum. . Findings may be secondary to small bowel inflammation/infection, such as seen with Crohn's disease.  2.  Hyperenhancing, mildly dilated appendix which may be secondarily involved by the small bowel inflammatory process. The small bowel inflammatory changes do not appear to be secondary to acute appendicitis, as the inflammatory changes are more   prominent within the small bowel.    "

## 2025-07-24 NOTE — CONSULTS
GASTROENTEROLOGY CONSULTATION      Nicolás Fermin  [address] [facility] [address]  65 year old male     Admission Date/Time: 7/23/2025  Primary Care Provider: Brooks Walsh     We were asked to see the patient in consultation by Dr. Shelby for evaluation of abdominal pain and abnormal CT.    ASSESSMENT:    1.  Enteritis with possible partial small bowel obstruction-acute onset without any prodrome GI symptoms would be unusual for Crohn's disease, thus it is more likely to be an infectious etiology or potentially NSAID enteritis.  Patient does have HIV, but his CD4 count has been high in the past on HAART, await recheck.    RECOMMENDATIONS:  - Okay to continue IV antibiotics for now  - Clear liquid diet, will advance slowly up to a mechanical soft diet/low fiber diet at least initially  - If patient is tolerating his diet, okay to discharge tomorrow  - Colonoscopy with ileal evaluation in 1 month-ordered at [facility]  - Follow-up in [facility] clinic shortly after colonoscopy-ordered at [facility]    See Oliveros MD   [facility]  [phone]      ________________________________________________________________________        HPI:  Nicolás Fermin is a 65 year old male who HIV on HAART with a CD4 count over thousand last year, Graves' disease, hypertension, peripheral neuropathy, squamous cell skin cancer.    Patient denies any prodrome symptoms.  He reports 2 days ago he developed a sharp periumbilical and lower abdominal pain which was initially 9 out of 10 and constant.  It is now 8 out of 10.  He had 1 episode of loose stool.  No melena or hematochezia.  He did have nausea, no vomiting.  He does take Advil 1 to 2 tablets/day.  No family history of Crohn's disease or ulcerative colitis.  No fevers or weight loss.     ROS: A comprehensive ten point review of systems was negative aside from those in mentioned in the HPI.      PAST MEDICAL HISTORY:  Past Medical History:   Diagnosis Date    Asymptomatic  human immunodeficiency virus (HIV) infection status (H)     Graves disease 2006    Hypertension     Peripheral neuropathy     Squamous cell carcinoma of skin, unspecified      PAST SURGICAL HISTORY:  Past Surgical History:   Procedure Laterality Date    BIOPSY  6-2018    skin/pos    COLONOSCOPY      neg    INJECT EPIDURAL LUMBAR N/A 10/28/2024    Procedure: L4-5 interlaminar epidural steroid injection;  Surgeon: Anil Jalloh MD;  Location: [facility] OR    INJECT EPIDURAL LUMBAR N/A 2/13/2025    Procedure: INJECTION, SPINE, LUMBAR, EPIDURAL (L2-L3);  Surgeon: Nato Matute MD;  Location: [facility] OR     SOCIAL HISTORY:  Social History     Tobacco Use    Smoking status: Every Day     Current packs/day: 0.50     Average packs/day: 0.5 packs/day for 51.2 years (25.6 ttl pk-yrs)     Types: Cigarettes     Start date: 4/13/1975     Passive exposure: Never    Smokeless tobacco: Never    Tobacco comments:     it sucks!   Vaping Use    Vaping status: Never Used   Substance Use Topics    Alcohol use: Yes     Comment: 2-3/day    Drug use: Yes     Types: Marijuana     Comment: minimal use of CBD/THC for chronic pain relief     FAMILY HISTORY:  Family History   Problem Relation Age of Onset    Thyroid Disease Mother     Breast Cancer Mother     Skin Cancer Mother         face    Hypertension Father     Skin Cancer Father         behind ear    Diabetes Maternal Grandmother     Diabetes Maternal Grandfather     Coronary Artery Disease Paternal Grandmother     Inflammatory Bowel Disease No family hx of      ALLERGIES: No Known Allergies  MEDICATIONS:   Prior to Admission medications   Medication Sig Start Date End Date Taking? Authorizing Provider   acetaminophen (TYLENOL) 325 MG tablet Take 325-650 mg by mouth every 6 hours as needed for mild pain.   Yes Reported, Patient   amitriptyline (ELAVIL) 50 MG tablet Take 1 tablet (50 mg) by mouth at bedtime. 5/15/25  Yes Stone Nowak MD   amLODIPine (NORVASC) 10  "MG tablet Take 1 tablet (10 mg) by mouth daily. 3/28/25  Yes Brooks Walsh MD   atenolol (TENORMIN) 100 MG tablet Take 1 tablet (100 mg) by mouth daily 6/16/25  Yes Brooks Walsh MD   ciclopirox (LOPROX) 0.77 % cream Apply topically 2 times daily To feet and toenails. 12/1/23  Yes Timoteo Gross DPM   clindamycin (CLEOCIN-T) 1 % external gel Apply topically 2 times daily. 7/18/25  Yes Sophia Degroot PA-C   DiphenhydrAMINE HCl (BENADRYL PO) Take 25 mg by mouth nightly as needed.   Yes Reported, Patient   Dolutegravir Sodium (TIVICAY PO) Take 50 mg by mouth at bedtime.   Yes Reported, Patient   emtricitabine-tenofovir AF (DESCOVY) 200-25 MG per tablet Take 1 tablet by mouth at bedtime.   Yes Unknown, Entered By History   gabapentin (NEURONTIN) 300 MG capsule Take 2 capsules (600 mg) by mouth 2 times daily. 5/15/25  Yes Stone Nowak MD   levothyroxine (SYNTHROID/LEVOTHROID) 112 MCG tablet TAKE 1 TABLET(112 MCG) BY MOUTH DAILY 4/28/25  Yes Brooks Walsh MD   lisinopril (ZESTRIL) 20 MG tablet Take 1 tablet (20 mg) by mouth 2 times daily 3/13/25  Yes Brooks Walsh MD   naproxen (NAPROSYN) 250 MG tablet Take 250 mg by mouth 2 times daily as needed for moderate pain.   Yes Unknown, Entered By History   Omega-3 Fatty Acids (FISH OIL) 500 MG CAPS Take 500 mg by mouth at bedtime.   Yes Reported, Patient   psyllium (METAMUCIL) 58.6 % POWD Take by mouth daily as needed.   Yes Reported, Patient   DULoxetine (CYMBALTA) 60 MG capsule Take 1 capsule (60 mg) by mouth daily. 5/15/25   Stone Nowak MD     PHYSICAL EXAM:   /65 (BP Location: Right arm)   Pulse 57   Temp 98  F (36.7  C) (Oral)   Resp 18   Ht 1.88 m (6' 2\")   Wt 102.2 kg (225 lb 6.4 oz)   SpO2 92%   BMI 28.94 kg/m     GEN: Alert, NAD.  MISSY: AT, anicteric, OP without erythema, exudate, or ulcers.    LYMPH: No LAD noted.  HRT: RRR, no JOSE LUIS  LUNGS: CTA  ABD: +BS, mild diffuse tender, non-distended, no " rebound or guarding.  SKIN: No rash, jaundice   NEURO: MS intact       ADDITIONAL DATA:   I reviewed the patient's new clinical lab test results.   Recent Labs   Lab Test 07/24/25 0655 07/23/25 1630 01/02/25  0917   WBC 13.2* 16.7* 7.5   HGB 13.4 14.9 14.5   MCV 98 96 94    278 218     Recent Labs   Lab Test 07/24/25 0655 07/23/25 2007 07/23/25 2006 01/02/25  0917   * 130*  --  135   POTASSIUM 4.9 4.4  --  4.2   CHLORIDE 96* 93*  --  100   CO2 22 24  --  25   BUN 19.3 22.4  --  11.3   CR 1.18* 1.29* 1.4* 1.03   ANIONGAP 11 13  --  10   PORTIA 9.2 9.6  --  9.3   GLC 81 80  --  95     Recent Labs   Lab Test 07/23/25 2050 07/23/25 2007 01/02/25  0917 10/31/23  1325   ALBUMIN  --  4.0 4.4 4.5   BILITOTAL  --  1.0 0.7 0.8   ALT  --  11 17 16   AST  --  15 27 30   PROTEIN Negative  --   --   --    LIPASE  --  14  --   --         I reviewed the patient's new imaging results:    EXAM: CT ABDOMEN PELVIS W CONTRAST  LOCATION: [facility]  DATE: 7/23/2025     INDICATION: RLQ pain  COMPARISON: None.  TECHNIQUE: CT scan of the abdomen and pelvis was performed following injection of IV contrast. Multiplanar reformats were obtained. Dose reduction techniques were used.  CONTRAST: 113 mL Isovue 370     FINDINGS:   LOWER CHEST: Subsegmental atelectasis within the posterior aspect of the bilateral lung bases.     HEPATOBILIARY: Normal.     PANCREAS: Normal.     SPLEEN: Normal.     ADRENAL GLANDS: Normal     KIDNEYS/BLADDER: Bilateral kidneys are within normal limits. No ureteral dilatation. Moderately distended bladder..     BOWEL: Stomach and proximal small bowel are within normal limits. Mildly dilated mid small bowel. Thick-walled segment of normal caliber small bowel at the transition area (images [id number]). No acute angulation to suggest adhesions. Normal caliber small   bowel distal to this. There are several loops of distal small bowel which demonstrate inflammatory changes within the  right lower quadrant, adjacent to the transition segment, including the terminal ileum. Appendiceal wall hyperenhancement with mild   dilatation in the region of the space measuring 14 mm. The more distal appendix is within normal limits in caliber. Normal caliber colon. Mild diverticulosis of the distal descending colon. No evidence for acute diverticulitis.     LYMPH NODES: No lymphadenopathy.     VASCULATURE: Moderate calcified atherosclerotic changes. No abdominal aortic aneurysm.     PELVIC ORGANS: Normal.     MUSCULOSKELETAL: Multilevel degenerative changes throughout the lumbar spine. Degenerative changes, lower lumbar facet joints.                                                                      IMPRESSION:   1.  Mildly dilated mid small bowel with a thick-walled transition inflamed appearing small bowel transition segment within the right lower quadrant. There are several, adjacent loops of distal small bowel which also demonstrate inflammatory changes   within the attending the terminal ileum. . Findings may be secondary to small bowel inflammation/infection, such as seen with Crohn's disease.  2.  Hyperenhancing, mildly dilated appendix which may be secondarily involved by the small bowel inflammatory process. The small bowel inflammatory changes do not appear to be secondary to acute appendicitis, as the inflammatory changes are more   prominent within the small bowel.

## 2025-07-24 NOTE — PROGRESS NOTES
RECEIVING UNIT ED HANDOFF REVIEW    ED Nurse Handoff Report was reviewed by: Christel Vallecillo RN on July 23, 2025 at 11:55 PM

## 2025-07-25 LAB
ALLEN'S TEST: YES
ANION GAP SERPL CALCULATED.3IONS-SCNC: 8 MMOL/L (ref 7–15)
BASE EXCESS BLDA CALC-SCNC: -3.4 MMOL/L (ref -3–3)
BUN SERPL-MCNC: 14.5 MG/DL (ref 8–23)
CALCIUM SERPL-MCNC: 9.1 MG/DL (ref 8.8–10.4)
CHLORIDE SERPL-SCNC: 96 MMOL/L (ref 98–107)
CREAT SERPL-MCNC: 1.09 MG/DL (ref 0.67–1.17)
CRP SERPL-MCNC: 246.92 MG/L
EGFRCR SERPLBLD CKD-EPI 2021: 75 ML/MIN/1.73M2
ERYTHROCYTE [DISTWIDTH] IN BLOOD BY AUTOMATED COUNT: 13 % (ref 10–15)
GLUCOSE SERPL-MCNC: 98 MG/DL (ref 70–99)
HCO3 BLD-SCNC: 22 MMOL/L (ref 21–28)
HCO3 SERPL-SCNC: 26 MMOL/L (ref 22–29)
HCT VFR BLD AUTO: 42.1 % (ref 40–53)
HGB BLD-MCNC: 14.7 G/DL (ref 13.3–17.7)
LAB ORDER RESULT STATUS: NORMAL
Lab: NORMAL
MCH RBC QN AUTO: 33.6 PG (ref 26.5–33)
MCHC RBC AUTO-ENTMCNC: 34.9 G/DL (ref 31.5–36.5)
MCV RBC AUTO: 96 FL (ref 78–100)
O2/TOTAL GAS SETTING VFR VENT: 28 %
OXYHGB MFR BLDA: 91 % (ref 92–100)
PCO2 BLD: 40 MM HG (ref 35–45)
PERFORMING LABORATORY: NORMAL
PH BLD: 7.35 [PH] (ref 7.35–7.45)
PLATELET # BLD AUTO: 294 10E3/UL (ref 150–450)
PO2 BLD: 68 MM HG (ref 80–105)
POTASSIUM SERPL-SCNC: 3.9 MMOL/L (ref 3.4–5.3)
RBC # BLD AUTO: 4.37 10E6/UL (ref 4.4–5.9)
SAO2 % BLDA: 93.6 % (ref 95–96)
SODIUM SERPL-SCNC: 130 MMOL/L (ref 135–145)
TEST NAME: NORMAL
WBC # BLD AUTO: 11.6 10E3/UL (ref 4–11)

## 2025-07-25 PROCEDURE — 87496 CYTOMEG DNA AMP PROBE: CPT | Performed by: SURGERY

## 2025-07-25 PROCEDURE — 258N000003 HC RX IP 258 OP 636: Performed by: INTERNAL MEDICINE

## 2025-07-25 PROCEDURE — 88304 TISSUE EXAM BY PATHOLOGIST: CPT | Mod: TC | Performed by: SURGERY

## 2025-07-25 PROCEDURE — 370N000017 HC ANESTHESIA TECHNICAL FEE, PER MIN: Performed by: SURGERY

## 2025-07-25 PROCEDURE — 250N000011 HC RX IP 250 OP 636: Performed by: PHYSICIAN ASSISTANT

## 2025-07-25 PROCEDURE — 88304 TISSUE EXAM BY PATHOLOGIST: CPT | Mod: 26 | Performed by: PATHOLOGY

## 2025-07-25 PROCEDURE — 250N000013 HC RX MED GY IP 250 OP 250 PS 637: Performed by: PHYSICIAN ASSISTANT

## 2025-07-25 PROCEDURE — 82947 ASSAY GLUCOSE BLOOD QUANT: CPT | Performed by: INTERNAL MEDICINE

## 2025-07-25 PROCEDURE — 82805 BLOOD GASES W/O2 SATURATION: CPT | Performed by: INTERNAL MEDICINE

## 2025-07-25 PROCEDURE — 85018 HEMOGLOBIN: CPT | Performed by: INTERNAL MEDICINE

## 2025-07-25 PROCEDURE — 250N000025 HC SEVOFLURANE, PER MIN: Performed by: SURGERY

## 2025-07-25 PROCEDURE — 360N000076 HC SURGERY LEVEL 3, PER MIN: Performed by: SURGERY

## 2025-07-25 PROCEDURE — 999N000157 HC STATISTIC RCP TIME EA 10 MIN

## 2025-07-25 PROCEDURE — 250N000011 HC RX IP 250 OP 636: Performed by: INTERNAL MEDICINE

## 2025-07-25 PROCEDURE — 250N000013 HC RX MED GY IP 250 OP 250 PS 637: Performed by: INTERNAL MEDICINE

## 2025-07-25 PROCEDURE — 0DTJ4ZZ RESECTION OF APPENDIX, PERCUTANEOUS ENDOSCOPIC APPROACH: ICD-10-PCS | Performed by: SURGERY

## 2025-07-25 PROCEDURE — 36600 WITHDRAWAL OF ARTERIAL BLOOD: CPT

## 2025-07-25 PROCEDURE — 87075 CULTR BACTERIA EXCEPT BLOOD: CPT | Performed by: SURGERY

## 2025-07-25 PROCEDURE — 87102 FUNGUS ISOLATION CULTURE: CPT | Performed by: SURGERY

## 2025-07-25 PROCEDURE — 120N000001 HC R&B MED SURG/OB

## 2025-07-25 PROCEDURE — 36415 COLL VENOUS BLD VENIPUNCTURE: CPT | Performed by: INTERNAL MEDICINE

## 2025-07-25 PROCEDURE — 86140 C-REACTIVE PROTEIN: CPT | Performed by: INTERNAL MEDICINE

## 2025-07-25 PROCEDURE — 258N000003 HC RX IP 258 OP 636: Performed by: ANESTHESIOLOGY

## 2025-07-25 PROCEDURE — 258N000001 HC RX 258: Performed by: SURGERY

## 2025-07-25 PROCEDURE — 85014 HEMATOCRIT: CPT | Performed by: INTERNAL MEDICINE

## 2025-07-25 PROCEDURE — 99232 SBSQ HOSP IP/OBS MODERATE 35: CPT | Performed by: INTERNAL MEDICINE

## 2025-07-25 PROCEDURE — 272N000001 HC OR GENERAL SUPPLY STERILE: Performed by: SURGERY

## 2025-07-25 PROCEDURE — 710N000009 HC RECOVERY PHASE 1, LEVEL 1, PER MIN: Performed by: SURGERY

## 2025-07-25 PROCEDURE — 250N000011 HC RX IP 250 OP 636: Performed by: ANESTHESIOLOGY

## 2025-07-25 PROCEDURE — 87070 CULTURE OTHR SPECIMN AEROBIC: CPT | Performed by: SURGERY

## 2025-07-25 PROCEDURE — 250N000009 HC RX 250: Performed by: SURGERY

## 2025-07-25 PROCEDURE — 44970 LAPAROSCOPY APPENDECTOMY: CPT | Mod: 22 | Performed by: SURGERY

## 2025-07-25 PROCEDURE — 999N000141 HC STATISTIC PRE-PROCEDURE NURSING ASSESSMENT: Performed by: SURGERY

## 2025-07-25 PROCEDURE — 80051 ELECTROLYTE PANEL: CPT | Performed by: INTERNAL MEDICINE

## 2025-07-25 PROCEDURE — 44970 LAPAROSCOPY APPENDECTOMY: CPT | Mod: AS | Performed by: PHYSICIAN ASSISTANT

## 2025-07-25 RX ORDER — ENOXAPARIN SODIUM 100 MG/ML
40 INJECTION SUBCUTANEOUS EVERY 24 HOURS
Status: DISPENSED | OUTPATIENT
Start: 2025-07-25

## 2025-07-25 RX ORDER — BUPIVACAINE HCL/EPINEPHRINE 0.5-1:200K
VIAL (ML) INJECTION PRN
Status: DISCONTINUED | OUTPATIENT
Start: 2025-07-25 | End: 2025-07-25 | Stop reason: HOSPADM

## 2025-07-25 RX ORDER — NALOXONE HYDROCHLORIDE 0.4 MG/ML
0.1 INJECTION, SOLUTION INTRAMUSCULAR; INTRAVENOUS; SUBCUTANEOUS
Status: DISCONTINUED | OUTPATIENT
Start: 2025-07-25 | End: 2025-07-25 | Stop reason: HOSPADM

## 2025-07-25 RX ORDER — SIMETHICONE 80 MG
80 TABLET,CHEWABLE ORAL 4 TIMES DAILY
Status: DISPENSED | OUTPATIENT
Start: 2025-07-25

## 2025-07-25 RX ORDER — SODIUM CHLORIDE, SODIUM LACTATE, POTASSIUM CHLORIDE, CALCIUM CHLORIDE 600; 310; 30; 20 MG/100ML; MG/100ML; MG/100ML; MG/100ML
INJECTION, SOLUTION INTRAVENOUS CONTINUOUS
Status: DISCONTINUED | OUTPATIENT
Start: 2025-07-25 | End: 2025-07-25 | Stop reason: HOSPADM

## 2025-07-25 RX ORDER — AMOXICILLIN 250 MG
1 CAPSULE ORAL 2 TIMES DAILY
Status: DISPENSED | OUTPATIENT
Start: 2025-07-25

## 2025-07-25 RX ORDER — CEFAZOLIN SODIUM/WATER 2 G/20 ML
2 SYRINGE (ML) INTRAVENOUS SEE ADMIN INSTRUCTIONS
Status: DISCONTINUED | OUTPATIENT
Start: 2025-07-25 | End: 2025-07-25 | Stop reason: HOSPADM

## 2025-07-25 RX ORDER — HYDROMORPHONE HCL IN WATER/PF 6 MG/30 ML
0.4 PATIENT CONTROLLED ANALGESIA SYRINGE INTRAVENOUS EVERY 5 MIN PRN
Status: DISCONTINUED | OUTPATIENT
Start: 2025-07-25 | End: 2025-07-25 | Stop reason: HOSPADM

## 2025-07-25 RX ORDER — ALBUTEROL SULFATE 90 UG/1
2 INHALANT RESPIRATORY (INHALATION) EVERY 6 HOURS PRN
Status: ACTIVE | OUTPATIENT
Start: 2025-07-25

## 2025-07-25 RX ORDER — ONDANSETRON 2 MG/ML
4 INJECTION INTRAMUSCULAR; INTRAVENOUS EVERY 30 MIN PRN
Status: DISCONTINUED | OUTPATIENT
Start: 2025-07-25 | End: 2025-07-25 | Stop reason: HOSPADM

## 2025-07-25 RX ORDER — HYDRALAZINE HYDROCHLORIDE 20 MG/ML
2.5-5 INJECTION INTRAMUSCULAR; INTRAVENOUS
Status: DISCONTINUED | OUTPATIENT
Start: 2025-07-25 | End: 2025-07-25 | Stop reason: HOSPADM

## 2025-07-25 RX ORDER — DEXAMETHASONE SODIUM PHOSPHATE 4 MG/ML
4 INJECTION, SOLUTION INTRA-ARTICULAR; INTRALESIONAL; INTRAMUSCULAR; INTRAVENOUS; SOFT TISSUE
Status: DISCONTINUED | OUTPATIENT
Start: 2025-07-25 | End: 2025-07-25 | Stop reason: HOSPADM

## 2025-07-25 RX ORDER — CEFAZOLIN SODIUM/WATER 2 G/20 ML
2 SYRINGE (ML) INTRAVENOUS
Status: COMPLETED | OUTPATIENT
Start: 2025-07-25 | End: 2025-07-25

## 2025-07-25 RX ORDER — HYDROMORPHONE HCL IN WATER/PF 6 MG/30 ML
0.2 PATIENT CONTROLLED ANALGESIA SYRINGE INTRAVENOUS EVERY 5 MIN PRN
Status: DISCONTINUED | OUTPATIENT
Start: 2025-07-25 | End: 2025-07-25 | Stop reason: HOSPADM

## 2025-07-25 RX ORDER — LABETALOL HYDROCHLORIDE 5 MG/ML
5 INJECTION, SOLUTION INTRAVENOUS
Status: DISCONTINUED | OUTPATIENT
Start: 2025-07-25 | End: 2025-07-25 | Stop reason: HOSPADM

## 2025-07-25 RX ORDER — FENTANYL CITRATE 50 UG/ML
25 INJECTION, SOLUTION INTRAMUSCULAR; INTRAVENOUS EVERY 5 MIN PRN
Status: DISCONTINUED | OUTPATIENT
Start: 2025-07-25 | End: 2025-07-25 | Stop reason: HOSPADM

## 2025-07-25 RX ORDER — MEPERIDINE HYDROCHLORIDE 25 MG/ML
12.5 INJECTION INTRAMUSCULAR; INTRAVENOUS; SUBCUTANEOUS EVERY 5 MIN PRN
Status: DISCONTINUED | OUTPATIENT
Start: 2025-07-25 | End: 2025-07-25 | Stop reason: HOSPADM

## 2025-07-25 RX ORDER — FENTANYL CITRATE 50 UG/ML
50 INJECTION, SOLUTION INTRAMUSCULAR; INTRAVENOUS EVERY 5 MIN PRN
Status: DISCONTINUED | OUTPATIENT
Start: 2025-07-25 | End: 2025-07-25 | Stop reason: HOSPADM

## 2025-07-25 RX ORDER — ONDANSETRON 4 MG/1
4 TABLET, ORALLY DISINTEGRATING ORAL EVERY 30 MIN PRN
Status: DISCONTINUED | OUTPATIENT
Start: 2025-07-25 | End: 2025-07-25 | Stop reason: HOSPADM

## 2025-07-25 RX ORDER — MAGNESIUM HYDROXIDE 1200 MG/15ML
LIQUID ORAL PRN
Status: DISCONTINUED | OUTPATIENT
Start: 2025-07-25 | End: 2025-07-25 | Stop reason: HOSPADM

## 2025-07-25 RX ORDER — ALBUTEROL SULFATE 0.83 MG/ML
2.5 SOLUTION RESPIRATORY (INHALATION)
Status: DISCONTINUED | OUTPATIENT
Start: 2025-07-25 | End: 2025-07-25

## 2025-07-25 RX ADMIN — SIMETHICONE 80 MG: 80 TABLET, CHEWABLE ORAL at 18:06

## 2025-07-25 RX ADMIN — SIMETHICONE 80 MG: 80 TABLET, CHEWABLE ORAL at 14:45

## 2025-07-25 RX ADMIN — AMITRIPTYLINE HYDROCHLORIDE 50 MG: 25 TABLET, FILM COATED ORAL at 21:50

## 2025-07-25 RX ADMIN — PIPERACILLIN AND TAZOBACTAM 3.38 G: 3; .375 INJECTION, POWDER, FOR SOLUTION INTRAVENOUS at 05:36

## 2025-07-25 RX ADMIN — FAMOTIDINE 20 MG: 10 INJECTION, SOLUTION INTRAVENOUS at 22:07

## 2025-07-25 RX ADMIN — AMLODIPINE BESYLATE 10 MG: 10 TABLET ORAL at 07:28

## 2025-07-25 RX ADMIN — GABAPENTIN 600 MG: 300 CAPSULE ORAL at 07:28

## 2025-07-25 RX ADMIN — ATENOLOL 50 MG: 50 TABLET ORAL at 07:28

## 2025-07-25 RX ADMIN — GABAPENTIN 600 MG: 300 CAPSULE ORAL at 21:50

## 2025-07-25 RX ADMIN — DULOXETINE HYDROCHLORIDE 60 MG: 60 CAPSULE, DELAYED RELEASE PELLETS ORAL at 07:28

## 2025-07-25 RX ADMIN — FAMOTIDINE 20 MG: 10 INJECTION, SOLUTION INTRAVENOUS at 15:36

## 2025-07-25 RX ADMIN — PIPERACILLIN AND TAZOBACTAM 3.38 G: 3; .375 INJECTION, POWDER, FOR SOLUTION INTRAVENOUS at 21:49

## 2025-07-25 RX ADMIN — SENNOSIDES AND DOCUSATE SODIUM 1 TABLET: 50; 8.6 TABLET ORAL at 21:50

## 2025-07-25 RX ADMIN — FENTANYL CITRATE 50 MCG: 50 INJECTION, SOLUTION INTRAMUSCULAR; INTRAVENOUS at 12:52

## 2025-07-25 RX ADMIN — HYDROMORPHONE HYDROCHLORIDE 0.2 MG: 0.2 INJECTION, SOLUTION INTRAMUSCULAR; INTRAVENOUS; SUBCUTANEOUS at 15:52

## 2025-07-25 RX ADMIN — SODIUM CHLORIDE, SODIUM LACTATE, POTASSIUM CHLORIDE, AND CALCIUM CHLORIDE: .6; .31; .03; .02 INJECTION, SOLUTION INTRAVENOUS at 07:47

## 2025-07-25 RX ADMIN — LEVOTHYROXINE SODIUM 112 MCG: 0.11 TABLET ORAL at 06:12

## 2025-07-25 RX ADMIN — SIMETHICONE 80 MG: 80 TABLET, CHEWABLE ORAL at 21:50

## 2025-07-25 RX ADMIN — DOLUTEGRAVIR SODIUM 50 MG: 50 TABLET, FILM COATED ORAL at 22:10

## 2025-07-25 RX ADMIN — SODIUM CHLORIDE, SODIUM LACTATE, POTASSIUM CHLORIDE, AND CALCIUM CHLORIDE: .6; .31; .03; .02 INJECTION, SOLUTION INTRAVENOUS at 16:55

## 2025-07-25 RX ADMIN — PIPERACILLIN AND TAZOBACTAM 3.38 G: 3; .375 INJECTION, POWDER, FOR SOLUTION INTRAVENOUS at 15:15

## 2025-07-25 RX ADMIN — EMTRICITABINE AND TENOFOVIR ALAFENAMIDE 1 TABLET: 200; 25 TABLET ORAL at 21:55

## 2025-07-25 RX ADMIN — ENOXAPARIN SODIUM 40 MG: 40 INJECTION SUBCUTANEOUS at 21:50

## 2025-07-25 ASSESSMENT — ACTIVITIES OF DAILY LIVING (ADL)
ADLS_ACUITY_SCORE: 33

## 2025-07-25 NOTE — DISCHARGE INSTRUCTIONS
[facility]  Discharge Instructions: Post-Operative Appendectomy    ACTIVITY  Expect to feel tired after your surgery.  This will gradually resolve.    Take frequent, short walks and increase your activity gradually.    Avoid strenuous physical activity or heavy lifting greater than 15-20 lbs. for 2-3 weeks.  You may climb stairs.  You may drive without restrictions when you are not using any prescription pain medication and feel comfortable in a car.  You may return to work/school when you are comfortable without any prescription pain medication.    WOUND CARE  You may remove your outer dressing or Band-Aids and shower 48 hours after the surgery.  Pat your incisions dry and leave them open to air.  Re-apply dressing (Band-Aids or gauze/tape) as needed for comfort or drainage.  You may have steri-strips (looks like white tape) on your incision.  You may peel off the steri-strips 2 weeks after your surgery if they have not peeled off on their own.  If you have skin glue, it will peel up and fall off on its own.  Do not soak your incisions in a tub or pool for 2 weeks.   Do not apply any lotions, creams, or ointments to your incisions.  A ridge under your incisions is normal and will gradually resolve.    DIET  Start with liquids, then gradually resume your regular diet as tolerated.  Avoid heavy, spicy, and greasy meals for 2-3 days.  Drink plenty of fluids to stay hydrated.    PAIN  Expect some tenderness and discomfort at the incision sites.  Use the prescribed pain medication at your discretion.  Expect gradual resolution of your pain over several days.  You may take ibuprofen with food (unless you have been told not to) or acetaminophen/Tylenol instead of or in addition to your prescribed pain medication.  However, if you are taking Norco or Percocet, do not take any additional acetaminophen/Tylenol.  Do not drink alcohol or drive while you are taking pain medications.  You may apply  ice to your incisions in 20 minute intervals as needed for the next 48 hours.  After that time, consider switching to heat if you prefer.    EXPECTATIONS  Pain medications can cause constipation.  Limit use when possible.  Take an over the counter or prescribed stool softener/stimulant, such as Colace or Senna, 1-2 times a day with plenty of water.  You may take a mild over the counter laxative, such as Miralax or a suppository, as needed.  You make discontinue these medications once you are having regular bowel movements and/or are no longer taking your narcotic pain medication.   You may have shoulder or upper back discomfort due to the gas used in surgery.  This is temporary and should resolve in 48-72 hours.  Short, frequent walks may help with this.  If you are unable to urinate for 8 hours or feel as though you are not emptying your bladder adequately, we recommend you seek care at an ER or Urgent Care facility for possible catheter placement.     FOLLOW UP  Follow up in Acute Care Clinic (Dr. Delong or Dr. Pablo) in 2-3 weeks.  Call [phone] to schedule an appointment or if you have any concerns. We are located at [address] [place] [address].    CALL OUR OFFICE -[phone] IF YOU HAVE:   Chills or fever above 101 F.  Increased redness, warmth, or drainage at your incisions.  Significant bleeding.  Pain not relieved by your pain medication or rest.  Increasing pain after the first 48 hours.  Any other concerns or questions.             Revised October 2022

## 2025-07-25 NOTE — CONSULTS
"  GASTROENTEROLOGY PROGRESS NOTE     IMPRESSION:  Perforated appendicitis - unusual presentation on CT with the multifocal enteritis with PSBO. Appreciate Dr. Delong with general surgery.  Enteritis - presumably this is the perforated appendicitis, but still an unusual CT appearance. Thus, we will still pursue colonoscopy with ileal evaluation in 8 weeks.      RECOMMENDATIONS:  - Defer further post-op management to surgery  - Outpatient colonoscopy at [facility] in 8 weeks, ordered at [facility].  - We will sign off, please call with any questions or concerns.     See Oliveros MD  [facility]  [phone]      ________________________________________________________________________      SUBJECTIVE: Patient just returned to his room after surgery.        OBJECTIVE:  BP (!) 155/88 (Patient Position: Semi-Harp's, Cuff Size: Adult Regular)   Pulse 74   Temp 97.2  F (36.2  C)   Resp 12   Ht 1.88 m (6' 2\")   Wt 102.2 kg (225 lb 6.4 oz)   SpO2 95%   BMI 28.94 kg/m    Temp (24hrs), Av.7  F (36.5  C), Min:97.1  F (36.2  C), Max:98.8  F (37.1  C)    Patient Vitals for the past 72 hrs:   Weight   25 0023 102.2 kg (225 lb 6.4 oz)        PHYSICAL EXAM  deferred    Additional Data:  I have reviewed the patient's new clinical lab results:     Recent Labs   Lab Test 25  0625  1630   WBC 11.6* 13.2* 16.7*   HGB 14.7 13.4 14.9   MCV 96 98 96    235 278     Recent Labs   Lab Test 25  0625  0625   * 129* 130*   POTASSIUM 3.9 4.9 4.4   CHLORIDE 96* 96* 93*   CO2 26 22 24   BUN 14.5 19.3 22.4   CR 1.09 1.18* 1.29*   ANIONGAP 8 11 13   PORTIA 9.1 9.2 9.6   GLC 98 81 80     Recent Labs   Lab Test 25  0917 10/31/23  1325   ALBUMIN  --  4.0 4.4 4.5   BILITOTAL  --  1.0 0.7 0.8   ALT  --  11 17 16   AST  --  15 27 30   PROTEIN Negative  --   --   --    LIPASE  --  14  --   --           "

## 2025-07-25 NOTE — PLAN OF CARE
Goal Outcome Evaluation:       DATE & TIME: 7/24/25-7/25/25 [id number]    Cognitive Concerns/ Orientation : A/O x4, forgetful at times   BEHAVIOR & AGGRESSION TOOL COLOR: Green  CIWA SCORE: NA   ABNL VS/O2:  VSS on RA  MOBILITY: SBA assist with a cane, Independent at baseline with a cane, unsteady gait at times, history of multiple falls per patient report.  PAIN MANAGMENT: c/o abd pain/discomfort 8/10, gave PRN Oxycodone x1  DIET: full liquid diet, (NPO at midnight for possible procedure).   BOWEL/BLADDER: continent of B&B. BM x1  ABNL LAB/BG: Na 129, Cr 1.18, WBC 13.2, .87  DRAIN/DEVICES: R PIV infusing LR at 100 ml/hr  TELEMETRY RHYTHM: NA  SKIN: abrasion on L lower inner leg, scattered scabs, bruises and dry/peeling skin  TESTS/PROCEDURES: Plan for Possible diagnostic laparoscopy, possible appendectomy today if no improvement.   D/C DAY/GOALS/PLACE: TBD  OTHER IMPORTANT INFO: surgery note says possible procedure today if pain does not improve.   GI note says possible discharge today if pt is tolerating diet.   Continues on IV Zosyn q6h.

## 2025-07-25 NOTE — BRIEF OP NOTE
[facility]  General Surgery Brief Operative Note    Pre-operative diagnosis: Abnormal CT of the abdomen [R93.5]  RLQ pain   Post-operative diagnosis Same   Procedure: Procedure(s):  DIAGNOSTIC LAPAROSCOPY  Laparoscopic Appendectomy    Surgeon(s), Assistant(s): Surgeons and Role:     * Jodi Pablo MD - Primary     * Everardo Bean MD - Assisting     * Alison Horvath PA-C - Assisting   Estimated blood loss: *25mL   Drains: Giovanni-Snow   Specimens: ID Type Source Tests Collected by Time Destination   1 : APPENDIX Tissue Appendix SURGICAL PATHOLOGY EXAM Jodi Pablo MD 7/25/2025 11:32 AM    A : PERITONEAL FLUID Peritoneal Fluid Abdomen ANAEROBIC BACTERIAL CULTURE ROUTINE, FUNGAL OR YEAST CULTURE ROUTINE, AEROBIC BACTERIAL CULTURE ROUTINE Jodi Pablo MD 7/25/2025  9:10 AM    B : PERITONEAL FLUID Washings Peritoneum CYTOMEGALOVIRUS QUANT PCR NON BLOOD Jodi Pablo MD 7/25/2025  9:07 AM       Findings: Evidence of infection: organ space infection, abscess, secondary purulent peritonitis, and Perforated appendix.    Complications:  Condition: None  Stable   Comments:      Alison oHrvath PA-C  [facility]         See dictated operative report for full details

## 2025-07-25 NOTE — PLAN OF CARE
Goal Outcome Evaluation:      Plan of Care Reviewed With: patient    Overall Patient Progress: no changeOverall Patient Progress: no change         Summary: Presented with RLQ abdominal pain, nausea and diarrhea, abnormal CT of abdomen; concerns for inflammatory bowel disease. Hx falls at home, fall risk.    DATE & TIME: 07/25/25    Cognitive Concerns/ Orientation : A&O x4, forgetful at times.  BEHAVIOR & AGGRESSION TOOL COLOR: Green  ABNL VS/O2:  VSS on RA  MOBILITY: SBA assist with a cane. Ind with cane at baseline.   PAIN MANAGMENT: PRN dilaudid 0.2mg effective for c/o surg pain.   DIET: NPO x meds.  BOWEL/BLADDER: Continent of B&B.  ABNL LAB: Na 130. WBC 11.6. .92.  DRAIN/DEVICES: R PIV infusing LR at 100 ml/hr. NG tube to LIS. LLQ ERICK drain.   SKIN: Abrasion on L lower inner leg, scattered scabs, bruises and dry/peeling skin. Abd lap sites x2 CDI.   TESTS/PROCEDURES: S/P diagnostic exp lap, appendectomy, NG tube and ERICK drain.   D/C DAY/GOALS/PLACE: Home when ready.  OTHER IMPORTANT INFO: Receiving IV Zosyn q6h. ERICK drain output pink. NG output minimal green drainage in tubing/canister. Sleeping between cares, arouses to voice, capno on.

## 2025-07-25 NOTE — OP NOTE
OPERATIVE NOTE:     PREOPERATIVE DIAGNOSIS: right lower quadrant phlegmon     POSTOPERATIVE DIAGNOSIS: same.     PROCEDURE PERFORMED: Laparoscopic appendectomy     SURGEON: Jodi Pablo MD  ASSISTANT: Alison Horvath PA-C was asked to assist for camera operation, exposure, hemostasis, counter-traction, closure.     ANESTHESIA: General endotracheal.     ESTIMATED BLOOD LOSS: 25 ml     FINDINGS:   The appendix was injected, dilated and markedly inflamed.   It was perforated in multiple areas.  There was turbid fluid throughout the abdomen.  The terminal ileum and cecum were inflamed by association.  The base of the appendix was also inflamed, but appropriate for stapling.  The small bowel was distended.     COMPLICATIONS: None.     SPECIMENS: Appendix.     OPERATIVE INDICATIONS: Nicolás Fermin is a 65 year old year old male with a history of lower quadrant abdominal pain.     CT scan of the abdomen and pelvis suggested appendicitis, inflammatory bowel disease or enteritis.     The patient understood the risks and benefits of proceeding with surgery. The patient has an   indication for laparoscopic appendectomy and consented to undergo surgery.     OPERATIVE DETAILS: The patient was brought to the operating room and prepared in a   routine fashion. A timeout was performed prior to surgery and documented by the nursing   team.     Under the benefits of general anesthesia, a left upper quadrant Veress needle was inserted and pneumoperitoneum was established using carbon dioxide gas to a maximum pressure of 15 mm Hg. A total of 3 ports were placed.     The patient was moved into a position of left lateral decubitus, Trendelenburg.    The anatomy of the right lower quadrant was entirely obscured by exudate and adhesions.  At least one  hour was spent dissecting out the small bowel from the cecum.      I requested that Dr. Bean scrub in at one point, to help identify the appendix.  The appendix was deep in the  posterior pelvis.     The appendix was elevated once the base and tip were clearly visualized and isolated. There were several perforations along the appendix.     Ligasure was used to divide a generous mesoappendix and clear a space for stapling.     The bowel load endoGIA stapler was then used to divide the appendix at its base.  A second load completed the resection.     The appendix was placed into a laparoscopic retrieval bag and removed from the body.     The abdomen was irrigated with saline and the saline was aspirated from the RLQ.     The terminal ileum was inspected as well as the staple line at the cecum.  The staple line was intact to compression.    A 19 Fr round drain was placed in the right lower quadrant and pelvis.     Hemostasis was confirmed.     The 12 mm incision was closed using a figure of eight 0 Vicryl suture.     All skin incisions were closed using 4-0 monocryl suture.

## 2025-07-26 LAB
ANION GAP SERPL CALCULATED.3IONS-SCNC: 8 MMOL/L (ref 7–15)
BUN SERPL-MCNC: 11.3 MG/DL (ref 8–23)
CALCIUM SERPL-MCNC: 8.6 MG/DL (ref 8.8–10.4)
CHLORIDE SERPL-SCNC: 101 MMOL/L (ref 98–107)
CREAT SERPL-MCNC: 1.02 MG/DL (ref 0.67–1.17)
EGFRCR SERPLBLD CKD-EPI 2021: 81 ML/MIN/1.73M2
ERYTHROCYTE [DISTWIDTH] IN BLOOD BY AUTOMATED COUNT: 12.9 % (ref 10–15)
GLUCOSE SERPL-MCNC: 95 MG/DL (ref 70–99)
HCO3 SERPL-SCNC: 25 MMOL/L (ref 22–29)
HCT VFR BLD AUTO: 40.1 % (ref 40–53)
HGB BLD-MCNC: 13.6 G/DL (ref 13.3–17.7)
MAGNESIUM SERPL-MCNC: 2 MG/DL (ref 1.7–2.3)
MCH RBC QN AUTO: 33.1 PG (ref 26.5–33)
MCHC RBC AUTO-ENTMCNC: 33.9 G/DL (ref 31.5–36.5)
MCV RBC AUTO: 98 FL (ref 78–100)
PHOSPHATE SERPL-MCNC: 3.1 MG/DL (ref 2.5–4.5)
PLATELET # BLD AUTO: 294 10E3/UL (ref 150–450)
POTASSIUM SERPL-SCNC: 4.7 MMOL/L (ref 3.4–5.3)
RBC # BLD AUTO: 4.11 10E6/UL (ref 4.4–5.9)
SODIUM SERPL-SCNC: 134 MMOL/L (ref 135–145)
WBC # BLD AUTO: 11.9 10E3/UL (ref 4–11)

## 2025-07-26 PROCEDURE — 250N000011 HC RX IP 250 OP 636: Performed by: INTERNAL MEDICINE

## 2025-07-26 PROCEDURE — 250N000011 HC RX IP 250 OP 636: Performed by: PHYSICIAN ASSISTANT

## 2025-07-26 PROCEDURE — 250N000013 HC RX MED GY IP 250 OP 250 PS 637: Performed by: INTERNAL MEDICINE

## 2025-07-26 PROCEDURE — 99232 SBSQ HOSP IP/OBS MODERATE 35: CPT | Performed by: INTERNAL MEDICINE

## 2025-07-26 PROCEDURE — HZ2ZZZZ DETOXIFICATION SERVICES FOR SUBSTANCE ABUSE TREATMENT: ICD-10-PCS | Performed by: INTERNAL MEDICINE

## 2025-07-26 PROCEDURE — 250N000013 HC RX MED GY IP 250 OP 250 PS 637: Performed by: STUDENT IN AN ORGANIZED HEALTH CARE EDUCATION/TRAINING PROGRAM

## 2025-07-26 PROCEDURE — 250N000013 HC RX MED GY IP 250 OP 250 PS 637: Performed by: PHYSICIAN ASSISTANT

## 2025-07-26 PROCEDURE — 80048 BASIC METABOLIC PNL TOTAL CA: CPT | Performed by: INTERNAL MEDICINE

## 2025-07-26 PROCEDURE — 84100 ASSAY OF PHOSPHORUS: CPT | Performed by: INTERNAL MEDICINE

## 2025-07-26 PROCEDURE — 85027 COMPLETE CBC AUTOMATED: CPT | Performed by: INTERNAL MEDICINE

## 2025-07-26 PROCEDURE — 36415 COLL VENOUS BLD VENIPUNCTURE: CPT | Performed by: INTERNAL MEDICINE

## 2025-07-26 PROCEDURE — 258N000003 HC RX IP 258 OP 636: Performed by: INTERNAL MEDICINE

## 2025-07-26 PROCEDURE — 93005 ELECTROCARDIOGRAM TRACING: CPT

## 2025-07-26 PROCEDURE — 83735 ASSAY OF MAGNESIUM: CPT | Performed by: INTERNAL MEDICINE

## 2025-07-26 PROCEDURE — 120N000001 HC R&B MED SURG/OB

## 2025-07-26 RX ORDER — METHOCARBAMOL 500 MG/1
500 TABLET, FILM COATED ORAL 4 TIMES DAILY
Status: DISCONTINUED | OUTPATIENT
Start: 2025-07-26 | End: 2025-07-29

## 2025-07-26 RX ORDER — KETOROLAC TROMETHAMINE 15 MG/ML
15 INJECTION, SOLUTION INTRAMUSCULAR; INTRAVENOUS EVERY 6 HOURS PRN
Status: ACTIVE | OUTPATIENT
Start: 2025-07-26 | End: 2025-07-31

## 2025-07-26 RX ORDER — ACETAMINOPHEN 325 MG/1
975 TABLET ORAL EVERY 8 HOURS
Status: DISCONTINUED | OUTPATIENT
Start: 2025-07-26 | End: 2025-07-29

## 2025-07-26 RX ORDER — FLUMAZENIL 0.1 MG/ML
0.2 INJECTION, SOLUTION INTRAVENOUS
Status: DISCONTINUED | OUTPATIENT
Start: 2025-07-26 | End: 2025-07-28

## 2025-07-26 RX ORDER — FOLIC ACID 1 MG/1
1 TABLET ORAL DAILY
Status: DISPENSED | OUTPATIENT
Start: 2025-07-26

## 2025-07-26 RX ORDER — DIAZEPAM 5 MG/1
10 TABLET ORAL EVERY 30 MIN PRN
Status: DISCONTINUED | OUTPATIENT
Start: 2025-07-26 | End: 2025-07-28

## 2025-07-26 RX ORDER — GABAPENTIN 600 MG/1
1200 TABLET ORAL ONCE
Status: DISCONTINUED | OUTPATIENT
Start: 2025-07-26 | End: 2025-07-26

## 2025-07-26 RX ORDER — HALOPERIDOL 5 MG/ML
2.5-5 INJECTION INTRAMUSCULAR EVERY 6 HOURS PRN
Status: ACTIVE | OUTPATIENT
Start: 2025-07-26

## 2025-07-26 RX ORDER — THIAMINE HYDROCHLORIDE 100 MG/ML
100 INJECTION, SOLUTION INTRAMUSCULAR; INTRAVENOUS DAILY
Status: DISCONTINUED | OUTPATIENT
Start: 2025-07-26 | End: 2025-07-26

## 2025-07-26 RX ORDER — DIAZEPAM 10 MG/2ML
5-10 INJECTION, SOLUTION INTRAMUSCULAR; INTRAVENOUS EVERY 30 MIN PRN
Status: DISCONTINUED | OUTPATIENT
Start: 2025-07-26 | End: 2025-07-28

## 2025-07-26 RX ORDER — OLANZAPINE 5 MG/1
5-10 TABLET, ORALLY DISINTEGRATING ORAL EVERY 6 HOURS PRN
Status: ACTIVE | OUTPATIENT
Start: 2025-07-26

## 2025-07-26 RX ORDER — NICOTINE 21 MG/24HR
1 PATCH, TRANSDERMAL 24 HOURS TRANSDERMAL DAILY
Status: DISPENSED | OUTPATIENT
Start: 2025-07-26

## 2025-07-26 RX ORDER — FOLIC ACID 5 MG/ML
1 INJECTION, SOLUTION INTRAMUSCULAR; INTRAVENOUS; SUBCUTANEOUS DAILY
Status: DISCONTINUED | OUTPATIENT
Start: 2025-07-26 | End: 2025-07-26

## 2025-07-26 RX ORDER — MULTIPLE VITAMINS W/ MINERALS TAB 9MG-400MCG
1 TAB ORAL DAILY
Status: DISCONTINUED | OUTPATIENT
Start: 2025-07-26 | End: 2025-07-26

## 2025-07-26 RX ADMIN — METHOCARBAMOL 500 MG: 500 TABLET ORAL at 13:18

## 2025-07-26 RX ADMIN — ENOXAPARIN SODIUM 40 MG: 40 INJECTION SUBCUTANEOUS at 21:06

## 2025-07-26 RX ADMIN — ACETAMINOPHEN 975 MG: 325 TABLET ORAL at 13:18

## 2025-07-26 RX ADMIN — METHOCARBAMOL 500 MG: 500 TABLET ORAL at 17:27

## 2025-07-26 RX ADMIN — SODIUM CHLORIDE, SODIUM LACTATE, POTASSIUM CHLORIDE, AND CALCIUM CHLORIDE: .6; .31; .03; .02 INJECTION, SOLUTION INTRAVENOUS at 11:31

## 2025-07-26 RX ADMIN — GABAPENTIN 600 MG: 300 CAPSULE ORAL at 07:47

## 2025-07-26 RX ADMIN — SODIUM CHLORIDE, SODIUM LACTATE, POTASSIUM CHLORIDE, AND CALCIUM CHLORIDE: .6; .31; .03; .02 INJECTION, SOLUTION INTRAVENOUS at 23:52

## 2025-07-26 RX ADMIN — LEVOTHYROXINE SODIUM 112 MCG: 0.11 TABLET ORAL at 05:55

## 2025-07-26 RX ADMIN — PIPERACILLIN AND TAZOBACTAM 3.38 G: 3; .375 INJECTION, POWDER, FOR SOLUTION INTRAVENOUS at 04:01

## 2025-07-26 RX ADMIN — THIAMINE HCL TAB 100 MG 100 MG: 100 TAB at 11:31

## 2025-07-26 RX ADMIN — NICOTINE 1 PATCH: 14 PATCH, EXTENDED RELEASE TRANSDERMAL at 17:27

## 2025-07-26 RX ADMIN — DULOXETINE HYDROCHLORIDE 60 MG: 60 CAPSULE, DELAYED RELEASE PELLETS ORAL at 07:47

## 2025-07-26 RX ADMIN — FAMOTIDINE 20 MG: 10 INJECTION, SOLUTION INTRAVENOUS at 22:18

## 2025-07-26 RX ADMIN — SIMETHICONE 80 MG: 80 TABLET, CHEWABLE ORAL at 07:47

## 2025-07-26 RX ADMIN — PIPERACILLIN AND TAZOBACTAM 3.38 G: 3; .375 INJECTION, POWDER, FOR SOLUTION INTRAVENOUS at 15:56

## 2025-07-26 RX ADMIN — SIMETHICONE 80 MG: 80 TABLET, CHEWABLE ORAL at 21:06

## 2025-07-26 RX ADMIN — DIAZEPAM 10 MG: 5 TABLET ORAL at 15:01

## 2025-07-26 RX ADMIN — SODIUM CHLORIDE, SODIUM LACTATE, POTASSIUM CHLORIDE, AND CALCIUM CHLORIDE: .6; .31; .03; .02 INJECTION, SOLUTION INTRAVENOUS at 02:22

## 2025-07-26 RX ADMIN — FOLIC ACID 1 MG: 1 TABLET ORAL at 11:31

## 2025-07-26 RX ADMIN — AMITRIPTYLINE HYDROCHLORIDE 50 MG: 25 TABLET, FILM COATED ORAL at 21:06

## 2025-07-26 RX ADMIN — SENNOSIDES AND DOCUSATE SODIUM 1 TABLET: 50; 8.6 TABLET ORAL at 07:47

## 2025-07-26 RX ADMIN — EMTRICITABINE AND TENOFOVIR ALAFENAMIDE 1 TABLET: 200; 25 TABLET ORAL at 21:06

## 2025-07-26 RX ADMIN — SIMETHICONE 80 MG: 80 TABLET, CHEWABLE ORAL at 17:27

## 2025-07-26 RX ADMIN — GABAPENTIN 600 MG: 300 CAPSULE ORAL at 21:06

## 2025-07-26 RX ADMIN — ACETAMINOPHEN 975 MG: 325 TABLET ORAL at 21:06

## 2025-07-26 RX ADMIN — AMLODIPINE BESYLATE 10 MG: 10 TABLET ORAL at 07:47

## 2025-07-26 RX ADMIN — DOLUTEGRAVIR SODIUM 50 MG: 50 TABLET, FILM COATED ORAL at 21:06

## 2025-07-26 RX ADMIN — PIPERACILLIN AND TAZOBACTAM 3.38 G: 3; .375 INJECTION, POWDER, FOR SOLUTION INTRAVENOUS at 21:07

## 2025-07-26 RX ADMIN — METHOCARBAMOL 500 MG: 500 TABLET ORAL at 21:06

## 2025-07-26 RX ADMIN — SENNOSIDES AND DOCUSATE SODIUM 1 TABLET: 50; 8.6 TABLET ORAL at 21:06

## 2025-07-26 RX ADMIN — SIMETHICONE 80 MG: 80 TABLET, CHEWABLE ORAL at 13:18

## 2025-07-26 RX ADMIN — PIPERACILLIN AND TAZOBACTAM 3.38 G: 3; .375 INJECTION, POWDER, FOR SOLUTION INTRAVENOUS at 10:05

## 2025-07-26 RX ADMIN — METHOCARBAMOL 500 MG: 500 TABLET ORAL at 10:07

## 2025-07-26 RX ADMIN — FAMOTIDINE 20 MG: 10 INJECTION, SOLUTION INTRAVENOUS at 07:52

## 2025-07-26 RX ADMIN — ATENOLOL 50 MG: 50 TABLET ORAL at 07:48

## 2025-07-26 ASSESSMENT — ACTIVITIES OF DAILY LIVING (ADL)
ADLS_ACUITY_SCORE: 41
ADLS_ACUITY_SCORE: 37
ADLS_ACUITY_SCORE: 41
ADLS_ACUITY_SCORE: 33
ADLS_ACUITY_SCORE: 41
ADLS_ACUITY_SCORE: 33
ADLS_ACUITY_SCORE: 41
ADLS_ACUITY_SCORE: 37
ADLS_ACUITY_SCORE: 33
ADLS_ACUITY_SCORE: 37
ADLS_ACUITY_SCORE: 41
ADLS_ACUITY_SCORE: 41
ADLS_ACUITY_SCORE: 37
ADLS_ACUITY_SCORE: 33
ADLS_ACUITY_SCORE: 41
ADLS_ACUITY_SCORE: 33
ADLS_ACUITY_SCORE: 33
ADLS_ACUITY_SCORE: 41

## 2025-07-26 NOTE — PLAN OF CARE
RLQ pain, abnormal CT > Perf appy. S/p appendectomy.     Orientation: A&Ox3-4, forgetful, confused at times.     Vitals/Tele: VSS 1L, RLQ pain managed with prn tylenol. Capno WNL.     IV Access/drains: PIV x2. /hr. Intermittent abx. RLQ ERICK drain with moderate output, slowing.     Diet: NPO ex meds. Pt pulled out NG. Surgery notified. Ok to leave out if pt remains asymptomatic. Denies nausea. +gas.     Mobility: A1 GB&W.    GI/: Continent B&B. Voiding in BR or urinal. No BM this shift.    Wound/Skin: Abdominal lap sites x2, covered with bandaid, CDI.     Consults: [facility], surgery.     Discharge Plan: Follow up with [facility] in 1 month for colonoscopy.       See Flow sheets for assessment

## 2025-07-26 NOTE — PROGRESS NOTES
[facility]    General Surgery  Daily Progress Note       Assessment and Plan:   Nicolás Fermin is a 65 year old male who is POD1 s/p Lap appendectomy for perforated appendicitis.    No return of bowel function yet.  Has been ambulating.  Labs look normal.  ERICK drain with copious amount of thin serosanguineous output    Plan:  -- Continue n.p.o. okay for meds and ice chips.  Will advance to clears once passing gas.  -- Continue to ambulate.  -- Okay to still hold on replacing NG tube.  If patient develops nausea, vomiting, abdominal pain or worsening distention recommend replacing.  -- Agree with IV antibiotics  --Continue to monitor ERICK drain output  -- ok for DVT ppx  -- Will continue to follow along    Marco Delong MD         Interval History:   Doing okay.  Continues to have pain in the right lower quadrant.  Up and walking.  No return of bowel function yet.           Physical Exam:   Temp: 98.9  F (37.2  C) Temp src: Oral BP: (!) 146/79 Pulse: 67   Resp: 18 SpO2: 97 % O2 Device: None (Room air) Oxygen Delivery: 1 LPM    I/O last 3 completed shifts:  In: 2997 [P.O.:60; I.V.:2937]  Out: 2060 [Urine:1050; Emesis/NG output:110; Drains:900]      Constitutional: healthy, alert, and no distress   Cardiovascular: RRR  Respiratory: Breathing comfortably on room air  Abdomen: Obese, moderately distended, soft, appropriately tender to palpation at sites of incision and right lower quadrant.  Thin serosanguineous output in ERICK drain. Some serosang drainage around ERICK drain site.       Data   Recent Labs   Lab 07/26/25  0605 07/25/25  0635 07/24/25  0655 07/23/25 2007   WBC 11.9* 11.6* 13.2*  --    HGB 13.6 14.7 13.4  --    MCV 98 96 98  --     294 235  --    * 130* 129* 130*   POTASSIUM 4.7 3.9 4.9 4.4   CHLORIDE 101 96* 96* 93*   CO2 25 26 22 24   BUN 11.3 14.5 19.3 22.4   CR 1.02 1.09 1.18* 1.29*   ANIONGAP 8 8 11 13   PORTIA 8.6* 9.1 9.2 9.6   GLC 95 98 81 80   ALBUMIN  --   --   --   4.0   PROTTOTAL  --   --   --  7.6   BILITOTAL  --   --   --  1.0   ALKPHOS  --   --   --  144   ALT  --   --   --  11   AST  --   --   --  15

## 2025-07-26 NOTE — PROGRESS NOTES
[facility]    Medicine Progress Note - Hospitalist Service    Date of Admission:  7/23/2025    Assessment & Plan     ranjit Fermin is a 65 year old male with history of HIV, who presented on 7/23/2025 with RLQ pain, nausea and 1 episode of diarrhea.      CT abd/pelvis showed - mildly dilated and inflamed mid small bowel in RLQ with adjacent loops of distal small bowel appearing inflamed within the terminal ileum, hyperenhancing mildly dilated appendix     Initially felt to have infectious enteritis vs less likely IBD vs acute appendicitis. Started on IV zosyn with improvement in CRP. [facility] and general surgery consulted. Taken to OR on 7/25 and found to have perforated acute appendicitis     Perforated acute appendicitis s/p laparoscopic appendectomy, 7/25/2025  -unusual presentation on CT scan. Now s/p Diagnostic laparoscopy with laparoscopic appendectomy, 7/25/2025. Operative findings showed - markedly perforated appendix with multiple areas of perforation, turbid fluid throughout the abdomen, inflamed terminal ileum and cecum, distended small bowel  -surgical management per gen surgery. NG came out on 7/25  -continue IV zosyn. Leukocytosis and CRP improving  -continue IV fluids  -pain control  -diet per surgery   -Outpatient colonoscopy with ileal evaluation in 1 month per [facility] due to unusual presentation     HIV with peripheral neuropathy   *Followed by Piy[facility]. Last HIV load detectable 3/2024, CD4 >1000.   *Reports compliance with HAART  - CD4 count stable  - Continue prior to admission HAART  - Continue prior to admission gabapentin, duloxetine      Chronic kidney disease, stage 2  Baseline creatinine 1-1.1. Creatinine mildly elevated to 1.29 on admission, Improved to 1.02 with IV fluids  - Continue IVF.  Monitor labs.  Hold lisinopril.     Mild chronic hyponatremia  Sodium 130 on admission. Now improved to 134. Records show longstanding history of mild hyponatremia  -Continue IV  "fluids and monitor sodium.      Essential hypertension  - Continue amlodipine  - Due to mild bradycardia, decreased atenolol to 50 mg daily   - Hold prior to admission lisinopril as above     Hypothyroidism  Hx of Graves disease  - Continue prior to admission levothyroxine     Tobacco use disorder  - Encouraged cessation  - Declined nicotine replacement     Mild uncomplicated alcohol withdrawal  -drinks few beers per day. Seems to have mild withdrawal symptoms on 7/26  -start on diazepam per [facility] protocol  -administer thiamine and folate        Diet: NPO for Medical/Clinical Reasons Except for: Meds, Ice Chips    DVT Prophylaxis: lovenox   Ferguson Catheter: Not present  Lines: None     Cardiac Monitoring: None  Code Status: Full Code      Clinically Significant Risk Factors         # Hyponatremia: Lowest Na = 130 mmol/L in last 2 days, will monitor as appropriate  # Hypochloremia: Lowest Cl = 96 mmol/L in last 2 days, will monitor as appropriate          # Hypertension: Noted on problem list            # Overweight: Estimated body mass index is 28.94 kg/m  as calculated from the following:    Height as of this encounter: 1.88 m (6' 2\").    Weight as of this encounter: 102.2 kg (225 lb 6.4 oz)., PRESENT ON ADMISSION            Social Drivers of Health    Tobacco Use: High Risk (7/23/2025)    Patient History     Smoking Tobacco Use: Every Day     Smokeless Tobacco Use: Never     Passive Exposure: Never   Physical Activity: Insufficiently Active (1/1/2025)    Exercise Vital Sign     Days of Exercise per Week: 3 days     Minutes of Exercise per Session: 20 min   Social Connections: Unknown (1/1/2025)    Social Connection and Isolation Panel [NHANES]     Frequency of Social Gatherings with Friends and Family: More than three times a week          Disposition Plan         Medically Ready for Discharge: Anticipated in 2-4 Days             Brandee Shelby MD  Hospitalist Service  [facility]  Securely " message with Kathryn (more info)  Text page via [facility] Paging/Directory   ______________________________________________________________________    Interval History   Feels ok, some abdominal distention  Thinks he may have passes some gas  Has copious output from ERICK drain  Denies shortness of breath  Ambulating   Pain is controlled       Physical Exam   Vital Signs: Temp: 98  F (36.7  C) Temp src: Oral BP: (!) 142/75 Pulse: 67   Resp: 20 SpO2: 94 % O2 Device: None (Room air) Oxygen Delivery: 1 LPM  Weight: 225 lbs 6.4 oz    Constitutional - awake and alert, in no acute distress  CV - RRR  Pulmonary - clear lungs  GI - abdomen is soft  Integumentary - skin is warm and dry, no rashes or ulcers  Neurological - awake, alert and oriented x3.  Moving all 4 extremities, normal speech, no focal deficits    Medical Decision Making       45 MINUTES SPENT BY ME on the date of service doing chart review, history, exam, documentation & further activities per the note.      Data     I have personally reviewed the following data over the past 24 hrs:    11.9 (H)  \   13.6   / 294     134 (L) 101 11.3 /  95   4.7 25 1.02 \       Imaging results reviewed over the past 24 hrs:   No results found for this or any previous visit (from the past 24 hours).

## 2025-07-26 NOTE — PROVIDER NOTIFICATION
MD Notification    Notified Person: MD    Notified Person Name: RAFA Delong MD    Notification Date/Time: 7/25 2045    Notification Interaction: Answering service page    Purpose of Notification: Pt pulled NG tube    Orders Received: Ok to leave out for now. Per Dr Delong via secure chat.     Comments: pt denies nausea. NG had less than 100ml out since placement.

## 2025-07-26 NOTE — PLAN OF CARE
Goal Outcome Evaluation:      Plan of Care Reviewed With: patient    Overall Patient Progress: no changeOverall Patient Progress: no change         Summary: Presented with RLQ abdominal pain, nausea and diarrhea, abnormal CT of abdomen; concerns for inflammatory bowel disease. Hx falls at home, fall risk.  Summary: Presented with RLQ abdominal pain, nausea and diarrhea, abnormal CT of abdomen; concerns for inflammatory bowel disease. Hx falls at home, fall risk.     DATE & TIME: 07/26/25    Cognitive Concerns/ Orientation : A&O x3, disoriented to situation, very forgetful, fidgets with items within reach.   BEHAVIOR & AGGRESSION TOOL COLOR: Green  CIWA 2,9(restless, pulled IV tubing apart/removed PIV), 5.  ABNL VS/O2:  VSS on RA  MOBILITY: Assist of 1 with GB/walker, amb in pina x4, chair x4. Ind with cane at baseline.   PAIN MANAGMENT: Sched tylenol and robaxin effective for pain.   DIET: NPO x meds/ice chips.  BOWEL/BLADDER: Continent of B&B. Smear BM x1. Passed flatus x1.   ABNL LAB: Na 134. WBC 11.9.   DRAIN/DEVICES: R PIV infusing LR at 100 ml/hr. LLQ ERICK drain. L PIV SL, for zosyn.   SKIN: Abrasion on L lower inner leg, scattered scabs, bruises and dry/peeling skin. Abd lap sites x2 CDI. ERICK drslynette minimal drainage, Dr Lara aware.   TESTS/PROCEDURES: S/P diagnostic exp lap, appendectomy, ERICK drain on 7/25.   D/C DAY/GOALS/PLACE: Pend progress.   OTHER IMPORTANT INFO: Receiving IV Zosyn q6h. ERICK drain output orange/pink. Nicotine patch on R upper back. Gen Surg involved.

## 2025-07-26 NOTE — PROGRESS NOTES
[facility]    Medicine Progress Note - Hospitalist Service    Date of Admission:  7/23/2025    Assessment & Plan     ranjit Fermin is a 65 year old male with history of HIV, who presented on 7/23/2025 with RLQ pain, nausea and 1 episode of diarrhea.      CT abd/pelvis showed - mildly dilated and inflamed mid small bowel in RLQ with adjacent loops of distal small bowel appearing inflamed within the terminal ileum, hyperenhancing mildly dilated appendix     Initially felt to have infectious enteritis vs less likely IBD vs acute appendicitis. Started on IV zosyn with improvement in CRP. [facility] and general surgery consulted. Taken to OR on 7/25 that showed perforated acute appendicitis     Perforated acute appendicitis s/p laparoscopic appendectomy, 7/25/2025  -unusual presentation on CT scan. Now s/p Diagnostic laparoscopy with laparoscopic appendectomy, 7/25/2025. Operative findings showed - markedly perforated appendix with multiple areas of perforation, turbid fluid throughout the abdomen, inflamed terminal ileum and cecum, distended small bowel  -surgical management per gen surgery. Has NG in place. Continue suction  -continue IV zosyn. Leukocytosis and CRP improving  -continue IV fluids  -pain control  -Outpatient colonoscopy with ileal evaluation in 1 month per [facility] due to unusual presentation     HIV with peripheral neuropathy   *Followed by [facility]. Last HIV load detectable 3/2024, CD4 >1000.   *Reports compliance with HAART  - CD4 count stable  - Continue prior to admission HAART  - Continue prior to admission gabapentin, duloxetine      Chronic kidney disease, stage 2  Baseline creatinine 1-1.1. Creatinine mildly elevated to 1.29 on admission, Improved to 1.09 with IV fluids  - Continue IVF.  Monitor labs.  Hold lisinopril.     Mild chronic hyponatremia  Sodium 130 on admission.  Remains stable.  Records show longstanding history of mild hyponatremia  -Continue IV fluids and  "monitor sodium.      Essential hypertension  - Continue amlodipine  - Due to mild bradycardia, decreased atenolol to 50 mg daily   - Hold prior to admission lisinopril as above     Hypothyroidism  Hx of Graves disease  - Continue prior to admission levothyroxine     Tobacco use disorder  - Encouraged cessation  - Declined nicotine replacement             Diet: NPO for Medical/Clinical Reasons Except for: Meds    DVT Prophylaxis: lovenox   Ferguson Catheter: Not present  Lines: None     Cardiac Monitoring: None  Code Status: Full Code      Clinically Significant Risk Factors         # Hyponatremia: Lowest Na = 129 mmol/L in last 2 days, will monitor as appropriate  # Hypochloremia: Lowest Cl = 96 mmol/L in last 2 days, will monitor as appropriate          # Hypertension: Noted on problem list            # Overweight: Estimated body mass index is 28.94 kg/m  as calculated from the following:    Height as of this encounter: 1.88 m (6' 2\").    Weight as of this encounter: 102.2 kg (225 lb 6.4 oz)., PRESENT ON ADMISSION            Social Drivers of Health    Tobacco Use: High Risk (7/23/2025)    Patient History     Smoking Tobacco Use: Every Day     Smokeless Tobacco Use: Never     Passive Exposure: Never   Physical Activity: Insufficiently Active (1/1/2025)    Exercise Vital Sign     Days of Exercise per Week: 3 days     Minutes of Exercise per Session: 20 min   Social Connections: Unknown (1/1/2025)    Social Connection and Isolation Panel [NHANES]     Frequency of Social Gatherings with Friends and Family: More than three times a week          Disposition Plan         Medically Ready for Discharge: Anticipated in 2-4 Days             Brandee Shelby MD  Hospitalist Service  [facility]  Securely message with [employer] (more info)  Text page via [employer] Paging/Directory   ______________________________________________________________________    Interval History     S/p surgery that showed perforated " appendicitis  RN reports wheezing  Denies shortness of breath. On 3L of O2      Physical Exam   Vital Signs: Temp: 99.8  F (37.7  C) Temp src: Oral BP: (!) 142/89 Pulse: 71   Resp: 16 SpO2: 99 % O2 Device: Nasal cannula Oxygen Delivery: 2 LPM  Weight: 225 lbs 6.4 oz    Constitutional - awake and alert, resting in bed, in no acute distress  CV - RRR  Pulmonary - clear lungs  GI - abdomen is soft  Integumentary - skin is warm and dry, no rashes or ulcers  Neurological - awake, alert and oriented x3.  Moving all 4 extremities, normal speech, no focal deficits    Medical Decision Making       45 MINUTES SPENT BY ME on the date of service doing chart review, history, exam, documentation & further activities per the note.      Data     I have personally reviewed the following data over the past 24 hrs:    11.6 (H)  \   14.7   / 294     130 (L) 96 (L) 14.5 /  98   3.9 26 1.09 \     Procal: N/A CRP: 246.92 (H) Lactic Acid: N/A         Imaging results reviewed over the past 24 hrs:   No results found for this or any previous visit (from the past 24 hours).

## 2025-07-27 LAB
ANION GAP SERPL CALCULATED.3IONS-SCNC: 11 MMOL/L (ref 7–15)
ATRIAL RATE - MUSE: 67 BPM
BASOPHILS # BLD AUTO: 0 10E3/UL (ref 0–0.2)
BASOPHILS NFR BLD AUTO: 0 %
BUN SERPL-MCNC: 11.3 MG/DL (ref 8–23)
CALCIUM SERPL-MCNC: 8.1 MG/DL (ref 8.8–10.4)
CHLORIDE SERPL-SCNC: 100 MMOL/L (ref 98–107)
CREAT SERPL-MCNC: 0.99 MG/DL (ref 0.67–1.17)
DIASTOLIC BLOOD PRESSURE - MUSE: NORMAL MMHG
EGFRCR SERPLBLD CKD-EPI 2021: 84 ML/MIN/1.73M2
EOSINOPHIL # BLD AUTO: 0.2 10E3/UL (ref 0–0.7)
EOSINOPHIL NFR BLD AUTO: 2 %
ERYTHROCYTE [DISTWIDTH] IN BLOOD BY AUTOMATED COUNT: 13 % (ref 10–15)
GLUCOSE SERPL-MCNC: 80 MG/DL (ref 70–99)
HCO3 SERPL-SCNC: 23 MMOL/L (ref 22–29)
HCT VFR BLD AUTO: 40.2 % (ref 40–53)
HGB BLD-MCNC: 14.1 G/DL (ref 13.3–17.7)
IMM GRANULOCYTES # BLD: 0.1 10E3/UL
IMM GRANULOCYTES NFR BLD: 1 %
INTERPRETATION ECG - MUSE: NORMAL
LYMPHOCYTES # BLD AUTO: 1.6 10E3/UL (ref 0.8–5.3)
LYMPHOCYTES NFR BLD AUTO: 13 %
MCH RBC QN AUTO: 33.7 PG (ref 26.5–33)
MCHC RBC AUTO-ENTMCNC: 35.1 G/DL (ref 31.5–36.5)
MCV RBC AUTO: 96 FL (ref 78–100)
MONOCYTES # BLD AUTO: 1.3 10E3/UL (ref 0–1.3)
MONOCYTES NFR BLD AUTO: 11 %
NEUTROPHILS # BLD AUTO: 9.3 10E3/UL (ref 1.6–8.3)
NEUTROPHILS NFR BLD AUTO: 75 %
NRBC # BLD AUTO: 0 10E3/UL
NRBC BLD AUTO-RTO: 0 /100
P AXIS - MUSE: 58 DEGREES
PLATELET # BLD AUTO: 287 10E3/UL (ref 150–450)
POTASSIUM SERPL-SCNC: 4.1 MMOL/L (ref 3.4–5.3)
PR INTERVAL - MUSE: 186 MS
QRS DURATION - MUSE: 102 MS
QT - MUSE: 400 MS
QTC - MUSE: 422 MS
R AXIS - MUSE: 60 DEGREES
RBC # BLD AUTO: 4.19 10E6/UL (ref 4.4–5.9)
SODIUM SERPL-SCNC: 134 MMOL/L (ref 135–145)
SYSTOLIC BLOOD PRESSURE - MUSE: NORMAL MMHG
T AXIS - MUSE: 67 DEGREES
VENTRICULAR RATE- MUSE: 67 BPM
WBC # BLD AUTO: 12.5 10E3/UL (ref 4–11)

## 2025-07-27 PROCEDURE — 258N000003 HC RX IP 258 OP 636: Performed by: INTERNAL MEDICINE

## 2025-07-27 PROCEDURE — 85014 HEMATOCRIT: CPT | Performed by: STUDENT IN AN ORGANIZED HEALTH CARE EDUCATION/TRAINING PROGRAM

## 2025-07-27 PROCEDURE — 36415 COLL VENOUS BLD VENIPUNCTURE: CPT | Performed by: STUDENT IN AN ORGANIZED HEALTH CARE EDUCATION/TRAINING PROGRAM

## 2025-07-27 PROCEDURE — 250N000011 HC RX IP 250 OP 636: Performed by: PHYSICIAN ASSISTANT

## 2025-07-27 PROCEDURE — 250N000011 HC RX IP 250 OP 636: Performed by: INTERNAL MEDICINE

## 2025-07-27 PROCEDURE — 250N000013 HC RX MED GY IP 250 OP 250 PS 637: Performed by: INTERNAL MEDICINE

## 2025-07-27 PROCEDURE — 85004 AUTOMATED DIFF WBC COUNT: CPT | Performed by: STUDENT IN AN ORGANIZED HEALTH CARE EDUCATION/TRAINING PROGRAM

## 2025-07-27 PROCEDURE — 80051 ELECTROLYTE PANEL: CPT | Performed by: STUDENT IN AN ORGANIZED HEALTH CARE EDUCATION/TRAINING PROGRAM

## 2025-07-27 PROCEDURE — 99232 SBSQ HOSP IP/OBS MODERATE 35: CPT | Performed by: INTERNAL MEDICINE

## 2025-07-27 PROCEDURE — 80048 BASIC METABOLIC PNL TOTAL CA: CPT | Performed by: STUDENT IN AN ORGANIZED HEALTH CARE EDUCATION/TRAINING PROGRAM

## 2025-07-27 PROCEDURE — 120N000001 HC R&B MED SURG/OB

## 2025-07-27 PROCEDURE — 250N000013 HC RX MED GY IP 250 OP 250 PS 637: Performed by: PHYSICIAN ASSISTANT

## 2025-07-27 PROCEDURE — 250N000013 HC RX MED GY IP 250 OP 250 PS 637: Performed by: STUDENT IN AN ORGANIZED HEALTH CARE EDUCATION/TRAINING PROGRAM

## 2025-07-27 RX ORDER — BISACODYL 10 MG
10 SUPPOSITORY, RECTAL RECTAL ONCE
Status: COMPLETED | OUTPATIENT
Start: 2025-07-27 | End: 2025-07-27

## 2025-07-27 RX ADMIN — NICOTINE 1 PATCH: 14 PATCH, EXTENDED RELEASE TRANSDERMAL at 08:44

## 2025-07-27 RX ADMIN — LEVOTHYROXINE SODIUM 112 MCG: 0.11 TABLET ORAL at 05:50

## 2025-07-27 RX ADMIN — THIAMINE HCL TAB 100 MG 100 MG: 100 TAB at 08:44

## 2025-07-27 RX ADMIN — ACETAMINOPHEN 975 MG: 325 TABLET ORAL at 21:39

## 2025-07-27 RX ADMIN — METHOCARBAMOL 500 MG: 500 TABLET ORAL at 16:16

## 2025-07-27 RX ADMIN — PIPERACILLIN AND TAZOBACTAM 3.38 G: 3; .375 INJECTION, POWDER, FOR SOLUTION INTRAVENOUS at 03:59

## 2025-07-27 RX ADMIN — DULOXETINE HYDROCHLORIDE 60 MG: 60 CAPSULE, DELAYED RELEASE PELLETS ORAL at 08:43

## 2025-07-27 RX ADMIN — DOLUTEGRAVIR SODIUM 50 MG: 50 TABLET, FILM COATED ORAL at 21:40

## 2025-07-27 RX ADMIN — METHOCARBAMOL 500 MG: 500 TABLET ORAL at 21:39

## 2025-07-27 RX ADMIN — ACETAMINOPHEN 975 MG: 325 TABLET ORAL at 05:50

## 2025-07-27 RX ADMIN — FOLIC ACID 1 MG: 1 TABLET ORAL at 08:44

## 2025-07-27 RX ADMIN — ATENOLOL 50 MG: 50 TABLET ORAL at 08:44

## 2025-07-27 RX ADMIN — ACETAMINOPHEN 975 MG: 325 TABLET ORAL at 13:23

## 2025-07-27 RX ADMIN — PIPERACILLIN AND TAZOBACTAM 3.38 G: 3; .375 INJECTION, POWDER, FOR SOLUTION INTRAVENOUS at 21:39

## 2025-07-27 RX ADMIN — AMLODIPINE BESYLATE 10 MG: 10 TABLET ORAL at 08:44

## 2025-07-27 RX ADMIN — ONDANSETRON 4 MG: 4 TABLET, ORALLY DISINTEGRATING ORAL at 13:46

## 2025-07-27 RX ADMIN — FAMOTIDINE 20 MG: 10 INJECTION, SOLUTION INTRAVENOUS at 21:43

## 2025-07-27 RX ADMIN — ENOXAPARIN SODIUM 40 MG: 40 INJECTION SUBCUTANEOUS at 21:39

## 2025-07-27 RX ADMIN — GABAPENTIN 600 MG: 300 CAPSULE ORAL at 21:40

## 2025-07-27 RX ADMIN — SIMETHICONE 80 MG: 80 TABLET, CHEWABLE ORAL at 08:43

## 2025-07-27 RX ADMIN — EMTRICITABINE AND TENOFOVIR ALAFENAMIDE 1 TABLET: 200; 25 TABLET ORAL at 21:41

## 2025-07-27 RX ADMIN — SIMETHICONE 80 MG: 80 TABLET, CHEWABLE ORAL at 13:23

## 2025-07-27 RX ADMIN — SIMETHICONE 80 MG: 80 TABLET, CHEWABLE ORAL at 21:40

## 2025-07-27 RX ADMIN — PIPERACILLIN AND TAZOBACTAM 3.38 G: 3; .375 INJECTION, POWDER, FOR SOLUTION INTRAVENOUS at 16:15

## 2025-07-27 RX ADMIN — BISACODYL 10 MG: 10 SUPPOSITORY RECTAL at 11:41

## 2025-07-27 RX ADMIN — PIPERACILLIN AND TAZOBACTAM 3.38 G: 3; .375 INJECTION, POWDER, FOR SOLUTION INTRAVENOUS at 09:46

## 2025-07-27 RX ADMIN — SIMETHICONE 80 MG: 80 TABLET, CHEWABLE ORAL at 16:15

## 2025-07-27 RX ADMIN — METHOCARBAMOL 500 MG: 500 TABLET ORAL at 08:44

## 2025-07-27 RX ADMIN — SENNOSIDES AND DOCUSATE SODIUM 1 TABLET: 50; 8.6 TABLET ORAL at 08:44

## 2025-07-27 RX ADMIN — FAMOTIDINE 20 MG: 10 INJECTION, SOLUTION INTRAVENOUS at 08:43

## 2025-07-27 RX ADMIN — SODIUM CHLORIDE, SODIUM LACTATE, POTASSIUM CHLORIDE, AND CALCIUM CHLORIDE: .6; .31; .03; .02 INJECTION, SOLUTION INTRAVENOUS at 08:44

## 2025-07-27 RX ADMIN — AMITRIPTYLINE HYDROCHLORIDE 50 MG: 25 TABLET, FILM COATED ORAL at 21:40

## 2025-07-27 RX ADMIN — GABAPENTIN 600 MG: 300 CAPSULE ORAL at 08:43

## 2025-07-27 RX ADMIN — SODIUM CHLORIDE, SODIUM LACTATE, POTASSIUM CHLORIDE, AND CALCIUM CHLORIDE: .6; .31; .03; .02 INJECTION, SOLUTION INTRAVENOUS at 21:39

## 2025-07-27 RX ADMIN — METHOCARBAMOL 500 MG: 500 TABLET ORAL at 13:23

## 2025-07-27 ASSESSMENT — ACTIVITIES OF DAILY LIVING (ADL)
ADLS_ACUITY_SCORE: 43
ADLS_ACUITY_SCORE: 47
ADLS_ACUITY_SCORE: 41
ADLS_ACUITY_SCORE: 45
ADLS_ACUITY_SCORE: 47
ADLS_ACUITY_SCORE: 45
ADLS_ACUITY_SCORE: 45
ADLS_ACUITY_SCORE: 41
ADLS_ACUITY_SCORE: 43
ADLS_ACUITY_SCORE: 45
ADLS_ACUITY_SCORE: 43
ADLS_ACUITY_SCORE: 45
ADLS_ACUITY_SCORE: 43
ADLS_ACUITY_SCORE: 43
ADLS_ACUITY_SCORE: 47
ADLS_ACUITY_SCORE: 45
ADLS_ACUITY_SCORE: 47
ADLS_ACUITY_SCORE: 45
ADLS_ACUITY_SCORE: 43
ADLS_ACUITY_SCORE: 47
ADLS_ACUITY_SCORE: 43
ADLS_ACUITY_SCORE: 47
ADLS_ACUITY_SCORE: 43

## 2025-07-27 NOTE — PLAN OF CARE
Goal Outcome Evaluation:      Plan of Care Reviewed With: patient    Overall Patient Progress: improvingOverall Patient Progress: improving         Summary: Presented with RLQ abdominal pain, nausea and diarrhea, abnormal CT of abdomen; concerns for inflammatory bowel disease. Hx falls at home, fall risk.    DATE & TIME: 07/26/25    Cognitive Concerns/ Orientation : A&O x4, forgetful, tracking conversation better today. Brothers report pt's mentation is slower than baseline.   BEHAVIOR & AGGRESSION TOOL COLOR: Green  CIWA: 0,0.  ABNL VS/O2:  VSS on RA  MOBILITY: Assist of 1 with GB/walker, amb in pina. Ind with cane at baseline.   PAIN MANAGMENT: Sched tylenol and robaxin effective for pain.   DIET: NPO x meds/ice chips/sips of clears.  BOWEL/BLADDER: Continent of B&B. Passing flatus. Dulcolax supp given, BM x1.    ABNL LAB: Na 134. WBC 12.5.  DRAIN/DEVICES: R PIV infusing LR at 100 ml/hr. LLQ ERICK drain. L PIV SL, for zosyn.   SKIN: Abrasion on L lower inner leg, scattered scabs, bruises and dry/peeling skin. Abd lap sites x2 CDI. ERICK drsg wet, redness to surrounding skin (borders marked), Surg aware.  TESTS/PROCEDURES: S/P diagnostic exp lap, appendectomy, ERICK drain on 7/25.   D/C DAY/GOALS/PLACE: Pend progress.   OTHER IMPORTANT INFO: Nausea before and after BM, PRN zofran given. Receiving IV Zosyn q6h. ERICK drain output gemma. Nicotine patch on L deltoid. Gen Surg involved. Sitter to prevent pt from pulling lines d/t forgetfulness.

## 2025-07-27 NOTE — PLAN OF CARE
Summary: POx2 perforated acute appendicitis s/p laparoscopic appendectomy, 7/25/2025   HIV+    Shift: 7/26 2949-1320    Orientation: A&Ox3 ex situation.     Vitals/Tele: VSS RA, denied pain this shift.    IV Access/drains: PIV x2, /hr. Intermittent abx.     Diet: NPO ex ice chips and meds. Denies nausea.     Mobility: A1 GB&W    GI/: Continent B&B, voiding in urinal or BR. No BM this shift. Patient endorses passing gas.     Wound/Skin: Abdominal lap sites x2, CDI. Abdominal ERICK drain with moderate serous output. Nicotine patch R shoulder.     Consults: Surgery, GI following.    Discharge Plan: Follow up [facility] for colonoscopy in 1mos. Discharge pending bowel progression.       See Flow sheets for assessment

## 2025-07-27 NOTE — PROGRESS NOTES
"[facility]  GENERAL SURGERY Progress Note    Admission Date: 2025         Assessment and Plan:     Nicolás Fermin is a 65 year old male with PMH significant for HIV, peripheral neuropathy and HTN, admitted for abdominal pain.  S/P dx laparoscopy with lap appy, POD 2.  Intra-op findings: dilated SB, ascites, perforated appendix with localized abscess and peritonitis.    - NPO, await ROBF, trial of sips of clears  - Pain control- Tylenol, gabapentin and Robaxin ATC, Oxy PRN  - WBC 11.9-->12.5, continue Zosyn day 4, abd fluid cx no growth x 1 day  - Continue ERICK drain- high output from ascites, monitor left flank erythema   - Hgb 14.1, continue Lovenox for DVT prophylaxis  - Continue Pepcid  - Dulcolax supp   - Labs ok  - CMV abd fluid results pending  - Ambulate 4x day and encourage IS  - Medical management per Hospitalist, much appreciate your assistance             Interval History:     HTN, pain controlled, +Flatus/-BM, UO adequate, ambulating.                     Physical Exam:   Blood pressure (!) 165/85, pulse 74, temperature 99.1  F (37.3  C), temperature source Oral, resp. rate 18, height 1.88 m (6' 2\"), weight 102.2 kg (225 lb 6.4 oz), SpO2 96%.  Temperature Temp  Av.5  F (36.9  C)  Min: 98  F (36.7  C)  Max: 99.1  F (37.3  C)   I/O last 3 completed shifts:  In: 2628 [P.O.:240; I.V.:2288; IV Piggyback:100]  Out: 2525 [Urine:1675; Drains:850]  Constitutional:  Awake and in no apparent distress.   Lungs: No increased work of breathing.   Cardiovascular: Regular rate and rhythm.   Abdomen: Moderately distended, appropriately tender at incision(s). ERICK drain intact, serous.  Surrounding erythema.    Wound(s): Clean, dry, and intact. No erythema or drainage.    Extremities: No edema or calf tenderness. +SCDs          Data:     Recent Labs   Lab Test 25  0543 25  0605 25  0635   WBC 12.5* 11.9* 11.6*   HGB 14.1 13.6 14.7   HCT 40.2 40.1 42.1    294 294 "      Recent Labs   Lab Test 07/27/25  0543 07/26/25  0605 07/25/25  0635   * 134* 130*   POTASSIUM 4.1 4.7 3.9   CHLORIDE 100 101 96*   CO2 23 25 26   BUN 11.3 11.3 14.5   CR 0.99 1.02 1.09   GLC 80 95 98       Alison Horvath PA-C  [facility]  Office: [phone]  Page on-call surgeon after 4pm

## 2025-07-27 NOTE — PROGRESS NOTES
[facility]    Medicine Progress Note - Hospitalist Service    Date of Admission:  7/23/2025    Assessment & Plan     ranjit Fermin is a 65 year old male with history of HIV, who presented on 7/23/2025 with RLQ pain, nausea and 1 episode of diarrhea.      CT abd/pelvis showed - mildly dilated and inflamed mid small bowel in RLQ with adjacent loops of distal small bowel appearing inflamed within the terminal ileum, hyperenhancing mildly dilated appendix     Initially felt to have infectious enteritis vs less likely IBD vs acute appendicitis. Started on IV zosyn with improvement in CRP. [facility] and general surgery consulted. Taken to OR on 7/25 and found to have perforated acute appendicitis     Perforated acute appendicitis s/p laparoscopic appendectomy, 7/25/2025  Expected postoperative ileus  -unusual presentation on CT scan. Now s/p Diagnostic laparoscopy with laparoscopic appendectomy, 7/25/2025. Operative findings showed - markedly perforated appendix with multiple areas of perforation, turbid fluid throughout the abdomen, inflamed terminal ileum and cecum, distended small bowel  -surgical management per gen surgery.  Await return of bowel function.  Starting on sips of water on 7/27. Advance diet per surgery. Dulcolax suppository on 7/27  -continue IV zosyn, IV fluids - LR at 60 ml/h  -pain control  -Outpatient colonoscopy with ileal evaluation in 1 month per [facility] due to unusual presentation     HIV with peripheral neuropathy   -Followed by P[facility]ush [facility]. Last HIV load detectable 3/2024, CD4 >1000. Reports compliance with HAART  - CD4 count stable  - Continue prior to admission HAART  - Continue prior to admission gabapentin, duloxetine      Chronic kidney disease, stage 2  Baseline creatinine 1-1.1. Creatinine mildly elevated to 1.29 on admission, Improved to 1 with IV fluids  - Continue IVF.  Monitor labs.  Hold lisinopril.     Mild chronic hyponatremia  Sodium 130 on admission. Now  "improved to 134. Records show longstanding history of mild hyponatremia  -Continue IV fluids and monitor sodium.      Essential hypertension  - Continue amlodipine  - Due to mild bradycardia, decreased atenolol to 50 mg daily   - Hold prior to admission lisinopril as above     Hypothyroidism  Hx of Graves disease  - Continue prior to admission levothyroxine     Tobacco use disorder  - Encouraged cessation. Continue nicotine patch    Mild uncomplicated alcohol withdrawal  -drinks few beers per day. Seems to have mild withdrawal symptoms on 7/26  -on diazepam per [facility] protocol  -administer thiamine and folate    Acute metabolic encephalopathy with delirium  -Patient developed confusion, agitation on 7/26.  Was also reported to have hallucinations and trying to remove ERICK drain, IV lines.  -This could be multifactorial- Secondary to mild alcohol withdrawal, surgery, acute illness.  -Has safety sitter in room.  Continue to monitor  -As needed Zyprexa available  -Doing much better on 7/27.          Diet: NPO for Medical/Clinical Reasons Except for: Meds, Ice Chips, Other; Specify: sips of clears    DVT Prophylaxis: lovenox   Ferguson Catheter: Not present  Lines: None     Cardiac Monitoring: None  Code Status: Full Code      Clinically Significant Risk Factors         # Hyponatremia: Lowest Na = 134 mmol/L in last 2 days, will monitor as appropriate           # Hypertension: Noted on problem list            # Overweight: Estimated body mass index is 28.94 kg/m  as calculated from the following:    Height as of this encounter: 1.88 m (6' 2\").    Weight as of this encounter: 102.2 kg (225 lb 6.4 oz)., PRESENT ON ADMISSION            Social Drivers of Health    Tobacco Use: High Risk (7/23/2025)    Patient History     Smoking Tobacco Use: Every Day     Smokeless Tobacco Use: Never     Passive Exposure: Never   Physical Activity: Insufficiently Active (1/1/2025)    Exercise Vital Sign     Days of Exercise per Week: 3 days     " Minutes of Exercise per Session: 20 min   Social Connections: Unknown (1/1/2025)    Social Connection and Isolation Panel [NHANES]     Frequency of Social Gatherings with Friends and Family: More than three times a week          Disposition Plan         Medically Ready for Discharge: Anticipated in 2-4 Days             Brandee Shelby MD  Hospitalist Service  [facility]  Securely message with [employer] (more info)  Text page via [employer] Paging/Directory   ______________________________________________________________________    Interval History     Reports he feels okay.  Reports he passed gas yesterday.    Discussed with RN, was agitated and confused yesterday and was trying to remove drains and IV lines.  Was placed on one-to-one safety sitter   Mental status improved today.  Calm and cooperative.  Less confused   feels ok, some abdominal distention      Physical Exam   Vital Signs: Temp: 99.1  F (37.3  C) Temp src: Oral BP: (!) 165/85 Pulse: 74   Resp: 18 SpO2: 96 % O2 Device: None (Room air)    Weight: 225 lbs 6.4 oz    Constitutional - awake and alert, in no acute distress  CV - RRR  Pulmonary - clear lungs  GI - abdomen is soft  Integumentary - skin is warm and dry, no rashes or ulcers  Neurological - awake, alert and oriented x3.  Moving all 4 extremities, normal speech, no focal deficits    Medical Decision Making       45 MINUTES SPENT BY ME on the date of service doing chart review, history, exam, documentation & further activities per the note.      Data     I have personally reviewed the following data over the past 24 hrs:    12.5 (H)  \   14.1   / 287     134 (L) 100 11.3 /  80   4.1 23 0.99 \       Imaging results reviewed over the past 24 hrs:   No results found for this or any previous visit (from the past 24 hours).

## 2025-07-28 LAB
ANION GAP SERPL CALCULATED.3IONS-SCNC: 8 MMOL/L (ref 7–15)
BUN SERPL-MCNC: 9 MG/DL (ref 8–23)
CALCIUM SERPL-MCNC: 8.5 MG/DL (ref 8.8–10.4)
CHLORIDE SERPL-SCNC: 101 MMOL/L (ref 98–107)
CREAT SERPL-MCNC: 1.01 MG/DL (ref 0.67–1.17)
EGFRCR SERPLBLD CKD-EPI 2021: 82 ML/MIN/1.73M2
ERYTHROCYTE [DISTWIDTH] IN BLOOD BY AUTOMATED COUNT: 12.7 % (ref 10–15)
GLUCOSE SERPL-MCNC: 84 MG/DL (ref 70–99)
HCO3 SERPL-SCNC: 25 MMOL/L (ref 22–29)
HCT VFR BLD AUTO: 40.1 % (ref 40–53)
HGB BLD-MCNC: 13.9 G/DL (ref 13.3–17.7)
MCH RBC QN AUTO: 33.6 PG (ref 26.5–33)
MCHC RBC AUTO-ENTMCNC: 34.7 G/DL (ref 31.5–36.5)
MCV RBC AUTO: 97 FL (ref 78–100)
PLATELET # BLD AUTO: 319 10E3/UL (ref 150–450)
POTASSIUM SERPL-SCNC: 4.2 MMOL/L (ref 3.4–5.3)
RBC # BLD AUTO: 4.14 10E6/UL (ref 4.4–5.9)
SODIUM SERPL-SCNC: 134 MMOL/L (ref 135–145)
WBC # BLD AUTO: 10.7 10E3/UL (ref 4–11)

## 2025-07-28 PROCEDURE — 250N000013 HC RX MED GY IP 250 OP 250 PS 637: Performed by: STUDENT IN AN ORGANIZED HEALTH CARE EDUCATION/TRAINING PROGRAM

## 2025-07-28 PROCEDURE — 80048 BASIC METABOLIC PNL TOTAL CA: CPT | Performed by: INTERNAL MEDICINE

## 2025-07-28 PROCEDURE — 250N000013 HC RX MED GY IP 250 OP 250 PS 637: Performed by: PHYSICIAN ASSISTANT

## 2025-07-28 PROCEDURE — 250N000011 HC RX IP 250 OP 636: Performed by: PHYSICIAN ASSISTANT

## 2025-07-28 PROCEDURE — 250N000013 HC RX MED GY IP 250 OP 250 PS 637: Performed by: INTERNAL MEDICINE

## 2025-07-28 PROCEDURE — 99232 SBSQ HOSP IP/OBS MODERATE 35: CPT | Performed by: INTERNAL MEDICINE

## 2025-07-28 PROCEDURE — 120N000001 HC R&B MED SURG/OB

## 2025-07-28 PROCEDURE — 84132 ASSAY OF SERUM POTASSIUM: CPT | Performed by: INTERNAL MEDICINE

## 2025-07-28 PROCEDURE — 250N000011 HC RX IP 250 OP 636: Performed by: INTERNAL MEDICINE

## 2025-07-28 PROCEDURE — 36415 COLL VENOUS BLD VENIPUNCTURE: CPT | Performed by: INTERNAL MEDICINE

## 2025-07-28 PROCEDURE — 85027 COMPLETE CBC AUTOMATED: CPT | Performed by: INTERNAL MEDICINE

## 2025-07-28 RX ORDER — LISINOPRIL 10 MG/1
10 TABLET ORAL DAILY
Status: DISCONTINUED | OUTPATIENT
Start: 2025-07-28 | End: 2025-07-30

## 2025-07-28 RX ADMIN — SENNOSIDES AND DOCUSATE SODIUM 1 TABLET: 50; 8.6 TABLET ORAL at 22:39

## 2025-07-28 RX ADMIN — SIMETHICONE 80 MG: 80 TABLET, CHEWABLE ORAL at 08:10

## 2025-07-28 RX ADMIN — FAMOTIDINE 20 MG: 10 INJECTION, SOLUTION INTRAVENOUS at 22:52

## 2025-07-28 RX ADMIN — AMLODIPINE BESYLATE 10 MG: 10 TABLET ORAL at 08:10

## 2025-07-28 RX ADMIN — SIMETHICONE 80 MG: 80 TABLET, CHEWABLE ORAL at 13:22

## 2025-07-28 RX ADMIN — AMITRIPTYLINE HYDROCHLORIDE 50 MG: 25 TABLET, FILM COATED ORAL at 22:39

## 2025-07-28 RX ADMIN — THIAMINE HCL TAB 100 MG 100 MG: 100 TAB at 08:10

## 2025-07-28 RX ADMIN — ATENOLOL 50 MG: 50 TABLET ORAL at 08:11

## 2025-07-28 RX ADMIN — GABAPENTIN 600 MG: 300 CAPSULE ORAL at 08:10

## 2025-07-28 RX ADMIN — PIPERACILLIN AND TAZOBACTAM 3.38 G: 3; .375 INJECTION, POWDER, FOR SOLUTION INTRAVENOUS at 22:39

## 2025-07-28 RX ADMIN — LEVOTHYROXINE SODIUM 112 MCG: 0.11 TABLET ORAL at 06:42

## 2025-07-28 RX ADMIN — SENNOSIDES AND DOCUSATE SODIUM 1 TABLET: 50; 8.6 TABLET ORAL at 08:11

## 2025-07-28 RX ADMIN — SIMETHICONE 80 MG: 80 TABLET, CHEWABLE ORAL at 22:39

## 2025-07-28 RX ADMIN — POLYETHYLENE GLYCOL 3350 17 G: 17 POWDER, FOR SOLUTION ORAL at 16:19

## 2025-07-28 RX ADMIN — FAMOTIDINE 20 MG: 10 INJECTION, SOLUTION INTRAVENOUS at 08:12

## 2025-07-28 RX ADMIN — NICOTINE 1 PATCH: 14 PATCH, EXTENDED RELEASE TRANSDERMAL at 08:13

## 2025-07-28 RX ADMIN — FOLIC ACID 1 MG: 1 TABLET ORAL at 08:11

## 2025-07-28 RX ADMIN — GABAPENTIN 600 MG: 300 CAPSULE ORAL at 22:39

## 2025-07-28 RX ADMIN — ACETAMINOPHEN 975 MG: 325 TABLET ORAL at 04:09

## 2025-07-28 RX ADMIN — ACETAMINOPHEN 975 MG: 325 TABLET ORAL at 22:39

## 2025-07-28 RX ADMIN — SIMETHICONE 80 MG: 80 TABLET, CHEWABLE ORAL at 17:02

## 2025-07-28 RX ADMIN — LISINOPRIL 10 MG: 10 TABLET ORAL at 13:22

## 2025-07-28 RX ADMIN — METHOCARBAMOL 500 MG: 500 TABLET ORAL at 17:02

## 2025-07-28 RX ADMIN — PIPERACILLIN AND TAZOBACTAM 3.38 G: 3; .375 INJECTION, POWDER, FOR SOLUTION INTRAVENOUS at 04:12

## 2025-07-28 RX ADMIN — PIPERACILLIN AND TAZOBACTAM 3.38 G: 3; .375 INJECTION, POWDER, FOR SOLUTION INTRAVENOUS at 09:58

## 2025-07-28 RX ADMIN — ACETAMINOPHEN 975 MG: 325 TABLET ORAL at 13:22

## 2025-07-28 RX ADMIN — METHOCARBAMOL 500 MG: 500 TABLET ORAL at 08:11

## 2025-07-28 RX ADMIN — DOLUTEGRAVIR SODIUM 50 MG: 50 TABLET, FILM COATED ORAL at 22:39

## 2025-07-28 RX ADMIN — DULOXETINE HYDROCHLORIDE 60 MG: 60 CAPSULE, DELAYED RELEASE PELLETS ORAL at 08:11

## 2025-07-28 RX ADMIN — PIPERACILLIN AND TAZOBACTAM 3.38 G: 3; .375 INJECTION, POWDER, FOR SOLUTION INTRAVENOUS at 15:55

## 2025-07-28 RX ADMIN — ENOXAPARIN SODIUM 40 MG: 40 INJECTION SUBCUTANEOUS at 22:39

## 2025-07-28 RX ADMIN — METHOCARBAMOL 500 MG: 500 TABLET ORAL at 22:39

## 2025-07-28 RX ADMIN — METHOCARBAMOL 500 MG: 500 TABLET ORAL at 13:23

## 2025-07-28 RX ADMIN — EMTRICITABINE AND TENOFOVIR ALAFENAMIDE 1 TABLET: 200; 25 TABLET ORAL at 22:49

## 2025-07-28 ASSESSMENT — ACTIVITIES OF DAILY LIVING (ADL)
ADLS_ACUITY_SCORE: 45

## 2025-07-28 NOTE — PLAN OF CARE
Goal Outcome Evaluation:  Summary: Presented with RLQ abdominal pain, nausea and diarrhea, lap appy 7/25. Hx falls at home, fall risk.    DATE & TIME: 07/28/25 6142-6994  Cognitive Concerns/ Orientation : A&O x4  BEHAVIOR & AGGRESSION TOOL COLOR: Green  ABNL VS/O2:  VSS on RA  MOBILITY: Assist of 1 with GB/walker   Ind with cane at baseline.   PAIN MANAGMENT: Sched tylenol and robaxin effective for pain.   DIET: CLD for now, if tolerating can advance to FLD tonight   BOWEL/BLADDER: Continent of B&B. Passing flatus.  ABNL LAB: Na 134.   DRAIN/DEVICES: R PIV SL. LLQ ERICK drain. L PIV SL.  SKIN: Abrasion on L lower inner leg, scattered scabs, bruises and dry/peeling skin. Abd lap sites x2 CDI. ERICK drsg wet, Surg aware.  TESTS/PROCEDURES: S/P diagnostic exp lap, appendectomy, ERICK drain on 7/25.   D/C DAY/GOALS/PLACE: Pend progress.   OTHER IMPORTANT INFO:Receiving IV Zosyn q6h. ERICK drain output yellow.

## 2025-07-28 NOTE — PROGRESS NOTES
[facility]    Medicine Progress Note - Hospitalist Service    Date of Admission:  7/23/2025    Assessment & Plan     ranjit Fermin is a 65 year old male with history of HIV, who presented on 7/23/2025 with RLQ pain, nausea and 1 episode of diarrhea.      CT abd/pelvis showed - mildly dilated and inflamed mid small bowel in RLQ with adjacent loops of distal small bowel appearing inflamed within the terminal ileum, hyperenhancing mildly dilated appendix     Initially felt to have infectious enteritis vs less likely IBD vs acute appendicitis. Started on IV zosyn with improvement in CRP. [facility] and general surgery consulted. Taken to OR on 7/25 and found to have perforated acute appendicitis     Perforated acute appendicitis s/p laparoscopic appendectomy, 7/25/2025  Expected postoperative ileus  -unusual presentation on CT scan. Now s/p Diagnostic laparoscopy with laparoscopic appendectomy, 7/25/2025. Operative findings showed - markedly perforated appendix with multiple areas of perforation, turbid fluid throughout the abdomen, inflamed terminal ileum and cecum, distended small bowel  -surgical management per gen surgery. Bowel function is returning.  Diet avanced to full liquids  -continue IV zosyn, discontinue Iv fluids   -pain control  -Outpatient colonoscopy with ileal evaluation in 1 month per [facility] due to unusual presentation     HIV with peripheral neuropathy   -Followed by Piyush GARZA. Last HIV load detectable 3/2024, CD4 >1000. Reports compliance with HAART  - CD4 count stable  - Continue prior to admission HAART  - Continue prior to admission gabapentin, duloxetine      Chronic kidney disease, stage 2  Baseline creatinine 1-1.1. Creatinine mildly elevated to 1.29 on admission, Improved to 1 with IV fluids  -discontinue IVF.  Monitor labs.       Mild chronic hyponatremia  -Sodium 130 on admission. Now improved to 134. Records show longstanding history of mild hyponatremia. Monitor  "sodium.      Essential hypertension  - Continue amlodipine  - Due to mild bradycardia, decreased atenolol to 50 mg daily   - resume lisinopril at a lower dose of 10 mg daily on 7/28     Hypothyroidism  Hx of Graves disease  - Continue levothyroxine     Tobacco use disorder  - Encouraged cessation. Continue nicotine patch    Mild uncomplicated alcohol withdrawal  -drinks few beers per day. Seemed to have mild withdrawal symptoms on 7/26. Now resolved. Discontinue CIWA protocol on 7/28   -continue thiamine and folate    Acute metabolic encephalopathy with delirium  -developed confusion, agitation on 7/26.  Was also reported to have hallucinations and trying to remove ERICK drain, IV lines.  -This could be multifactorial- Secondary to mild alcohol withdrawal, surgery, acute illness.  -now resolved on 7/28. Back to baseline          Diet: Full Liquid Diet    DVT Prophylaxis: lovenox   Ferguson Catheter: Not present  Lines: None     Cardiac Monitoring: None  Code Status: Full Code      Clinically Significant Risk Factors         # Hyponatremia: Lowest Na = 134 mmol/L in last 2 days, will monitor as appropriate           # Hypertension: Noted on problem list            # Overweight: Estimated body mass index is 28.94 kg/m  as calculated from the following:    Height as of this encounter: 1.88 m (6' 2\").    Weight as of this encounter: 102.2 kg (225 lb 6.4 oz).             Social Drivers of Health    Tobacco Use: High Risk (7/23/2025)    Patient History     Smoking Tobacco Use: Every Day     Smokeless Tobacco Use: Never     Passive Exposure: Never   Physical Activity: Insufficiently Active (1/1/2025)    Exercise Vital Sign     Days of Exercise per Week: 3 days     Minutes of Exercise per Session: 20 min   Social Connections: Unknown (1/1/2025)    Social Connection and Isolation Panel [NHANES]     Frequency of Social Gatherings with Friends and Family: More than three times a week          Disposition Plan         Medically Ready " for Discharge: Anticipated in 2-4 Days             Brandee Shelby MD  Hospitalist Service  [facility]  Securely message with [employer] (more info)  Text page via [employer] Paging/Directory   ______________________________________________________________________    Interval History     Reports he feels okay.  He has and has had bowel movement  Clear liquid diet  Mental status improved.  Off safety sitter  No complaint        Physical Exam   Vital Signs: Temp: 97.5  F (36.4  C) Temp src: Oral BP: (!) 152/80 Pulse: 67   Resp: 16 SpO2: 97 % O2 Device: None (Room air)    Weight: 225 lbs 6.4 oz    Constitutional - awake and alert, in no acute distress  CV - RRR  Pulmonary - clear lungs  GI - abdomen is soft  Integumentary - skin is warm and dry, no rashes or ulcers  Neurological - awake, alert and oriented x3.  Moving all 4 extremities, normal speech, no focal deficits    Medical Decision Making       45 MINUTES SPENT BY ME on the date of service doing chart review, history, exam, documentation & further activities per the note.      Data     I have personally reviewed the following data over the past 24 hrs:    10.7  \   13.9   / 319     134 (L) 101 9.0 /  84   4.2 25 1.01 \       Imaging results reviewed over the past 24 hrs:   No results found for this or any previous visit (from the past 24 hours).

## 2025-07-28 NOTE — PROGRESS NOTES
Summary: Presented with RLQ abdominal pain, nausea and diarrhea, abnormal CT of abdomen; concerns for inflammatory bowel disease. Hx falls at home, fall risk.    DATE & TIME: 07/26/25 5588-2261  Cognitive Concerns/ Orientation : A&O x4  BEHAVIOR & AGGRESSION TOOL COLOR: Green  CIWA: 0,0.  ABNL VS/O2:  VSS on RA  MOBILITY: Assist of 1 with GB/walker   Ind with cane at baseline.   PAIN MANAGMENT: Sched tylenol and robaxin effective for pain.   DIET: NPO x meds/ice chips/sips of clears.  BOWEL/BLADDER: Continent of B&B. Passing flatus. Dulcolax supp given, BM x1.  ABNL LAB: Na 134. WBC 12.5.  DRAIN/DEVICES: R PIV infusing LR at 60 ml/hr. LLQ ERICK drain. L PIV SL, for zosyn.   SKIN: Abrasion on L lower inner leg, scattered scabs, bruises and dry/peeling skin. Abd lap sites x2 CDI. ERICK drsg wet, Surg aware.  TESTS/PROCEDURES: S/P diagnostic exp lap, appendectomy, ERICK drain on 7/25.   D/C DAY/GOALS/PLACE: Pend progress.   OTHER IMPORTANT INFO:Receiving IV Zosyn q6h. ERICK drain output gemma.

## 2025-07-28 NOTE — PROGRESS NOTES
"[facility]  GENERAL SURGERY Progress Note    Admission Date: 2025         Assessment and Plan:     Nicolás Fermin is a 65 year old male with PMH significant for HIV, peripheral neuropathy and HTN, admitted for abdominal pain.  S/P dx laparoscopy with challenging lap appy, POD 3.  Intra-op findings: dilated SB, ascites, perforated appendix with localized abscess and peritonitis.     - +ROBF, advance to clears, fulls later today if tolerates clears  - Ok to lock IVF from surgery standpoint once tolerating clears  - Pain control- Tylenol, gabapentin and Robaxin ATC, Oxy PRN (not taking)  - WBC 12.5-->10.7, continue Zosyn day 5, abd fluid cx no growth x 2 days, transition to Augmentin for total of 10 days  - Continue ERICK drain- high output from ascites, likely discontinue at discharge  - Left flank erythema- suspect secondary to ascites  - Hgb 13.9, continue Lovenox for DVT prophylaxis  - Continue Pepcid  - CMV abd fluid results pending  - Ambulate 4x day and encourage IS  - Medical management per Hospitalist, much appreciate your assistance             Interval History:     Hypertensive, feeling much better, hungry, denies increased abd pain, pain controlled, +Flatus/BM, UO adequate, ambulating.                      Physical Exam:   Blood pressure (!) 152/80, pulse 67, temperature 97.5  F (36.4  C), temperature source Oral, resp. rate 16, height 1.88 m (6' 2\"), weight 102.2 kg (225 lb 6.4 oz), SpO2 97%.  Temperature Temp  Av.8  F (36.6  C)  Min: 97.5  F (36.4  C)  Max: 98.2  F (36.8  C)   I/O last 3 completed shifts:  In: 3536 [P.O.:540; I.V.:2796; IV Piggyback:200]  Out: 1070 [Urine:350; Drains:720]  Constitutional:  Awake and in no apparent distress.   Lungs: No increased work of breathing.   Cardiovascular: Regular rate and rhythm.   Abdomen: Soft, non-distended, appropriately tender at incision(s). ERICK drain intact, serous. L flank erythema noted.   Wound(s): Clean, dry, and " intact. No erythema or drainage.    Extremities: No edema or calf tenderness. +SCDs          Data:      Recent Labs   Lab Test 07/28/25  0620 07/27/25  0543 07/26/25  0605   WBC 10.7 12.5* 11.9*   HGB 13.9 14.1 13.6   HCT 40.1 40.2 40.1    287 294      Recent Labs   Lab Test 07/28/25  0620 07/27/25  0543 07/26/25  0605   * 134* 134*   POTASSIUM 4.2 4.1 4.7   CHLORIDE 101 100 101   CO2 25 23 25   BUN 9.0 11.3 11.3   CR 1.01 0.99 1.02   GLC 84 80 95     Alison Horvath PA-C  [facility]  Office: [phone]  Page on-call surgeon after 4pm

## 2025-07-28 NOTE — PLAN OF CARE
Goal Outcome Evaluation:      Plan of Care Reviewed With: patient    Overall Patient Progress: no changeOverall Patient Progress: no change     A&O x 4. CIWA score 0. VSS, RA. CMS intact. Pain managed with robaxin and tylenol. SBA with walker, ambulated in hallway. Voiding in BR. Lap sites unchanged. ERICK patent and intact.

## 2025-07-28 NOTE — PLAN OF CARE
DATE & TIME: 7/27/25 3pm-11pm    Cognitive Concerns/ Orientation : A&Ox4   BEHAVIOR & AGGRESSION TOOL COLOR: green  CIWA SCORE: 0   ABNL VS/O2: VSS/Room air  MOBILITY: SBA/GB/Walker  PAIN MANAGMENT: denies  DIET: ice chips and sips of clears  BOWEL/BLADDER: continent  ABNL LAB/BG: Na 134  DRAIN/DEVICES: LLQ ERICK, L PIV and R PIV  SKIN: x2 lap sites, reddened area to LLQ of abdomen where ERICK drain is leaking fluid.   TESTS/PROCEDURES: POD #2 exp lap and appy   D/C DATE: pending

## 2025-07-29 LAB
MAYO MISC RESULT: NORMAL
PATH REPORT.COMMENTS IMP SPEC: NORMAL
PATH REPORT.COMMENTS IMP SPEC: NORMAL
PATH REPORT.FINAL DX SPEC: NORMAL
PATH REPORT.GROSS SPEC: NORMAL
PATH REPORT.MICROSCOPIC SPEC OTHER STN: NORMAL
PATH REPORT.RELEVANT HX SPEC: NORMAL
PHOTO IMAGE: NORMAL

## 2025-07-29 PROCEDURE — 99232 SBSQ HOSP IP/OBS MODERATE 35: CPT | Performed by: INTERNAL MEDICINE

## 2025-07-29 PROCEDURE — 250N000013 HC RX MED GY IP 250 OP 250 PS 637: Performed by: PHYSICIAN ASSISTANT

## 2025-07-29 PROCEDURE — 250N000011 HC RX IP 250 OP 636: Performed by: PHYSICIAN ASSISTANT

## 2025-07-29 PROCEDURE — 250N000013 HC RX MED GY IP 250 OP 250 PS 637: Performed by: STUDENT IN AN ORGANIZED HEALTH CARE EDUCATION/TRAINING PROGRAM

## 2025-07-29 PROCEDURE — 250N000013 HC RX MED GY IP 250 OP 250 PS 637: Performed by: INTERNAL MEDICINE

## 2025-07-29 PROCEDURE — 250N000011 HC RX IP 250 OP 636: Performed by: INTERNAL MEDICINE

## 2025-07-29 PROCEDURE — 120N000001 HC R&B MED SURG/OB

## 2025-07-29 RX ORDER — METHOCARBAMOL 500 MG/1
500 TABLET, FILM COATED ORAL 3 TIMES DAILY
Status: DISPENSED | OUTPATIENT
Start: 2025-07-29

## 2025-07-29 RX ORDER — ACETAMINOPHEN 325 MG/1
650 TABLET ORAL EVERY 8 HOURS
Status: DISPENSED | OUTPATIENT
Start: 2025-07-29

## 2025-07-29 RX ADMIN — FAMOTIDINE 20 MG: 10 INJECTION, SOLUTION INTRAVENOUS at 21:11

## 2025-07-29 RX ADMIN — SIMETHICONE 80 MG: 80 TABLET, CHEWABLE ORAL at 17:25

## 2025-07-29 RX ADMIN — PIPERACILLIN AND TAZOBACTAM 3.38 G: 3; .375 INJECTION, POWDER, FOR SOLUTION INTRAVENOUS at 16:21

## 2025-07-29 RX ADMIN — ENOXAPARIN SODIUM 40 MG: 40 INJECTION SUBCUTANEOUS at 21:11

## 2025-07-29 RX ADMIN — METHOCARBAMOL 500 MG: 500 TABLET ORAL at 16:21

## 2025-07-29 RX ADMIN — DOLUTEGRAVIR SODIUM 50 MG: 50 TABLET, FILM COATED ORAL at 21:10

## 2025-07-29 RX ADMIN — SENNOSIDES AND DOCUSATE SODIUM 1 TABLET: 50; 8.6 TABLET ORAL at 21:10

## 2025-07-29 RX ADMIN — AMLODIPINE BESYLATE 10 MG: 10 TABLET ORAL at 09:19

## 2025-07-29 RX ADMIN — SIMETHICONE 80 MG: 80 TABLET, CHEWABLE ORAL at 13:29

## 2025-07-29 RX ADMIN — PIPERACILLIN AND TAZOBACTAM 3.38 G: 3; .375 INJECTION, POWDER, FOR SOLUTION INTRAVENOUS at 04:07

## 2025-07-29 RX ADMIN — GABAPENTIN 600 MG: 300 CAPSULE ORAL at 09:19

## 2025-07-29 RX ADMIN — FOLIC ACID 1 MG: 1 TABLET ORAL at 09:18

## 2025-07-29 RX ADMIN — METHOCARBAMOL 500 MG: 500 TABLET ORAL at 21:09

## 2025-07-29 RX ADMIN — AMITRIPTYLINE HYDROCHLORIDE 50 MG: 25 TABLET, FILM COATED ORAL at 21:10

## 2025-07-29 RX ADMIN — ACETAMINOPHEN 650 MG: 325 TABLET ORAL at 13:29

## 2025-07-29 RX ADMIN — PIPERACILLIN AND TAZOBACTAM 3.38 G: 3; .375 INJECTION, POWDER, FOR SOLUTION INTRAVENOUS at 09:54

## 2025-07-29 RX ADMIN — GABAPENTIN 600 MG: 300 CAPSULE ORAL at 21:09

## 2025-07-29 RX ADMIN — PIPERACILLIN AND TAZOBACTAM 3.38 G: 3; .375 INJECTION, POWDER, FOR SOLUTION INTRAVENOUS at 21:11

## 2025-07-29 RX ADMIN — SIMETHICONE 80 MG: 80 TABLET, CHEWABLE ORAL at 21:10

## 2025-07-29 RX ADMIN — ATENOLOL 50 MG: 50 TABLET ORAL at 09:19

## 2025-07-29 RX ADMIN — SIMETHICONE 80 MG: 80 TABLET, CHEWABLE ORAL at 09:20

## 2025-07-29 RX ADMIN — FAMOTIDINE 20 MG: 10 INJECTION, SOLUTION INTRAVENOUS at 09:55

## 2025-07-29 RX ADMIN — ACETAMINOPHEN 650 MG: 325 TABLET ORAL at 21:10

## 2025-07-29 RX ADMIN — THIAMINE HCL TAB 100 MG 100 MG: 100 TAB at 09:20

## 2025-07-29 RX ADMIN — LISINOPRIL 10 MG: 10 TABLET ORAL at 09:19

## 2025-07-29 RX ADMIN — EMTRICITABINE AND TENOFOVIR ALAFENAMIDE 1 TABLET: 200; 25 TABLET ORAL at 21:10

## 2025-07-29 RX ADMIN — DULOXETINE HYDROCHLORIDE 60 MG: 60 CAPSULE, DELAYED RELEASE PELLETS ORAL at 09:18

## 2025-07-29 RX ADMIN — ACETAMINOPHEN 975 MG: 325 TABLET ORAL at 05:51

## 2025-07-29 RX ADMIN — LEVOTHYROXINE SODIUM 112 MCG: 0.11 TABLET ORAL at 06:36

## 2025-07-29 RX ADMIN — NICOTINE 1 PATCH: 14 PATCH, EXTENDED RELEASE TRANSDERMAL at 09:54

## 2025-07-29 ASSESSMENT — ACTIVITIES OF DAILY LIVING (ADL)
ADLS_ACUITY_SCORE: 45
ADLS_ACUITY_SCORE: 45
ADLS_ACUITY_SCORE: 52
ADLS_ACUITY_SCORE: 45
ADLS_ACUITY_SCORE: 45
ADLS_ACUITY_SCORE: 47
ADLS_ACUITY_SCORE: 45
ADLS_ACUITY_SCORE: 47
ADLS_ACUITY_SCORE: 47
ADLS_ACUITY_SCORE: 45
ADLS_ACUITY_SCORE: 47
ADLS_ACUITY_SCORE: 45
ADLS_ACUITY_SCORE: 47
ADLS_ACUITY_SCORE: 45

## 2025-07-29 NOTE — PROGRESS NOTES
MD Notification    Notified Person: MD    Notified Person Name: Alison Horvath    Notification Date/Time: 07/29/2025 0745    Notification Interaction: amcom    Purpose of Notification: Parker4 Nicolás Fermin. Left lower quadrant redness and swelling, seems to be increasing up the left side. Please advice.    Bar, Geovani [phone]    Orders Received: Pull ERICK drain    Comments:

## 2025-07-29 NOTE — PLAN OF CARE
Orientation: alert and oriented x4-forgetful    Vitals/Tele: vitals stable on room air except elevated blood pressure, denies pain this shift    IV Access/drains: PIVs SL    Diet: low fiber    Mobility: assist 1 gb/walker    GI/: continent of bowel and bladder    Wound/Skin: 2 abdominal sites- old ERICK site LLQ    Consults: GI, general surgery    Discharge Plan: pending improvement      See Flow sheets for assessment

## 2025-07-29 NOTE — PROGRESS NOTES
"[facility]  GENERAL SURGERY Progress Note    Admission Date: 2025         Assessment and Plan:     Nicolás Fermin is a 65 year old male with PMH significant for HIV, peripheral neuropathy and HTN, admitted for abdominal pain.  S/P dx laparoscopy with challenging lap appy, POD 4.  Intra-op findings: dilated SB, ascites, perforated appendix with localized abscess and peritonitis.     - Low residue diet  - Pain control- Tylenol (dose decreased), gabapentin and Robaxin (dose decreased) ATC, Oxy PRN (not taking)  - WBC 12.5-->10.7, continue Zosyn day 5, abd fluid cx no growth x 3 days, transition to Augmentin for total of 10 days  - Discontinue ERICK drain  - Left flank erythema- suspect secondary to ascites, appears to be stable  - Hgb 13.9 yesterday, continue Lovenox for DVT prophylaxis  - Continue Pepcid  - CMV abd fluid results pending  - Ambulate 4x day and encourage IS  - Medical management per Hospitalist, much appreciate your assistance             Interval History:     RN page addressed during my visit.  Patient denies any complaints, feeling well, hungry. Minimal pain, tolerating diet, +Flatus/BM, UO adequate, ambulating.                     Physical Exam:   Blood pressure (!) 152/81, pulse 63, temperature 97  F (36.1  C), temperature source Oral, resp. rate 18, height 1.88 m (6' 2\"), weight 102.2 kg (225 lb 6.4 oz), SpO2 97%.  Temperature Temp  Av.8  F (36.6  C)  Min: 97  F (36.1  C)  Max: 98.2  F (36.8  C)   I/O last 3 completed shifts:  In: 1046 [I.V.:946; IV Piggyback:100]  Out: 1060 [Urine:600; Drains:460]  Constitutional:  Awake and in no apparent distress.   Lungs: No increased work of breathing.   Cardiovascular: Regular rate and rhythm.   Abdomen: Soft, non-distended, appropriately tender at incision(s). ERICK drain intact, serous. L flank erythema appears to be stable.    Wound(s): Clean, dry, and intact. No erythema or drainage.    Extremities: No edema or calf " tenderness. +SCDs          Data:   No new labs/imaging.     Alison Horvath PA-C  [facility]  Office: [phone]  Page on-call surgeon after 4pm

## 2025-07-29 NOTE — PLAN OF CARE
Goal Outcome Evaluation:    Summary: Presented with RLQ abdominal pain, nausea and diarrhea, lap appy 7/25. Hx falls at home, fall risk.    DATE & TIME: 07/28/25 1930-0730  Cognitive Concerns/ Orientation : A&O x4  BEHAVIOR & AGGRESSION TOOL COLOR: Green  ABNL VS/O2:  VSS on RA  MOBILITY: Assist of 1 with GB/walker, ambulating to bathroom twice this shift   Ind with cane at baseline.   PAIN MANAGMENT: Schedule tylenol and robaxin   DIET: Full liquid diet   BOWEL/BLADDER: Continent of B&B. 2 BM  ABNL LAB: No new lab  DRAIN/DEVICES: R PIV SL. LLQ ERICK drain with large amount of output, including Left side laterally swelling, monitoring and paging surgery this morning. L PIV SL.  SKIN: Abrasion on L lower inner leg, scattered scabs, bruises and dry/peeling skin. Abd lap sites x2 CDI. ERICK dressing wet and changed-CDI  TESTS/PROCEDURES: S/P diagnostic exp lap, appendectomy, ERICK drain on 7/25.   D/C DAY/GOALS/PLACE: Pend progress.   OTHER IMPORTANT INFO:Receiving IV Zosyn q6h. ERICK drain output yellow.

## 2025-07-29 NOTE — PROGRESS NOTES
[facility]    Medicine Progress Note - Hospitalist Service    Date of Admission:  7/23/2025    Assessment & Plan     ranjit Fermin is a 65 year old male with history of HIV, who presented on 7/23/2025 with RLQ pain, nausea and 1 episode of diarrhea.      CT abd/pelvis showed - mildly dilated and inflamed mid small bowel in RLQ with adjacent loops of distal small bowel appearing inflamed within the terminal ileum, hyperenhancing mildly dilated appendix     Initially felt to have infectious enteritis vs less likely IBD vs acute appendicitis. Started on IV zosyn with improvement in CRP. [facility] and general surgery consulted. Taken to OR on 7/25 and found to have perforated acute appendicitis     Perforated acute appendicitis s/p laparoscopic appendectomy, 7/25/2025  Expected postoperative ileus  -unusual presentation on CT scan. Now s/p Diagnostic laparoscopy with laparoscopic appendectomy, 7/25/2025. Operative findings showed - markedly perforated appendix with multiple areas of perforation, turbid fluid throughout the abdomen, inflamed terminal ileum and cecum, distended small bowel  -Operative cultures negative  -surgical management per gen surgery. Diet avanced to Low residue diet   -continue IV zosyn, addition to Augmentin on 7/30   -pain control  -Outpatient colonoscopy with ileal evaluation in 1 month per [facility] due to unusual presentation     HIV with peripheral neuropathy   -Followed by Piyush GARZA. Last HIV load detectable 3/2024, CD4 >1000. Reports compliance with HAART  - CD4 count stable  - Continue prior to admission HAART  - Continue prior to admission gabapentin, duloxetine      Chronic kidney disease, stage 2  Baseline creatinine 1-1.1. Creatinine mildly elevated to 1.29 on admission, Improved to 1 with IV fluids  -discontinue IVF.  Monitor labs.       Mild chronic hyponatremia  -Sodium 130 on admission. Now improved to 134. Records show longstanding history of mild  "hyponatremia. Monitor sodium.      Essential hypertension  - Continue amlodipine  - Due to mild bradycardia, decreased atenolol to 50 mg daily   - continue lisinopril 10 mg daily     Hypothyroidism  Hx of Graves disease  - Continue levothyroxine     Tobacco use disorder  - Encouraged cessation. Continue nicotine patch    Mild uncomplicated alcohol withdrawal  -drinks few beers per day. Seemed to have mild withdrawal symptoms on 7/26. Now resolved. Discontinued CIWA protocol on 7/28   -continue thiamine and folate    Acute metabolic encephalopathy with delirium  -developed confusion, agitation on 7/26.  Was also reported to have hallucinations and trying to remove ERICK drain, IV lines.  -This could be multifactorial- Secondary to mild alcohol withdrawal, surgery, acute illness.  -still somewhat confused on 7/29  -PT OT consult           Diet: Low Fiber Diet    DVT Prophylaxis: lovenox   Ferguson Catheter: Not present  Lines: None     Cardiac Monitoring: None  Code Status: Full Code      Clinically Significant Risk Factors         # Hyponatremia: Lowest Na = 134 mmol/L in last 2 days, will monitor as appropriate           # Hypertension: Noted on problem list            # Overweight: Estimated body mass index is 28.94 kg/m  as calculated from the following:    Height as of this encounter: 1.88 m (6' 2\").    Weight as of this encounter: 102.2 kg (225 lb 6.4 oz).             Social Drivers of Health    Tobacco Use: High Risk (7/23/2025)    Patient History     Smoking Tobacco Use: Every Day     Smokeless Tobacco Use: Never     Passive Exposure: Never   Physical Activity: Insufficiently Active (1/1/2025)    Exercise Vital Sign     Days of Exercise per Week: 3 days     Minutes of Exercise per Session: 20 min   Social Connections: Unknown (1/1/2025)    Social Connection and Isolation Panel [NHANES]     Frequency of Social Gatherings with Friends and Family: More than three times a week          Disposition Plan         Medically " Ready for Discharge: Anticipated in 2-4 Days             Brandee Shelby MD  Hospitalist Service  [facility]  Securely message with [employer] (more info)  Text page via [employer] Paging/Directory   ______________________________________________________________________    Interval History     Reports he feels okay.    Diet advanced   No complaint  Remains mildly confused         Physical Exam   Vital Signs: Temp: 97  F (36.1  C) Temp src: Oral BP: (!) 152/81 Pulse: 63   Resp: 18 SpO2: 97 % O2 Device: Nasal cannula    Weight: 225 lbs 6.4 oz    Constitutional - awake and alert, in no acute distress  CV - RRR  Pulmonary - clear lungs  GI - abdomen is soft  Integumentary - skin is warm and dry, no rashes or ulcers  Neurological - awake, alert and oriented x3.  Moving all 4 extremities, normal speech, no focal deficits    Medical Decision Making       45 MINUTES SPENT BY ME on the date of service doing chart review, history, exam, documentation & further activities per the note.      Data         Imaging results reviewed over the past 24 hrs:   No results found for this or any previous visit (from the past 24 hours).

## 2025-07-30 ENCOUNTER — APPOINTMENT (OUTPATIENT)
Dept: PHYSICAL THERAPY | Facility: CLINIC | Age: 65
DRG: 397 | End: 2025-07-30
Attending: INTERNAL MEDICINE
Payer: COMMERCIAL

## 2025-07-30 DIAGNOSIS — E05.00 GRAVES DISEASE: ICD-10-CM

## 2025-07-30 LAB — BACTERIA PRT CULT: NO GROWTH

## 2025-07-30 PROCEDURE — 97161 PT EVAL LOW COMPLEX 20 MIN: CPT | Mod: GP | Performed by: PHYSICAL THERAPIST

## 2025-07-30 PROCEDURE — 250N000013 HC RX MED GY IP 250 OP 250 PS 637: Performed by: INTERNAL MEDICINE

## 2025-07-30 PROCEDURE — 250N000011 HC RX IP 250 OP 636: Performed by: INTERNAL MEDICINE

## 2025-07-30 PROCEDURE — 250N000011 HC RX IP 250 OP 636: Performed by: PHYSICIAN ASSISTANT

## 2025-07-30 PROCEDURE — 97530 THERAPEUTIC ACTIVITIES: CPT | Mod: GP | Performed by: PHYSICAL THERAPIST

## 2025-07-30 PROCEDURE — 250N000013 HC RX MED GY IP 250 OP 250 PS 637: Performed by: PHYSICIAN ASSISTANT

## 2025-07-30 PROCEDURE — 99232 SBSQ HOSP IP/OBS MODERATE 35: CPT | Performed by: INTERNAL MEDICINE

## 2025-07-30 PROCEDURE — 120N000001 HC R&B MED SURG/OB

## 2025-07-30 PROCEDURE — 97116 GAIT TRAINING THERAPY: CPT | Mod: GP | Performed by: PHYSICAL THERAPIST

## 2025-07-30 RX ORDER — LISINOPRIL 20 MG/1
20 TABLET ORAL DAILY
Status: DISPENSED | OUTPATIENT
Start: 2025-07-31

## 2025-07-30 RX ORDER — LEVOTHYROXINE SODIUM 112 UG/1
112 TABLET ORAL DAILY
Qty: 90 TABLET | Refills: 0 | Status: SHIPPED | OUTPATIENT
Start: 2025-07-30

## 2025-07-30 RX ADMIN — FAMOTIDINE 20 MG: 10 INJECTION, SOLUTION INTRAVENOUS at 08:26

## 2025-07-30 RX ADMIN — NICOTINE 1 PATCH: 14 PATCH, EXTENDED RELEASE TRANSDERMAL at 08:19

## 2025-07-30 RX ADMIN — FAMOTIDINE 20 MG: 10 INJECTION, SOLUTION INTRAVENOUS at 21:04

## 2025-07-30 RX ADMIN — SIMETHICONE 80 MG: 80 TABLET, CHEWABLE ORAL at 13:05

## 2025-07-30 RX ADMIN — METHOCARBAMOL 500 MG: 500 TABLET ORAL at 08:19

## 2025-07-30 RX ADMIN — GABAPENTIN 600 MG: 300 CAPSULE ORAL at 20:57

## 2025-07-30 RX ADMIN — ACETAMINOPHEN 650 MG: 325 TABLET ORAL at 05:00

## 2025-07-30 RX ADMIN — METHOCARBAMOL 500 MG: 500 TABLET ORAL at 21:03

## 2025-07-30 RX ADMIN — SIMETHICONE 80 MG: 80 TABLET, CHEWABLE ORAL at 08:18

## 2025-07-30 RX ADMIN — AMOXICILLIN AND CLAVULANATE POTASSIUM 1 TABLET: 875; 125 TABLET, FILM COATED ORAL at 09:26

## 2025-07-30 RX ADMIN — ATENOLOL 50 MG: 50 TABLET ORAL at 08:18

## 2025-07-30 RX ADMIN — SIMETHICONE 80 MG: 80 TABLET, CHEWABLE ORAL at 21:03

## 2025-07-30 RX ADMIN — DOLUTEGRAVIR SODIUM 50 MG: 50 TABLET, FILM COATED ORAL at 21:04

## 2025-07-30 RX ADMIN — METHOCARBAMOL 500 MG: 500 TABLET ORAL at 17:07

## 2025-07-30 RX ADMIN — GABAPENTIN 600 MG: 300 CAPSULE ORAL at 08:18

## 2025-07-30 RX ADMIN — EMTRICITABINE AND TENOFOVIR ALAFENAMIDE 1 TABLET: 200; 25 TABLET ORAL at 21:04

## 2025-07-30 RX ADMIN — ACETAMINOPHEN 650 MG: 325 TABLET ORAL at 21:03

## 2025-07-30 RX ADMIN — ENOXAPARIN SODIUM 40 MG: 40 INJECTION SUBCUTANEOUS at 20:56

## 2025-07-30 RX ADMIN — FOLIC ACID 1 MG: 1 TABLET ORAL at 08:18

## 2025-07-30 RX ADMIN — PIPERACILLIN AND TAZOBACTAM 3.38 G: 3; .375 INJECTION, POWDER, FOR SOLUTION INTRAVENOUS at 05:00

## 2025-07-30 RX ADMIN — LEVOTHYROXINE SODIUM 112 MCG: 0.11 TABLET ORAL at 06:56

## 2025-07-30 RX ADMIN — AMITRIPTYLINE HYDROCHLORIDE 50 MG: 25 TABLET, FILM COATED ORAL at 21:04

## 2025-07-30 RX ADMIN — AMLODIPINE BESYLATE 10 MG: 10 TABLET ORAL at 08:18

## 2025-07-30 RX ADMIN — LISINOPRIL 10 MG: 10 TABLET ORAL at 08:18

## 2025-07-30 RX ADMIN — SIMETHICONE 80 MG: 80 TABLET, CHEWABLE ORAL at 17:07

## 2025-07-30 RX ADMIN — THIAMINE HCL TAB 100 MG 100 MG: 100 TAB at 08:18

## 2025-07-30 RX ADMIN — DULOXETINE HYDROCHLORIDE 60 MG: 60 CAPSULE, DELAYED RELEASE PELLETS ORAL at 08:18

## 2025-07-30 RX ADMIN — AMOXICILLIN AND CLAVULANATE POTASSIUM 1 TABLET: 875; 125 TABLET, FILM COATED ORAL at 20:57

## 2025-07-30 ASSESSMENT — ACTIVITIES OF DAILY LIVING (ADL)
ADLS_ACUITY_SCORE: 52
ADLS_ACUITY_SCORE: 52
ADLS_ACUITY_SCORE: 46
ADLS_ACUITY_SCORE: 52
ADLS_ACUITY_SCORE: 54
ADLS_ACUITY_SCORE: 52
ADLS_ACUITY_SCORE: 46
ADLS_ACUITY_SCORE: 47
ADLS_ACUITY_SCORE: 47
ADLS_ACUITY_SCORE: 52
ADLS_ACUITY_SCORE: 52
ADLS_ACUITY_SCORE: 47
ADLS_ACUITY_SCORE: 47
ADLS_ACUITY_SCORE: 52
ADLS_ACUITY_SCORE: 47
ADLS_ACUITY_SCORE: 52
ADLS_ACUITY_SCORE: 47
ADLS_ACUITY_SCORE: 54
ADLS_ACUITY_SCORE: 52
ADLS_ACUITY_SCORE: 52
DEPENDENT_IADLS:: INDEPENDENT
ADLS_ACUITY_SCORE: 52

## 2025-07-30 NOTE — PROGRESS NOTES
"[facility]  GENERAL SURGERY Progress Note    Admission Date: 2025         Assessment and Plan:     Nicolás Fermin is a 65 year old male with PMH significant for HIV, peripheral neuropathy and HTN, admitted for abdominal pain.  S/P dx laparoscopy with challenging lap appy, POD 5.  Intra-op findings: dilated SB, ascites, perforated appendix with localized abscess and peritonitis. ERICK drain removed .     - Low residue diet  - Pain control- Tylenol, gabapentin and Robaxin ATC, Oxy PRN (not taking)  - Continue Zosyn while IP day 6, abd fluid cx no growth, transition to Augmentin for total of 10 days  - Left flank erythema- suspect secondary to ascites, appears to be stable  - Continue Lovenox for DVT prophylaxis  - Continue Pepcid  - Dry dressing to drain site as needed  - CMV abd fluid results pending  - Ambulate 4x day and encourage IS  - Medical management per Hospitalist, much appreciate your assistance  - Ok to discharge from surgery standpoint when medically able  - Follow up with GI as OP in 3 months. NO colonoscopy for minimum 3 months after appendectomy  - Discharge instructions discussed and in chart, no narcotics at discharge  - Please call with questions/concerns             Interval History:     Denies increased abd pain, n/v. Minimal pain, tolerating diet, +Flatus/BM, UO adequate, ambulating.                     Physical Exam:   Blood pressure (!) 149/80, pulse 62, temperature 98.9  F (37.2  C), temperature source Oral, resp. rate 18, height 1.88 m (6' 2\"), weight 102.4 kg (225 lb 12 oz), SpO2 96%.  Temperature Temp  Av.2  F (36.8  C)  Min: 97.8  F (36.6  C)  Max: 98.9  F (37.2  C)   I/O last 3 completed shifts:  In: 340 [P.O.:240; IV Piggyback:100]  Out: 1050 [Urine:950; Drains:100]  Constitutional:  Awake and in no apparent distress.   Lungs: No increased work of breathing.   Cardiovascular: Regular rate and rhythm.   Abdomen: Soft, non-distended, appropriately " tender at incision(s). Left flank erythema stable.   Wound(s): Clean, dry, and intact. No erythema or drainage.    Extremities: No edema or calf tenderness. +SCDs          Data:   No new labs/imaging.   Recent Labs   Lab Test 07/28/25  0620 07/27/25  0543 07/26/25  0605   WBC 10.7 12.5* 11.9*   HGB 13.9 14.1 13.6   HCT 40.1 40.2 40.1    287 294      Recent Labs   Lab Test 07/28/25  0620 07/27/25  0543 07/26/25  0605   * 134* 134*   POTASSIUM 4.2 4.1 4.7   CHLORIDE 101 100 101   CO2 25 23 25   BUN 9.0 11.3 11.3   CR 1.01 0.99 1.02   GLC 84 80 95       Alison Horvath PA-C  [facility]  Office: [phone]  Page on-call surgeon after 4pm

## 2025-07-30 NOTE — PLAN OF CARE
Goal Outcome Evaluation:      Plan of Care Reviewed With: patient    Overall Patient Progress: improvingOverall Patient Progress: improving    Summary: Presented with RLQ abdominal pain, nausea and diarrhea, lap appy 7/25. Hx falls at home, fall risk.    DATE & TIME: 7/30/2025 [id number]  Cognitive Concerns/ Orientation : A&O x4.  Forgetful.  BEHAVIOR & AGGRESSION TOOL COLOR: Green  ABNL VS/O2:  VSS on RA  MOBILITY: Assist of 1 with GB/walker, ambulating to bathroom, walks slowly, Ind with cane at baseline.   PAIN MANAGMENT: Denies. Given scheduled tylenol and robaxin   DIET: Low fiber diet.  BOWEL/BLADDER: Continent of B&B. 3 watery BM; Senna held.  ABNL LAB: No new lab  DRAIN/DEVICES: R PIV SL; L PIV SL.   SKIN: Abrasion on L lower inner leg, scattered scabs, bruises and dry/peeling skin. Abd lap appy sites x2 CDI. LLQ ERICK drain site has moderate yellow drainage, applied new ABD.   TESTS/PROCEDURES: none   D/C DAY/GOALS/PLACE: TBD, therapies recommending TCU, SW following.   OTHER IMPORTANT INFO: IV Zosyn changed to oral Augmentin BID. Surgery cleared pt for discharge.

## 2025-07-30 NOTE — PLAN OF CARE
Goal Outcome Evaluation:       Summary: Presented with RLQ abdominal pain, nausea and diarrhea, lap appy 7/25. Hx falls at home, fall risk.    DATE & TIME: 7/29/2025  [id number]  Cognitive Concerns/ Orientation : A&O x4.  Forgetful.  BEHAVIOR & AGGRESSION TOOL COLOR: Green  ABNL VS/O2:  VSS on RA  MOBILITY: Assist of 1 with GB/walker, Ind with cane at baseline.   PAIN MANAGMENT: Denies pain  DIET: Low fiber diet.  BOWEL/BLADDER: Continent of B&B. Incontinent of BM at times  ABNL LAB: No new lab  DRAIN/DEVICES: R PIV SL; L PIV SL.   SKIN: Abrasion on L lower inner leg, scattered scabs, bruises and dry/peeling skin. Abd lap sites x2 CDI. LLQ ERICK drain removed prior shift; Gauze and ABD dressing over site; dressing changed and CDI this shift.    TESTS/PROCEDURES: No new lab  D/C DAY/GOALS/PLACE: Pend progress.   OTHER IMPORTANT INFO: Receiving IV Zosyn q6h.

## 2025-07-30 NOTE — CONSULTS
Care Management Initial Consult    General Information  Assessment completed with: Patient, pt  Type of CM/SW Visit: Initial Assessment    Primary Care Provider verified and updated as needed:     Readmission within the last 30 days: unable to assess      Reason for Consult: discharge planning  Advance Care Planning:            Communication Assessment  Patient's communication style: spoken language (English or Bilingual)    Hearing Difficulty or Deaf: no   Wear Glasses or Blind: yes (not with patient)    Cognitive  Cognitive/Neuro/Behavioral: WDL  Level of Consciousness: alert  Arousal Level: arouses to voice, arouses to touch/gentle shaking, opens eyes spontaneously  Orientation: oriented x 4  Mood/Behavior: calm, cooperative  Best Language: 0 - No aphasia  Speech: clear, spontaneous    Living Environment:   People in home: sibling(s)     Current living Arrangements: house      Able to return to prior arrangements: no       Family/Social Support:  Care provided by: self  Provides care for: no one  Marital Status: Single  Support system: Sibling(s)          Description of Support System: Supportive         Current Resources:   Patient receiving home care services: No        Community Resources: None  Equipment currently used at home:  (cane)  Supplies currently used at home:      Employment/Financial:  Employment Status:  (works as a  at [employer] in [place])        Financial Concerns:             Does the patient's insurance plan have a 3 day qualifying hospital stay waiver?  No    Lifestyle & Psychosocial Needs:  Social Drivers of Health     Food Insecurity: Low Risk  (1/1/2025)    Food Insecurity     Within the past 12 months, did you worry that your food would run out before you got money to buy more?: No     Within the past 12 months, did the food you bought just not last and you didn t have money to get more?: No   Depression: Not at risk (1/13/2025)    PHQ-2     PHQ-2 Score: 0   Housing Stability: Low  Risk  (1/1/2025)    Housing Stability     Do you have housing? : Yes     Are you worried about losing your housing?: No   Tobacco Use: High Risk (7/23/2025)    Patient History     Smoking Tobacco Use: Every Day     Smokeless Tobacco Use: Never     Passive Exposure: Never   Financial Resource Strain: Low Risk  (1/1/2025)    Financial Resource Strain     Within the past 12 months, have you or your family members you live with been unable to get utilities (heat, electricity) when it was really needed?: No   Alcohol Use: Not on file   Transportation Needs: Low Risk  (1/1/2025)    Transportation Needs     Within the past 12 months, has lack of transportation kept you from medical appointments, getting your medicines, non-medical meetings or appointments, work, or from getting things that you need?: No   Physical Activity: Insufficiently Active (1/1/2025)    Exercise Vital Sign     Days of Exercise per Week: 3 days     Minutes of Exercise per Session: 20 min   Interpersonal Safety: Low Risk  (7/24/2025)    Interpersonal Safety     Do you feel physically and emotionally safe where you currently live?: Yes     Within the past 12 months, have you been hit, slapped, kicked or otherwise physically hurt by someone?: No     Within the past 12 months, have you been humiliated or emotionally abused in other ways by your partner or ex-partner?: No   Stress: No Stress Concern Present (1/1/2025)    [employer] - Occupational Stress Questionnaire     Feeling of Stress : Only a little   Social Connections: Unknown (1/1/2025)    Social Connection and Isolation Panel [NHANES]     Frequency of Communication with Friends and Family: Not on file     Frequency of Social Gatherings with Friends and Family: More than three times a week     Attends Scientology Services: Not on file     Active Member of Clubs or Organizations: Not on file     Attends Club or Organization Meetings: Not on file     Marital Status: Not on  file   Health Literacy: Not on file       Functional Status:  Prior to admission patient needed assistance:   Dependent ADLs:: Independent  Dependent IADLs:: Independent       Mental Health Status:          Chemical Dependency Status:                Values/Beliefs:  Spiritual, Cultural Beliefs, Yazidi Practices, Values that affect care:                 Discussed  Partnership in Safe Discharge Planning  document with patient/family: No    Additional Information:  Pt is a 65 year old male who was admitted to the hospital with concerns of  abdominal pain per  note on 7/23/25.    Writer able to see that PT is recommending TCU.  Writer met with pt at bedside. Introduced self an role. Pt states that he lives at home with his brother. He states it is a two level home and he lives on the main floor. He states the main floor has a bathroom and kitchen as well. Pt states that prior to hospitalization he was independent in ADL's, and I'ADL's. Pt states that that he does not have any home care or pca services set up. Pt states that he does use a cane as needed. He states he is working at the [employer] as a  in [place]. Pt does request a letter from doctor stating that he is hospitalized so that he can provide this to his job. Writer discussed with pt about PT recommendation. Pt states that he is in agreement to tcu. He would like referrals sent to higher rated facilities near the [facility] and [place]. Writer agreeable to look at medicare.gov to see which facilities are higher rated and near his home. Pt states no further questions or concerns at this time.     Writer sent referrals to tcu. Writer sent message to Doctor Alison Horvath informing them that pt would like a letter from doctor stating that he has been hospitalized for his job.       Next Steps: awaiting medical stability; awaiting accepting tcu.     Lolly Graves, MATTIE  Social Work  [facility]

## 2025-07-30 NOTE — PROGRESS NOTES
"   07/30/25 1000   Appointment Info   Signing Clinician's Name / Credentials (PT) Lan Chi DPT       Present no   Living Environment   People in Home sibling(s)   Current Living Arrangements house   Home Accessibility stairs to enter home   Number of Stairs, Main Entrance 3   Stair Railings, Main Entrance none   Transportation Anticipated family or friend will provide   Living Environment Comments Pt lives in a house with his brother. Stairs to enter. Pt reports his brother works and can not provide 24/7 assist. Reports ex sister-in-law can provide assist but she also works.   Self-Care   Usual Activity Tolerance good   Current Activity Tolerance moderate   Regular Exercise No   Equipment Currently Used at Home cane, straight   Fall history within last six months yes   Number of times patient has fallen within last six months 7   Activity/Exercise/Self-Care Comment Pt reports being IND at baseline with all ADLs. Pt ambulates w/ a  SEC at baseline, does not own a FWW. Pt works as a .   General Information   Onset of Illness/Injury or Date of Surgery 07/30/25   Referring Physician Brandee Shelby MD   Patient/Family Therapy Goals Statement (PT) \"To go home\"   Pertinent History of Current Problem (include personal factors and/or comorbidities that impact the POC) Per Chart: Nicolás Fermin is a 65 year old male with PMH significant for HIV, peripheral neuropathy and HTN, admitted for abdominal pain.  S/P dx laparoscopy with challenging lap appy, POD 5.   Existing Precautions/Restrictions fall   Weight-Bearing Status - LLE full weight-bearing   Weight-Bearing Status - RLE full weight-bearing   Cognition   Orientation Status (Cognition) oriented x 4   Pain Assessment   Patient Currently in Pain No   Posture    Posture Forward head position;Protracted shoulders   Range of Motion (ROM)   Range of Motion ROM is WFL   Strength (Manual Muscle Testing)   Strength (Manual Muscle Testing) " Deficits observed during functional mobility   Strength Comments BLE Hip Flexion: 3+/5   Bed Mobility   Comment, (Bed Mobility) Did not assess   Transfers   Comment, (Transfers) Sit>stand w/ FWW and mod A x 1   Gait/Stairs (Locomotion)   [place] Level (Gait) contact guard   Assistive Device (Gait) walker, front-wheeled   Distance in Feet (Gait) 5'   Balance   Balance Comments Adequate static sitting balance; pt ambulates w/ a FWW   Sensory Examination   Sensory Perception patient reports no sensory changes   Clinical Impression   Criteria for Skilled Therapeutic Intervention Yes, treatment indicated   PT Diagnosis (PT) Impaired gait   Influenced by the following impairments Decreased activity tolerance; decreased balance; decreased strength   Functional limitations due to impairments Impaired functional mobility   Clinical Presentation (PT Evaluation Complexity) stable   Clinical Presentation Rationale Clinical judgement   Clinical Decision Making (Complexity) low complexity   Planned Therapy Interventions (PT) balance training;bed mobility training;gait training;patient/family education;stair training;strengthening;transfer training;progressive activity/exercise   Risk & Benefits of therapy have been explained evaluation/treatment results reviewed;care plan/treatment goals reviewed;risks/benefits reviewed;current/potential barriers reviewed;participants voiced agreement with care plan;participants included;patient   PT Total Evaluation Time   PT Eval, Low Complexity Minutes (47973) 10   Physical Therapy Goals   PT Frequency 5x/week   PT Predicted Duration/Target Date for Goal Attainment 08/06/25   PT Goals Transfers;Bed Mobility;Gait;Stairs   PT: Bed Mobility Supervision/stand-by assist;Supine to/from sit   PT: Transfers Supervision/stand-by assist;Sit to/from stand;Assistive device   PT: Gait Supervision/stand-by assist;Assistive device;150 feet   PT: Stairs Supervision/stand-by assist;3 stairs    Interventions   Interventions Quick Adds Gait Training;Therapeutic Activity   Therapeutic Activity   Therapeutic Activities: dynamic activities to improve functional performance Minutes (38470) 8   Symptoms Noted During/After Treatment Fatigue   Treatment Detail/Skilled Intervention Greeted pt sitting in chair, agreed to PT. VSS on RA throughout session. Pt attempting to stand, unable to complete IND despite multiple attempts. Pt performed sit>stand x 6 w/ FWW and mod A x 1, needing assist to stand fully upright. Verbal cues for hand placement. Pt unsteady in standing, able to gain balance without PT assist. After ambulation, pt returned to chair w/ FWW and CGA, cues to descend in a slow, controlled motion. Discussed discharge recommendations with pt, pt will consider TCU. Pt ended session sitting in chair, with all needs met and call light within reach.   Gait Training   Gait Training Minutes (39009) 13   Symptoms Noted During/After Treatment (Gait Training) fatigue   Treatment Detail/Skilled Intervention Pt ambulated w/ FWW and CGA. Pt ambulated with decreased gait speed, downward gaze, FWW out too far in front of pt, and heavy reliance on FWW. Verbal cues for upright gaze and posture, FWW proximity, to increase step length, and to reduce reliance on FWW. Good carryover with cues. Pt required one standing rest break due to fatigue. Mild unsteadiness throughout but no overt LOB noted.   Distance in Feet 75'   PT Discharge Planning   PT Plan Assess bed mobility; repeat sit>stands; gait w/ FWW; trial stairs   PT Discharge Recommendation (DC Rec) Transitional Care Facility   PT Rationale for DC Rec Pt is below baseline. Pt currently requiring assist with all functional mobility. Pt presents with deficits in activity tolerance, balance, and strength. Due to these deficits and recent falls history, pt is a high falls risk. Pt would benefit from continued skilled PT services via TCU to address deficits and improve IND  with safety and functional mobility. Pt reports his brother and ex sister-in-law can provide assist but they both work.   PT Brief overview of current status Goals of therapy will be to address safe mobility and make recs for d/c to next level of care. Pt and RN will continue to follow all falls risk precautions as documented by RN staff while hospitalized. A x 1 w/ FWW   PT Total Distance Amb During Session (feet) 75   Physical Therapy Time and Intention   Timed Code Treatment Minutes 21   Total Session Time (sum of timed and untimed services) 31

## 2025-07-30 NOTE — PLAN OF CARE
Goal Outcome Evaluation:      Plan of Care Reviewed With: patient          Outcome Evaluation: TCU when medically stable

## 2025-07-30 NOTE — PLAN OF CARE
Goal Outcome Evaluation:    Summary: Presented with RLQ abdominal pain, nausea and diarrhea, lap appy 7/25. Hx falls at home, fall risk.    DATE & TIME: 7/29/2025  4510 - 1310  Cognitive Concerns/ Orientation : A&O x4.  Forgetful.  BEHAVIOR & AGGRESSION TOOL COLOR: Green  ABNL VS/O2:  VSS on RA  MOBILITY: Assist of 1 with GB/walker, ambulating to bathroom twice this shift    Ind with cane at baseline.   PAIN MANAGMENT: Denies.  Given scheduled tylenol and robaxin   DIET: Low fiber diet.  BOWEL/BLADDER: Continent of B&B. 3 watery BM; Senna held.  ABNL LAB: No new lab  DRAIN/DEVICES: R PIV SL; L PIV SL.   SKIN: Abrasion on L lower inner leg, scattered scabs, bruises and dry/peeling skin. Abd lap sites x2 CDI. LLQ ERICK drain removed prior shift; Gauze and ABD dressing over site; dressing changed due to 70% saturation this shift.  TESTS/PROCEDURES: S/P diagnostic exp lap, appendectomy, ERICK drain on 7/25, removed 7/29/2025.   D/C DAY/GOALS/PLACE: Pend progress.   OTHER IMPORTANT INFO:Receiving IV Zosyn q6h.

## 2025-07-30 NOTE — PROGRESS NOTES
"BRIEF NUTRITION ASSESSMENT      REASON FOR ASSESSMENT:  Nicolás Fermin is a 65 year old male seen by Registered Dietitian for LOS day 7    NUTRITION HISTORY:  Met with pt at chairside. He said his appetite has been low since admission because he is worried about his dog and his brother. PTA he was eating fine. He lives with his brother and they share the cooking responsibilities. They are trying to make better food choices. Discussed his current diet order, when he is cleared for a regular diet how to add fiber back, protein intake, and staying hydrated. Encouraged him to reach out if he had any nutrition questions or concerns.     CURRENT DIET AND INTAKE:  Diet:  Low fiber            % of all documented meals, appetite noted as good      ANTHROPOMETRICS:  Height: 6' 2\"  Weight:  225 lbs 12.02 oz  Body mass index is 28.98 kg/m .   Weight Status: Overweight BMI 25-29.9  IBW:  82.2  %IBW: 125%   Weight History:   Wt Readings from Last 10 Encounters:   07/30/25 102.4 kg (225 lb 12 oz)   07/23/25 102.1 kg (225 lb)   02/13/25 101.2 kg (223 lb)   01/02/25 103.3 kg (227 lb 12.8 oz)   10/28/24 101.2 kg (223 lb)   10/31/23 101.3 kg (223 lb 6.4 oz)   10/10/22 114 kg (251 lb 6.4 oz)   07/28/22 116.5 kg (256 lb 12.8 oz)   01/26/22 117 kg (258 lb)   09/16/21 117.1 kg (258 lb 1.6 oz)        LABS:  Labs noted    MALNUTRITION:  Patient does not meet two of the following criteria necessary for diagnosing malnutrition.     % Weight Loss:  None noted  % Intake:  Decreased intake does not meet criteria for malnutrition   Subcutaneous Fat Loss:  None observed  Muscle Loss:  None observed  Fluid Retention:  None noted    NUTRITION INTERVENTION:  Nutrition Diagnosis:  No nutrition diagnosis at this time.    FOLLOW UP/MONITORING:   Will re-evaluate in 7 - 10 days, or sooner, if re-consulted.    Radha Harrell, PhD, RD  Clinical Dietitian - [facility]  Available on Vocera        "

## 2025-07-30 NOTE — PROGRESS NOTES
[facility]    Medicine Progress Note - Hospitalist Service    Date of Admission:  7/23/2025    Assessment & Plan     ranjit Fermin is a 65 year old male with history of HIV, who presented on 7/23/2025 with RLQ pain, nausea and 1 episode of diarrhea.      CT abd/pelvis showed - mildly dilated and inflamed mid small bowel in RLQ with adjacent loops of distal small bowel appearing inflamed within the terminal ileum, hyperenhancing mildly dilated appendix     Initially felt to have infectious enteritis vs less likely IBD vs acute appendicitis. Started on IV zosyn with improvement in CRP. [facility] and general surgery consulted. Taken to OR on 7/25 and found to have perforated acute appendicitis     Perforated acute appendicitis s/p laparoscopic appendectomy, 7/25/2025  Expected postoperative ileus  -unusual presentation on CT scan. Now s/p Diagnostic laparoscopy with laparoscopic appendectomy, 7/25/2025. Operative findings showed - markedly perforated appendix with multiple areas of perforation, turbid fluid throughout the abdomen, inflamed terminal ileum and cecum, distended small bowel  -Operative cultures negative  -surgical management per gen surgery. Diet avanced to Low residue diet   -completed 5 days of IV zosyn, switched to Augmentin on 7/30   -pain control  -Outpatient colonoscopy with ileal evaluation in 3 month per [facility] due to unusual presentation. Gen surgery does not recommend colonoscopy within 3 months of surgery      HIV with peripheral neuropathy   -Followed by Piyush GARZA. Last HIV load detectable 3/2024, CD4 >1000. Reports compliance with HAART  - CD4 count stable  - Continue prior to admission HAART  - Continue prior to admission gabapentin, duloxetine      Chronic kidney disease, stage 2  Baseline creatinine 1-1.1. Creatinine mildly elevated to 1.29 on admission, Improved to 1 with IV fluids  -discontinue IVF.  Monitor labs.       Mild chronic hyponatremia  -Sodium 130 on  "admission. Now improved to 134. Records show longstanding history of mild hyponatremia. Monitor sodium.      Essential hypertension  - Continue amlodipine  - Due to mild bradycardia, decreased atenolol to 50 mg daily   - continue lisinopril 10 mg daily     Hypothyroidism  Hx of Graves disease  - Continue levothyroxine     Tobacco use disorder  - Encouraged cessation. Continue nicotine patch    Mild uncomplicated alcohol withdrawal  -drinks few beers per day. Seemed to have mild withdrawal symptoms on 7/26. Now resolved. Discontinued CIWA protocol on 7/28   -continue thiamine and folate    Acute metabolic encephalopathy with delirium  -developed confusion, agitation on 7/26.  Was also reported to have hallucinations and trying to remove ERICK drain, IV lines.  -This could be multifactorial- Secondary to mild alcohol withdrawal, surgery, acute illness.  -now resolved on 7/30. Appears to be baseline    Generalized weakness  Physical deconditioning  -PT consulted  - TCU recommended.   -consult SW          Diet: Low Fiber Diet    DVT Prophylaxis: lovenox   Ferguson Catheter: Not present  Lines: None     Cardiac Monitoring: None  Code Status: Full Code      Clinically Significant Risk Factors                   # Hypertension: Noted on problem list            # Overweight: Estimated body mass index is 28.98 kg/m  as calculated from the following:    Height as of this encounter: 1.88 m (6' 2\").    Weight as of this encounter: 102.4 kg (225 lb 12 oz).             Social Drivers of Health    Tobacco Use: High Risk (7/23/2025)    Patient History     Smoking Tobacco Use: Every Day     Smokeless Tobacco Use: Never     Passive Exposure: Never   Physical Activity: Insufficiently Active (1/1/2025)    Exercise Vital Sign     Days of Exercise per Week: 3 days     Minutes of Exercise per Session: 20 min   Social Connections: Unknown (1/1/2025)    Social Connection and Isolation Panel [NHANES]     Frequency of Social Gatherings with Friends " and Family: More than three times a week          Disposition Plan         Medically Ready for Discharge: Anticipated Tomorrow             Brandee Shelby MD  Hospitalist Service  [facility]  Securely message with [employer] (more info)  Text page via [employer] Paging/Directory   ______________________________________________________________________    Interval History     Reports he feels okay.    Tolerating diet  Pain is controlled  Awake and alert today        Physical Exam   Vital Signs: Temp: 98.9  F (37.2  C) Temp src: Oral BP: (!) 149/80 Pulse: 62   Resp: 18 SpO2: 96 % O2 Device: None (Room air)    Weight: 225 lbs 12.02 oz    Constitutional - awake and alert, in no acute distress  CV - RRR  Pulmonary - clear lungs  GI - abdomen is soft  Integumentary - skin is warm and dry, no rashes or ulcers  Neurological - awake, alert and oriented x3.  Moving all 4 extremities, normal speech, no focal deficits    Medical Decision Making       45 MINUTES SPENT BY ME on the date of service doing chart review, history, exam, documentation & further activities per the note.      Data         Imaging results reviewed over the past 24 hrs:   No results found for this or any previous visit (from the past 24 hours).

## 2025-07-31 ENCOUNTER — APPOINTMENT (OUTPATIENT)
Dept: PHYSICAL THERAPY | Facility: CLINIC | Age: 65
DRG: 397 | End: 2025-07-31
Payer: COMMERCIAL

## 2025-07-31 ENCOUNTER — APPOINTMENT (OUTPATIENT)
Dept: OCCUPATIONAL THERAPY | Facility: CLINIC | Age: 65
DRG: 397 | End: 2025-07-31
Attending: INTERNAL MEDICINE
Payer: COMMERCIAL

## 2025-07-31 VITALS
WEIGHT: 225.75 LBS | RESPIRATION RATE: 16 BRPM | HEART RATE: 70 BPM | OXYGEN SATURATION: 94 % | SYSTOLIC BLOOD PRESSURE: 138 MMHG | HEIGHT: 74 IN | DIASTOLIC BLOOD PRESSURE: 73 MMHG | BODY MASS INDEX: 28.97 KG/M2 | TEMPERATURE: 98.4 F

## 2025-07-31 LAB
BACTERIA PRT CULT: NORMAL
BACTERIA PRT CULT: NORMAL

## 2025-07-31 PROCEDURE — 120N000001 HC R&B MED SURG/OB

## 2025-07-31 PROCEDURE — 250N000013 HC RX MED GY IP 250 OP 250 PS 637: Performed by: INTERNAL MEDICINE

## 2025-07-31 PROCEDURE — 250N000011 HC RX IP 250 OP 636: Performed by: INTERNAL MEDICINE

## 2025-07-31 PROCEDURE — 250N000011 HC RX IP 250 OP 636: Performed by: PHYSICIAN ASSISTANT

## 2025-07-31 PROCEDURE — 97165 OT EVAL LOW COMPLEX 30 MIN: CPT | Mod: GO

## 2025-07-31 PROCEDURE — 99232 SBSQ HOSP IP/OBS MODERATE 35: CPT | Performed by: INTERNAL MEDICINE

## 2025-07-31 PROCEDURE — 97530 THERAPEUTIC ACTIVITIES: CPT | Mod: GP | Performed by: PHYSICAL THERAPY ASSISTANT

## 2025-07-31 PROCEDURE — 97116 GAIT TRAINING THERAPY: CPT | Mod: GP | Performed by: PHYSICAL THERAPY ASSISTANT

## 2025-07-31 PROCEDURE — 250N000013 HC RX MED GY IP 250 OP 250 PS 637: Performed by: PHYSICIAN ASSISTANT

## 2025-07-31 PROCEDURE — 97535 SELF CARE MNGMENT TRAINING: CPT | Mod: GO

## 2025-07-31 PROCEDURE — 97530 THERAPEUTIC ACTIVITIES: CPT | Mod: GO

## 2025-07-31 RX ORDER — AMOXICILLIN 250 MG
1 CAPSULE ORAL 2 TIMES DAILY
DISCHARGE
Start: 2025-07-31

## 2025-07-31 RX ORDER — ATENOLOL 50 MG/1
50 TABLET ORAL DAILY
DISCHARGE
Start: 2025-08-01

## 2025-07-31 RX ORDER — NICOTINE 21 MG/24HR
1 PATCH, TRANSDERMAL 24 HOURS TRANSDERMAL DAILY
DISCHARGE
Start: 2025-08-01

## 2025-07-31 RX ORDER — SIMETHICONE 80 MG
80 TABLET,CHEWABLE ORAL EVERY 6 HOURS PRN
DISCHARGE
Start: 2025-07-31

## 2025-07-31 RX ADMIN — METHOCARBAMOL 500 MG: 500 TABLET ORAL at 08:45

## 2025-07-31 RX ADMIN — GABAPENTIN 600 MG: 300 CAPSULE ORAL at 21:45

## 2025-07-31 RX ADMIN — AMOXICILLIN AND CLAVULANATE POTASSIUM 1 TABLET: 875; 125 TABLET, FILM COATED ORAL at 08:45

## 2025-07-31 RX ADMIN — SIMETHICONE 80 MG: 80 TABLET, CHEWABLE ORAL at 08:45

## 2025-07-31 RX ADMIN — DOLUTEGRAVIR SODIUM 50 MG: 50 TABLET, FILM COATED ORAL at 21:46

## 2025-07-31 RX ADMIN — ACETAMINOPHEN 650 MG: 325 TABLET ORAL at 13:12

## 2025-07-31 RX ADMIN — ENOXAPARIN SODIUM 40 MG: 40 INJECTION SUBCUTANEOUS at 21:44

## 2025-07-31 RX ADMIN — ACETAMINOPHEN 650 MG: 325 TABLET ORAL at 06:06

## 2025-07-31 RX ADMIN — SIMETHICONE 80 MG: 80 TABLET, CHEWABLE ORAL at 21:46

## 2025-07-31 RX ADMIN — SIMETHICONE 80 MG: 80 TABLET, CHEWABLE ORAL at 17:39

## 2025-07-31 RX ADMIN — ATENOLOL 50 MG: 50 TABLET ORAL at 08:45

## 2025-07-31 RX ADMIN — MICONAZOLE NITRATE: 2 POWDER TOPICAL at 14:21

## 2025-07-31 RX ADMIN — FAMOTIDINE 20 MG: 10 INJECTION, SOLUTION INTRAVENOUS at 08:54

## 2025-07-31 RX ADMIN — ACETAMINOPHEN 650 MG: 325 TABLET ORAL at 21:45

## 2025-07-31 RX ADMIN — LISINOPRIL 20 MG: 20 TABLET ORAL at 08:45

## 2025-07-31 RX ADMIN — AMLODIPINE BESYLATE 10 MG: 10 TABLET ORAL at 08:45

## 2025-07-31 RX ADMIN — NICOTINE 1 PATCH: 14 PATCH, EXTENDED RELEASE TRANSDERMAL at 08:45

## 2025-07-31 RX ADMIN — METHOCARBAMOL 500 MG: 500 TABLET ORAL at 17:39

## 2025-07-31 RX ADMIN — FOLIC ACID 1 MG: 1 TABLET ORAL at 08:45

## 2025-07-31 RX ADMIN — METHOCARBAMOL 500 MG: 500 TABLET ORAL at 21:45

## 2025-07-31 RX ADMIN — AMOXICILLIN AND CLAVULANATE POTASSIUM 1 TABLET: 875; 125 TABLET, FILM COATED ORAL at 21:45

## 2025-07-31 RX ADMIN — GABAPENTIN 600 MG: 300 CAPSULE ORAL at 08:45

## 2025-07-31 RX ADMIN — SENNOSIDES AND DOCUSATE SODIUM 1 TABLET: 50; 8.6 TABLET ORAL at 08:45

## 2025-07-31 RX ADMIN — EMTRICITABINE AND TENOFOVIR ALAFENAMIDE 1 TABLET: 200; 25 TABLET ORAL at 21:44

## 2025-07-31 RX ADMIN — LEVOTHYROXINE SODIUM 112 MCG: 0.11 TABLET ORAL at 06:06

## 2025-07-31 RX ADMIN — AMITRIPTYLINE HYDROCHLORIDE 50 MG: 25 TABLET, FILM COATED ORAL at 21:45

## 2025-07-31 RX ADMIN — DULOXETINE HYDROCHLORIDE 60 MG: 60 CAPSULE, DELAYED RELEASE PELLETS ORAL at 08:45

## 2025-07-31 RX ADMIN — MICONAZOLE NITRATE: 2 POWDER TOPICAL at 21:46

## 2025-07-31 RX ADMIN — SIMETHICONE 80 MG: 80 TABLET, CHEWABLE ORAL at 13:12

## 2025-07-31 RX ADMIN — FAMOTIDINE 20 MG: 10 INJECTION, SOLUTION INTRAVENOUS at 22:05

## 2025-07-31 ASSESSMENT — ACTIVITIES OF DAILY LIVING (ADL)
ADLS_ACUITY_SCORE: 43
ADLS_ACUITY_SCORE: 46
ADLS_ACUITY_SCORE: 45
ADLS_ACUITY_SCORE: 43
ADLS_ACUITY_SCORE: 45
ADLS_ACUITY_SCORE: 46
ADLS_ACUITY_SCORE: 45
ADLS_ACUITY_SCORE: 46
ADLS_ACUITY_SCORE: 43
ADLS_ACUITY_SCORE: 46
ADLS_ACUITY_SCORE: 45
PREVIOUS_RESPONSIBILITIES: MEAL PREP;HOUSEKEEPING;LAUNDRY;SHOPPING;YARDWORK;MEDICATION MANAGEMENT;FINANCES;DRIVING;WORK
ADLS_ACUITY_SCORE: 45
ADLS_ACUITY_SCORE: 43
ADLS_ACUITY_SCORE: 45
ADLS_ACUITY_SCORE: 46
ADLS_ACUITY_SCORE: 45
ADLS_ACUITY_SCORE: 43

## 2025-07-31 NOTE — PLAN OF CARE
Goal Outcome Evaluation:       Summary: Presented with RLQ abdominal pain, nausea and diarrhea, lap appy 7/25. Hx falls at home, fall risk.    DATE & TIME: 7/30/2025 [id number]  Cognitive Concerns/ Orientation : A&O x4.  Forgetful.  BEHAVIOR & AGGRESSION TOOL COLOR: Green  ABNL VS/O2:  VSS on RA  MOBILITY: Assist of 1 with GB/walker, ambulating to bathroom, walks slowly, Ind with cane at baseline.   PAIN MANAGMENT: Denies. Given scheduled tylenol and robaxin   DIET: Low fiber diet.  BOWEL/BLADDER: Continent of B&B. Senna held-had 3 loose stool shift prior. None for my shift  ABNL LAB: No new lab  DRAIN/DEVICES: R PIV SL; L PIV SL.   SKIN: Abrasion on L lower inner leg, scattered scabs, bruises and dry/peeling skin. 2 Abd lap sites, LLQ ERICK drain site with small drainage.  TESTS/PROCEDURES: none   D/C DAY/GOALS/PLACE: TBD, therapies recommending TCU, SW following.   OTHER IMPORTANT INFO: IV Zosyn changed to oral Augmentin BID. Surgery cleared pt for discharge.

## 2025-07-31 NOTE — PROGRESS NOTES
Care Management Follow Up    Length of Stay (days): 8    Expected Discharge Date: 07/31/2025     Concerns to be Addressed:       Patient plan of care discussed at interdisciplinary rounds: Yes    Anticipated Discharge Disposition:  (TCU)              Anticipated Discharge Services:    Anticipated Discharge DME:      Patient/family educated on Medicare website which has current facility and service quality ratings:    Education Provided on the Discharge Plan:    Patient/Family in Agreement with the Plan: yes    Referrals Placed by CM/SW:    Private pay costs discussed: Not applicable    Discussed  Partnership in Safe Discharge Planning  document with patient/family: No     Handoff Completed: No, handoff not indicated or clinically appropriate    Additional Information:  Writer received voicemail from [facility] stating they are not able to take pt.  Writer placed phone call to [facility]. No answer. Writer left voicemail asking for call back.  [facility]. Writer left voicemail asking for call back.  Addendum: Writer received voicemail from [facility] that they do not have any openings.    Next Steps: awaiting accepting tcu.   MATTIE Delgado  Social Work  [facility]

## 2025-07-31 NOTE — PROGRESS NOTES
07/31/25 1057   Appointment Info   Signing Clinician's Name / Credentials (OT) Tracy Schuster, OTR/L   Living Environment   People in Home sibling(s)   Current Living Arrangements house   Home Accessibility stairs to enter home   Number of Stairs, Main Entrance 3   Stair Railings, Main Entrance none   Transportation Anticipated family or friend will provide   Living Environment Comments Pt lives in a house with his brother and dog. 3 GABRIELA w/ no railing. Bed, BR, kitchen located on main level. Tub shower. Laundry in basement, 14 stairs to access w/ no railing. Pt reports his brother works and can not provide 24/7 assist. Reports ex sister-in-law can provide assist but she also works.   Self-Care   Usual Activity Tolerance good   Current Activity Tolerance moderate   Regular Exercise No   Equipment Currently Used at Home cane, straight   Fall history within last six months yes   Number of times patient has fallen within last six months 7   Activity/Exercise/Self-Care Comment Pt reports being IND at baseline with all ADLs. Pt ambulates w/ a SEC at baseline, does not own a FWW.   Instrumental Activities of Daily Living (IADL)   Previous Responsibilities meal prep;housekeeping;laundry;shopping;yardwork;medication management;finances;driving;work   IADL Comments Pt reports ind w/ all IADLs at baseline. Pt drives, works as  at [employer]. Pt ind yardwork, laundry, med mgmt. Pt reports shares cleaning, cooking w/ brother. Pt cares for dog, takes on walks.   General Information   Onset of Illness/Injury or Date of Surgery 07/23/25   Referring Physician Brandee Shelby MD   Patient/Family Therapy Goal Statement (OT) To return home   Additional Occupational Profile Info/Pertinent History of Current Problem Per chart: Per Chart: Nicolás Fermin is a 65 year old male with PMH significant for HIV, peripheral neuropathy and HTN, admitted for abdominal pain. S/P dx laparoscopy with challenging lap appy, POD 6   Existing  Precautions/Restrictions fall   Cognitive Status Examination   Orientation Status orientation to person, place and time   Affect/Mental Status (Cognitive) confused   Follows Commands repetition of directions required;verbal cues/prompting required   Attention Deficit requires cues/redirection to task   Cognitive Status Comments Pt A&Ox4. Pt occasionally confused, tangential during session. Pt benefiting from repetition of VCs   Cognitive Screens/Assessments   Cognitive Assessments Completed (S)  [facility] Mental Status Exam ([facility]):  Total Score out of /30 (S)  25/30   [facility] Norms (S)  21-26 equals mild neurocognitive disorder   [facility] Domains assessed: (S)  orientation, memory, attention, executive functions   [facility] Interpretation (S)  Pt scoring 25/30 on [facility] cognitive assessment indicating mild cognitive impairment. Pt scoring 3/3 orientation, 1/3 basic math and higher level cognitive tasks, 2/3 animal naming (abstract thinking), 3/5 word recall/memory, 2/2 number sequencing, 4/4 clock drawing for executive function, 2/2 shape identification and perception, 8/8 story recall for short term memory. Patient demonstrates deficits in the following areas: attention, delayed recall, executive functions.  Please note that this examination is used to screen individuals to look for the presence of cognitive deficits and to identify changes in cognition over time.  Pt would benefit from assist w/ med mgmt, driving, yardwork, finances, cooking for safety upon discharge. See discharge recommendations for further details.   Visual Perception   Visual Impairment/Limitations WFL   Sensory   Sensory Comments Pt reporting baseline neuropathy in both feet   Pain Assessment   Patient Currently in Pain No   Range of Motion Comprehensive   Comment, General Range of Motion Not formally assessed, BUE WFL w/ functional tasks   Strength Comprehensive (MMT)   Comment, General Manual Muscle Testing (MMT) Assessment Not  formally assessed, BUE WFL w/ functional tasks. Pt reporting BLE wkns compared to baseline   Bed Mobility   Comment (Bed Mobility) NT - pt in recliner chair upon arrival   Transfers   Transfers sit-stand transfer   Sit-Stand Transfer   Sit-Stand [place] (Transfers) minimum assist (75% patient effort)   Balance   Balance Comments Not formally assessed, pt initially w/ slight posterior lean upon STS. No LOB noted while amb w/ FWW   Activities of Daily Living   BADL Assessment/Intervention lower body dressing;grooming;toileting   Lower Body Dressing Assessment/Training   [place] Level (Lower Body Dressing) supervision   Grooming Assessment/Training   [place] Level (Grooming) supervision   Toileting   [place] Level (Toileting) supervision   Comment, (Toileting) Per clinical judgement   Clinical Impression   Criteria for Skilled Therapeutic Interventions Met (OT) Yes, treatment indicated   OT Diagnosis Impaired I/ADL independence   OT Problem List-Impairments impacting ADL problems related to;activity tolerance impaired;cognition;mobility;strength;range of motion (ROM)   Assessment of Occupational Performance 3-5 Performance Deficits   Identified Performance Deficits cognition, toileting, dressing, functional mobility, IADLs   Planned Therapy Interventions (OT) ADL retraining;IADL retraining;cognition;strengthening;ROM;transfer training;home program guidelines;progressive activity/exercise;risk factor education   Clinical Decision Making Complexity (OT) problem focused assessment/low complexity   Risk & Benefits of therapy have been explained evaluation/treatment results reviewed;care plan/treatment goals reviewed;risks/benefits reviewed;current/potential barriers reviewed;participants voiced agreement with care plan;participants included;patient   OT Total Evaluation Time   OT Eval, Low Complexity Minutes (98162) 20   OT Goals   Therapy Frequency (OT) 5 times/week   OT Predicted Duration/Target Date  for Goal Attainment 08/07/25   OT Goals Hygiene/Grooming;Lower Body Dressing;Upper Body Dressing;Transfers;Toilet Transfer/Toileting;Cognition;OT Goal 1;OT Goal 2;OT Goal 3   OT: Hygiene/Grooming independent;while standing   OT: Upper Body Dressing Independent;including set-up/clothing retrieval   OT: Lower Body Dressing Independent;including set-up/clothing retrieval   OT: Transfer Independent;with assistive device  (Shower tub transfer)   OT: Toilet Transfer/Toileting Independent;toilet transfer;cleaning and garment management   OT: Cognitive Patient/caregiver will verbalize understanding of cognitive assessment results/recommendations as needed for safe discharge planning   OT: Goal 1 Pt will verbalize 3 fall risk reduction methods to enhance safety within home environment   OT: Goal 2 Pt will verbalize 4 memory compensation methods to incorporate into daily routine for inc I/ADL safety   OT: Goal 3 Pt will accurately set up 3/3 meds into pillbox for inc IADL safety   Interventions   Interventions Quick Adds Self-Care/Home Management;Therapeutic Activity   Self-Care/Home Management   Self-Care/Home Mgmt/ADL, Compensatory, Meal Prep Minutes (85473) 23   Symptoms Noted During/After Treatment (Meal Preparation/Planning Training) none   Treatment Detail/Skilled Intervention Pt greeted in recliner chair, agreeable to OT. Pt doff/don socks using seated fig 4 method SBA, pt reporting inc difficulty w/ L sock. Pt STS Min A. Pt amb to BR w/ FWW close SBA. Pt tolerated ~4 min standing g/h at sink (oral cares). Pt engaged in therapeutic act (see below). Pt engaged in SLUMS cog assess, scored 25/30 (see above for details). Pt edu on results, possible impact on I/ADL ind and safety. Pt edu on med mgmt safety (i.e., using pillbox, inc supervision, alarms, etc), pt verbalized understanding. Pt reporting father had Hx of dementia, pt has noticed impacts on their cognition. Pt becoming tearful, therapeutic listening provided. Pt  edu on resources available for memory compensation, etc, pt verbalized understanding and appreciative. Pt in recliner end of session, alarm on, all needs within reach   Therapeutic Activities   Therapeutic Activity Minutes (91539) 10   Symptoms noted during/after treatment fatigue   Treatment Detail/Skilled Intervention Pt amb in hallway w/ FWW close SBA ~300' to progress act escobar for I/ADLs, such as bathing, and to prepare for cog assess. Pt amb at slow pace, 3 standing rest breaks taken. Pt edu on fall risk reduction methods (remove tripping hazards, inc awareness of environment, assist w/ walking dog, shower safety, etc), pt verbalized understanding. Pt return to room, transfer to recliner chair CGA.   OT Discharge Planning   OT Plan toileting, memory comp methods, fall risk reduction, home safety, progress act escobar, shower tub transfer, med mgmt   OT Discharge Recommendation (DC Rec) Transitional Care Facility   OT Rationale for DC Rec Pt functioning below baseline, limited by dec act escobar, cognition. Pt reports lives w/ brother, ind w/ I/ADLs at baseline. Pt currently Ax1 for ADL and mobility safety. Pt scored 25/30 on SLUMS cog assess 7/31/25. indicating possible mild cognitive deficits. Rec TCU to progress act escobar for I/ADLs and monitor cog. IP OT will cont to follow   OT Brief overview of current status Ax1 w/ FWW; 25/30 SLUMS. Goals of therapy will be to address safe mobility and make recs for d/c to next level of care. Pt and RN will continue to follow all falls risk precautions as documented by RN staff while hospitalized.   OT Total Distance Amb During Session (feet) 300   OT Equipment Needed at Discharge shower chair   Total Session Time   Timed Code Treatment Minutes 33   Total Session Time (sum of timed and untimed services) 53

## 2025-08-01 ENCOUNTER — APPOINTMENT (OUTPATIENT)
Dept: OCCUPATIONAL THERAPY | Facility: CLINIC | Age: 65
DRG: 397 | End: 2025-08-01
Payer: COMMERCIAL

## 2025-08-01 LAB
BACTERIA PRT CULT: NORMAL
CREAT SERPL-MCNC: 1.01 MG/DL (ref 0.67–1.17)
EGFRCR SERPLBLD CKD-EPI 2021: 82 ML/MIN/1.73M2
MCV RBC AUTO: 96 FL (ref 78–100)
PLATELET # BLD AUTO: 324 10E3/UL (ref 150–450)

## 2025-08-01 PROCEDURE — 85049 AUTOMATED PLATELET COUNT: CPT | Performed by: PHYSICIAN ASSISTANT

## 2025-08-01 PROCEDURE — 97530 THERAPEUTIC ACTIVITIES: CPT | Mod: GO

## 2025-08-01 PROCEDURE — 250N000013 HC RX MED GY IP 250 OP 250 PS 637: Performed by: INTERNAL MEDICINE

## 2025-08-01 PROCEDURE — 36415 COLL VENOUS BLD VENIPUNCTURE: CPT | Performed by: PHYSICIAN ASSISTANT

## 2025-08-01 PROCEDURE — 97535 SELF CARE MNGMENT TRAINING: CPT | Mod: GO

## 2025-08-01 PROCEDURE — 82565 ASSAY OF CREATININE: CPT | Performed by: PHYSICIAN ASSISTANT

## 2025-08-01 PROCEDURE — 99232 SBSQ HOSP IP/OBS MODERATE 35: CPT | Performed by: INTERNAL MEDICINE

## 2025-08-01 PROCEDURE — 120N000001 HC R&B MED SURG/OB

## 2025-08-01 PROCEDURE — 250N000011 HC RX IP 250 OP 636: Performed by: INTERNAL MEDICINE

## 2025-08-01 PROCEDURE — 250N000013 HC RX MED GY IP 250 OP 250 PS 637: Performed by: PHYSICIAN ASSISTANT

## 2025-08-01 RX ADMIN — SIMETHICONE 80 MG: 80 TABLET, CHEWABLE ORAL at 21:04

## 2025-08-01 RX ADMIN — FOLIC ACID 1 MG: 1 TABLET ORAL at 09:46

## 2025-08-01 RX ADMIN — SENNOSIDES AND DOCUSATE SODIUM 1 TABLET: 50; 8.6 TABLET ORAL at 21:05

## 2025-08-01 RX ADMIN — METHOCARBAMOL 500 MG: 500 TABLET ORAL at 15:40

## 2025-08-01 RX ADMIN — SIMETHICONE 80 MG: 80 TABLET, CHEWABLE ORAL at 09:46

## 2025-08-01 RX ADMIN — ACETAMINOPHEN 650 MG: 325 TABLET ORAL at 21:04

## 2025-08-01 RX ADMIN — ENOXAPARIN SODIUM 40 MG: 40 INJECTION SUBCUTANEOUS at 21:04

## 2025-08-01 RX ADMIN — DOLUTEGRAVIR SODIUM 50 MG: 50 TABLET, FILM COATED ORAL at 21:04

## 2025-08-01 RX ADMIN — METHOCARBAMOL 500 MG: 500 TABLET ORAL at 09:47

## 2025-08-01 RX ADMIN — SENNOSIDES AND DOCUSATE SODIUM 1 TABLET: 50; 8.6 TABLET ORAL at 09:46

## 2025-08-01 RX ADMIN — LEVOTHYROXINE SODIUM 112 MCG: 0.11 TABLET ORAL at 06:20

## 2025-08-01 RX ADMIN — MICONAZOLE NITRATE: 2 POWDER TOPICAL at 09:55

## 2025-08-01 RX ADMIN — MICONAZOLE NITRATE: 2 POWDER TOPICAL at 21:07

## 2025-08-01 RX ADMIN — LISINOPRIL 20 MG: 20 TABLET ORAL at 09:47

## 2025-08-01 RX ADMIN — NICOTINE 1 PATCH: 14 PATCH, EXTENDED RELEASE TRANSDERMAL at 09:50

## 2025-08-01 RX ADMIN — METHOCARBAMOL 500 MG: 500 TABLET ORAL at 21:05

## 2025-08-01 RX ADMIN — ATENOLOL 50 MG: 50 TABLET ORAL at 09:47

## 2025-08-01 RX ADMIN — ACETAMINOPHEN 650 MG: 325 TABLET ORAL at 06:20

## 2025-08-01 RX ADMIN — EMTRICITABINE AND TENOFOVIR ALAFENAMIDE 1 TABLET: 200; 25 TABLET ORAL at 21:05

## 2025-08-01 RX ADMIN — SIMETHICONE 80 MG: 80 TABLET, CHEWABLE ORAL at 18:14

## 2025-08-01 RX ADMIN — ACETAMINOPHEN 650 MG: 325 TABLET ORAL at 15:40

## 2025-08-01 RX ADMIN — GABAPENTIN 600 MG: 300 CAPSULE ORAL at 21:04

## 2025-08-01 RX ADMIN — AMOXICILLIN AND CLAVULANATE POTASSIUM 1 TABLET: 875; 125 TABLET, FILM COATED ORAL at 09:46

## 2025-08-01 RX ADMIN — DULOXETINE HYDROCHLORIDE 60 MG: 60 CAPSULE, DELAYED RELEASE PELLETS ORAL at 09:46

## 2025-08-01 RX ADMIN — AMITRIPTYLINE HYDROCHLORIDE 50 MG: 25 TABLET, FILM COATED ORAL at 21:04

## 2025-08-01 RX ADMIN — GABAPENTIN 600 MG: 300 CAPSULE ORAL at 09:45

## 2025-08-01 RX ADMIN — AMOXICILLIN AND CLAVULANATE POTASSIUM 1 TABLET: 875; 125 TABLET, FILM COATED ORAL at 21:05

## 2025-08-01 RX ADMIN — AMLODIPINE BESYLATE 10 MG: 10 TABLET ORAL at 09:47

## 2025-08-01 ASSESSMENT — ACTIVITIES OF DAILY LIVING (ADL)
ADLS_ACUITY_SCORE: 50
ADLS_ACUITY_SCORE: 43
ADLS_ACUITY_SCORE: 47
ADLS_ACUITY_SCORE: 43
ADLS_ACUITY_SCORE: 50
ADLS_ACUITY_SCORE: 50
ADLS_ACUITY_SCORE: 47
ADLS_ACUITY_SCORE: 43
ADLS_ACUITY_SCORE: 47
ADLS_ACUITY_SCORE: 50
ADLS_ACUITY_SCORE: 50
ADLS_ACUITY_SCORE: 47
ADLS_ACUITY_SCORE: 43
ADLS_ACUITY_SCORE: 50

## 2025-08-01 NOTE — PLAN OF CARE
Goal Outcome Evaluation: progressing    7/23/25 admit, RLQ pain, POD 7, Lap appendectomy  8/1/25, 7 a to 7 p    Orientation: Oriented but intermittently confused, calm and cooperative    Vitals/Tele: VSS on RA, No PRN meds given for pain    IV Access/drains: SAMSON SL, patient refused dressing reinforcement    Diet: Low fiber/residue    Mobility: easy A1 GB and W    GI/: Continent most of the time    Wound/Skin: abdominal site approximated, ERICK drain site daily dressing changes and as needed.     Consults: OT, PT, CM    Discharge Plan: Pending TCU placement    Others:  Changed dressing on LLQ old ERICK drain site      See Flow sheets for assessment

## 2025-08-01 NOTE — PROGRESS NOTES
Care Management Follow Up    Length of Stay (days): 9    Expected Discharge Date: 07/31/2025     Concerns to be Addressed:       Patient plan of care discussed at interdisciplinary rounds: Yes    Anticipated Discharge Disposition:  (TCU)              Anticipated Discharge Services:    Anticipated Discharge DME:      Patient/family educated on Medicare website which has current facility and service quality ratings:    Education Provided on the Discharge Plan:    Patient/Family in Agreement with the Plan: yes    Referrals Placed by CM/SW:    Private pay costs discussed: Not applicable    Discussed  Partnership in Safe Discharge Planning  document with patient/family: No     Handoff Completed: No, handoff not indicated or clinically appropriate    Additional Information:  Writer met with pt at bedside. Writer informed pt of declines due to no bed availability at [facility] and [facility]. Writer asked pt for additional referrals. Pt would like referrals sent to [facility], [facility], and [facility]. Writer asked pt if he would be ok with writer expanding the search even more. Pt states he is not. He states only those facilities and then if they are not able to accommodate pt he would like to talk to care management about referrals from there. Pt even briefly mentions home care but ultimately states he does feel he needs tcu. Pt states no further questions or concerns for writer.    Writer sent referrals.    Next Steps: awaiting accepting tcu.     Lolly Graves, ARIW  Social Work  [facility]

## 2025-08-01 NOTE — PLAN OF CARE
DATE & TIME: 7/31/25 1900- 8/01/25 0730  Cognitive Concerns/ Orientation : A&O x4, forgeful   BEHAVIOR & AGGRESSION TOOL COLOR: Green   ABNL VS/O2: VSS RA  MOBILITY: Ax1 gb/walker  PAIN MANAGMENT: Scheduled Tylenol, lower back pain  DIET: Low fiber  BOWEL/BLADDER: Continent, up to BR  ABNL LAB/BG: None new  DRAIN/DEVICES: PIV x2 saline locked  TELEMETRY RHYTHM: NA  SKIN: LLE abrasion, scattered scabs/ bruises. Abdominal lap sites x2, bandaids CDI.  Old ERICK drain site to LLQ, dressing with moderate drainage, changed x1.  TESTS/PROCEDURES: None  D/C DATE: Anticipated discharge to TCU today pending placement.   OTHER IMPORTANT INFO: Continues on PO Augmentin.

## 2025-08-01 NOTE — PROGRESS NOTES
[facility]    Medicine Progress Note - Hospitalist Service    Date of Admission:  7/23/2025    Assessment & Plan     ranjit Fermin is a 65 year old male with history of HIV, who presented on 7/23/2025 with RLQ pain, nausea and 1 episode of diarrhea.      CT abd/pelvis showed - mildly dilated and inflamed mid small bowel in RLQ with adjacent loops of distal small bowel appearing inflamed within the terminal ileum, hyperenhancing mildly dilated appendix     Initially felt to have infectious enteritis vs less likely IBD vs acute appendicitis. Started on IV zosyn with improvement in CRP. [facility] and general surgery consulted. Taken to OR on 7/25 and found to have perforated acute appendicitis     Perforated acute appendicitis s/p laparoscopic appendectomy, 7/25/2025  Expected postoperative ileus  -unusual presentation on CT scan. Now s/p Diagnostic laparoscopy with laparoscopic appendectomy, 7/25/2025. Operative findings showed - markedly perforated appendix with multiple areas of perforation, turbid fluid throughout the abdomen, inflamed terminal ileum and cecum, distended small bowel  -Operative cultures negative  -surgical management per gen surgery. Diet avanced to Low residue diet   -completed 5 days of IV zosyn, switched to Augmentin for 5 more days on 7/30   -pain control  -Outpatient colonoscopy with ileal evaluation in 3 month per [facility] due to unusual presentation. Gen surgery does not recommend colonoscopy within 3 months of surgery      HIV with peripheral neuropathy   -Followed by Piyush GARZA. Last HIV load detectable 3/2024, CD4 >1000. Reports compliance with HAART  - CD4 count stable  - Continue prior to admission HAART  - Continue prior to admission gabapentin, duloxetine      Chronic kidney disease, stage 2  Baseline creatinine 1-1.1. Creatinine mildly elevated to 1.29 on admission, Improved to 1 with IV fluids  -discontinue IVF.  Monitor labs.       Mild chronic  "hyponatremia  -Sodium 130 on admission. Now improved to 134. Records show longstanding history of mild hyponatremia. Monitor sodium.      Essential hypertension  - Continue amlodipine  - Due to mild bradycardia, decreased atenolol to 50 mg daily   - continue lisinopril 10 mg daily     Hypothyroidism  Hx of Graves disease  - Continue levothyroxine     Tobacco use disorder  - Encouraged cessation. Continue nicotine patch    Mild uncomplicated alcohol withdrawal  -drinks few beers per day. Seemed to have mild withdrawal symptoms on 7/26. Now resolved. Discontinued CIWA protocol on 7/28   -continue thiamine and folate    Acute metabolic encephalopathy with delirium  -developed confusion, agitation on 7/26.  Was also reported to have hallucinations and trying to remove ERICK drain, IV lines.  -This could be multifactorial- Secondary to mild alcohol withdrawal, surgery, acute illness.  -now resolved on 7/30. Appears to be baseline    Generalized weakness  Physical deconditioning  -PT consulted  - TCU recommended.   -consult SW          Diet: Low Fiber Diet    DVT Prophylaxis: lovenox   Ferguson Catheter: Not present  Lines: None     Cardiac Monitoring: None  Code Status: Full Code      Clinically Significant Risk Factors                   # Hypertension: Noted on problem list            # Overweight: Estimated body mass index is 28.98 kg/m  as calculated from the following:    Height as of this encounter: 1.88 m (6' 2\").    Weight as of this encounter: 102.4 kg (225 lb 12 oz).             Social Drivers of Health    Tobacco Use: High Risk (7/23/2025)    Patient History     Smoking Tobacco Use: Every Day     Smokeless Tobacco Use: Never     Passive Exposure: Never   Physical Activity: Insufficiently Active (1/1/2025)    Exercise Vital Sign     Days of Exercise per Week: 3 days     Minutes of Exercise per Session: 20 min   Social Connections: Unknown (1/1/2025)    Social Connection and Isolation Panel [NHANES]     Frequency of " Social Gatherings with Friends and Family: More than three times a week          Disposition Plan         Medically Ready for Discharge: Ready Now             Brandee Shelby MD  Hospitalist Service  [facility]  Securely message with [employer] (more info)  Text page via [employer] Paging/Directory   ______________________________________________________________________    Interval History     Reports he feels okay.    Tolerating diet  Pain is controlled  Awake and alert. Confusion has resolved   VS stable         Physical Exam   Vital Signs: Temp: 98.7  F (37.1  C) Temp src: Oral BP: (!) 141/75 Pulse: 70   Resp: 16 SpO2: 96 % O2 Device: None (Room air)    Weight: 225 lbs 12.02 oz    Constitutional - awake and alert, in no acute distress  CV - RRR  Pulmonary - clear lungs  GI - abdomen is soft  Integumentary - skin is warm and dry, no rashes or ulcers  Neurological - awake, alert and oriented x3.  Moving all 4 extremities, normal speech, no focal deficits    Medical Decision Making       35 MINUTES SPENT BY ME on the date of service doing chart review, history, exam, documentation & further activities per the note.      Data         Imaging results reviewed over the past 24 hrs:   No results found for this or any previous visit (from the past 24 hours).

## 2025-08-01 NOTE — PROGRESS NOTES
[facility]    Medicine Progress Note - Hospitalist Service    Date of Admission:  7/23/2025    Assessment & Plan     ranjit Fermin is a 65 year old male with history of HIV, who presented on 7/23/2025 with RLQ pain, nausea and 1 episode of diarrhea.      CT abd/pelvis showed - mildly dilated and inflamed mid small bowel in RLQ with adjacent loops of distal small bowel appearing inflamed within the terminal ileum, hyperenhancing mildly dilated appendix     Initially felt to have infectious enteritis vs less likely IBD vs acute appendicitis. Started on IV zosyn with improvement in CRP. [facility] and general surgery consulted. Taken to OR on 7/25 and found to have perforated acute appendicitis     Perforated acute appendicitis s/p laparoscopic appendectomy, 7/25/2025  Expected postoperative ileus  -unusual presentation on CT scan. Now s/p Diagnostic laparoscopy with laparoscopic appendectomy, 7/25/2025. Operative findings showed - markedly perforated appendix with multiple areas of perforation, turbid fluid throughout the abdomen, inflamed terminal ileum and cecum, distended small bowel  -Operative cultures negative  -surgical management per gen surgery. Tolerating diet  -completed 5 days of IV zosyn, switched to Augmentin for 5 more days on 7/30   -Outpatient colonoscopy with ileal evaluation in 3 month per [facility] due to unusual presentation. Gen surgery does not recommend colonoscopy within 3 months of surgery      HIV with peripheral neuropathy   -Followed by Piyush GARZA. Last HIV load detectable 3/2024, CD4 >1000. Reports compliance with HAART  - CD4 count stable  - Continue prior to admission HAART  - Continue prior to admission gabapentin, duloxetine      Chronic kidney disease, stage 2  Baseline creatinine 1-1.1. Creatinine mildly elevated to 1.29 on admission, Improved to 1 with IV fluids       Mild chronic hyponatremia  -Sodium 130 on admission. Now improved to 134. Records show  "longstanding history of mild hyponatremia. Monitor sodium.      Essential hypertension  - Continue amlodipine  - Due to mild bradycardia, decreased atenolol to 50 mg daily   - increase lisinopril to 20 mg daily     Hypothyroidism  Hx of Graves disease  - Continue levothyroxine     Tobacco use disorder  - Encouraged cessation. Continue nicotine patch    Mild uncomplicated alcohol withdrawal  -drinks few beers per day. Seemed to have mild withdrawal symptoms on 7/26. Now resolved. Discontinued CIWA protocol on 7/28   -continue thiamine and folate    Acute metabolic encephalopathy with delirium  -developed confusion, agitation on 7/26.  Was also reported to have hallucinations and trying to remove ERICK drain, IV lines.  -This could be multifactorial- Secondary to mild alcohol withdrawal, surgery, acute illness.  -now resolved on 7/30. Appears to be baseline    Generalized weakness  Physical deconditioning  -PT/OT consulted  - TCU recommended.   -consult SW          Diet: Low Fiber Diet    DVT Prophylaxis: lovenox   Ferguson Catheter: Not present  Lines: None     Cardiac Monitoring: None  Code Status: Full Code      Clinically Significant Risk Factors                   # Hypertension: Noted on problem list            # Overweight: Estimated body mass index is 28.98 kg/m  as calculated from the following:    Height as of this encounter: 1.88 m (6' 2\").    Weight as of this encounter: 102.4 kg (225 lb 12 oz).             Social Drivers of Health    Tobacco Use: High Risk (7/23/2025)    Patient History     Smoking Tobacco Use: Every Day     Smokeless Tobacco Use: Never     Passive Exposure: Never   Physical Activity: Insufficiently Active (1/1/2025)    Exercise Vital Sign     Days of Exercise per Week: 3 days     Minutes of Exercise per Session: 20 min   Social Connections: Unknown (1/1/2025)    Social Connection and Isolation Panel [NHANES]     Frequency of Social Gatherings with Friends and Family: More than three times a " week          Disposition Plan         Medically Ready for Discharge: Ready Now             Brandee Shelby MD  Hospitalist Service  [facility]  Securely message with [employer] (more info)  Text page via [employer] Paging/Directory   ______________________________________________________________________    Interval History     Reports he feels okay.    Tolerating diet  Pain is controlled  Awake and alert.   VS stable   Awaiting placement         Physical Exam   Vital Signs: Temp: 99.5  F (37.5  C) Temp src: Oral BP: (!) 148/79 Pulse: 67   Resp: 16 SpO2: 98 % O2 Device: None (Room air)    Weight: 225 lbs 12.02 oz    Constitutional - awake and alert, in no acute distress  Integumentary - skin is warm and dry, no rashes or ulcers  Neurological - awake, alert and oriented x3.  Moving all 4 extremities, normal speech, no focal deficits    Medical Decision Making       35 MINUTES SPENT BY ME on the date of service doing chart review, history, exam, documentation & further activities per the note.      Data     I have personally reviewed the following data over the past 24 hrs:    N/A  \   N/A   / 324     N/A N/A N/A /  N/A   N/A N/A 1.01 \       Imaging results reviewed over the past 24 hrs:   No results found for this or any previous visit (from the past 24 hours).

## 2025-08-02 PROCEDURE — 250N000013 HC RX MED GY IP 250 OP 250 PS 637: Performed by: INTERNAL MEDICINE

## 2025-08-02 PROCEDURE — 99231 SBSQ HOSP IP/OBS SF/LOW 25: CPT | Performed by: INTERNAL MEDICINE

## 2025-08-02 PROCEDURE — 250N000011 HC RX IP 250 OP 636: Performed by: INTERNAL MEDICINE

## 2025-08-02 PROCEDURE — 120N000001 HC R&B MED SURG/OB

## 2025-08-02 PROCEDURE — 250N000013 HC RX MED GY IP 250 OP 250 PS 637: Performed by: PHYSICIAN ASSISTANT

## 2025-08-02 RX ADMIN — LISINOPRIL 20 MG: 20 TABLET ORAL at 08:31

## 2025-08-02 RX ADMIN — SIMETHICONE 80 MG: 80 TABLET, CHEWABLE ORAL at 21:10

## 2025-08-02 RX ADMIN — NICOTINE 1 PATCH: 14 PATCH, EXTENDED RELEASE TRANSDERMAL at 08:36

## 2025-08-02 RX ADMIN — DOLUTEGRAVIR SODIUM 50 MG: 50 TABLET, FILM COATED ORAL at 21:09

## 2025-08-02 RX ADMIN — METHOCARBAMOL 500 MG: 500 TABLET ORAL at 15:58

## 2025-08-02 RX ADMIN — DULOXETINE HYDROCHLORIDE 60 MG: 60 CAPSULE, DELAYED RELEASE PELLETS ORAL at 08:33

## 2025-08-02 RX ADMIN — METHOCARBAMOL 500 MG: 500 TABLET ORAL at 08:32

## 2025-08-02 RX ADMIN — FOLIC ACID 1 MG: 1 TABLET ORAL at 08:31

## 2025-08-02 RX ADMIN — MICONAZOLE NITRATE: 2 POWDER TOPICAL at 08:37

## 2025-08-02 RX ADMIN — ATENOLOL 50 MG: 50 TABLET ORAL at 08:32

## 2025-08-02 RX ADMIN — AMOXICILLIN AND CLAVULANATE POTASSIUM 1 TABLET: 875; 125 TABLET, FILM COATED ORAL at 19:38

## 2025-08-02 RX ADMIN — GABAPENTIN 600 MG: 300 CAPSULE ORAL at 21:08

## 2025-08-02 RX ADMIN — AMOXICILLIN AND CLAVULANATE POTASSIUM 1 TABLET: 875; 125 TABLET, FILM COATED ORAL at 08:33

## 2025-08-02 RX ADMIN — ACETAMINOPHEN 650 MG: 325 TABLET ORAL at 06:52

## 2025-08-02 RX ADMIN — MICONAZOLE NITRATE: 2 POWDER TOPICAL at 21:11

## 2025-08-02 RX ADMIN — METHOCARBAMOL 500 MG: 500 TABLET ORAL at 21:10

## 2025-08-02 RX ADMIN — ACETAMINOPHEN 650 MG: 325 TABLET ORAL at 21:09

## 2025-08-02 RX ADMIN — GABAPENTIN 600 MG: 300 CAPSULE ORAL at 08:31

## 2025-08-02 RX ADMIN — EMTRICITABINE AND TENOFOVIR ALAFENAMIDE 1 TABLET: 200; 25 TABLET ORAL at 21:08

## 2025-08-02 RX ADMIN — AMITRIPTYLINE HYDROCHLORIDE 50 MG: 25 TABLET, FILM COATED ORAL at 21:09

## 2025-08-02 RX ADMIN — ENOXAPARIN SODIUM 40 MG: 40 INJECTION SUBCUTANEOUS at 21:10

## 2025-08-02 RX ADMIN — SIMETHICONE 80 MG: 80 TABLET, CHEWABLE ORAL at 13:35

## 2025-08-02 RX ADMIN — ACETAMINOPHEN 650 MG: 325 TABLET ORAL at 13:35

## 2025-08-02 RX ADMIN — SIMETHICONE 80 MG: 80 TABLET, CHEWABLE ORAL at 08:33

## 2025-08-02 RX ADMIN — AMLODIPINE BESYLATE 10 MG: 10 TABLET ORAL at 08:31

## 2025-08-02 RX ADMIN — LEVOTHYROXINE SODIUM 112 MCG: 0.11 TABLET ORAL at 06:52

## 2025-08-02 RX ADMIN — SIMETHICONE 80 MG: 80 TABLET, CHEWABLE ORAL at 17:00

## 2025-08-02 ASSESSMENT — ACTIVITIES OF DAILY LIVING (ADL)
ADLS_ACUITY_SCORE: 47
ADLS_ACUITY_SCORE: 50
ADLS_ACUITY_SCORE: 47

## 2025-08-02 NOTE — PLAN OF CARE
Goal Outcome Evaluation:       DATE & TIME: 8/1//25-8/2/25 4768-7763    Cognitive Concerns/ Orientation : A&O x4, forgeful and sometimes confused  BEHAVIOR & AGGRESSION TOOL COLOR: Green   ABNL VS/O2: /75, other VSS RA  MOBILITY: Ax1 gb/walker  PAIN MANAGMENT: denies pain, on Schedule Tylenol, Gabapentin and Robaxin. PRN's also available  DIET: Low fiber  BOWEL/BLADDER: Continent, up to BR  ABNL LAB/BG: None new  DRAIN/DEVICES: PIV saline locked  TELEMETRY RHYTHM: NA  SKIN: LLE scab, scattered scabs/ bruises. Abdominal lap sites x2 with bandaids CDI.  Old ERICK drain site to LLQ, leaking moderately, dressing changed x1   TESTS/PROCEDURES: None  D/C DATE: Anticipated discharge to TCU  pending placement.   OTHER IMPORTANT INFO: Continues on PO Augmentin.

## 2025-08-02 NOTE — PLAN OF CARE
Goal Outcome Evaluation: Progressing    Reason for Admission: POD 8 lap appendectomy    Evaluation of Goal:   Patient is A&Ox 4. Vitals are stable on RA. Patient is up with assist of one with gait belt and walker, ambulated to the bathroom. Adequate urine output; no bowel movement on this shift. Low fiber diet with adequate appetite. Patient denies acute pain - some dull achiness in abdomen. Patient scoring green on the Aggression Stoplight Tool. Pt calm and cooperative with cares, able to make needs known, call light within reach, bed alarm on for safety. Discharge disposition is pending placement.     Carmen Blanco RN on 8/2/2025 at 2:46 PM

## 2025-08-02 NOTE — PROGRESS NOTES
Care Management Follow Up    Length of Stay (days): 10    Expected Discharge Date: 08/03/2025     Concerns to be Addressed:       Patient plan of care discussed at interdisciplinary rounds: Yes    Anticipated Discharge Disposition:  (TCU)              Anticipated Discharge Services:    Anticipated Discharge DME:      Patient/family educated on Medicare website which has current facility and service quality ratings:    Education Provided on the Discharge Plan:    Patient/Family in Agreement with the Plan: yes    Referrals Placed by CM/SW:    Private pay costs discussed: Not applicable    Discussed  Partnership in Safe Discharge Planning  document with patient/family: No     Handoff Completed: No, handoff not indicated or clinically appropriate    Additional Information:  Writer able to see pending referrals are present  for [facility] and [facility] tcu.  Writer placed phone call to [facility] tcu. No answer. Writer left voicemail asking for call back.  Writer is aware that [facility] tcu is closed on weekends therefore not able to follow up.    Next Steps: awaiting accepting tcu.     MATTIE Delgado  Social Work  [facility]

## 2025-08-02 NOTE — PROGRESS NOTES
[facility]    Medicine Progress Note - Hospitalist Service    Date of Admission:  7/23/2025    Assessment & Plan     ranjit Fermin is a 65 year old male with history of HIV, who presented on 7/23/2025 with RLQ pain, nausea and 1 episode of diarrhea.      CT abd/pelvis showed - mildly dilated and inflamed mid small bowel in RLQ with adjacent loops of distal small bowel appearing inflamed within the terminal ileum, hyperenhancing mildly dilated appendix     Initially felt to have infectious enteritis vs less likely IBD vs acute appendicitis. Started on IV zosyn with improvement in CRP. [facility] and general surgery consulted. Taken to OR on 7/25 and found to have perforated acute appendicitis     Perforated acute appendicitis s/p laparoscopic appendectomy, 7/25/2025  Expected postoperative ileus  -unusual presentation on CT scan. Now s/p Diagnostic laparoscopy with laparoscopic appendectomy, 7/25/2025. Operative findings showed - markedly perforated appendix with multiple areas of perforation, turbid fluid throughout the abdomen, inflamed terminal ileum and cecum, distended small bowel  -Operative cultures negative  -surgical management per gen surgery. Tolerating diet  -completed 5 days of IV zosyn, switched to Augmentin for 5 more days on 7/30   -Outpatient colonoscopy with ileal evaluation in 3 month per [facility] due to unusual presentation. Gen surgery does not recommend colonoscopy within 3 months of surgery      HIV with peripheral neuropathy   -Followed by Piyush GARZA. Last HIV load detectable 3/2024, CD4 >1000. Reports compliance with HAART  - CD4 count stable  - Continue prior to admission HAART  - Continue prior to admission gabapentin, duloxetine      Chronic kidney disease, stage 2  Baseline creatinine 1-1.1. Creatinine mildly elevated to 1.29 on admission, Improved to 1 with IV fluids       Mild chronic hyponatremia  -Sodium 130 on admission. Now improved to 134. Records show  "longstanding history of mild hyponatremia. Monitor sodium.      Essential hypertension  - Continue amlodipine  - Due to mild bradycardia, decreased atenolol to 50 mg daily   - increase lisinopril to 20 mg daily     Hypothyroidism  Hx of Graves disease  - Continue levothyroxine     Tobacco use disorder  - Encouraged cessation. Continue nicotine patch    Mild uncomplicated alcohol withdrawal  -drinks few beers per day. Seemed to have mild withdrawal symptoms on 7/26. Now resolved. Discontinued CIWA protocol on 7/28   -continue thiamine and folate    Acute metabolic encephalopathy with delirium  -developed confusion, agitation on 7/26.  Was also reported to have hallucinations and trying to remove ERICK drain, IV lines.  -This could be multifactorial- Secondary to mild alcohol withdrawal, surgery, acute illness.  -now resolved on 7/30. Appears to be baseline    Generalized weakness  Physical deconditioning  -PT/OT consulted  - TCU recommended. Awaiting placement          Diet: Low Fiber Diet    DVT Prophylaxis: lovenox   Ferguson Catheter: Not present  Lines: None     Cardiac Monitoring: None  Code Status: Full Code      Clinically Significant Risk Factors                   # Hypertension: Noted on problem list            # Overweight: Estimated body mass index is 28.98 kg/m  as calculated from the following:    Height as of this encounter: 1.88 m (6' 2\").    Weight as of this encounter: 102.4 kg (225 lb 12 oz).             Social Drivers of Health    Tobacco Use: High Risk (7/23/2025)    Patient History     Smoking Tobacco Use: Every Day     Smokeless Tobacco Use: Never     Passive Exposure: Never   Physical Activity: Insufficiently Active (1/1/2025)    Exercise Vital Sign     Days of Exercise per Week: 3 days     Minutes of Exercise per Session: 20 min   Social Connections: Unknown (1/1/2025)    Social Connection and Isolation Panel [NHANES]     Frequency of Social Gatherings with Friends and Family: More than three times " a week          Disposition Plan         Medically Ready for Discharge: Ready Now             Brandee Shelby MD  Hospitalist Service  [facility]  Securely message with [employer] (more info)  Text page via [employer] Paging/Directory   ______________________________________________________________________    Interval History     Reports he feels okay.  Tolerating diet. No complains  Awaiting placement   BP mildly elevated         Physical Exam   Vital Signs: Temp: 98.3  F (36.8  C) Temp src: Oral BP: (!) 158/80 Pulse: 61   Resp: 18 SpO2: 95 % O2 Device: None (Room air)    Weight: 225 lbs 12.02 oz    Constitutional - awake and alert, in no acute distress  Integumentary - skin is warm and dry, no rashes or ulcers  Neurological - awake, alert and oriented x3.  Moving all 4 extremities, normal speech, no focal deficits    Medical Decision Making       25 MINUTES SPENT BY ME on the date of service doing chart review, history, exam, documentation & further activities per the note.      Data         Imaging results reviewed over the past 24 hrs:   No results found for this or any previous visit (from the past 24 hours).

## 2025-08-03 LAB
ATRIAL RATE - MUSE: 67 BPM
DIASTOLIC BLOOD PRESSURE - MUSE: NORMAL MMHG
INTERPRETATION ECG - MUSE: NORMAL
P AXIS - MUSE: 58 DEGREES
PR INTERVAL - MUSE: 186 MS
QRS DURATION - MUSE: 102 MS
QT - MUSE: 400 MS
QTC - MUSE: 422 MS
R AXIS - MUSE: 60 DEGREES
SYSTOLIC BLOOD PRESSURE - MUSE: NORMAL MMHG
T AXIS - MUSE: 67 DEGREES
VENTRICULAR RATE- MUSE: 67 BPM

## 2025-08-03 PROCEDURE — 250N000011 HC RX IP 250 OP 636: Performed by: INTERNAL MEDICINE

## 2025-08-03 PROCEDURE — 120N000001 HC R&B MED SURG/OB

## 2025-08-03 PROCEDURE — 250N000013 HC RX MED GY IP 250 OP 250 PS 637: Performed by: INTERNAL MEDICINE

## 2025-08-03 PROCEDURE — 99231 SBSQ HOSP IP/OBS SF/LOW 25: CPT | Performed by: INTERNAL MEDICINE

## 2025-08-03 PROCEDURE — 250N000013 HC RX MED GY IP 250 OP 250 PS 637: Performed by: PHYSICIAN ASSISTANT

## 2025-08-03 RX ADMIN — SIMETHICONE 80 MG: 80 TABLET, CHEWABLE ORAL at 21:46

## 2025-08-03 RX ADMIN — GABAPENTIN 600 MG: 300 CAPSULE ORAL at 09:00

## 2025-08-03 RX ADMIN — SENNOSIDES AND DOCUSATE SODIUM 1 TABLET: 50; 8.6 TABLET ORAL at 08:59

## 2025-08-03 RX ADMIN — EMTRICITABINE AND TENOFOVIR ALAFENAMIDE 1 TABLET: 200; 25 TABLET ORAL at 21:47

## 2025-08-03 RX ADMIN — GABAPENTIN 600 MG: 300 CAPSULE ORAL at 20:00

## 2025-08-03 RX ADMIN — DOLUTEGRAVIR SODIUM 50 MG: 50 TABLET, FILM COATED ORAL at 21:44

## 2025-08-03 RX ADMIN — SIMETHICONE 80 MG: 80 TABLET, CHEWABLE ORAL at 09:00

## 2025-08-03 RX ADMIN — MICONAZOLE NITRATE: 2 POWDER TOPICAL at 09:02

## 2025-08-03 RX ADMIN — ACETAMINOPHEN 650 MG: 325 TABLET ORAL at 06:12

## 2025-08-03 RX ADMIN — AMLODIPINE BESYLATE 10 MG: 10 TABLET ORAL at 08:59

## 2025-08-03 RX ADMIN — METHOCARBAMOL 500 MG: 500 TABLET ORAL at 09:00

## 2025-08-03 RX ADMIN — NICOTINE 1 PATCH: 14 PATCH, EXTENDED RELEASE TRANSDERMAL at 09:01

## 2025-08-03 RX ADMIN — AMITRIPTYLINE HYDROCHLORIDE 50 MG: 25 TABLET, FILM COATED ORAL at 21:44

## 2025-08-03 RX ADMIN — ENOXAPARIN SODIUM 40 MG: 40 INJECTION SUBCUTANEOUS at 21:47

## 2025-08-03 RX ADMIN — SIMETHICONE 80 MG: 80 TABLET, CHEWABLE ORAL at 14:50

## 2025-08-03 RX ADMIN — METHOCARBAMOL 500 MG: 500 TABLET ORAL at 15:54

## 2025-08-03 RX ADMIN — ACETAMINOPHEN 650 MG: 325 TABLET ORAL at 21:44

## 2025-08-03 RX ADMIN — DULOXETINE HYDROCHLORIDE 60 MG: 60 CAPSULE, DELAYED RELEASE PELLETS ORAL at 09:02

## 2025-08-03 RX ADMIN — ATENOLOL 50 MG: 50 TABLET ORAL at 09:00

## 2025-08-03 RX ADMIN — ACETAMINOPHEN 650 MG: 325 TABLET ORAL at 14:50

## 2025-08-03 RX ADMIN — METHOCARBAMOL 500 MG: 500 TABLET ORAL at 21:44

## 2025-08-03 RX ADMIN — LISINOPRIL 20 MG: 20 TABLET ORAL at 09:01

## 2025-08-03 RX ADMIN — AMOXICILLIN AND CLAVULANATE POTASSIUM 1 TABLET: 875; 125 TABLET, FILM COATED ORAL at 19:59

## 2025-08-03 RX ADMIN — AMOXICILLIN AND CLAVULANATE POTASSIUM 1 TABLET: 875; 125 TABLET, FILM COATED ORAL at 08:59

## 2025-08-03 RX ADMIN — LEVOTHYROXINE SODIUM 112 MCG: 0.11 TABLET ORAL at 08:59

## 2025-08-03 RX ADMIN — MICONAZOLE NITRATE: 2 POWDER TOPICAL at 20:04

## 2025-08-03 RX ADMIN — FOLIC ACID 1 MG: 1 TABLET ORAL at 08:59

## 2025-08-03 RX ADMIN — SIMETHICONE 80 MG: 80 TABLET, CHEWABLE ORAL at 18:08

## 2025-08-03 ASSESSMENT — ACTIVITIES OF DAILY LIVING (ADL)
ADLS_ACUITY_SCORE: 47

## 2025-08-03 NOTE — PLAN OF CARE
Goal Outcome Evaluation: Progressing    7/23/25 admit, RLQ pain, POD 9, Lap appendectomy  8/3/25, 7 a to 3 p     Orientation: Oriented but intermittently confused, calm and cooperative     Vitals/Tele: VSS on RA, No PRN meds given for pain     IV Access/drains: SAMSON SL     Diet: Low fiber/residue, tolerating     Mobility: easy A1 GB and W, walked in 3 hallways x 1 this shift     GI/: Continent most of the time     Wound/Skin: abdominal surgical site site approximated, ERICK drain site daily dressing change done     Consults: OT, PT, CM     Discharge Plan: Pending TCU placement, possible discharge to home with C     Others:  Changed dressing on LLQ old ERICK drain site  Needs to be encouraged to ambulate more

## 2025-08-03 NOTE — PROGRESS NOTES
[facility]    Medicine Progress Note - Hospitalist Service    Date of Admission:  7/23/2025    Assessment & Plan     ranjit Fermin is a 65 year old male with history of HIV, who presented on 7/23/2025 with RLQ pain, nausea and 1 episode of diarrhea.      CT abd/pelvis showed - mildly dilated and inflamed mid small bowel in RLQ with adjacent loops of distal small bowel appearing inflamed within the terminal ileum, hyperenhancing mildly dilated appendix     Initially felt to have infectious enteritis vs less likely IBD vs acute appendicitis. Started on IV zosyn with improvement in CRP. [facility] and general surgery consulted. Taken to OR on 7/25 and found to have perforated acute appendicitis     Perforated acute appendicitis s/p laparoscopic appendectomy, 7/25/2025  Expected postoperative ileus  -unusual presentation on CT scan. Now s/p Diagnostic laparoscopy with laparoscopic appendectomy, 7/25/2025. Operative findings showed - markedly perforated appendix with multiple areas of perforation, turbid fluid throughout the abdomen, inflamed terminal ileum and cecum, distended small bowel  -Operative cultures negative  -surgical management per gen surgery. Tolerating diet  -completed 5 days of IV zosyn, switched to Augmentin for 5 more days on 7/30   -Outpatient colonoscopy with ileal evaluation in 3 month per [facility] due to unusual presentation. Gen surgery does not recommend colonoscopy within 3 months of surgery      HIV with peripheral neuropathy   -Followed by Piyush GARZA. Last HIV load detectable 3/2024, CD4 >1000. Reports compliance with HAART  - CD4 count stable  - Continue prior to admission HAART  - Continue prior to admission gabapentin, duloxetine      Chronic kidney disease, stage 2  Baseline creatinine 1-1.1. Creatinine mildly elevated to 1.29 on admission, Improved to 1 with IV fluids       Mild chronic hyponatremia  -Sodium 130 on admission. Now improved to 134. Records show  "longstanding history of mild hyponatremia. Monitor sodium.      Essential hypertension  - Continue amlodipine  - Due to mild bradycardia, decreased atenolol to 50 mg daily   - increase lisinopril to 20 mg daily     Hypothyroidism  Hx of Graves disease  - Continue levothyroxine     Tobacco use disorder  - Encouraged cessation. Continue nicotine patch    Mild uncomplicated alcohol withdrawal  -drinks few beers per day. Seemed to have mild withdrawal symptoms on 7/26. Now resolved. Discontinued CIWA protocol on 7/28   -continue thiamine and folate    Acute metabolic encephalopathy with delirium  -developed confusion, agitation on 7/26.  Was also reported to have hallucinations and trying to remove ERICK drain, IV lines.  -This could be multifactorial- Secondary to mild alcohol withdrawal, surgery, acute illness.  -now resolved on 7/30. Appears to be baseline    Generalized weakness  Physical deconditioning  -PT/OT consulted  - TCU recommended. Awaiting placement          Diet: Low Fiber Diet    DVT Prophylaxis: lovenox   Ferguson Catheter: Not present  Lines: None     Cardiac Monitoring: None  Code Status: Full Code      Clinically Significant Risk Factors                   # Hypertension: Noted on problem list            # Overweight: Estimated body mass index is 29.44 kg/m  as calculated from the following:    Height as of this encounter: 1.88 m (6' 2\").    Weight as of this encounter: 104 kg (229 lb 4.5 oz).             Social Drivers of Health    Tobacco Use: High Risk (7/23/2025)    Patient History     Smoking Tobacco Use: Every Day     Smokeless Tobacco Use: Never     Passive Exposure: Never   Physical Activity: Insufficiently Active (1/1/2025)    Exercise Vital Sign     Days of Exercise per Week: 3 days     Minutes of Exercise per Session: 20 min   Social Connections: Unknown (1/1/2025)    Social Connection and Isolation Panel [NHANES]     Frequency of Social Gatherings with Friends and Family: More than three times a " week          Disposition Plan         Medically Ready for Discharge: Ready Now             Brandee Shelby MD  Hospitalist Service  [facility]  Securely message with [employer] (more info)  Text page via [employer] Paging/Directory   ______________________________________________________________________    Interval History     Reports he feels okay.  No complaint   Had questions about next labs if unable to find TCU  Blood pressure mildly elevated          Physical Exam   Vital Signs: Temp: 98.2  F (36.8  C) Temp src: Oral BP: (!) 168/81 Pulse: 58   Resp: 16 SpO2: 95 % O2 Device: None (Room air)    Weight: 229 lbs 4.45 oz    Constitutional - awake and alert, in no acute distress  Integumentary - skin is warm and dry, no rashes or ulcers  Neurological - awake, alert and oriented x3.  Moving all 4 extremities, normal speech, no focal deficits    Medical Decision Making       25 MINUTES SPENT BY ME on the date of service doing chart review, history, exam, documentation & further activities per the note.      Data         Imaging results reviewed over the past 24 hrs:   No results found for this or any previous visit (from the past 24 hours).

## 2025-08-03 NOTE — PROGRESS NOTES
Goal Outcome Evaluation:        DATE & TIME: 8/3//25-8/2/25 2300 -8452      Concerns /RLQ pain, nausea and 1 episode of diarrhea.    Orientation : A&O x4, forgeful   BEHAVIOR & AGGRESSION TOOL COLOR: Green                                                              ABNL VS/O2:  RA  MOBILITY: Ax1 gb/walker  PAIN MANAGMENT: denies pain,  PRN's also available  DIET: Low fiber  BOWEL/BLADDER: Continent, up to BR  ABNL LAB/BG: None new  DRAIN/DEVICES: PIV saline locked  TELEMETRY RHYTHM: NA  SKIN: LLE scab, scattered scabs/ bruises. Abdominal lap sites x2 with bandaids CDI.  Old ERICK drain site to LLQ, leaking moderately, dressing changed x1   TESTS/PROCEDURES: None  D/C DATE: Anticipated discharge to TCU  pending placement.   OTHER IMPORTANT INFO: Continues on PO Augmentin.

## 2025-08-03 NOTE — PROGRESS NOTES
Care Management Follow Up    Length of Stay (days): 11    Expected Discharge Date: 08/03/2025     Concerns to be Addressed:       Patient plan of care discussed at interdisciplinary rounds: Yes    Anticipated Discharge Disposition:  (TCU)              Anticipated Discharge Services:    Anticipated Discharge DME:      Patient/family educated on Medicare website which has current facility and service quality ratings:    Education Provided on the Discharge Plan:    Patient/Family in Agreement with the Plan: yes    Referrals Placed by CM/SW:    Private pay costs discussed: Not applicable    Discussed  Partnership in Safe Discharge Planning  document with patient/family: No     Handoff Completed: No, handoff not indicated or clinically appropriate    Additional Information:  Writer spoke with pt about two pending referrals for [facility]. Writer informed pt of need to send additional referrals. Pt is agreeable eto referrals in [facility] in [place], and [place] . He is not agreeable to referrals in [facility] or [place].   Writer sent additional referrals.  Writer left notice for unit  on Monday to follow up with [facility] in the morning to see if any ability to accomodates pt and if they are not able to then to follow up with referrals sent today.     Next Steps: awaiting accepting tcu.     MATTIE Delgado  Social Work  [facility]

## 2025-08-03 NOTE — PLAN OF CARE
Goal Outcome Evaluation:DATE & TIME: 8/3/25 5159-6153  Cognitive Concerns/ Orientation : A&O x4, forgeful and sometimes confused  BEHAVIOR & AGGRESSION TOOL COLOR: Green   ABNL VS/O2: VSS RA  MOBILITY: Ax1 gb/walker  PAIN MANAGMENT: denies pain, on Schedule Tylenol, Gabapentin and Robaxin. PRN's also available  DIET: Low fiber  BOWEL/BLADDER: Continent up to BR, also uses urinal at beds side.  ABNL LAB/BG: None new  DRAIN/DEVICES: PIVx2 saline locked  TELEMETRY RHYTHM: NA  SKIN: LLE scab, scattered scabs/ bruises. Abdominal lap sites x2 with bandaids CDI.  Old ERICK drain site to LLQ, dressing c/d/i  TESTS/PROCEDURES: None  D/C DATE: Anticipated discharge to TCU,  pending placement.   OTHER IMPORTANT INFO: Continues on PO Augmentin. Ambulated outside the room x 1

## 2025-08-03 NOTE — PLAN OF CARE
Goal Outcome Evaluation:DATE & TIME: 8/2/25 4040-8435  Cognitive Concerns/ Orientation : A&O x4, forgeful and sometimes confused  BEHAVIOR & AGGRESSION TOOL COLOR: Green   ABNL VS/O2: VSS RA  MOBILITY: Ax1 gb/walker  PAIN MANAGMENT: denies pain, on Schedule Tylenol, Gabapentin and Robaxin. PRN's also available  DIET: Low fiber  BOWEL/BLADDER: Continent up to BR, also uses urinal at beds side.  ABNL LAB/BG: None new  DRAIN/DEVICES: PIVx2 saline locked  TELEMETRY RHYTHM: NA  SKIN: LLE scab, scattered scabs/ bruises. Abdominal lap sites x2 with bandaids CDI.  Old ERICK drain site to LLQ, dressing c/d/i  TESTS/PROCEDURES: None  D/C DATE: Anticipated discharge to TCU  pending placement.   OTHER IMPORTANT INFO: Continues on PO Augmentin.

## 2025-08-04 VITALS
TEMPERATURE: 98.7 F | WEIGHT: 229.28 LBS | OXYGEN SATURATION: 97 % | HEIGHT: 74 IN | SYSTOLIC BLOOD PRESSURE: 157 MMHG | DIASTOLIC BLOOD PRESSURE: 84 MMHG | BODY MASS INDEX: 29.43 KG/M2 | HEART RATE: 66 BPM | RESPIRATION RATE: 18 BRPM

## 2025-08-04 PROCEDURE — 250N000013 HC RX MED GY IP 250 OP 250 PS 637: Performed by: INTERNAL MEDICINE

## 2025-08-04 PROCEDURE — 97535 SELF CARE MNGMENT TRAINING: CPT | Mod: GO

## 2025-08-04 PROCEDURE — 99239 HOSP IP/OBS DSCHRG MGMT >30: CPT | Performed by: INTERNAL MEDICINE

## 2025-08-04 PROCEDURE — 97530 THERAPEUTIC ACTIVITIES: CPT | Mod: GO

## 2025-08-04 PROCEDURE — 97116 GAIT TRAINING THERAPY: CPT | Mod: GP

## 2025-08-04 PROCEDURE — 97530 THERAPEUTIC ACTIVITIES: CPT | Mod: GP

## 2025-08-04 PROCEDURE — 250N000013 HC RX MED GY IP 250 OP 250 PS 637: Performed by: PHYSICIAN ASSISTANT

## 2025-08-04 RX ORDER — ATENOLOL 50 MG/1
50 TABLET ORAL DAILY
Qty: 90 TABLET | Refills: 0 | Status: SHIPPED | OUTPATIENT
Start: 2025-08-04

## 2025-08-04 RX ORDER — NICOTINE 21 MG/24HR
1 PATCH, TRANSDERMAL 24 HOURS TRANSDERMAL DAILY
Qty: 30 PATCH | Refills: 0 | Status: SHIPPED | OUTPATIENT
Start: 2025-08-04

## 2025-08-04 RX ADMIN — AMLODIPINE BESYLATE 10 MG: 10 TABLET ORAL at 09:49

## 2025-08-04 RX ADMIN — SIMETHICONE 80 MG: 80 TABLET, CHEWABLE ORAL at 17:04

## 2025-08-04 RX ADMIN — LISINOPRIL 20 MG: 20 TABLET ORAL at 09:49

## 2025-08-04 RX ADMIN — DULOXETINE HYDROCHLORIDE 60 MG: 60 CAPSULE, DELAYED RELEASE PELLETS ORAL at 09:49

## 2025-08-04 RX ADMIN — GABAPENTIN 600 MG: 300 CAPSULE ORAL at 09:49

## 2025-08-04 RX ADMIN — FOLIC ACID 1 MG: 1 TABLET ORAL at 09:49

## 2025-08-04 RX ADMIN — LEVOTHYROXINE SODIUM 112 MCG: 0.11 TABLET ORAL at 06:30

## 2025-08-04 RX ADMIN — ATENOLOL 50 MG: 50 TABLET ORAL at 09:49

## 2025-08-04 RX ADMIN — OXYCODONE HYDROCHLORIDE 2.5 MG: 5 TABLET ORAL at 06:34

## 2025-08-04 RX ADMIN — AMOXICILLIN AND CLAVULANATE POTASSIUM 1 TABLET: 875; 125 TABLET, FILM COATED ORAL at 09:49

## 2025-08-04 RX ADMIN — NICOTINE 1 PATCH: 14 PATCH, EXTENDED RELEASE TRANSDERMAL at 09:48

## 2025-08-04 RX ADMIN — MICONAZOLE NITRATE: 2 POWDER TOPICAL at 09:50

## 2025-08-04 RX ADMIN — SIMETHICONE 80 MG: 80 TABLET, CHEWABLE ORAL at 09:49

## 2025-08-04 RX ADMIN — ACETAMINOPHEN 650 MG: 325 TABLET ORAL at 06:29

## 2025-08-04 RX ADMIN — METHOCARBAMOL 500 MG: 500 TABLET ORAL at 09:49

## 2025-08-04 RX ADMIN — SIMETHICONE 80 MG: 80 TABLET, CHEWABLE ORAL at 13:25

## 2025-08-04 RX ADMIN — METHOCARBAMOL 500 MG: 500 TABLET ORAL at 17:04

## 2025-08-04 ASSESSMENT — ACTIVITIES OF DAILY LIVING (ADL)
ADLS_ACUITY_SCORE: 47

## 2025-08-04 NOTE — DISCHARGE SUMMARY
"[facility]  Hospitalist Discharge Summary      Date of Admission:  7/23/2025  Date of Discharge:  8/4/2025  Discharging Provider: Brandee Shelby MD  Discharge Service: Hospitalist Service    Discharge Diagnoses      Perforated acute appendicitis s/p laparoscopic appendectomy, 7/25/2025  Expected postoperative ileus - resolved    HIV with peripheral neuropathy      Chronic kidney disease, stage 2     Mild chronic hyponatremia     Essential hypertension     Hypothyroidism  Hx of Graves disease     Tobacco use disorder     Mild uncomplicated alcohol withdrawal  - resolved     Acute metabolic encephalopathy with delirium  - resolved     Generalized weakness  Physical deconditioning  -PT/OT consulted  - TCU recommended. Awaiting placement       Clinically Significant Risk Factors     # Overweight: Estimated body mass index is 29.44 kg/m  as calculated from the following:    Height as of this encounter: 1.88 m (6' 2\").    Weight as of this encounter: 104 kg (229 lb 4.5 oz).       Follow-ups Needed After Discharge   Follow-up Appointments       Follow Up      Follow up with Dr. Pbalo (Acute Care Clinic) on 8/20/25 at 1:30pm.  Please arrive 15 minutes prior to your appointment time.  Call [phone] if you have any concerns. We are located at [address] [place] [address].    Follow up with GI as OP in 3 months. NO colonoscopy for minimum 3 months after appendectomy        Hospital Follow-up with Existing Primary Care Provider (PCP)          Schedule Primary Care visit within: 14 Days           {Additional follow-up instructions/to-do's for PCP    :    Unresulted Labs Ordered in the Past 30 Days of this Admission       Date and Time Order Name Status Description    7/25/2025  9:10 AM Fungal or Yeast Culture Routine Preliminary         These results will be followed up by     Discharge Disposition   Discharged to home with home care PT OT RN TERRA   Condition at discharge: " Stable    Hospital Course     Nicolás Fermin is a 65 year old male with history of HIV, who presented on 7/23/2025 with RLQ pain, nausea and 1 episode of diarrhea.       CT abd/pelvis showed - mildly dilated and inflamed mid small bowel in RLQ with adjacent loops of distal small bowel appearing inflamed within the terminal ileum, hyperenhancing mildly dilated appendix      Initially felt to have infectious enteritis vs less likely IBD vs acute appendicitis. Started on IV zosyn with improvement in CRP. [facility] and general surgery consulted. Taken to OR on 7/25 and found to have perforated acute appendicitis     Perforated acute appendicitis s/p laparoscopic appendectomy, 7/25/2025  Expected postoperative ileus - resolved  -unusual presentation on CT scan. S/p Diagnostic laparoscopy with laparoscopic appendectomy, 7/25/2025. Operative findings showed - markedly perforated appendix with multiple areas of perforation, turbid fluid throughout the abdomen, inflamed terminal ileum and cecum, distended small bowel  -Operative cultures negative  -completed 5 days of IV zosyn + 5 days Augmentin in hospital.   -Outpatient colonoscopy with ileal evaluation in 3 month per [facility] due to unusual presentation. Gen surgery does not recommend colonoscopy within 3 months of surgery      HIV with peripheral neuropathy   -Followed by Piyush GARZA. Last HIV load detectable 3/2024, CD4 >1000. Reports compliance with HAART  - CD4 count stable  - Continue prior to admission HAART  - Continue prior to admission gabapentin, duloxetine      Chronic kidney disease, stage 2  Baseline creatinine 1-1.1. Creatinine mildly elevated to 1.29 on admission, Improved to 1 with IV fluids       Mild chronic hyponatremia  -Sodium 130 on admission. Now improved to 134. Records show longstanding history of mild hyponatremia.     Essential hypertension  - Continue amlodipine, lisinopril  - Due to mild bradycardia, decreased atenolol to 50 mg daily       Hypothyroidism  Hx of Graves disease  - Continue levothyroxine     Tobacco use disorder  - Encouraged cessation. Continue nicotine patch     Mild uncomplicated alcohol withdrawal  -drinks few beers per day. Had mild alcohol withdrawal earlier in hospital course.      Acute metabolic encephalopathy with delirium  -developed confusion, agitation on 7/26.  Was also reported to have hallucinations and trying to remove ERICK drain, IV lines.  -Likely multifactorial- Secondary to mild alcohol withdrawal, surgery, acute illness.  -now resolved as of 7/30.      Generalized weakness  Physical deconditioning  Mild cognitive impairment - SLUMS 25/30 this admission  -PT/OT consulted  - TCU recommended. Even after several days no accepting TCU found. On further discussion with patient and his brother, they decided to go home with home care - PT OT RN and SW.        Consultations This Hospital Stay   GASTROENTEROLOGY IP CONSULT  SURGERY GENERAL IP CONSULT  PHARMACY IP CONSULT  PHYSICAL THERAPY ADULT IP CONSULT  OCCUPATIONAL THERAPY ADULT IP CONSULT  CARE MANAGEMENT / SOCIAL WORK IP CONSULT    Code Status   Full Code    Time Spent on this Encounter   IBrandee MD, personally saw the patient today and spent greater than 30 minutes discharging this patient.       Brandee Shelby MD  [place] [address] [facility]  [address] [place] [address]  Phone: [phone]  ______________________________________________________________________    Physical Exam   Vital Signs: Temp: 98.7  F (37.1  C) Temp src: Oral BP: (!) 157/84 Pulse: 66   Resp: 18 SpO2: 97 % O2 Device: None (Room air)    Weight: 229 lbs 4.45 oz    onstitutional - awake and alert, in no acute distress  Integumentary - skin is warm and dry, no rashes or ulcers  Neurological - awake, alert and oriented x3.  Moving all 4 extremities, normal speech, no focal deficits       Primary Care Physician   Brooks Walsh    Discharge Orders       WOUND CARE SUPPLIES    Abds and tape for drain site     Home Care Referral      Follow Up    Follow up with Dr. Pablo (Acute Care Clinic) on 8/20/25 at 1:30pm.  Please arrive 15 minutes prior to your appointment time.  Call [phone] if you have any concerns. We are located at [address] [place] [address].    Follow up with GI as OP in 3 months. NO colonoscopy for minimum 3 months after appendectomy     Wound care and dressings    Instructions to care for your wound at home: keep wound(s) clean and dry. You may shower, but do not soak incisions x 2 weeks. Leave steri strips in place, they will fall off on their own. Apply dry dressing as needed.     Reason for your hospital stay    Perforated appendicites     Activity    Your activity upon discharge: activity as tolerated     Diet    Follow this diet upon discharge: Regular     Hospital Follow-up with Existing Primary Care Provider (PCP)            Significant Results and Procedures   Results for orders placed or performed during the hospital encounter of 07/23/25   CT Abdomen Pelvis w Contrast    Narrative    EXAM: CT ABDOMEN PELVIS W CONTRAST  LOCATION: [facility]  DATE: 7/23/2025    INDICATION: RLQ pain  COMPARISON: None.  TECHNIQUE: CT scan of the abdomen and pelvis was performed following injection of IV contrast. Multiplanar reformats were obtained. Dose reduction techniques were used.  CONTRAST: 113 mL Isovue 370    FINDINGS:   LOWER CHEST: Subsegmental atelectasis within the posterior aspect of the bilateral lung bases.    HEPATOBILIARY: Normal.    PANCREAS: Normal.    SPLEEN: Normal.    ADRENAL GLANDS: Normal    KIDNEYS/BLADDER: Bilateral kidneys are within normal limits. No ureteral dilatation. Moderately distended bladder..    BOWEL: Stomach and proximal small bowel are within normal limits. Mildly dilated mid small bowel. Thick-walled segment of normal caliber small bowel at the transition area  (images [id number]). No acute angulation to suggest adhesions. Normal caliber small   bowel distal to this. There are several loops of distal small bowel which demonstrate inflammatory changes within the right lower quadrant, adjacent to the transition segment, including the terminal ileum. Appendiceal wall hyperenhancement with mild   dilatation in the region of the space measuring 14 mm. The more distal appendix is within normal limits in caliber. Normal caliber colon. Mild diverticulosis of the distal descending colon. No evidence for acute diverticulitis.    LYMPH NODES: No lymphadenopathy.    VASCULATURE: Moderate calcified atherosclerotic changes. No abdominal aortic aneurysm.    PELVIC ORGANS: Normal.    MUSCULOSKELETAL: Multilevel degenerative changes throughout the lumbar spine. Degenerative changes, lower lumbar facet joints.      Impression    IMPRESSION:   1.  Mildly dilated mid small bowel with a thick-walled transition inflamed appearing small bowel transition segment within the right lower quadrant. There are several, adjacent loops of distal small bowel which also demonstrate inflammatory changes   within the attending the terminal ileum. . Findings may be secondary to small bowel inflammation/infection, such as seen with Crohn's disease.  2.  Hyperenhancing, mildly dilated appendix which may be secondarily involved by the small bowel inflammatory process. The small bowel inflammatory changes do not appear to be secondary to acute appendicitis, as the inflammatory changes are more   prominent within the small bowel.         Discharge Medications      Review of your medicines        START taking        Dose / Directions   miconazole 2 % external powder  Commonly known as: MICATIN  Used for: Candida rash of groin      Apply topically 2 times daily. To groin rash  Quantity: 85 g  Refills: 0     nicotine 14 MG/24HR 24 hr patch  Commonly known as: NICODERM CQ  Used for: Tobacco abuse disorder      Dose: 1  patch  Place 1 patch over 24 hours onto the skin daily.  Quantity: 30 patch  Refills: 0            CHANGE how you take these medications        Dose / Directions   atenolol 50 MG tablet  Commonly known as: TENORMIN  This may have changed:   medication strength  how much to take  Used for: Essential (primary) hypertension      Dose: 50 mg  Take 1 tablet (50 mg) by mouth daily.  Quantity: 90 tablet  Refills: 0     levothyroxine 112 MCG tablet  Commonly known as: SYNTHROID/LEVOTHROID  This may have changed: when to take this  Used for: Graves disease      Dose: 112 mcg  TAKE 1 TABLET(112 MCG) BY MOUTH DAILY  Quantity: 90 tablet  Refills: 0            CONTINUE these medicines which have NOT CHANGED        Dose / Directions   acetaminophen 325 MG tablet  Commonly known as: TYLENOL      Dose: 325-650 mg  Take 325-650 mg by mouth every 6 hours as needed for mild pain.  Refills: 0     amitriptyline 50 MG tablet  Commonly known as: ELAVIL  Used for: Peripheral sensory-motor axonal polyneuropathy      Dose: 50 mg  Take 1 tablet (50 mg) by mouth at bedtime.  Quantity: 90 tablet  Refills: 1     amLODIPine 10 MG tablet  Commonly known as: NORVASC  Used for: Essential hypertension, benign      Dose: 10 mg  Take 1 tablet (10 mg) by mouth daily.  Quantity: 90 tablet  Refills: 2     BENADRYL PO      Dose: 25 mg  Take 25 mg by mouth nightly as needed.  Refills: 0     ciclopirox 0.77 % cream  Commonly known as: LOPROX  Used for: Tinea pedis of left foot, Onychomycosis      Apply topically 2 times daily To feet and toenails.  Quantity: 90 g  Refills: 5     clindamycin 1 % external gel  Commonly known as: CLEOCIN-T  Used for: Staph skin infection      Apply topically 2 times daily.  Quantity: 60 g  Refills: 0     DULoxetine 60 MG capsule  Commonly known as: CYMBALTA  Used for: Peripheral sensory-motor axonal polyneuropathy      Dose: 60 mg  Take 1 capsule (60 mg) by mouth daily.  Quantity: 90 capsule  Refills: 1      emtricitabine-tenofovir -25 MG per tablet  Commonly known as: DESCOVY      Dose: 1 tablet  Take 1 tablet by mouth at bedtime.  Refills: 0     fish oil-omega-3 fatty acids 500 MG capsule      Dose: 500 mg  Take 500 mg by mouth at bedtime.  Refills: 0     gabapentin 300 MG capsule  Commonly known as: NEURONTIN  Used for: Peripheral sensory-motor axonal polyneuropathy      Dose: 600 mg  Take 2 capsules (600 mg) by mouth 2 times daily.  Quantity: 360 capsule  Refills: 1     lisinopril 20 MG tablet  Commonly known as: ZESTRIL  Used for: Essential hypertension, benign      Dose: 20 mg  Take 1 tablet (20 mg) by mouth 2 times daily  Quantity: 180 tablet  Refills: 2     naproxen 250 MG tablet  Commonly known as: NAPROSYN      Dose: 250 mg  Take 250 mg by mouth 2 times daily as needed for moderate pain.  Refills: 0     psyllium 58.6 % powder  Commonly known as: METAMUCIL/KONSYL      Take by mouth daily as needed.  Refills: 0     TIVICAY PO  Used for: Fever chills, Cough      Dose: 50 mg  Take 50 mg by mouth at bedtime.  Refills: 0               Where to get your medicines        These medications were sent to [id number] - [place], [place] - [address]  [address], [facility] [address]      Phone: [phone]   levothyroxine 112 MCG tablet       These medications were sent to [facility] [place]  [place] - [address] [place] [address]  [address] [address] [address]      Phone: [phone]   atenolol 50 MG tablet  miconazole 2 % external powder  nicotine 14 MG/24HR 24 hr patch       Allergies   No Known Allergies

## 2025-08-04 NOTE — PLAN OF CARE
Goal Outcome Evaluation:       SUMMARY: Perforated acute appendicitis. S/p Lap appendectomy 7/25.     DATE & TIME: 8/3/25-8/4/25 7578-2155  Cognitive Concerns/ Orientation : A&O x4, forgetful at times  BEHAVIOR & AGGRESSION TOOL COLOR: Green   ABNL VS/O2: VSS RA except elevated BP  MOBILITY: Ax1 gb/walker, ambulated in halls x1  PAIN MANAGMENT: c/o lower back pain. Managed with Scheduled Tylenol, Gabapentin and Robaxin. PRN oxycodone x1.   DIET: Low fiber  BOWEL/BLADDER: Continent up to BR, also uses urinal at bedside.  ABNL LAB/BG: None new  DRAIN/DEVICES: PIVx2 saline locked  TELEMETRY RHYTHM: NA  SKIN: LLE calf/shin scab, scattered scabs/ bruises. Abdominal lap sites x2 with bandaids CDI. Old ERICK drain site to LLQ, dressing c/d/i  TESTS/PROCEDURES: None  D/C DATE: Anticipated discharge to TCU  pending placement.   OTHER IMPORTANT INFO: Continues on PO Augmentin. Refused bed alarm overnight, calls appropriately

## 2025-08-04 NOTE — PROGRESS NOTES
Care Management Follow Up    Length of Stay (days): 12    Expected Discharge Date: 08/04/2025     Concerns to be Addressed:   Need to discuss discharge plan going home with his brother    Patient plan of care discussed at interdisciplinary rounds: Yes    Anticipated Discharge Disposition: TCU vs home with OPPT/OT     Anticipated Discharge Services:  therapies  Anticipated Discharge DME:  melissa    Patient/family educated on Medicare website which has current facility and service quality ratings:  yes  Education Provided on the Discharge Plan:  yes  Patient/Family in Agreement with the Plan: yes    Referrals Placed by CM/SW:  TCUs and then now focus of ging home  Private pay costs discussed: Not applicable    Discussed  Partnership in Safe Discharge Planning  document with patient/family: No     Handoff Completed: Yes, [facility] PCP: Internal handoff referral completed    Additional Information:  Therapy recommendations have now changed to OT recommending home OT and PT recommending OPPT if patient has the assistance he needs from his brother.  This was discussed with patient.  He has lived with his younger Brother Michelet for three years.  Discussed SLUMS score (25/30) and recommendations for continued therapies.  Patient would like to have outpatient therapies and felt his brother would be able to assist as needed.  Patient noted his brother does most of the cooking and laundry.  Also explained that patient should wait to start driving again until he has had some outpatient therapy and seen his PCP.  Patient is in agreement for this and noted he would have one of his brothers provide transportation.  Awaiting call back from his brother.    In preparation, a PCP follow up has been scheduled on 8/12/25.    Addendum 3:20: CC had long conversation with patient's Brother Michelet.  Michelet does work in the morning at the golf [employer].  Discussed what patient would need in order to transition home.  Michelet felt providing transportation may be  a little difficult and felt having home care would be best at the current time.  Michelet would really like for his brother to transition home with him and is willing to help make this happen.  He posed the question of what if he gets him home and then after a few days it does not work out, what does he do.  Have asked rounding Hospitalist to add a SW in case this does happen.  Patient has been ambulating halls with the walker and exhibiting good tolerance.  Home care referral was placed.  Michelet will provide transportation around 7:00 PM.  Have sent a Small[employer] message to Dr Shelby.    Next Steps:   Care Management is following    Felipa Carrillo, RN  Inpatient Care Management  [phone]

## 2025-08-04 NOTE — PLAN OF CARE
Goal Outcome Evaluation:      Plan of Care Reviewed With: patient    Overall Patient Progress: improvingOverall Patient Progress: improving    Discharge    Patient discharged to home via w/c with family (brother)  Care plan note  SUMMARY: Perforated acute appendicitis. S/p Lap appendectomy 7/25.     DATE & TIME: 8/4/25 5119-9671  Cognitive Concerns/ Orientation : A&O x4, forgetful at times  BEHAVIOR & AGGRESSION TOOL COLOR: Green   ABNL VS/O2: VSS RA except elevated BP  MOBILITY: Ax1 gb/walker, ambulated in hallways multiple times.   PAIN MANAGMENT: denies pain, on Schedule Tylenol, Gabapentin and Robaxin.  DIET: Low fiber  BOWEL/BLADDER: Continent up to BR, also uses urinal at bedside.  ABNL LAB/BG: None new  DRAIN/DEVICES: PIVx2 removed prior to discharge   TELEMETRY RHYTHM: NA  SKIN: LLE calf/shin scab, scattered scabs/ bruises. Abdominal lap sites x2 with steri strips and band aids CDI. Old ERICK drain site to LLQ, no drainage noted, dressing changed, CDI.   TESTS/PROCEDURES: None  D/C DATE: Discharged home today 8/4/25 at 1915, pt's brother provided transportation  OTHER IMPORTANT INFO: went through AVS and discharge home medication information, pt verbalized understanding. Belongings packed and sent with the pt. Denied questions and concerns at the time of discharge. Extra dressings given for abdominal ERICK drain site. Print out of MD's letter for work given.          Listed belongings gathered and given to patient (including from security/pharmacy). Yes  Care Plan and Patient education resolved: Yes  Prescriptions if needed, hard copies sent with patient  NA  Medication Bin checked and emptied on discharge Yes  SW/care coordinator/charge RN aware of discharge: Yes

## 2025-08-05 ENCOUNTER — PATIENT OUTREACH (OUTPATIENT)
Dept: INTERNAL MEDICINE | Facility: CLINIC | Age: 65
End: 2025-08-05
Payer: COMMERCIAL

## 2025-08-05 ENCOUNTER — PATIENT OUTREACH (OUTPATIENT)
Dept: CARE COORDINATION | Facility: CLINIC | Age: 65
End: 2025-08-05
Payer: COMMERCIAL

## 2025-08-05 NOTE — PROGRESS NOTES
Care Management Discharge Note    Discharge Date: 08/04/2025       Discharge Disposition:  Home  Discharge Services:  Home care SN/SW/PT/OT    Discharge DME:  walker    Discharge Transportation: family or friend will provide    Is transportation arrangement complete? No, family transporting    Private pay costs discussed: Not applicable    Does the patient's insurance plan have a 3 day qualifying hospital stay waiver?  No    PAS Confirmation Code:  NA  Patient/family educated on Medicare website which has current facility and service quality ratings:  NA    Education Provided on the Discharge Plan:  yes  Persons Notified of Discharge Plans: Patient and brother. Nsg  Patient/Family in Agreement with the Plan: yes    Handoff Referral Completed: Yes, [facility] PCP: Internal handoff referral completed    Additional Information:  CC had long conversation with patient's Brother Michelet.  Michelet does work in the morning at the golf course.  Discussed what patient would need in order to transition home and have outpatient therapies.  Mcihelet felt providing transportation may be a little difficult and felt having home care would be best at the current time.  Michelet would really like for his brother to transition home with him and is willing to help make this happen.  He posed the question of what if he gets him home and then after a few days it does not work out, what does he do.  Have asked rounding Hospitalist to add a SW in case this does happen.  Patient has been ambulating halls with the walker and exhibiting good tolerance.    [facility] accepted patient for SN/SW/PT/OT.  Patient has the following appointments:  Aug    7 [facility] Abundio Matt  Thursday Aug 7, 2025 8:45 AM [address] [address]  [phone]   Aug    12 ED/Hospital Follow Up with Dev Ames  Tuesday Aug 12, 2025 1:45 PM  Please plan to arrive at the clinic at your  "\"Arrive By\" time for your appointment. Our late policy will be enforced based on this time. [facility]  [address] [id number] [phone]   Aug    20 Post-Op General Surgery with Jodi Pablo  Wednesday Aug 20, 2025 1:15 PM [facility] [address], [address]  [facility] [id number] [phone]   Nov 18 Return Neurology with Stone Nowak  Tuesday Nov 18, 2025 8:45 AM [facility] [place]  [address], [address]  [facility] [id number] [phone]     Patient's brother is providing transportation home.    Felipa Carrillo, RN  Inpatient Care Management  [phone]     "

## 2025-08-05 NOTE — PLAN OF CARE
"Occupational Therapy Discharge Summary    Reason for therapy discharge:    Discharged to home with home therapy.    Progress towards therapy goal(s). See goals on Care Plan in [facility] electronic health record for goal details.  Goals partially met.  Barriers to achieving goals:   discharge from facility.    Therapy recommendation(s):    Continued therapy is recommended.  Rationale/Recommendations:  Per treating therapist, \"Pt functioning below baseline, limited by dec act escobar, cognition. Pt reports lives w/ brother, ind w/ I/ADLs at baseline. Pt scored 25/30 on SLUMS cog assess 7/31/25, indicating possible mild cognitive deficits. w/ LOS, pt progressing to SBA w/ ADLs and mobility. Pt reports lives w/ brother who is able to provide assist as needed w/ heavier I/ADLs (driving, cooking, cleaning, laundry, caring for pt's dog). If this level of assist is available, rec home w/ assist and HHOT for home safety eval, cont monitoring cognition, progress I/ADL act escobar. If this assist is not available, rec TCU to progress act escobar for I/ADLs and monitor cog. IP OT will cont to follow\".        "

## 2025-08-05 NOTE — PLAN OF CARE
"Physical Therapy Discharge Summary    Reason for therapy discharge:    Discharged to home with home therapy.    Progress towards therapy goal(s). See goals on Care Plan in [facility] electronic health record for goal details.  Goals partially met.  Barriers to achieving goals:   discharge from facility.    Therapy recommendation(s):    Continued therapy is recommended.  Rationale/Recommendations:  per most recent PT note: \"Pt is below baseline. Pt presents with deficits in activity tolerance, balance, and strength. Progressed to be SBA with bed mob, transfers and ambulation on this date, using FWW. Given progress, writer wondering if home with assist and OP PT would be appropriate; per conversation with pt, his brother is retired and can provide assistance as needed. Pt currently would need Ax1 with stairs and assitance with IADLs like cooking, laundry, rides for appointments. If this level of assistance is available, would recommend home with OP PT. HOWEVER, notes are conflicting... per eval, pt's brother works and cannot provide assistance; also, nurse reported that the pt rents from brother but they don't live together. Will need to confirm living environment to better anticipate d/c recommendations and needs. OT planning to follow up on this today.\" .      "

## 2025-08-07 ENCOUNTER — OFFICE VISIT (OUTPATIENT)
Dept: DERMATOLOGY | Facility: CLINIC | Age: 65
End: 2025-08-07
Payer: COMMERCIAL

## 2025-08-07 ENCOUNTER — TELEPHONE (OUTPATIENT)
Dept: INTERNAL MEDICINE | Facility: CLINIC | Age: 65
End: 2025-08-07

## 2025-08-07 DIAGNOSIS — L81.4 LENTIGO: ICD-10-CM

## 2025-08-07 DIAGNOSIS — D18.01 ANGIOMA OF SKIN: ICD-10-CM

## 2025-08-07 DIAGNOSIS — Z85.828 HISTORY OF SKIN CANCER: ICD-10-CM

## 2025-08-07 DIAGNOSIS — C44.719 BASAL CELL CARCINOMA (BCC) OF LEFT LOWER LEG: ICD-10-CM

## 2025-08-07 DIAGNOSIS — C44.719 BASAL CELL CARCINOMA (BCC) OF SKIN OF LEFT ANKLE: Primary | ICD-10-CM

## 2025-08-07 DIAGNOSIS — D23.9 DERMAL NEVUS: ICD-10-CM

## 2025-08-07 DIAGNOSIS — L82.1 SEBORRHEIC KERATOSES: ICD-10-CM

## 2025-08-07 LAB — BACTERIA PRT CULT: NORMAL

## 2025-08-11 ENCOUNTER — TELEPHONE (OUTPATIENT)
Dept: INTERNAL MEDICINE | Facility: CLINIC | Age: 65
End: 2025-08-11
Payer: COMMERCIAL

## 2025-08-13 ENCOUNTER — MEDICAL CORRESPONDENCE (OUTPATIENT)
Dept: HEALTH INFORMATION MANAGEMENT | Facility: CLINIC | Age: 65
End: 2025-08-13
Payer: COMMERCIAL

## 2025-08-14 ENCOUNTER — TELEPHONE (OUTPATIENT)
Dept: INTERNAL MEDICINE | Facility: CLINIC | Age: 65
End: 2025-08-14

## 2025-08-14 ENCOUNTER — OFFICE VISIT (OUTPATIENT)
Dept: INTERNAL MEDICINE | Facility: CLINIC | Age: 65
End: 2025-08-14
Payer: COMMERCIAL

## 2025-08-14 VITALS
HEART RATE: 63 BPM | TEMPERATURE: 97.1 F | SYSTOLIC BLOOD PRESSURE: 142 MMHG | BODY MASS INDEX: 28.08 KG/M2 | WEIGHT: 218.8 LBS | OXYGEN SATURATION: 97 % | HEIGHT: 74 IN | DIASTOLIC BLOOD PRESSURE: 76 MMHG | RESPIRATION RATE: 14 BRPM

## 2025-08-14 DIAGNOSIS — B20 HUMAN IMMUNODEFICIENCY VIRUS (HIV) DISEASE (H): ICD-10-CM

## 2025-08-14 DIAGNOSIS — E89.0 POSTABLATIVE HYPOTHYROIDISM: ICD-10-CM

## 2025-08-14 DIAGNOSIS — I10 ESSENTIAL (PRIMARY) HYPERTENSION: ICD-10-CM

## 2025-08-14 DIAGNOSIS — B20 NEUROPATHY DUE TO HIV (H): ICD-10-CM

## 2025-08-14 DIAGNOSIS — G63 NEUROPATHY DUE TO HIV (H): ICD-10-CM

## 2025-08-14 DIAGNOSIS — K35.32 PERFORATED APPENDIX: Primary | ICD-10-CM

## 2025-08-14 DIAGNOSIS — M54.17 RADICULAR PAIN OF LUMBOSACRAL REGION: ICD-10-CM

## 2025-08-14 LAB — BACTERIA PRT CULT: NORMAL

## 2025-08-19 ENCOUNTER — TELEPHONE (OUTPATIENT)
Dept: INTERNAL MEDICINE | Facility: CLINIC | Age: 65
End: 2025-08-19
Payer: COMMERCIAL

## 2025-08-21 LAB — BACTERIA PRT CULT: NORMAL

## 2025-08-26 ENCOUNTER — MYC MEDICAL ADVICE (OUTPATIENT)
Dept: INTERNAL MEDICINE | Facility: CLINIC | Age: 65
End: 2025-08-26
Payer: COMMERCIAL

## (undated) DEVICE — ENDO POUCH RETRIEVAL SYSTEM OD11 MM 265 ML CD005

## (undated) DEVICE — SYR 10ML PERFIX LL 332152

## (undated) DEVICE — ESU HOLDER LAP INST DISP PURPLE LONG 330MM H-PRO-330

## (undated) DEVICE — KIT CULTURE ESWAB AEROBE/ANAEROBE WHITE TOP R723480

## (undated) DEVICE — SU VICRYL+ 0 27 UR6 VLT VCP603H

## (undated) DEVICE — DRSG BANDAID 1X3" FABRIC CURITY LATEX FREE KC44101

## (undated) DEVICE — LINEN TOWEL PACK X5 5464

## (undated) DEVICE — SU ETHILON 3-0 FS-1 18" 669H

## (undated) DEVICE — SYRINGE IRRIGATION BULB TIP 60ML STER LF DYND20125

## (undated) DEVICE — WARMER SCOPE DISPOSABLE DLW510

## (undated) DEVICE — KIT TURNOVER FAIRVIEW SOUTHDALE FULL SP3889

## (undated) DEVICE — SYR 30ML LL W/O NDL 302832

## (undated) DEVICE — NDL INSUFFLATION 13GA 120MM C2201

## (undated) DEVICE — ENDO TROCAR FIRST ENTRY KII FIOS Z-THRD 05X100MM CTF03

## (undated) DEVICE — NEEDLE EPIDURAL TUOHY 20G X4.5 18324

## (undated) DEVICE — PACK LAP CHOLE SLC15LCFSD

## (undated) DEVICE — PREP CHLORAPREP W/ORANGE TINT 10.5ML 930715

## (undated) DEVICE — GLOVE BIOGEL PI ULTRATOUCH G SZ 7.0 42170

## (undated) DEVICE — SOL NACL 0.9% INJ 1000ML BAG 2B1324X

## (undated) DEVICE — ENDO DISSECTOR KITTNER 05MM 28-0804

## (undated) DEVICE — STPL RELOAD REG TISSUE ECHELON 45 X 3.6MM BLUE GST45B

## (undated) DEVICE — ESU LIGASURE MARYLAND LAPAROSCOPIC SLR/DVDR 5MMX37CM LF1937

## (undated) DEVICE — TRAY PAIN INJECTION 97A 640

## (undated) DEVICE — SUCTION MANIFOLD NEPTUNE 2 SYS 4 PORT 0702-020-000

## (undated) DEVICE — ANTIFOG SOLUTION SEE SHARP 150M TROCAR SWABS 30978

## (undated) DEVICE — BLADE KNIFE SURG 11 371111

## (undated) DEVICE — ESU GROUND PAD UNIVERSAL W/O CORD

## (undated) DEVICE — ENDO TROCAR SLEEVE KII Z-THREADED 05X100MM CTS02

## (undated) DEVICE — DRAIN RESERVOIR 100ML JP 0070740

## (undated) DEVICE — PREP CHLORAPREP 26ML TINTED HI-LITE ORANGE 930815

## (undated) DEVICE — SUCTION IRR STRYKERFLOW II W/TIP 250-070-520

## (undated) DEVICE — SU MONOCRYL 4-0 PS-2 18" UND Y496G

## (undated) DEVICE — GLOVE PROTEXIS BLUE W/NEU-THERA 6.5  2D73EB65

## (undated) DEVICE — ENDO TROCAR FIRST ENTRY KII FIOS Z-THRD 12X100MM CTF73

## (undated) DEVICE — STPL POWERED ECHELON 45MM PSEE45A

## (undated) DEVICE — Device

## (undated) DEVICE — SPONGE LAP 18X18" X8435

## (undated) DEVICE — ANTIFOG SOLUTION W/FOAM PAD CF-1002

## (undated) DEVICE — DRSG STERI STRIP 1/2X4" R1547

## (undated) RX ORDER — FENTANYL CITRATE 50 UG/ML
INJECTION, SOLUTION INTRAMUSCULAR; INTRAVENOUS
Status: DISPENSED
Start: 2025-07-25

## (undated) RX ORDER — FENTANYL CITRATE 50 UG/ML
INJECTION, SOLUTION INTRAMUSCULAR; INTRAVENOUS
Status: DISPENSED
Start: 2018-10-17

## (undated) RX ORDER — LIDOCAINE HYDROCHLORIDE 10 MG/ML
INJECTION, SOLUTION EPIDURAL; INFILTRATION; INTRACAUDAL; PERINEURAL
Status: DISPENSED
Start: 2021-11-29

## (undated) RX ORDER — METHYLPREDNISOLONE ACETATE 80 MG/ML
INJECTION, SUSPENSION INTRA-ARTICULAR; INTRALESIONAL; INTRAMUSCULAR; SOFT TISSUE
Status: DISPENSED
Start: 2021-11-29

## (undated) RX ORDER — BUPIVACAINE HYDROCHLORIDE 5 MG/ML
INJECTION, SOLUTION EPIDURAL; INTRACAUDAL
Status: DISPENSED
Start: 2021-11-29

## (undated) RX ORDER — ALBUTEROL SULFATE 90 UG/1
INHALANT RESPIRATORY (INHALATION)
Status: DISPENSED
Start: 2025-07-25

## (undated) RX ORDER — FENTANYL CITRATE 0.05 MG/ML
INJECTION, SOLUTION INTRAMUSCULAR; INTRAVENOUS
Status: DISPENSED
Start: 2025-07-25

## (undated) RX ORDER — PROPOFOL 10 MG/ML
INJECTION, EMULSION INTRAVENOUS
Status: DISPENSED
Start: 2025-07-25

## (undated) RX ORDER — HYDROMORPHONE HYDROCHLORIDE 1 MG/ML
INJECTION, SOLUTION INTRAMUSCULAR; INTRAVENOUS; SUBCUTANEOUS
Status: DISPENSED
Start: 2025-07-25